# Patient Record
Sex: MALE | Race: WHITE | NOT HISPANIC OR LATINO | Employment: FULL TIME | URBAN - METROPOLITAN AREA
[De-identification: names, ages, dates, MRNs, and addresses within clinical notes are randomized per-mention and may not be internally consistent; named-entity substitution may affect disease eponyms.]

---

## 2018-09-12 ENCOUNTER — APPOINTMENT (EMERGENCY)
Dept: RADIOLOGY | Facility: HOSPITAL | Age: 63
DRG: 210 | End: 2018-09-12
Payer: OTHER MISCELLANEOUS

## 2018-09-12 ENCOUNTER — HOSPITAL ENCOUNTER (INPATIENT)
Facility: HOSPITAL | Age: 63
LOS: 5 days | DRG: 210 | End: 2018-09-17
Attending: EMERGENCY MEDICINE | Admitting: INTERNAL MEDICINE
Payer: OTHER MISCELLANEOUS

## 2018-09-12 DIAGNOSIS — E55.9 VITAMIN D DEFICIENCY: ICD-10-CM

## 2018-09-12 DIAGNOSIS — E11.65 TYPE 2 DIABETES MELLITUS WITH HYPERGLYCEMIA, WITHOUT LONG-TERM CURRENT USE OF INSULIN (HCC): ICD-10-CM

## 2018-09-12 DIAGNOSIS — M81.0 OSTEOPOROSIS WITHOUT CURRENT PATHOLOGICAL FRACTURE, UNSPECIFIED OSTEOPOROSIS TYPE: ICD-10-CM

## 2018-09-12 DIAGNOSIS — S72.002A HIP FRACTURE, LEFT, CLOSED, INITIAL ENCOUNTER (HCC): Primary | ICD-10-CM

## 2018-09-12 DIAGNOSIS — R64 CACHECTIC (HCC): ICD-10-CM
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
## 2018-09-12 PROBLEM — E87.1 HYPONATREMIA: Status: ACTIVE | Noted: 2018-09-12

## 2018-09-12 LAB
ABO GROUP BLD: NORMAL
ANION GAP SERPL CALCULATED.3IONS-SCNC: 11 MMOL/L (ref 4–13)
APTT PPP: 26 SECONDS (ref 24–36)
ATRIAL RATE: 82 BPM
BASOPHILS # BLD AUTO: 0.03 THOUSANDS/ΜL (ref 0–0.1)
BASOPHILS NFR BLD AUTO: 1 % (ref 0–1)
BLD GP AB SCN SERPL QL: NEGATIVE
BUN SERPL-MCNC: 19 MG/DL (ref 5–25)
CALCIUM SERPL-MCNC: 8.8 MG/DL (ref 8.3–10.1)
CHLORIDE SERPL-SCNC: 96 MMOL/L (ref 100–108)
CO2 SERPL-SCNC: 23 MMOL/L (ref 21–32)
CREAT SERPL-MCNC: 1.01 MG/DL (ref 0.6–1.3)
CREAT UR-MCNC: 84 MG/DL
EOSINOPHIL # BLD AUTO: 0.03 THOUSAND/ΜL (ref 0–0.61)
EOSINOPHIL NFR BLD AUTO: 1 % (ref 0–6)
ERYTHROCYTE [DISTWIDTH] IN BLOOD BY AUTOMATED COUNT: 11.9 % (ref 11.6–15.1)
GFR SERPL CREATININE-BSD FRML MDRD: 78 ML/MIN/1.73SQ M
GLUCOSE SERPL-MCNC: 280 MG/DL (ref 65–140)
GLUCOSE SERPL-MCNC: 455 MG/DL (ref 65–140)
GLUCOSE SERPL-MCNC: 479 MG/DL (ref 65–140)
GLUCOSE SERPL-MCNC: >500 MG/DL (ref 65–140)
HCT VFR BLD AUTO: 39.6 % (ref 36.5–49.3)
HGB BLD-MCNC: 14.1 G/DL (ref 12–17)
IMM GRANULOCYTES # BLD AUTO: 0.03 THOUSAND/UL (ref 0–0.2)
IMM GRANULOCYTES NFR BLD AUTO: 1 % (ref 0–2)
INR PPP: 1.03 (ref 0.86–1.17)
LYMPHOCYTES # BLD AUTO: 1.2 THOUSANDS/ΜL (ref 0.6–4.47)
LYMPHOCYTES NFR BLD AUTO: 29 % (ref 14–44)
MCH RBC QN AUTO: 30.2 PG (ref 26.8–34.3)
MCHC RBC AUTO-ENTMCNC: 35.6 G/DL (ref 31.4–37.4)
MCV RBC AUTO: 85 FL (ref 82–98)
MICROALBUMIN UR-MCNC: 7.6 MG/L (ref 0–20)
MICROALBUMIN/CREAT 24H UR: 9 MG/G CREATININE (ref 0–30)
MONOCYTES # BLD AUTO: 0.24 THOUSAND/ΜL (ref 0.17–1.22)
MONOCYTES NFR BLD AUTO: 6 % (ref 4–12)
NEUTROPHILS # BLD AUTO: 2.64 THOUSANDS/ΜL (ref 1.85–7.62)
NEUTS SEG NFR BLD AUTO: 62 % (ref 43–75)
NRBC BLD AUTO-RTO: 0 /100 WBCS
P AXIS: 66 DEGREES
PLATELET # BLD AUTO: 159 THOUSANDS/UL (ref 149–390)
PMV BLD AUTO: 9.8 FL (ref 8.9–12.7)
POTASSIUM SERPL-SCNC: 4.6 MMOL/L (ref 3.5–5.3)
PR INTERVAL: 174 MS
PROTHROMBIN TIME: 13.6 SECONDS (ref 11.8–14.2)
QRS AXIS: 69 DEGREES
QRSD INTERVAL: 74 MS
QT INTERVAL: 372 MS
QTC INTERVAL: 434 MS
RBC # BLD AUTO: 4.67 MILLION/UL (ref 3.88–5.62)
RH BLD: POSITIVE
SODIUM SERPL-SCNC: 130 MMOL/L (ref 136–145)
SPECIMEN EXPIRATION DATE: NORMAL
T WAVE AXIS: 76 DEGREES
VENTRICULAR RATE: 82 BPM
WBC # BLD AUTO: 4.17 THOUSAND/UL (ref 4.31–10.16)

## 2018-09-12 PROCEDURE — 82948 REAGENT STRIP/BLOOD GLUCOSE: CPT

## 2018-09-12 PROCEDURE — 85025 COMPLETE CBC W/AUTO DIFF WBC: CPT | Performed by: EMERGENCY MEDICINE

## 2018-09-12 PROCEDURE — 85730 THROMBOPLASTIN TIME PARTIAL: CPT | Performed by: ORTHOPAEDIC SURGERY

## 2018-09-12 PROCEDURE — 73080 X-RAY EXAM OF ELBOW: CPT

## 2018-09-12 PROCEDURE — 86901 BLOOD TYPING SEROLOGIC RH(D): CPT | Performed by: ORTHOPAEDIC SURGERY

## 2018-09-12 PROCEDURE — 71045 X-RAY EXAM CHEST 1 VIEW: CPT

## 2018-09-12 PROCEDURE — 36415 COLL VENOUS BLD VENIPUNCTURE: CPT | Performed by: ORTHOPAEDIC SURGERY

## 2018-09-12 PROCEDURE — 82570 ASSAY OF URINE CREATININE: CPT | Performed by: INTERNAL MEDICINE

## 2018-09-12 PROCEDURE — 80048 BASIC METABOLIC PNL TOTAL CA: CPT | Performed by: EMERGENCY MEDICINE

## 2018-09-12 PROCEDURE — 93010 ELECTROCARDIOGRAM REPORT: CPT | Performed by: INTERNAL MEDICINE

## 2018-09-12 PROCEDURE — 86900 BLOOD TYPING SEROLOGIC ABO: CPT | Performed by: ORTHOPAEDIC SURGERY

## 2018-09-12 PROCEDURE — 96361 HYDRATE IV INFUSION ADD-ON: CPT

## 2018-09-12 PROCEDURE — 85610 PROTHROMBIN TIME: CPT | Performed by: ORTHOPAEDIC SURGERY

## 2018-09-12 PROCEDURE — 90471 IMMUNIZATION ADMIN: CPT

## 2018-09-12 PROCEDURE — 82043 UR ALBUMIN QUANTITATIVE: CPT | Performed by: INTERNAL MEDICINE

## 2018-09-12 PROCEDURE — 96374 THER/PROPH/DIAG INJ IV PUSH: CPT

## 2018-09-12 PROCEDURE — 99285 EMERGENCY DEPT VISIT HI MDM: CPT

## 2018-09-12 PROCEDURE — 90715 TDAP VACCINE 7 YRS/> IM: CPT | Performed by: EMERGENCY MEDICINE

## 2018-09-12 PROCEDURE — 73502 X-RAY EXAM HIP UNI 2-3 VIEWS: CPT

## 2018-09-12 PROCEDURE — 86850 RBC ANTIBODY SCREEN: CPT | Performed by: ORTHOPAEDIC SURGERY

## 2018-09-12 PROCEDURE — 93005 ELECTROCARDIOGRAM TRACING: CPT

## 2018-09-12 PROCEDURE — 73552 X-RAY EXAM OF FEMUR 2/>: CPT

## 2018-09-12 PROCEDURE — 36415 COLL VENOUS BLD VENIPUNCTURE: CPT | Performed by: EMERGENCY MEDICINE

## 2018-09-12 RX ORDER — HEPARIN SODIUM 5000 [USP'U]/ML
5000 INJECTION, SOLUTION INTRAVENOUS; SUBCUTANEOUS EVERY 8 HOURS SCHEDULED
Status: DISCONTINUED | OUTPATIENT
Start: 2018-09-12 | End: 2018-09-13

## 2018-09-12 RX ORDER — INSULIN GLARGINE 100 [IU]/ML
10 INJECTION, SOLUTION SUBCUTANEOUS ONCE
Status: COMPLETED | OUTPATIENT
Start: 2018-09-12 | End: 2018-09-12

## 2018-09-12 RX ORDER — METHOCARBAMOL 500 MG/1
500 TABLET, FILM COATED ORAL EVERY 6 HOURS PRN
Status: DISCONTINUED | OUTPATIENT
Start: 2018-09-12 | End: 2018-09-17 | Stop reason: HOSPADM

## 2018-09-12 RX ORDER — DOCUSATE SODIUM 100 MG/1
100 CAPSULE, LIQUID FILLED ORAL 2 TIMES DAILY
Status: DISCONTINUED | OUTPATIENT
Start: 2018-09-12 | End: 2018-09-16

## 2018-09-12 RX ORDER — MORPHINE SULFATE 10 MG/ML
6 INJECTION, SOLUTION INTRAMUSCULAR; INTRAVENOUS ONCE
Status: COMPLETED | OUTPATIENT
Start: 2018-09-12 | End: 2018-09-12

## 2018-09-12 RX ORDER — ACETAMINOPHEN 325 MG/1
650 TABLET ORAL EVERY 6 HOURS PRN
Status: DISCONTINUED | OUTPATIENT
Start: 2018-09-12 | End: 2018-09-17 | Stop reason: HOSPADM

## 2018-09-12 RX ORDER — OXYCODONE HYDROCHLORIDE 5 MG/1
5 TABLET ORAL EVERY 4 HOURS PRN
Status: DISCONTINUED | OUTPATIENT
Start: 2018-09-12 | End: 2018-09-17 | Stop reason: HOSPADM

## 2018-09-12 RX ORDER — SODIUM CHLORIDE 9 MG/ML
125 INJECTION, SOLUTION INTRAVENOUS CONTINUOUS
Status: DISCONTINUED | OUTPATIENT
Start: 2018-09-13 | End: 2018-09-15

## 2018-09-12 RX ORDER — ACETAMINOPHEN 325 MG/1
650 TABLET ORAL EVERY 6 HOURS PRN
Status: DISCONTINUED | OUTPATIENT
Start: 2018-09-12 | End: 2018-09-12

## 2018-09-12 RX ORDER — POLYETHYLENE GLYCOL 3350 17 G/17G
17 POWDER, FOR SOLUTION ORAL DAILY PRN
Status: DISCONTINUED | OUTPATIENT
Start: 2018-09-12 | End: 2018-09-17 | Stop reason: HOSPADM

## 2018-09-12 RX ORDER — GABAPENTIN 100 MG/1
100 CAPSULE ORAL 3 TIMES DAILY
Status: DISCONTINUED | OUTPATIENT
Start: 2018-09-12 | End: 2018-09-17 | Stop reason: HOSPADM

## 2018-09-12 RX ORDER — OXYCODONE HYDROCHLORIDE 10 MG/1
10 TABLET ORAL EVERY 4 HOURS PRN
Status: DISCONTINUED | OUTPATIENT
Start: 2018-09-12 | End: 2018-09-17 | Stop reason: HOSPADM

## 2018-09-12 RX ADMIN — MORPHINE SULFATE 6 MG: 10 INJECTION INTRAVENOUS at 09:40

## 2018-09-12 RX ADMIN — INSULIN LISPRO 6 UNITS: 100 INJECTION, SOLUTION INTRAVENOUS; SUBCUTANEOUS at 18:29

## 2018-09-12 RX ADMIN — HEPARIN SODIUM 5000 UNITS: 5000 INJECTION, SOLUTION INTRAVENOUS; SUBCUTANEOUS at 21:04

## 2018-09-12 RX ADMIN — DOCUSATE SODIUM 100 MG: 100 CAPSULE, LIQUID FILLED ORAL at 17:58

## 2018-09-12 RX ADMIN — OXYCODONE HYDROCHLORIDE 10 MG: 10 TABLET ORAL at 21:04

## 2018-09-12 RX ADMIN — SODIUM CHLORIDE 125 ML/HR: 0.9 INJECTION, SOLUTION INTRAVENOUS at 10:59

## 2018-09-12 RX ADMIN — INSULIN GLARGINE 10 UNITS: 100 INJECTION, SOLUTION SUBCUTANEOUS at 18:30

## 2018-09-12 RX ADMIN — INSULIN LISPRO 2 UNITS: 100 INJECTION, SOLUTION INTRAVENOUS; SUBCUTANEOUS at 21:05

## 2018-09-12 RX ADMIN — HEPARIN SODIUM 5000 UNITS: 5000 INJECTION, SOLUTION INTRAVENOUS; SUBCUTANEOUS at 15:00

## 2018-09-12 RX ADMIN — GABAPENTIN 100 MG: 100 CAPSULE ORAL at 13:15

## 2018-09-12 RX ADMIN — TETANUS TOXOID, REDUCED DIPHTHERIA TOXOID AND ACELLULAR PERTUSSIS VACCINE, ADSORBED 0.5 ML: 5; 2.5; 8; 8; 2.5 SUSPENSION INTRAMUSCULAR at 09:40

## 2018-09-12 RX ADMIN — METHOCARBAMOL 500 MG: 500 TABLET ORAL at 17:58

## 2018-09-12 RX ADMIN — GABAPENTIN 100 MG: 100 CAPSULE ORAL at 21:04

## 2018-09-12 RX ADMIN — GABAPENTIN 100 MG: 100 CAPSULE ORAL at 17:58

## 2018-09-12 NOTE — H&P
INTERNAL MEDICINE HISTORY AND PHYSICAL  [facility]     NAME: Rajiv Piedra  AGE: 70 y o  SEX: male  : 1955   MRN: [id number]  ENCOUNTER: [id number]    DATE: 2018  TIME: 2:20 PM    Primary Care Physician: No primary care provider on file  Admitting Provider: Kyara Rushing MD    Chief complaint:  Fall    History of Present Illness     Rajiv Piedra is a 70 y o  male with reported history of possible pre diabetes, otherwise patient's past medical history is unknown as he has not seen a physician in many years  Patient thinks he may have taken medications for blood pressure though he is not sure  Patient does not currently take any medications for other chronic co morbidities  Patient presented to the emergency department in the setting of a mechanical fall resulting in a left hip fracture  Patient states he was at work he is a  for [email or web address] and was running and slipped and landed on his left hip  He denies any loss of consciousness or seizure-like activity  Patient noticed immediate pain, externally rotated left leg and inability to weight bear on left lower extremity  Patient did not have any head trauma  On presentation patient's glucose was elevated at 480  He is not on any insulin or oral antihyperglycemics  Patient states he was told he might have pre diabetes many years ago  Patient is planned for OR tomorrow with Ortho  Denies shortness of breath, fevers, chills, nausea, vomiting  Denies any chest pain  Denies any history of chest pain  Patient does report being a former smoker from the age of 14 to approximately age 26 packs per day however quit in the 46s  Patient states he is very active and exercises daily  Patient states he does pushups every day and used to run on a daily basis though does not run as much    His exercise capacity is good    Patient will be admitted to Medicine service for preoperative evaluation as well as medical management    Review of Systems   Review of Systems   Constitutional: Negative for activity change, appetite change, chills, fatigue and fever  HENT: Negative  Eyes: Negative  Respiratory: Negative for cough, choking, shortness of breath and wheezing  Cardiovascular: Negative for chest pain, palpitations and leg swelling  Gastrointestinal: Negative for abdominal distention, abdominal pain, constipation, diarrhea, nausea and vomiting  Endocrine: Positive for polyuria  Negative for polydipsia and polyphagia  Genitourinary: Positive for frequency  Negative for difficulty urinating, dysuria, flank pain and hematuria  Musculoskeletal: Positive for arthralgias, gait problem and joint swelling  Skin: Positive for color change and wound  Hematological: Negative  Psychiatric/Behavioral: Negative  Past Medical History     Past Medical History:   Diagnosis Date    H/O nicotine dependence     Prediabetes        Past Surgical History   No past surgical history on file  Social History     History   Alcohol Use No     History   Drug Use No     History   Smoking Status    Former Smoker    Packs/day: 2 00    Years: 15 00    Types: Cigarettes    Quit date: 9/12/1987   Smokeless Tobacco    Not on file       Family History     Family History   Problem Relation Age of Onset    No Known Problems Mother     No Known Problems Father     No Known Problems Sister     Lung cancer Brother        Medications Prior to Admission     Prior to Admission medications    Not on File       Allergies   No Known Allergies    Objective     Vitals:    09/12/18 1103 09/12/18 1106 09/12/18 1358   BP: 108/58 108/58 125/65   BP Location: Left arm Left arm    Pulse: 78 82 76   Resp: 16 16 16   SpO2: 98% 98% 97%     There is no height or weight on file to calculate BMI    No intake or output data in the 24 hours ending 09/12/18 1420  Invasive Devices     Peripheral Intravenous Line            Peripheral IV 09/12/18 Right Antecubital less than 1 day                Physical Exam  Physical Exam   Constitutional: He is oriented to person, place, and time  No distress  Thin appearing, cachectic   HENT:   Head: Normocephalic and atraumatic  Eyes: Pupils are equal, round, and reactive to light  Neck: Normal range of motion  No JVD present  Cardiovascular: Normal rate and regular rhythm  Exam reveals no gallop and no friction rub  No murmur heard  Pulmonary/Chest: Effort normal and breath sounds normal  No respiratory distress  He has no wheezes  He has no rales  Abdominal: Soft  He exhibits no distension  There is no tenderness  Hyperactive bowel sounds   Musculoskeletal: He exhibits tenderness and deformity  Left lower extremity externally rotated  Limited range of motion  Mild bruising noted on lateral aspect of left lower extremity by knee   Neurological: He is alert and oriented to person, place, and time  No cranial nerve deficit  Skin: Skin is warm  He is not diaphoretic  Superficial skin laceration on left upper extremity on elbow   Vitals reviewed  Lab Results: I have personally reviewed pertinent reports      CBC:   Results from last 7 days  Lab Units 09/12/18  0930   WBC Thousand/uL 4 17*   RBC Million/uL 4 67   HEMOGLOBIN g/dL 14 1   HEMATOCRIT % 39 6   MCV fL 85   MCH pg 30 2   MCHC g/dL 35 6   RDW % 11 9   MPV fL 9 8   PLATELETS Thousands/uL 159   NRBC AUTO /100 WBCs 0   NEUTROS PCT % 62   LYMPHS PCT % 29   MONOS PCT % 6   EOS PCT % 1   BASOS PCT % 1   NEUTROS ABS Thousands/µL 2 64   LYMPHS ABS Thousands/µL 1 20   MONOS ABS Thousand/µL 0 24   EOS ABS Thousand/µL 0 03   , Chemistry Profile:   Results from last 7 days  Lab Units 09/12/18  0930   SODIUM mmol/L 130*   POTASSIUM mmol/L 4 6   CHLORIDE mmol/L 96*   CO2 mmol/L 23   BUN mg/dL 19   CREATININE mg/dL 1 01   CALCIUM mg/dL 8 8   EGFR ml/min/1 73sq m 78   , Coagulation Studies:   Results from last 7 days  Lab Units 09/12/18  1049   PROTIME seconds 13 6   INR 1 03   PTT seconds 26   , Cardiac Studies:       Imaging: I have personally reviewed pertinent films in PACS  Xr Elbow 3+ Views Left    Result Date: 9/12/2018  Narrative: LEFT ELBOW INDICATION:   elbow pain  Injury COMPARISON:  None VIEWS:  XR ELBOW 3+ VW LEFT FINDINGS: There is no acute fracture or dislocation  There is no joint effusion  Posterior olecranon spurring  No lytic or blastic lesions are seen  Soft tissues are unremarkable  Impression: No acute osseous abnormality  Workstation performed: [id number]     Xr Hip/pelv 2-3 Vws Left    Result Date: 9/12/2018  Narrative: LEFT HIP INDICATION:   hip pain  COMPARISON:  None VIEWS:  XR HIP/PELV 2-3 VWS LEFT  W PELVIS IF PERFORMED FINDINGS: There is a mildly displaced, likely comminuted left femoral intertrochanteric fracture  Mild hip degenerative changes  No lytic or blastic osseous lesions  Soft tissues are unremarkable  Degenerative changes visualized lower lumbar spine  Impression: There is a mildly displaced, likely comminuted left femoral intertrochanteric fracture  Workstation performed: [id number]     Xr Femur 2 Vw Left    Result Date: 9/12/2018  Narrative: LEFT FEMUR INDICATION:   hip pain  Injury COMPARISON:  None VIEWS:  XR FEMUR 2 VW LEFT FINDINGS: There is a mildly displaced, likely comminuted left femoral intertrochanteric fracture  Left hip and left knee degenerative changes  No lytic or blastic lesions are seen  Soft tissues are unremarkable  Impression: There is a mildly displaced, likely comminuted left femoral intertrochanteric fracture  Workstation performed: [id number]     Xr Chest 1 View    Result Date: 9/12/2018  Narrative: CHEST INDICATION:   hip pain  Injury COMPARISON:  None EXAM PERFORMED/VIEWS:  XR CHEST 1 VIEW FINDINGS: Cardiomediastinal silhouette appears unremarkable  The lungs are clear  No pneumothorax or pleural effusion  Osseous structures appear within normal limits for patient age       Impression: No acute cardiopulmonary disease  Workstation performed: [id number]           EKG, Pathology, and Other Studies: I have personally reviewed pertinent reports  Medications given in Emergency Department     Medication Administration - last 24 hours from 09/11/2018 1420 to 09/12/2018 1420       Date/Time Order Dose Route Action Action by     09/12/2018 0940 tetanus-diphtheria-acellular pertussis (BOOSTRIX) IM injection 0 5 mL 0 5 mL Intramuscular Given Jacinto Good RN     09/12/2018 0940 morphine (PF) 10 mg/mL injection 6 mg 6 mg Intravenous Given Jacinto Good RN     09/12/2018 1059 sodium chloride 0 9 % infusion 125 mL/hr Intravenous New Bag Hazeline Raw     09/12/2018 1315 gabapentin (NEURONTIN) capsule 100 mg 100 mg Oral Given David Mcgregor RN          Assessment and Plan     1  Left femoral intertrochanteric fracture  · NPO after midnight  To OR with Ortho for fixation of left femoral intertrochanteric fracture  · Pain control  · Monitor for postop anemia  · IV fluids  · Preoperative evaluation - patient has no history of coronary artery disease or congestive heart failure  He is not on intensive insulin regimen  Patient reports good exercise tolerance with at least greater than 4 mets of physical activity  Denies any current or history of chest pain  No lower extremity edema  Patient's revised repeat cardiac risk index is 0 4% for estimated rate of MI, PE, VF, cardiac arrest or complete heart block perioperatively  Therefore patient is very low risk of adverse outcome with noncardiac surgery and does not require further cardiac risk stratification to proceed with low risk orthopedic procedure    2  Presumed Type 2 diabetes mellitus with history of pre diabetes and significant hyperglycemia on admission  · No prior A1c  Patient states he was told he may have pre diabetes many years ago  Patient does not follow with primary care physician and has not followed diabetic diet    · On admission blood glucose was elevated to almost 500  He had no evidence of anion gap or acidosis to suggest DKA  · For now will place on correction scale algorithm 2 t i d  with meals and algorithm 1 q h s  · Will check A1c to assess severity of disease  Diabetic diet  · Check microalbumin, creatinine ratio and lipid panel  Consider statin and ACE-inhibitor based on results    3  Reported history of possible hypertension  · Currently controlled  Continue to monitor off medication    4  History of nicotine dependence  · Two pack per day from age 14 to approximately 25  Patient states he quit in the 1980s  Patient does report difficulty gaining weight over the last year and reports he used to be maybe 20 lb heavier a few years ago  He does appear cachectic though reports his weight over the last year has been stable  · Consider lung cancer screening with low-dose CT once acute issues addressed    5  Cachexia - nutrition consult  Diabetic diet    6  Hyponatremia - continue to monitor  Sodium 130 on admission  Asymptomatic  IV fluids recheck BMP in    Code Status: Level 1 - Full Code  VTE Pharmacologic Prophylaxis: Heparin   VTE Mechanical Prophylaxis: foot pump applied  Admission Status: INPATIENT     Admission Time  I spent 45 minutes admitting the patient  This involved direct patient contact where I performed a full history and physical, reviewing previous records, and reviewing laboratory and other diagnostic studies      Bassem Ochoa MD  Internal Medicine  PGY-3

## 2018-09-12 NOTE — CONSULTS
Orthopedics   Jennie Nurse 70 y o  male MRN: [id number]  Unit/Bed#: X ray      Chief Complaint:   left hip pain    HPI:   70 y  o male community ambulator status post fall at work complaining of left hip pain and inability to bear weight  Patient works as a  for a [employer] and fell at work, complaining of immediate pain  He has no history of injury or pain in this hip  He only complains of elbow pain otherwise  No associated numbness or tingling  He has not seen a doctor in many years, either  Review Of Systems:   · Skin: Superficial abrasion over posterior olecranon  · Neuro: See HPI  · Musculoskeletal: See HPI  · 14 point review of systems negative except as stated above     Past Medical History:   No past medical history on file  Past Surgical History:   No past surgical history on file  Family History:  Family history reviewed and non-contributory  No family history on file  Social History:  Social History     Social History    Marital status: Single     Spouse name: N/A    Number of children: N/A    Years of education: N/A     Social History Main Topics    Smoking status: Never Smoker    Smokeless tobacco: Not on file    Alcohol use No    Drug use: No    Sexual activity: Not on file     Other Topics Concern    Not on file     Social History Narrative    No narrative on file       Allergies:   No Known Allergies        Labs:    0  Lab Value Date/Time   HCT 39 6 09/12/2018 0930   HGB 14 1 09/12/2018 0930   INR 1 03 09/12/2018 1049   WBC 4 17 (L) 09/12/2018 0930       Meds:    Current Facility-Administered Medications:     [START ON 9/13/2018] sodium chloride 0 9 % infusion, 125 mL/hr, Intravenous, Continuous, Víctor Cardona MD, Last Rate: 125 mL/hr at 09/12/18 1059, 125 mL/hr at 09/12/18 1059  No current outpatient prescriptions on file      Blood Culture:   No results found for: BLOODCX    Wound Culture:   No results found for: WOUNDCULT    Ins and Outs:  No intake/output data recorded  Physical Exam:   /58 (BP Location: Left arm)   Pulse 82   Resp 16   SpO2 98%   Gen: Alert and oriented to person, place, time  HEENT: EOMI, eyes clear, moist mucus membranes, hearing intact  Respiratory: Bilateral chest rise  No audible wheezing found  Cardiovascular: Regular Rate and Rhythm  Abdomen: soft nontender/nondistended  Musculoskeletal: left lower extremity  · Skin intact, limb shortened and externally rotated  · Tender to palpation over hip  · Positive log roll  · Sensation intact L2-S1  · Positive ankle dorsi/plantar flexion, EHL/FHL  · 2+ DP pulse    Radiology:   I personally reviewed the films  X-rays left hip shows Intertrochanteric femur fracture    Assessment:  70 y  o male status post fall with leftIntertrochanteric femur fracture    Plan:   · Non weight bearing left lower extremity  · Analgesics for pain  · NPO at midnight  · Ortega Catheter insertion  · Medicine to manage elevated glucose levels in 400s and sodium in 130s  · To OR for IM nail femur fracture of Intertrochanteric femur fracture   · Pre-op labs and imaging    Frances James MD

## 2018-09-12 NOTE — ED PROVIDER NOTES
History  Chief Complaint   Patient presents with    Fall     Pt tripped while running an dinjured his L hip  -LOC -thinners -head strike    Hip Injury     Patient is a 76-year-old male with no known medical history who presents with fall  Patient says that he suffered a mechanical fall at approximately 8:00 a m  this morning while running at work  He works for [employer] and is active  He says the surface was slippery and he slipped and fell on his left hip  He denies any preceding chest pain, dyspnea, palpitations, syncope  It was mechanical fall  He says that he primarily struck his left hip and that is where all of his pain is at this point  He denies any head trauma, neck trauma, chest pain, dyspnea  Patient denies any neurologic symptoms including headache, altered mental status, focal neurological deficits, nausea, vomiting  Patient denies any neck pain or decreased range of motion of the neck  He has not been ambulatory since the accident  He denies pain anywhere else on his body other than his left hip at this time  Patient was stood up by his colleagues at work and was unable to bear weight on his left leg  Patient denies medical history, though he has not seen a doctor for many years he tells me  He denies any recent history of broken bones  He does not take any daily medications  None       Past Medical History:   Diagnosis Date    H/O nicotine dependence     Prediabetes        No past surgical history on file  Family History   Problem Relation Age of Onset    No Known Problems Mother     No Known Problems Father     No Known Problems Sister     Lung cancer Brother      I have reviewed and agree with the history as documented      Social History   Substance Use Topics    Smoking status: Former Smoker     Packs/day: 2 00     Years: 15 00     Types: Cigarettes     Quit date: 9/12/1987    Smokeless tobacco: Not on file    Alcohol use No        Review of Systems Constitutional: Negative for chills, diaphoresis, fatigue and fever  HENT: Negative for drooling, facial swelling, sore throat and trouble swallowing  Eyes: Negative for photophobia  Respiratory: Negative for cough, choking, chest tightness, shortness of breath, wheezing and stridor  Cardiovascular: Negative for chest pain, palpitations and leg swelling  Gastrointestinal: Negative for abdominal distention, abdominal pain, diarrhea, nausea and vomiting  Genitourinary: Negative for dysuria  Musculoskeletal: Negative for back pain, neck pain and neck stiffness  Left hip pain   Skin: Negative for color change, pallor and rash  Neurological: Negative for dizziness, speech difficulty, weakness, light-headedness, numbness and headaches  Hematological: Negative for adenopathy  Psychiatric/Behavioral: Negative for agitation  All other systems reviewed and are negative  Physical Exam  ED Triage Vitals   Temperature Pulse Respirations Blood Pressure SpO2   09/12/18 1445 09/12/18 1103 09/12/18 1103 09/12/18 1103 09/12/18 1103   98 3 °F (36 8 °C) 78 16 108/58 98 %      Temp Source Heart Rate Source Patient Position - Orthostatic VS BP Location FiO2 (%)   09/12/18 1445 09/12/18 1103 09/12/18 1106 09/12/18 1103 --   Oral Monitor Lying Left arm       Pain Score       09/12/18 0940       8           Orthostatic Vital Signs  Vitals:    09/12/18 1103 09/12/18 1106 09/12/18 1358 09/12/18 1445   BP: 108/58 108/58 125/65 136/64   Pulse: 78 82 76 95   Patient Position - Orthostatic VS:  Lying  Lying       Physical Exam   Constitutional: He is oriented to person, place, and time  He appears well-developed and well-nourished  No distress  HENT:   Head: Normocephalic  Greer sign negative  Raccoon eyes negative  Has no hemotympanum bilaterally  Eyes: EOM are normal  Pupils are equal, round, and reactive to light  Neck: Normal range of motion  Neck supple  Neck is nontender    Full range of motion of the neck  Cardiovascular: Normal rate, regular rhythm, normal heart sounds and intact distal pulses  Exam reveals no gallop and no friction rub  No murmur heard  Pulmonary/Chest: Effort normal and breath sounds normal    Lungs are clear bilaterally   Abdominal: Soft  Bowel sounds are normal  He exhibits no distension  There is no tenderness  There is no guarding  Abdomen is soft, nondistended, nontender  No rebound tenderness or guarding  Musculoskeletal: Normal range of motion  No tenderness of the T-spine, L-spine, sacrum  No tenderness of the remainder of the back, chest wall, left elbow, left knee  No tenderness of the trochanter of the left hip  Patient is keeping his left leg flex and internally rotated  He will not allow me to move the leg  Distally patient is neurovascularly intact on the left leg  Abrasion noted to the left elbow and left knee  Neurological: He is alert and oriented to person, place, and time  No cranial nerve deficit or sensory deficit  He exhibits normal muscle tone  Skin: Capillary refill takes less than 2 seconds  Psychiatric: He has a normal mood and affect   His behavior is normal  Judgment and thought content normal        ED Medications  Medications   sodium chloride 0 9 % infusion (125 mL/hr Intravenous New Bag 9/12/18 1059)   gabapentin (NEURONTIN) capsule 100 mg (100 mg Oral Given 9/12/18 1758)   HYDROmorphone (DILAUDID) injection 0 5 mg (not administered)   methocarbamol (ROBAXIN) tablet 500 mg (500 mg Oral Given 9/12/18 1758)   oxyCODONE (ROXICODONE) immediate release tablet 10 mg (not administered)   oxyCODONE (ROXICODONE) IR tablet 5 mg (not administered)   acetaminophen (TYLENOL) tablet 650 mg (not administered)   docusate sodium (COLACE) capsule 100 mg (100 mg Oral Given 9/12/18 1758)   polyethylene glycol (MIRALAX) packet 17 g (not administered)   heparin (porcine) subcutaneous injection 5,000 Units (5,000 Units Subcutaneous Given 9/12/18 1500)   insulin lispro (HumaLOG) 100 units/mL subcutaneous injection 1-6 Units (6 Units Subcutaneous Given 9/12/18 1829)   insulin lispro (HumaLOG) 100 units/mL subcutaneous injection 1-5 Units (not administered)   tetanus-diphtheria-acellular pertussis (BOOSTRIX) IM injection 0 5 mL (0 5 mL Intramuscular Given 9/12/18 0940)   morphine (PF) 10 mg/mL injection 6 mg (6 mg Intravenous Given 9/12/18 0940)   insulin glargine (LANTUS) subcutaneous injection 10 Units 0 1 mL (10 Units Subcutaneous Given 9/12/18 1830)       Diagnostic Studies  Results Reviewed     Procedure Component Value Units Date/Time    APTT [[id number]]  (Normal) Collected:  09/12/18 1049    Lab Status:  Final result Specimen:  Blood from Arm, Right Updated:  09/12/18 1117     PTT 26 seconds     Protime-INR [[id number]]  (Normal) Collected:  09/12/18 1049    Lab Status:  Final result Specimen:  Blood from Arm, Right Updated:  09/12/18 1117     Protime 13 6 seconds      INR 4 58    Basic metabolic panel [[id number]]  (Abnormal) Collected:  09/12/18 0930    Lab Status:  Final result Specimen:  Blood from Arm, Right Updated:  09/12/18 1002     Sodium 130 (L) mmol/L      Potassium 4 6 mmol/L      Chloride 96 (L) mmol/L      CO2 23 mmol/L      ANION GAP 11 mmol/L      BUN 19 mg/dL      Creatinine 1 01 mg/dL      Glucose 479 (H) mg/dL      Calcium 8 8 mg/dL      eGFR 78 ml/min/1 73sq m     Narrative:         National Kidney Disease Education Program recommendations are as follows:  GFR calculation is accurate only with a steady state creatinine  Chronic Kidney disease less than 60 ml/min/1 73 sq  meters  Kidney failure less than 15 ml/min/1 73 sq  meters      CBC and differential [[id number]]  (Abnormal) Collected:  09/12/18 0930    Lab Status:  Final result Specimen:  Blood from Arm, Right Updated:  09/12/18 0940     WBC 4 17 (L) Thousand/uL      RBC 4 67 Million/uL      Hemoglobin 14 1 g/dL      Hematocrit 39 6 %      MCV 85 fL      MCH 30 2 pg      MCHC 35 6 g/dL      RDW 11 9 %      MPV 9 8 fL      Platelets 035 Thousands/uL      nRBC 0 /100 WBCs      Neutrophils Relative 62 %      Immat GRANS % 1 %      Lymphocytes Relative 29 %      Monocytes Relative 6 %      Eosinophils Relative 1 %      Basophils Relative 1 %      Neutrophils Absolute 2 64 Thousands/µL      Immature Grans Absolute 0 03 Thousand/uL      Lymphocytes Absolute 1 20 Thousands/µL      Monocytes Absolute 0 24 Thousand/µL      Eosinophils Absolute 0 03 Thousand/µL      Basophils Absolute 0 03 Thousands/µL                  XR elbow 3+ views LEFT   Final Result by Bandar Rojo MD (09/12 1126)      No acute osseous abnormality  Workstation performed: [id number]         XR chest 1 view   Final Result by Bandar Rojo MD (09/12 1124)      No acute cardiopulmonary disease  Workstation performed: [id number]         XR femur 2 vw left   Final Result by Bandar Rojo MD (09/12 1124)      There is a mildly displaced, likely comminuted left femoral intertrochanteric fracture  Workstation performed: [id number]         XR hip/pelv 2-3 vws left   Final Result by Bandar Rojo MD (09/12 1123)      There is a mildly displaced, likely comminuted left femoral intertrochanteric fracture  Workstation performed: [id number]               Procedures  Procedures      Phone Consults  ED Phone Contact    ED Course                               MDM  Number of Diagnoses or Management Options  [facility]): Hip fracture, left, closed, initial encounter [facility]): new and requires workup  Diagnosis management comments: Initial evaluation:  Patient is a 58-year-old male presents after mechanical fall  Patient does not have medical history, though he has not seen a physician in years  At this time, the major concern is left hip pathology (fracture versus dislocation)  Patient also has abrasions to the left elbow and left knee    We will image left elbow, left knee, pelvis, left hip   Patient does not require CT head or C-spine imaging at this time as patient has no neurologic complaints, neck pain, decreased range of motion of the neck  Patient will be given analgesia with morphine at this time in reassessed  Final evaluation:  Patient is a 68-year-old male who presents after mechanical fall found to have a intertrochanteric left hip fracture  I have discussed the patient case with Orthopedics  Due to fact that the patient has significant hyperglycemia and likely undiagnosed diabetes, he will be admitted to Medicine with orthopedic consult  He will need orthopedic surgery over the coming days  Patient was hemodynamically stable throughout his time in the ED  Amount and/or Complexity of Data Reviewed  Clinical lab tests: ordered and reviewed  Tests in the radiology section of CPT®: ordered and reviewed    Risk of Complications, Morbidity, and/or Mortality  Presenting problems: high  Diagnostic procedures: low  Management options: moderate    Patient Progress  Patient progress: stable    CritCare Time    Disposition  Final diagnoses:   Hip fracture, left, closed, initial encounter ([facility] [address] )   [facility] ([facility] [address] )     Time reflects when diagnosis was documented in both MDM as applicable and the Disposition within this note     Time User Action Codes Description Comment    9/12/2018 10:24 AM PhongLeticia kennedy [place] Add [S72 002A] Hip fracture, left, closed, initial encounter ([facility] [address] )     9/12/2018  1:40 PM CardioSofia Add [R64] [facility])       ED Disposition     ED Disposition Condition Comment    Admit  Admitting Physician: Javy Gudino [[id number]]   Level of Care: Med Surg [16]        Follow-up Information    None         There are no discharge medications for this patient  No discharge procedures on file  ED Provider  Attending physically available and evaluated Atilano Bence  I managed the patient along with the ED Attending      Electronically Signed by         Heather Vasques MD  09/12/18 1946

## 2018-09-12 NOTE — ED ATTENDING ATTESTATION
Princess Halina MD, saw and evaluated the patient  I have discussed the patient with the resident/non-physician practitioner and agree with the resident's/non-physician practitioner's findings, Plan of Care, and MDM as documented in the resident's/non-physician practitioner's note, except where noted  All available labs and Radiology studies were reviewed  At this point I agree with the current assessment done in the Emergency Department  I have conducted an independent evaluation of this patient a history and physical is as follows:  68-year-old male with no regular primary care, mechanical slip and fall on wet concrete  Patient was unable to ambulate after the event  Patient complains of severe left hip pain  Patient denies other injury  On exam patient with deformed rotated hip  Patient also with abrasion on his left elbow  Remainder of exam is unremarkable  Impression:  Hip fracture  X-ray with intratrochanteric hip fracture  Patient's labs with hyperglycemia  Will plan to admit to the hospital for surgical repair, management of hyperglycemia      Critical Care Time  CritCare Time    Procedures

## 2018-09-13 ENCOUNTER — ANESTHESIA EVENT (INPATIENT)
Dept: PERIOP | Facility: HOSPITAL | Age: 63
DRG: 210 | End: 2018-09-13
Payer: OTHER MISCELLANEOUS

## 2018-09-13 ENCOUNTER — APPOINTMENT (INPATIENT)
Dept: RADIOLOGY | Facility: HOSPITAL | Age: 63
DRG: 210 | End: 2018-09-13
Payer: OTHER MISCELLANEOUS

## 2018-09-13 ENCOUNTER — ANESTHESIA (INPATIENT)
Dept: PERIOP | Facility: HOSPITAL | Age: 63
DRG: 210 | End: 2018-09-13
Payer: OTHER MISCELLANEOUS

## 2018-09-13 LAB
ALBUMIN SERPL BCP-MCNC: 3.1 G/DL (ref 3.5–5)
ALP SERPL-CCNC: 34 U/L (ref 46–116)
ALT SERPL W P-5'-P-CCNC: 23 U/L (ref 12–78)
ANION GAP SERPL CALCULATED.3IONS-SCNC: 8 MMOL/L (ref 4–13)
APTT PPP: 28 SECONDS (ref 24–36)
AST SERPL W P-5'-P-CCNC: 12 U/L (ref 5–45)
BASOPHILS # BLD AUTO: 0.03 THOUSANDS/ΜL (ref 0–0.1)
BASOPHILS NFR BLD AUTO: 0 % (ref 0–1)
BILIRUB SERPL-MCNC: 0.87 MG/DL (ref 0.2–1)
BUN SERPL-MCNC: 19 MG/DL (ref 5–25)
CALCIUM SERPL-MCNC: 8.1 MG/DL (ref 8.3–10.1)
CHLORIDE SERPL-SCNC: 100 MMOL/L (ref 100–108)
CHOLEST SERPL-MCNC: 156 MG/DL (ref 50–200)
CO2 SERPL-SCNC: 25 MMOL/L (ref 21–32)
CREAT SERPL-MCNC: 0.78 MG/DL (ref 0.6–1.3)
EOSINOPHIL # BLD AUTO: 0.04 THOUSAND/ΜL (ref 0–0.61)
EOSINOPHIL NFR BLD AUTO: 1 % (ref 0–6)
ERYTHROCYTE [DISTWIDTH] IN BLOOD BY AUTOMATED COUNT: 12.2 % (ref 11.6–15.1)
EST. AVERAGE GLUCOSE BLD GHB EST-MCNC: 309 MG/DL
GFR SERPL CREATININE-BSD FRML MDRD: 95 ML/MIN/1.73SQ M
GLUCOSE SERPL-MCNC: 226 MG/DL (ref 65–140)
GLUCOSE SERPL-MCNC: 239 MG/DL (ref 65–140)
GLUCOSE SERPL-MCNC: 241 MG/DL (ref 65–140)
GLUCOSE SERPL-MCNC: 242 MG/DL (ref 65–140)
GLUCOSE SERPL-MCNC: 346 MG/DL (ref 65–140)
GLUCOSE SERPL-MCNC: 405 MG/DL (ref 65–140)
HBA1C MFR BLD: 12.4 % (ref 4.2–6.3)
HCT VFR BLD AUTO: 35.1 % (ref 36.5–49.3)
HDLC SERPL-MCNC: 52 MG/DL (ref 40–60)
HGB BLD-MCNC: 12.3 G/DL (ref 12–17)
IMM GRANULOCYTES # BLD AUTO: 0.03 THOUSAND/UL (ref 0–0.2)
IMM GRANULOCYTES NFR BLD AUTO: 0 % (ref 0–2)
INR PPP: 1.12 (ref 0.86–1.17)
LDLC SERPL CALC-MCNC: 81 MG/DL (ref 0–100)
LYMPHOCYTES # BLD AUTO: 1.57 THOUSANDS/ΜL (ref 0.6–4.47)
LYMPHOCYTES NFR BLD AUTO: 22 % (ref 14–44)
MCH RBC QN AUTO: 30 PG (ref 26.8–34.3)
MCHC RBC AUTO-ENTMCNC: 35 G/DL (ref 31.4–37.4)
MCV RBC AUTO: 86 FL (ref 82–98)
MONOCYTES # BLD AUTO: 0.55 THOUSAND/ΜL (ref 0.17–1.22)
MONOCYTES NFR BLD AUTO: 8 % (ref 4–12)
NEUTROPHILS # BLD AUTO: 4.88 THOUSANDS/ΜL (ref 1.85–7.62)
NEUTS SEG NFR BLD AUTO: 69 % (ref 43–75)
NRBC BLD AUTO-RTO: 0 /100 WBCS
PLATELET # BLD AUTO: 147 THOUSANDS/UL (ref 149–390)
PMV BLD AUTO: 10.3 FL (ref 8.9–12.7)
POTASSIUM SERPL-SCNC: 3.9 MMOL/L (ref 3.5–5.3)
PROT SERPL-MCNC: 6.1 G/DL (ref 6.4–8.2)
PROTHROMBIN TIME: 14.5 SECONDS (ref 11.8–14.2)
RBC # BLD AUTO: 4.1 MILLION/UL (ref 3.88–5.62)
SODIUM SERPL-SCNC: 133 MMOL/L (ref 136–145)
TRIGL SERPL-MCNC: 115 MG/DL
TSH SERPL DL<=0.05 MIU/L-ACNC: 2.88 UIU/ML (ref 0.36–3.74)
WBC # BLD AUTO: 7.1 THOUSAND/UL (ref 4.31–10.16)

## 2018-09-13 PROCEDURE — C1769 GUIDE WIRE: HCPCS | Performed by: ORTHOPAEDIC SURGERY

## 2018-09-13 PROCEDURE — 82948 REAGENT STRIP/BLOOD GLUCOSE: CPT

## 2018-09-13 PROCEDURE — C1713 ANCHOR/SCREW BN/BN,TIS/BN: HCPCS | Performed by: ORTHOPAEDIC SURGERY

## 2018-09-13 PROCEDURE — 80061 LIPID PANEL: CPT | Performed by: INTERNAL MEDICINE

## 2018-09-13 PROCEDURE — 0QS706Z REPOSITION LEFT UPPER FEMUR WITH INTRAMEDULLARY INTERNAL FIXATION DEVICE, OPEN APPROACH: ICD-10-PCS | Performed by: ORTHOPAEDIC SURGERY

## 2018-09-13 PROCEDURE — 85730 THROMBOPLASTIN TIME PARTIAL: CPT | Performed by: INTERNAL MEDICINE

## 2018-09-13 PROCEDURE — 83036 HEMOGLOBIN GLYCOSYLATED A1C: CPT | Performed by: INTERNAL MEDICINE

## 2018-09-13 PROCEDURE — 84443 ASSAY THYROID STIM HORMONE: CPT | Performed by: INTERNAL MEDICINE

## 2018-09-13 PROCEDURE — 80053 COMPREHEN METABOLIC PANEL: CPT | Performed by: INTERNAL MEDICINE

## 2018-09-13 PROCEDURE — 85025 COMPLETE CBC W/AUTO DIFF WBC: CPT | Performed by: INTERNAL MEDICINE

## 2018-09-13 PROCEDURE — 99255 IP/OBS CONSLTJ NEW/EST HI 80: CPT | Performed by: INTERNAL MEDICINE

## 2018-09-13 PROCEDURE — 99222 1ST HOSP IP/OBS MODERATE 55: CPT | Performed by: ORTHOPAEDIC SURGERY

## 2018-09-13 PROCEDURE — 85610 PROTHROMBIN TIME: CPT | Performed by: INTERNAL MEDICINE

## 2018-09-13 PROCEDURE — 27245 TREAT THIGH FRACTURE: CPT | Performed by: ORTHOPAEDIC SURGERY

## 2018-09-13 PROCEDURE — 73502 X-RAY EXAM HIP UNI 2-3 VIEWS: CPT

## 2018-09-13 DEVICE — 10MM/130 DEG TI CANN TROCH FIXATION NAIL 170MM-STERILE: Type: IMPLANTABLE DEVICE | Site: FEMUR | Status: FUNCTIONAL

## 2018-09-13 DEVICE — 5.0MM TI LOCKING SCREW W/T25 STARDRIVE 42MM F/IM NAIL-STER: Type: IMPLANTABLE DEVICE | Site: FEMUR | Status: FUNCTIONAL

## 2018-09-13 DEVICE — 11.0MM TI TROCH FIXATION NAIL SCREW/110MM - STERILE: Type: IMPLANTABLE DEVICE | Site: FEMUR | Status: FUNCTIONAL

## 2018-09-13 RX ORDER — CALCIUM CARBONATE 500(1250)
1 TABLET ORAL
Status: DISCONTINUED | OUTPATIENT
Start: 2018-09-13 | End: 2018-09-17 | Stop reason: HOSPADM

## 2018-09-13 RX ORDER — MAGNESIUM HYDROXIDE 1200 MG/15ML
LIQUID ORAL AS NEEDED
Status: DISCONTINUED | OUTPATIENT
Start: 2018-09-13 | End: 2018-09-13 | Stop reason: HOSPADM

## 2018-09-13 RX ORDER — MIDAZOLAM HYDROCHLORIDE 1 MG/ML
INJECTION INTRAMUSCULAR; INTRAVENOUS AS NEEDED
Status: DISCONTINUED | OUTPATIENT
Start: 2018-09-13 | End: 2018-09-13 | Stop reason: SURG

## 2018-09-13 RX ORDER — SODIUM CHLORIDE 9 MG/ML
75 INJECTION, SOLUTION INTRAVENOUS CONTINUOUS
Status: DISCONTINUED | OUTPATIENT
Start: 2018-09-13 | End: 2018-09-15

## 2018-09-13 RX ORDER — INSULIN GLARGINE 100 [IU]/ML
30 INJECTION, SOLUTION SUBCUTANEOUS DAILY
Status: DISCONTINUED | OUTPATIENT
Start: 2018-09-14 | End: 2018-09-15

## 2018-09-13 RX ORDER — INSULIN GLARGINE 100 [IU]/ML
12 INJECTION, SOLUTION SUBCUTANEOUS DAILY
Status: DISCONTINUED | OUTPATIENT
Start: 2018-09-13 | End: 2018-09-13

## 2018-09-13 RX ORDER — ONDANSETRON 2 MG/ML
4 INJECTION INTRAMUSCULAR; INTRAVENOUS EVERY 6 HOURS PRN
Status: DISCONTINUED | OUTPATIENT
Start: 2018-09-13 | End: 2018-09-17 | Stop reason: HOSPADM

## 2018-09-13 RX ORDER — PROPOFOL 10 MG/ML
INJECTION, EMULSION INTRAVENOUS AS NEEDED
Status: DISCONTINUED | OUTPATIENT
Start: 2018-09-13 | End: 2018-09-13 | Stop reason: SURG

## 2018-09-13 RX ORDER — OXYCODONE HYDROCHLORIDE 5 MG/1
TABLET ORAL
Qty: 40 TABLET | Refills: 0 | Status: CANCELLED | OUTPATIENT
Start: 2018-09-13

## 2018-09-13 RX ORDER — FENTANYL CITRATE 50 UG/ML
INJECTION, SOLUTION INTRAMUSCULAR; INTRAVENOUS AS NEEDED
Status: DISCONTINUED | OUTPATIENT
Start: 2018-09-13 | End: 2018-09-13 | Stop reason: SURG

## 2018-09-13 RX ORDER — OXYCODONE HYDROCHLORIDE 5 MG/1
5 TABLET ORAL EVERY 4 HOURS PRN
Qty: 30 TABLET | Refills: 0 | Status: SHIPPED | OUTPATIENT
Start: 2018-09-13 | End: 2018-10-23 | Stop reason: ALTCHOICE

## 2018-09-13 RX ORDER — ONDANSETRON 2 MG/ML
INJECTION INTRAMUSCULAR; INTRAVENOUS AS NEEDED
Status: DISCONTINUED | OUTPATIENT
Start: 2018-09-13 | End: 2018-09-13 | Stop reason: SURG

## 2018-09-13 RX ORDER — FENTANYL CITRATE/PF 50 MCG/ML
25 SYRINGE (ML) INJECTION
Status: DISCONTINUED | OUTPATIENT
Start: 2018-09-13 | End: 2018-09-13 | Stop reason: HOSPADM

## 2018-09-13 RX ORDER — ONDANSETRON 2 MG/ML
4 INJECTION INTRAMUSCULAR; INTRAVENOUS ONCE AS NEEDED
Status: DISCONTINUED | OUTPATIENT
Start: 2018-09-13 | End: 2018-09-13 | Stop reason: HOSPADM

## 2018-09-13 RX ORDER — LIDOCAINE HYDROCHLORIDE 10 MG/ML
INJECTION, SOLUTION INFILTRATION; PERINEURAL AS NEEDED
Status: DISCONTINUED | OUTPATIENT
Start: 2018-09-13 | End: 2018-09-13 | Stop reason: SURG

## 2018-09-13 RX ORDER — MELATONIN
2000 DAILY
Status: DISCONTINUED | OUTPATIENT
Start: 2018-09-13 | End: 2018-09-17 | Stop reason: HOSPADM

## 2018-09-13 RX ADMIN — INSULIN LISPRO 10 UNITS: 100 INJECTION, SOLUTION INTRAVENOUS; SUBCUTANEOUS at 18:58

## 2018-09-13 RX ADMIN — GABAPENTIN 100 MG: 100 CAPSULE ORAL at 09:15

## 2018-09-13 RX ADMIN — CEFAZOLIN SODIUM 2000 MG: 2 SOLUTION INTRAVENOUS at 11:04

## 2018-09-13 RX ADMIN — INSULIN LISPRO 6 UNITS: 100 INJECTION, SOLUTION INTRAVENOUS; SUBCUTANEOUS at 18:57

## 2018-09-13 RX ADMIN — FENTANYL CITRATE 25 MCG: 50 INJECTION, SOLUTION INTRAMUSCULAR; INTRAVENOUS at 12:28

## 2018-09-13 RX ADMIN — DOCUSATE SODIUM 100 MG: 100 CAPSULE, LIQUID FILLED ORAL at 17:34

## 2018-09-13 RX ADMIN — CEFAZOLIN SODIUM 2000 MG: 2 SOLUTION INTRAVENOUS at 18:54

## 2018-09-13 RX ADMIN — SODIUM CHLORIDE 125 ML/HR: 0.9 INJECTION, SOLUTION INTRAVENOUS at 00:00

## 2018-09-13 RX ADMIN — PROPOFOL 150 MG: 10 INJECTION, EMULSION INTRAVENOUS at 11:05

## 2018-09-13 RX ADMIN — INSULIN GLARGINE 12 UNITS: 100 INJECTION, SOLUTION SUBCUTANEOUS at 08:01

## 2018-09-13 RX ADMIN — SODIUM CHLORIDE 75 ML/HR: 0.9 INJECTION, SOLUTION INTRAVENOUS at 12:30

## 2018-09-13 RX ADMIN — PHENYLEPHRINE HYDROCHLORIDE 50 MCG/MIN: 10 INJECTION INTRAVENOUS at 11:20

## 2018-09-13 RX ADMIN — ONDANSETRON 4 MG: 2 INJECTION INTRAMUSCULAR; INTRAVENOUS at 11:39

## 2018-09-13 RX ADMIN — LIDOCAINE HYDROCHLORIDE 50 MG: 10 INJECTION, SOLUTION INFILTRATION; PERINEURAL at 11:05

## 2018-09-13 RX ADMIN — GABAPENTIN 100 MG: 100 CAPSULE ORAL at 21:34

## 2018-09-13 RX ADMIN — MIDAZOLAM 1 MG: 1 INJECTION INTRAMUSCULAR; INTRAVENOUS at 10:45

## 2018-09-13 RX ADMIN — FENTANYL CITRATE 50 MCG: 50 INJECTION, SOLUTION INTRAMUSCULAR; INTRAVENOUS at 10:45

## 2018-09-13 RX ADMIN — VITAMIN D, TAB 1000IU (100/BT) 2000 UNITS: 25 TAB at 09:15

## 2018-09-13 RX ADMIN — FENTANYL CITRATE 25 MCG: 50 INJECTION, SOLUTION INTRAMUSCULAR; INTRAVENOUS at 11:29

## 2018-09-13 RX ADMIN — INSULIN LISPRO 3 UNITS: 100 INJECTION, SOLUTION INTRAVENOUS; SUBCUTANEOUS at 21:34

## 2018-09-13 RX ADMIN — GABAPENTIN 100 MG: 100 CAPSULE ORAL at 17:33

## 2018-09-13 RX ADMIN — SODIUM CHLORIDE: 0.9 INJECTION, SOLUTION INTRAVENOUS at 10:40

## 2018-09-13 RX ADMIN — Medication 1 TABLET: at 09:15

## 2018-09-13 NOTE — PROGRESS NOTES
Subjective: 71 yo male with left intertrochanteric fracture  Pain continues to be patients main complaint    Objective:  Lab Results   Component Value Date/Time    WBC 7 10 09/13/2018 05:03 AM    HGB 12 3 09/13/2018 05:03 AM       Vitals:    09/12/18 2350   BP: 106/58   Pulse: 92   Resp: 18   Temp: 98 6 °F (37 °C)   SpO2: 98%     Left lower extremity  TTP over hip  Ecchymoses seen anterolaterally  Motor & sensation grossly intact  2+ DP pulse    Assessment: 71 yo male with left intertrochanteric fracture    Plan:   To OR for IMN today  NPO  NWB LLE  Pain control  DVT ppx- Mechanical until after surgery  PT/OT after surgery  Glucose and sodium management per primary team  Dispo

## 2018-09-13 NOTE — DISCHARGE INSTRUCTIONS
Discharge Instructions - Orthopedics  Savannah Richards 70 y o  male MRN: [id number]  Unit/Bed#: PACU 06    Weight Bearing Status:                                           Weight bearing as tolerated left lower extremity    DVT prophylaxis:  Complete course of Lovenox as directed    Pain:  Continue analgesics as directed    Dressing Instructions:   Keep dressing clean, dry and intact until follow up appointment  PT/OT:  Continue PT/OT on outpatient basis as directed    Appt Instructions: If you do not have your appointment, please call the clinic at [phone] to f/u with Dr Sandoval in 2 weeks    Contact the office sooner if you experience any increased numbness/tingling in the extremities  Miscellaneous: Follow up with Endocrine in 4-6 weeks  Call for appt once discharged from hospital       Prevention of Falls and Fractures  Safety first to prevent falls: At any age, people can change their environments to reduce their risk of falling and breaking a bone  Outdoor safety tips: In nasty weather, use a walker or cane for added stability  Wear warm boots with rubber soles for added traction  Look carefully at floor surfaces in public buildings  Many floors are made of highly polished marble or tile that can be very slippery  If floors have plastic or carpet runners in place, stay on them whenever possible  Identify community services that can provide assistance, such as 24-hour pharmacies and grocery stores that take orders over the phone and deliver  It is especially important to use these services in bad weather  Use a shoulder bag, lui pack, or backpack to leave hands free  Stop at curbs and check their height before stepping up or down  Be cautious at curbs that have been cut away to allow access for bikes or wheelchairs  The incline up or down may lead to a fall  Indoor safety tips:  Keep all rooms free from clutter, especially the floors  Keep floor surfaces smooth but not slippery   When entering rooms, be aware of differences in floor levels and thresholds  Wear supportive, low-heeled shoes, even at home  Avoid walking around in socks, stockings, or floppy, backless slippers  Check that all carpets and area rugs have skid-proof backing or are tacked to the floor, including carpeting on stairs  Keep electrical and telephone cords and wires out of walkways  Be sure that all stairwells are adequately lit and that stairs have handrails on both sides  Consider placing fluorescent tape on the edges of the top and bottom steps  For optimal safety, install grab bars on bathroom walls beside tubs, showers, and toilets  If you are unstable on your feet, consider using a plastic chair with a back and nonskid leg tips in the shower  Use a rubber bath mat in the shower or tub  Keep a flashlight with fresh batteries beside your bed  Add ceiling fixtures to rooms lit by lamps only, or install lamps that can be turned on by a switch near the entry point into the room  Another option is to install voice- or sound-activated lamps  Use bright light bulbs in your home  If you must use a step-stool for hard-to-reach areas, use a sturdy one with a handrail and wide steps  A better option is to reorganize work and storage areas to minimize the need for stooping or excessive reaching  Consider purchasing a portable phone that you can take with you from room to room  It provides security because you can answer the phone without rushing for it and you can call for help should an accident occur  Dont let prescriptions run low  Always keep at least 1 weeks worth of medications on hand at home  Check prescriptions with your doctor and pharmacist to see if they may be increasing your risk of falling  If you take multiple medications, check with your doctor and pharmacist about possible interactions between the different medications  Arrange with a family member or friend for daily contact   Try to have at least one person who knows where you are  If you live alone, you may wish to contract with a monitoring company that will respond to your call 24 hours a day  Watch yourself in a mirror  Does your body lean or sway back and forth or side to side? People with decreased ability to balance often have a high degree of body sway and are more likely to fall  ([employer]  [facility] gov/Health_Info/Bone/Osteoporosis/Fracture/prevent_falls  asp#d)    This patient has been enrolled in Own the Bone program (www [employer]  org), which is a national post-fracture, systems-based, multidisciplinary fragility fracture prevention initiative program   In order to help with this initiative, please provide the appropriate counseling regarding the following prevention measures at the patient's next office visit:    Regular weight bearing and muscle strengthening  Fall Prevention  Smoking Cessation  Alcohol Consumption  Vitamin D supplementation  Calcium supplementation  Any additional pharmacotherapy recommendations    Type 2 Diabetes in Adults   AMBULATORY CARE:   Type 2 diabetes  is a disease that affects how your body uses glucose (sugar)  Normally, when the blood sugar level increases, the pancreas makes more insulin  Insulin helps move sugar out of the blood so it can be used for energy  Type 2 diabetes develops because either the body cannot make enough insulin, or it cannot use the insulin correctly  After many years, your pancreas may stop making insulin     Common symptoms include the following:   · More hunger or thirst than usual     · Frequent urination     · Weight loss without trying     · Blurred vision  Call 911 if you have any of the following:   · You have any of the following signs of a stroke:      ¨ Numbness or drooping on one side of your face     ¨ Weakness in an arm or leg    ¨ Confusion or difficulty speaking    ¨ Dizziness, a severe headache, or vision loss    · You have any of the following signs of a heart attack: ¨ Squeezing, pressure, or pain in your chest that lasts longer than 5 minutes or returns    ¨ Discomfort or pain in your back, neck, jaw, stomach, or arm     ¨ Trouble breathing    ¨ Nausea or vomiting    ¨ Lightheadedness or a sudden cold sweat, especially with chest pain or trouble breathing  Seek care immediately if:   · You have severe abdominal pain, or the pain spreads to your back  You may also be vomiting  · You have trouble staying awake or focusing  · You are shaking or sweating  · You have blurred or double vision  · Your breath has a fruity, sweet smell  · Your breathing is deep and labored, or rapid and shallow  · Your heartbeat is fast and weak  Contact your healthcare provider if:   · You are vomiting or have diarrhea  · You have an upset stomach and cannot eat the foods on your meal plan  · You feel weak or more tired than usual      · You feel dizzy, have headaches, or are easily irritated  · Your skin is red, warm, dry, or swollen  · You have a wound that does not heal      · You have numbness in your arms or legs  · You have trouble coping with your illness, or you feel anxious or depressed  · You have questions or concerns about your condition or care  Treatment for type 2 diabetes  includes keeping your blood sugar at a normal level  You must eat the right foods, and exercise regularly  You may need medicine if you cannot control your blood sugar level with nutrition and exercise  You may also need medicine to prevent heart disease, a complication of type 2 diabetes  You may  need any of the following:  · Hypoglycemic medicines or insulin  may be given to decrease the amount of sugar in your blood  · Blood pressure medicine  may be given to lower your blood pressure  Your blood pressure should be less than 140/90  · Cholesterol lowering medicine  may be given to prevent heart disease       · Antiplatelets , such as aspirin, help prevent blood clots  Take your antiplatelet medicine exactly as directed  These medicines make it more likely for you to bleed or bruise  If you are told to take aspirin, do not take acetaminophen or ibuprofen instead  · Take your medicine as directed  Contact your healthcare provider if you think your medicine is not helping or if you have side effects  Tell him or her if you are allergic to any medicine  Keep a list of the medicines, vitamins, and herbs you take  Include the amounts, and when and why you take them  Bring the list or the pill bottles to follow-up visits  Carry your medicine list with you in case of an emergency  Check your blood sugar level: You will be taught how to check a small drop of blood in a glucose monitor  You will need to check your blood sugar level at least 3 times each day if you are on insulin  Ask your healthcare provider when and how often to check during the day  If you check your blood sugar level before a meal , it should be between 80 and 130 mg/dL  If you check your blood sugar level 1 to 2 hours after a meal , it should be less than 180 mg/dL  Ask your healthcare provider if these are good goals for you  Write down your results, and show them to your healthcare provider  He may use the results to make changes to your medicine, food, and exercise schedules  If your blood sugar level is too low: Your blood sugar level is too low if it goes below 70 mg/dL  If the level is too low, eat or drink 15 grams of fast-acting carbohydrate  These are found naturally in fruits  Fast-acting carbohydrates will raise your blood sugar level quickly  Examples of 15 grams of fast-acting carbohydrate are 4 ounces (½ cup) of fruit juice or 4 ounces of regular soda  Other examples are 2 tablespoons of raisins or 3 to 4 glucose tablets  Check your blood sugar level 15 minutes later  If the level is still low (less than 100 mg/dL), eat another 15 grams of carbohydrate   When the level returns to 100 mg/dL, eat a snack or meal that contains carbohydrates  This will help prevent another drop in blood sugar  Always carefully follow your healthcare provider's instructions on how to treat low blood sugar levels  Check your feet each day for sores:  Wear shoes and socks that fit correctly  Do not trim your toenails  Ask your healthcare provider for more information about foot care  Follow your meal plan:  A dietitian will help you make a meal plan to keep your blood sugar level steady  Do not skip meals  Your blood sugar level may drop too low if you have taken diabetes medicine and do not eat  · Keep track of carbohydrates (sugar and starchy foods)  Your blood sugar level can get too high if you eat too many carbohydrates  Your dietitian will help you plan meals and snacks that have the right amount of carbohydrates  · Eat low-fat foods , such as skinless chicken and low-fat milk  · Eat less sodium (salt)  Limit high-sodium foods, such as soy sauce, potato chips, and soup  Do not add salt to food you cook  Limit your use of table salt  You should have less than 2,300 mg of sodium per day  · Eat high-fiber foods , such as vegetables, whole grain breads, and beans  · Limit alcohol  Alcohol affects your blood sugar level and can make it harder to manage your diabetes  Limit alcohol to 1 drink a day if you are a woman  Limit alcohol to 2 drinks a day if you are a man  A drink of alcohol is 12 ounces of beer, 5 ounces of wine, or 1½ ounces of liquor  Maintain a healthy weight:  Ask your healthcare provider how much you should weigh  A healthy weight can help you control your diabetes  Ask your provider to help you create a weight loss plan if you are overweight  Together you can set manageable weight loss goals  Exercise as directed:  Exercise can help keep your blood sugar level steady, decrease your risk of heart disease, and help you lose weight   Stretch before and after you exercise  Exercise for at least 150 minutes every week  Spread this amount of exercise over at least 3 days a week  Do not skip exercise more than 2 days in a row  Include muscle strengthening activities 2 to 3 days each week  Older adults should include balance training 2 to 3 times each week  Activities that help increase balance include yoga and azael chi  Work with your healthcare provider to create an exercise plan  · Check your blood sugar level before and after exercise  Healthcare providers may tell you to change the amount of insulin you take or food you eat  If your blood sugar level is high, check your blood or urine for ketones before you exercise  Do not exercise if your blood sugar level is high and you have ketones  · If your blood sugar level is less than 100 mg/dL, have a carbohydrate snack before you exercise  Examples are 4 to 6 crackers, ½ banana, 8 ounces (1 cup) of milk, or 4 ounces (½ cup) of juice  Drink water or liquids that do not contain sugar before, during, and after exercise  Ask your dietitian or healthcare provider which liquids you should drink when you exercise  · Do not sit for longer than 30 minutes  If you cannot walk around, at least stand up  This will help you stay active and keep your blood circulating  Do not smoke:  Nicotine and other chemicals in cigarettes and cigars can cause lung damage and make it more difficult to manage your diabetes  Ask your healthcare provider for information if you currently smoke and need help to quit  Do not use e-cigarettes or smokeless tobacco in place of cigarettes or to help you quit  They still contain nicotine  Check your blood pressure as directed:  Ask your healthcare provider what your blood pressure should be  Most adults with diabetes and high blood pressure should have a systolic blood pressure (first number) less than 140  Your diastolic blood pressure (second number) should be less than 90     Wear medical alert identification:  Wear medical alert jewelry or carry a card that says you have diabetes  Ask your healthcare provider where to get these items  Ask about vaccines: You have a higher risk for serious illness if you get the flu, pneumonia, or hepatitis  Ask your healthcare provider if you should get a flu, pneumonia, or hepatitis B vaccine, and when to get the vaccine  Follow up with your healthcare provider as directed: You may need to return to have your A1c checked every 3 months  You will need to return at least once each year to have your feet checked  You will need an eye exam once a year to check for retinopathy  You will also need urine tests every year to check for kidney problems  You may need tests to monitor for heart disease such as an EKG, stress test, blood pressure monitoring, and blood tests  Write down your questions so you remember to ask them during your visits  © 2017 [address] [employer] Information is for End User's use only and may not be sold, redistributed or otherwise used for commercial purposes  All illustrations and images included in CareNotes® are the copyrighted property of [employer]  or Barak Vazquez  The above information is an  only  It is not intended as medical advice for individual conditions or treatments  Talk to your doctor, nurse or pharmacist before following any medical regimen to see if it is safe and effective for you

## 2018-09-13 NOTE — PROGRESS NOTES
IM Residency Progress Note   Unit/Bed#: [facility] 929-01 Encounter: [id number]  SOD Team CECILY       Tesha Her 70 y o  male [id number]    Hospital Stay Days: 1      Assessment/Plan:    Principal Problem:    Hip fracture, left, closed, initial encounter [facility])  Active Problems:    Type 2 diabetes mellitus ([facility] )    Essential hypertension    Hyponatremia    Accident due to mechanical fall without injury    Cachectic ([facility] )    1  Left intertrochanteric fracture  · Mechanical fall  Check PTH, 25 hydroxy vitamin-D and TSH level for presumed osteoporosis  No evidence of hypercalcemia to suggest MM  No clinical manifestations of testosterone deficiency  Will defer SPEP/UPEP, free and total testosterone levels as well as 24 hour urinary calcium excretion  · Endocrinology consult   · Start vitamin-D and calcium supplementation  · PT/OT eval  · DVT prophylaxis following OR  Foot pumps  · Pain control  Bowel regimen    2  Newly diagnosed poorly controlled type 2 diabetes mellitus  · New diagnosis for patient  A1c noted to be 12 4  Patient started on basal and prandial insulin regimen -  weight based since A1c greater than 9 ~  0 4 mg u/kg/daily total dose of insulin  12 units Lantus daily and 4 units Humalog t i d  before meals  · Blood glucose goal 140-180 while in the hospital   Avoid hypoglycemia  · Carbohydrate restricted diet  Currently NPO  · Check lipid panel and microalbumin/creatinine ratio  · Consider statin and ACE-inhibitor based on above results  · Diabetes education  Endocrinology consult for newly diagnosed poorly controlled diabetes  Patient currently does not have a PCP  He will need also Endocrinology follow-up in the outpatient setting given his A1c level  Will coordinate with case management for dispo regarding diabetes supplies/follow-up    3  Reported history of essential hypertension  · Currently not on any medication  Controlled  Continue to monitor    4  Cachexia  · Nutrition consult  Adjust diet per recommendations    5  Hyponatremia  · Resolving  IVF    Disposition: To OR today for IMS of left intertrochanteric fracture with Ortho  Endocrinology consult for new diagnosis of type 2 diabetes poorly controlled with A1c greater than 12  PT/OT eval for dispo following surgery      Subjective:   Patient seen examined this morning  No acute events overnight  Pain is well controlled  Patient is planned for OR today for orthopedic fixation of left femoral neck fracture  Denies any shortness of breath or chest pain  Denies any fevers, chills, nausea or vomiting       Vitals: Temp (24hrs), Av 2 °F (36 8 °C), Min:97 6 °F (36 4 °C), Max:98 6 °F (37 °C)  Current: Temperature: 97 6 °F (36 4 °C)  Vitals:    18 1430 18 1445 18 2350 18 0700   BP:  136/64 106/58 129/72   BP Location:  Right arm Left arm Left arm   Pulse:  95 92 84   Resp:   18   Temp:  98 3 °F (36 8 °C) 98 6 °F (37 °C) 97 6 °F (36 4 °C)   TempSrc:  Oral Oral Oral   SpO2:  97% 98% 98%   Weight: 67 1 kg (148 lb) 67 1 kg (147 lb 14 9 oz)     Height: 5' 10" (1 778 m) 5' 10" (1 778 m)      Body mass index is 21 23 kg/m²  I/O last 24 hours: In: 180 [P O :180]  Out: 1650 [Urine:1650]      Physical Exam:   Physical Exam   Constitutional: He is oriented to person, place, and time  No distress  Thin appearing, cachectic   HENT:   Head: Normocephalic and atraumatic  Eyes: Pupils are equal, round, and reactive to light  Neck: Normal range of motion  No JVD present  Cardiovascular: Normal rate and regular rhythm  Exam reveals no gallop and no friction rub  No murmur heard  Pulmonary/Chest: Effort normal and breath sounds normal  No respiratory distress  He has no wheezes  He has no rales  Abdominal: Soft  He exhibits no distension  There is no tenderness  Musculoskeletal: He exhibits tenderness and deformity  Left lower extremity externally rotated  Limited range of motion    Mild bruising noted on lateral aspect of left lower extremity by knee   Neurological: He is alert and oriented to person, place, and time  No cranial nerve deficit  Skin: Skin is warm  He is not diaphoretic  Vitals reviewed      Invasive Devices     Peripheral Intravenous Line            Peripheral IV 09/12/18 Right Antecubital less than 1 day                Labs:   Recent Results (from the past 24 hour(s))   CBC and differential    Collection Time: 09/12/18  9:30 AM   Result Value Ref Range    WBC 4 17 (L) 4 31 - 10 16 Thousand/uL    RBC 4 67 3 88 - 5 62 Million/uL    Hemoglobin 14 1 12 0 - 17 0 g/dL    Hematocrit 39 6 36 5 - 49 3 %    MCV 85 82 - 98 fL    MCH 30 2 26 8 - 34 3 pg    MCHC 35 6 31 4 - 37 4 g/dL    RDW 11 9 11 6 - 15 1 %    MPV 9 8 8 9 - 12 7 fL    Platelets 823 139 - 900 Thousands/uL    nRBC 0 /100 WBCs    Neutrophils Relative 62 43 - 75 %    Immat GRANS % 1 0 - 2 %    Lymphocytes Relative 29 14 - 44 %    Monocytes Relative 6 4 - 12 %    Eosinophils Relative 1 0 - 6 %    Basophils Relative 1 0 - 1 %    Neutrophils Absolute 2 64 1 85 - 7 62 Thousands/µL    Immature Grans Absolute 0 03 0 00 - 0 20 Thousand/uL    Lymphocytes Absolute 1 20 0 60 - 4 47 Thousands/µL    Monocytes Absolute 0 24 0 17 - 1 22 Thousand/µL    Eosinophils Absolute 0 03 0 00 - 0 61 Thousand/µL    Basophils Absolute 0 03 0 00 - 0 10 Thousands/µL   Basic metabolic panel    Collection Time: 09/12/18  9:30 AM   Result Value Ref Range    Sodium 130 (L) 136 - 145 mmol/L    Potassium 4 6 3 5 - 5 3 mmol/L    Chloride 96 (L) 100 - 108 mmol/L    CO2 23 21 - 32 mmol/L    ANION GAP 11 4 - 13 mmol/L    BUN 19 5 - 25 mg/dL    Creatinine 1 01 0 60 - 1 30 mg/dL    Glucose 479 (H) 65 - 140 mg/dL    Calcium 8 8 8 3 - 10 1 mg/dL    eGFR 78 ml/min/1 73sq m   APTT    Collection Time: 09/12/18 10:49 AM   Result Value Ref Range    PTT 26 24 - 36 seconds   Protime-INR    Collection Time: 09/12/18 10:49 AM   Result Value Ref Range    Protime 13 6 11 8 - 14 2 seconds INR 1 03 0 86 - 1 17   Type and screen    Collection Time: 09/12/18 10:49 AM   Result Value Ref Range    ABO Grouping A     Rh Factor Positive     Antibody Screen Negative     Specimen Expiration Date [id number]    ECG 12 lead    Collection Time: 09/12/18 11:03 AM   Result Value Ref Range    Ventricular Rate 82 BPM    Atrial Rate 82 BPM    LA Interval 174 ms    QRSD Interval 74 ms    QT Interval 372 ms    QTC Interval 434 ms    P Axis 66 degrees    QRS Axis 69 degrees    T Wave Axis 76 degrees   Fingerstick Glucose (POCT)    Collection Time: 09/12/18  2:58 PM   Result Value Ref Range    POC Glucose 455 (H) 65 - 140 mg/dl   Fingerstick Glucose (POCT)    Collection Time: 09/12/18  5:59 PM   Result Value Ref Range    POC Glucose >500 (HH) 65 - 140 mg/dl   Fingerstick Glucose (POCT)    Collection Time: 09/12/18  8:36 PM   Result Value Ref Range    POC Glucose 280 (H) 65 - 140 mg/dl   Microalbumin / creatinine urine ratio    Collection Time: 09/12/18 10:27 PM   Result Value Ref Range    Creatinine, Ur 84 0 mg/dL    Microalbum  ,U,Random 7 6 0 0 - 20 0 mg/L    Microalb Creat Ratio 9 0 - 30 mg/g creatinine   Hemoglobin A1C    Collection Time: 09/13/18  5:03 AM   Result Value Ref Range    Hemoglobin A1C 12 4 (H) 4 2 - 6 3 %     mg/dl   TSH, 3rd generation    Collection Time: 09/13/18  5:03 AM   Result Value Ref Range    TSH 3RD GENERATON 2 880 0 358 - 3 740 uIU/mL   CBC and differential    Collection Time: 09/13/18  5:03 AM   Result Value Ref Range    WBC 7 10 4 31 - 10 16 Thousand/uL    RBC 4 10 3 88 - 5 62 Million/uL    Hemoglobin 12 3 12 0 - 17 0 g/dL    Hematocrit 35 1 (L) 36 5 - 49 3 %    MCV 86 82 - 98 fL    MCH 30 0 26 8 - 34 3 pg    MCHC 35 0 31 4 - 37 4 g/dL    RDW 12 2 11 6 - 15 1 %    MPV 10 3 8 9 - 12 7 fL    Platelets 230 (L) 776 - 390 Thousands/uL    nRBC 0 /100 WBCs    Neutrophils Relative 69 43 - 75 %    Immat GRANS % 0 0 - 2 %    Lymphocytes Relative 22 14 - 44 %    Monocytes Relative 8 4 - 12 % Eosinophils Relative 1 0 - 6 %    Basophils Relative 0 0 - 1 %    Neutrophils Absolute 4 88 1 85 - 7 62 Thousands/µL    Immature Grans Absolute 0 03 0 00 - 0 20 Thousand/uL    Lymphocytes Absolute 1 57 0 60 - 4 47 Thousands/µL    Monocytes Absolute 0 55 0 17 - 1 22 Thousand/µL    Eosinophils Absolute 0 04 0 00 - 0 61 Thousand/µL    Basophils Absolute 0 03 0 00 - 0 10 Thousands/µL   Protime-INR    Collection Time: 09/13/18  5:03 AM   Result Value Ref Range    Protime 14 5 (H) 11 8 - 14 2 seconds    INR 1 12 0 86 - 1 17   APTT    Collection Time: 09/13/18  5:03 AM   Result Value Ref Range    PTT 28 24 - 36 seconds   Comprehensive metabolic panel    Collection Time: 09/13/18  5:03 AM   Result Value Ref Range    Sodium 133 (L) 136 - 145 mmol/L    Potassium 3 9 3 5 - 5 3 mmol/L    Chloride 100 100 - 108 mmol/L    CO2 25 21 - 32 mmol/L    ANION GAP 8 4 - 13 mmol/L    BUN 19 5 - 25 mg/dL    Creatinine 0 78 0 60 - 1 30 mg/dL    Glucose 241 (H) 65 - 140 mg/dL    Calcium 8 1 (L) 8 3 - 10 1 mg/dL    AST 12 5 - 45 U/L    ALT 23 12 - 78 U/L    Alkaline Phosphatase 34 (L) 46 - 116 U/L    Total Protein 6 1 (L) 6 4 - 8 2 g/dL    Albumin 3 1 (L) 3 5 - 5 0 g/dL    Total Bilirubin 0 87 0 20 - 1 00 mg/dL    eGFR 95 ml/min/1 73sq m   Lipid Panel with Direct LDL reflex    Collection Time: 09/13/18  5:03 AM   Result Value Ref Range    Cholesterol 156 50 - 200 mg/dL    Triglycerides 115 <=150 mg/dL    HDL, Direct 52 40 - 60 mg/dL    LDL Calculated 81 0 - 100 mg/dL   Fingerstick Glucose (POCT)    Collection Time: 09/13/18  7:43 AM   Result Value Ref Range    POC Glucose 242 (H) 65 - 140 mg/dl       Radiology Results: I have personally reviewed pertinent reports  Other Diagnostic Testing:   I have personally reviewed pertinent reports          Active Meds:   Current Facility-Administered Medications   Medication Dose Route Frequency    acetaminophen (TYLENOL) tablet 650 mg  650 mg Oral Q6H PRN    calcium carbonate (OYSTER SHELL,OSCAL) 500 mg tablet 1 tablet  1 tablet Oral Daily With Breakfast    cholecalciferol (VITAMIN D3) tablet 2,000 Units  2,000 Units Oral Daily    docusate sodium (COLACE) capsule 100 mg  100 mg Oral BID    gabapentin (NEURONTIN) capsule 100 mg  100 mg Oral TID    heparin (porcine) subcutaneous injection 5,000 Units  5,000 Units Subcutaneous Q8H [address]    HYDROmorphone (DILAUDID) injection 0 5 mg  0 5 mg Intravenous Q3H PRN    insulin glargine (LANTUS) subcutaneous injection 12 Units 0 12 mL  12 Units Subcutaneous Daily    insulin lispro (HumaLOG) 100 units/mL subcutaneous injection 1-5 Units  1-5 Units Subcutaneous HS    insulin lispro (HumaLOG) 100 units/mL subcutaneous injection 1-6 Units  1-6 Units Subcutaneous TID With Meals    insulin lispro (HumaLOG) 100 units/mL subcutaneous injection 4 Units  4 Units Subcutaneous TID With Meals    methocarbamol (ROBAXIN) tablet 500 mg  500 mg Oral Q6H PRN    oxyCODONE (ROXICODONE) immediate release tablet 10 mg  10 mg Oral Q4H PRN    oxyCODONE (ROXICODONE) IR tablet 5 mg  5 mg Oral Q4H PRN    polyethylene glycol (MIRALAX) packet 17 g  17 g Oral Daily PRN    sodium chloride 0 9 % infusion  125 mL/hr Intravenous Continuous         VTE Pharmacologic Prophylaxis: Reason for no pharmacologic prophylaxis To OR today  VTE Mechanical Prophylaxis: foot pump applied    Rustam Jasso MD

## 2018-09-13 NOTE — PROGRESS NOTES
Patient is a 43-year-old male who is a community ambulator who was admitted yesterday the medical service following a fall during which she suffered a fracture of his left hip  He complains left hip pain, and inability ambulate  Examination finds a gaunt appearing male, moderate distress  His left lower extremity short and externally rotated  The skin overlying the left hip is intact  He is under perform straight leg raise  His left thighs devoid of atrophy  Left knee is not effused  The foot and toes strongly dorsiflex on the left foot  I have personally reviewed x-rays left hip that show a intertrochanteric fracture left hip that occurs through osteopenic bone  Assessment/plan:  43-year-old male with acute traumatic injury left hip which is a intertrochanteric fracture of the left hip  Occurs without compartment syndrome, occurs without neurovascular compromise  Operative repair of this lesion is indicated to alleviate pain and promote improved function  After review of the risks and benefits, consent was confirmed at the bedside  Plan:   At this patient's medically cleared, ORIF left hip can be performed today at [facility]

## 2018-09-13 NOTE — MALNUTRITION/BMI
This medical record reflects one or more clinical indicators suggestive of malnutrition  Malnutrition Findings:   Malnutrition type: Acute illness (in the context on uncontrolled BG)  Degree of Malnutrition: Malnutrition of moderate degree (as evidenced by mild fat and muscle loss as observed by slightly depressed cheeks and temples; tx with oral diet)  Malnutrition Characteristics: Fat loss, Muscle loss        See Nutrition note dated 9/13/18 for additional details  Completed nutrition assessment is viewable in the nutrition documentation

## 2018-09-13 NOTE — CONSULTS
Consultation - Eitan Low 70 y o  male MRN: [id number]    Unit/Bed#: [facility] 929-01 Encounter: [id number]      Assessment/Plan     Assessment: This is a 70y o -year-old male with severe osteoporosis, left intertrochanteric fracture, erectile dysfunction and diabetes with hyperglycemia  Plan:  1  Severe osteoporosis as evident by left intertrochanteric fracture which caused significant decrease in mobility-he will need a metabolic workup for his osteoporosis which should include CMP, TSH, free T4, SPEP, UPEP, total and free testosterone, 25 hydroxy vitamin-D level and intact PTH  He should have a DEXA scan as an outpatient  He would be a good candidate for pharmacological therapy for his osteoporosis which can be discussed that is office visit in about six weeks  2   Left intertrochanteric fracture has been repaired surgically  3   New onset type 2 diabetes-his diabetes is uncontrolled based on hemoglobin A1c as well as his blood sugars  I would increase his Lantus to 30 U per day and Humalog to 10 U with meals with correctional insulin at algorithm four  He should have a urine for microalbumin  4   Erectile dysfunction suggesting hypogonadism-check total and free testosterone  Inpatient consult to Endocrinology  Consult performed by: Bogdan Abdi  Consult ordered by: Surekha Epstein          CC:  Osteoporosis and own the bone Program    History of Present Illness     HPI: Eitan Low is a 70y o  year old male who is admitted to the hospital on September 12, 2018 after a fall which led to a left intertrochanteric fracture  He has no prior history of fractures and denies any history of osteoporosis  There is no family history of osteoporosis  He currently does not smoke or drink alcohol  He does not drink carbonated beverages  He is taking 1000 U of vitamin-D per day  He is not eating or drinking much dairy products or green leafy vegetables      On this admission, he was diagnosed with type 2 diabetes up  He does admit to polyuria, nocturia and weight loss  He has not seen a physician in over two years  There is no family history of diabetes  He denies any retinopathy, neuropathy, nephropathy, heart attack, stroke in claudication  He does admit to rectal dysfunction which has been going on for a few years  Review of Systems   Constitutional: Positive for unexpected weight change  Negative for chills and fever  Respiratory: Negative for shortness of breath  Cardiovascular: Negative for chest pain  Gastrointestinal: Negative for constipation, diarrhea, nausea and vomiting  Endocrine: Positive for polyuria  Negative for polydipsia  Nocturia   All other systems reviewed and are negative  Historical Information   Past Medical History:   Diagnosis Date    H/O nicotine dependence     Prediabetes      No past surgical history on file    Social History   History   Alcohol Use No     History   Drug Use No     History   Smoking Status    Former Smoker    Packs/day: 2 00    Years: 15 00    Types: Cigarettes    Quit date: 9/12/1987   Smokeless Tobacco    Not on file     Family History:   Family History   Problem Relation Age of Onset    No Known Problems Mother     No Known Problems Father     No Known Problems Sister     Lung cancer Brother        Meds/Allergies   Current Facility-Administered Medications   Medication Dose Route Frequency Provider Last Rate Last Dose    acetaminophen (TYLENOL) tablet 650 mg  650 mg Oral Q6H PRN Gonzalo Lin MD        calcium carbonate (OYSTER SHELL,OSCAL) 500 mg tablet 1 tablet  1 tablet Oral Daily With Breakfast Gonzalo Lin MD   1 tablet at 09/13/18 0915    ceFAZolin (ANCEF) IVPB (premix) 2,000 mg  2,000 mg Intravenous Q8H Frances James MD        cholecalciferol (VITAMIN D3) tablet 2,000 Units  2,000 Units Oral Daily Gonzalo Lin MD   2,000 Units at 09/13/18 0915    docusate sodium (COLACE) capsule 100 mg  100 mg Oral BID Juan Jose Rosa Cardio, MD   100 mg at 09/12/18 1758    [START ON 9/14/2018] enoxaparin (LOVENOX) subcutaneous injection 40 mg  40 mg Subcutaneous Daily Awais Eldridge MD        gabapentin (NEURONTIN) capsule 100 mg  100 mg Oral TID Awais Eldridge MD   100 mg at 09/13/18 0915    HYDROmorphone (DILAUDID) injection 0 5 mg  0 5 mg Intravenous Q3H PRN Awais Eldridge MD        insulin glargine (LANTUS) subcutaneous injection 12 Units 0 12 mL  12 Units Subcutaneous Daily Danie Hopkins MD   12 Units at 09/13/18 0801    insulin lispro (HumaLOG) 100 units/mL subcutaneous injection 1-5 Units  1-5 Units Subcutaneous HS Danie Hopkins MD   2 Units at 09/12/18 2105    insulin lispro (HumaLOG) 100 units/mL subcutaneous injection 1-6 Units  1-6 Units Subcutaneous TID With Meals Danie Hopkins MD   6 Units at 09/12/18 1829    insulin lispro (HumaLOG) 100 units/mL subcutaneous injection 4 Units  4 Units Subcutaneous TID With Meals Danie Hopkins MD        methocarbamol (ROBAXIN) tablet 500 mg  500 mg Oral Q6H PRN Awais Eldridge MD   500 mg at 09/12/18 1758    ondansetron (ZOFRAN) injection 4 mg  4 mg Intravenous Q6H PRN Awais Eldridge MD        oxyCODONE (ROXICODONE) immediate release tablet 10 mg  10 mg Oral Q4H PRN Awais Eldridge MD   10 mg at 09/12/18 2104    oxyCODONE (ROXICODONE) IR tablet 5 mg  5 mg Oral Q4H PRN Awais Eldridge MD        polyethylene glycol (MIRALAX) packet 17 g  17 g Oral Daily PRN Danie Hopkins MD        sodium chloride 0 9 % infusion  125 mL/hr Intravenous Continuous Awais Eldridge  mL/hr at 09/13/18 0000      sodium chloride 0 9 % infusion  75 mL/hr Intravenous Continuous Ceclia Coma, CRNA 75 mL/hr at 09/13/18 1230 75 mL/hr at 09/13/18 1230     No Known Allergies    Objective   Vitals: Blood pressure 119/70, pulse 87, temperature 97 5 °F (36 4 °C), temperature source Oral, resp  rate 16, height 5' 10" (1 778 m), weight 67 1 kg (147 lb 14 9 oz), SpO2 99 %      Intake/Output Summary (Last 24 hours) at 09/13/18 [address] [place] [address] filed at 09/13/18 1340   Gross per 24 hour   Intake              980 ml   Output             2600 ml   Net            -1620 ml     Invasive Devices     Peripheral Intravenous Line            Peripheral IV 09/12/18 Right Antecubital 1 day          Drain            Urethral Catheter Latex 16 Fr  less than 1 day                Physical Exam   Constitutional: He is oriented to person, place, and time  He appears well-developed and well-nourished  No distress  HENT:   Head: Normocephalic and atraumatic  Mouth/Throat: Oropharynx is clear and moist and mucous membranes are normal  No oropharyngeal exudate  Eyes: Conjunctivae, EOM and lids are normal  Right eye exhibits no discharge  Left eye exhibits no discharge  No scleral icterus  Neck: Neck supple  No thyromegaly present  Cardiovascular: Normal rate, regular rhythm and normal heart sounds  Exam reveals no gallop and no friction rub  No murmur heard  Pulmonary/Chest: Effort normal and breath sounds normal  No respiratory distress  He has no wheezes  Abdominal: Soft  Bowel sounds are normal  He exhibits no distension  There is no tenderness  Musculoskeletal: Normal range of motion  He exhibits no edema, tenderness or deformity  Lymphadenopathy:        Head (right side): No occipital adenopathy present  Head (left side): No occipital adenopathy present  Right: No supraclavicular adenopathy present  Left: No supraclavicular adenopathy present  Neurological: He is alert and oriented to person, place, and time  No cranial nerve deficit  Coordination normal    Skin: Skin is warm and intact  No rash noted  He is not diaphoretic  No erythema  Psychiatric: He has a normal mood and affect  His behavior is normal    Vitals reviewed  The history was obtained from the review of the chart, patient      Lab Results:     Results from last 7 days  Lab Units 09/13/18  0503   HEMOGLOBIN A1C % 12 4*     Lab Results Component Value Date    WBC 7 10 09/13/2018    HGB 12 3 09/13/2018    HCT 35 1 (L) 09/13/2018    MCV 86 09/13/2018     (L) 09/13/2018     Lab Results   Component Value Date/Time    BUN 19 09/13/2018 05:03 AM     (L) 09/13/2018 05:03 AM    K 3 9 09/13/2018 05:03 AM     09/13/2018 05:03 AM    CO2 25 09/13/2018 05:03 AM    CREATININE 0 78 09/13/2018 05:03 AM    AST 12 09/13/2018 05:03 AM    ALT 23 09/13/2018 05:03 AM    ALB 3 1 (L) 09/13/2018 05:03 AM     Recent Labs      09/13/18   0503   HDL  52   TRIG  115     No results found for: Nicolasa Cnaseco  POC Glucose (mg/dl)   Date Value   09/13/2018 226 (H)   09/13/2018 239 (H)       Imaging Studies: I have personally reviewed pertinent reports  I personally reviewed the imaging of the left hip and it shows an intratrochanteric fracture that is mildly displaced  Portions of the record may have been created with voice recognition software

## 2018-09-13 NOTE — OP NOTE
OPERATIVE REPORT  PATIENT NAME: Gulshan Butt    :  1955  MRN: [id number]  Pt Location: [facility] OR ROOM 04    SURGERY DATE: 2018    Surgeon(s) and Role:     * Jose Cain MD - Primary     * Yeison Edwards MD - Assisting     * Zane Hernandez PA-C - Assisting    Preop Diagnosis:  Hip fracture, left, closed, initial encounter ([facility] ) Laroy Jeans    Post-Op Diagnosis Codes:     * Hip fracture, left, closed, initial encounter ([facility] ) [S72 002A]    Procedure(s) (LRB):  INSERTION NAIL IM FEMUR ANTEGRADE (TROCHANTERIC) (Left)  ORIF left hip with intramedullary nail  Specimen(s):  * No specimens in log *    Estimated Blood Loss:   75 mL    Drains:  Urethral Catheter Latex 16 Fr  (Active)   Number of days: 0       Anesthesia Type:   General    Operative Indications:  Hip fracture, left, closed, initial encounter ([facility] ) [S72 002A]  Intertrochanteric fracture left hip    Operative Findings:  Short nail, locked distally    Complications:   None    Procedure and Technique: Following induction of adequate level of general anesthesia, this patient was then placed on the fracture table  The left lower extremity was then underwent successful closed reduction, this was confirmed with biplanar fluoroscopy  The left hip and lateral thigh were then prepped draped sterilely  Antibiotics were administered  The shower curtain was utilized  A small incision made proximally hip  Under fluoroscopic control, the proximal femur was opened up over a beaded tip guide nayely  The short nail was then introduced in cannulated fashion, and was sunk to appropriate depth, the beaded tip guide nayely was were drawn  A lag screw was then placed within the center of the femoral head  This was placed over a terminally threaded K-wire over which gentleman reamed  The setscrew was then deployed  Utilizing the out  device, a single distal locking bolt was placed  Final fluoroscopic films document a well-aligned fracture, good hardware position  Satisfied with the extent of surgery, the wounds then flushed with saline and closed  The subcu tissue closed 2 Vicryl suture  The skin was closed to a nylons  Mepilex where dressings were applied    He was awakened from general anesthesia, and taken recovery room stable condition with plans to include physical therapy weight-bearing to tolerance, he will require DVT prophylaxis with chemical agent and pneumatic compression devices   I was present for the entire procedure    Patient Disposition:  PACU     SIGNATURE: Becca Sethi MD  DATE: September 13, 2018  TIME: 11:47 AM

## 2018-09-13 NOTE — CASE MANAGEMENT
Initial Clinical Review    Admission: Date/Time/Statement: 9/12/18 @ 1437    Orders Placed This Encounter   Procedures    Inpatient Admission     Standing Status:   Standing     Number of Occurrences:   1     Order Specific Question:   Admitting Physician     Answer:   Kamari Ill     Order Specific Question:   Level of Care     Answer:   Med Surg [16]     Order Specific Question:   Estimated length of stay     Answer:   More than 2 Midnights     Order Specific Question:   Certification     Answer:   I certify that inpatient services are medically necessary for this patient for a duration of greater than two midnights  See H&P and MD Progress Notes for additional information about the patient's course of treatment  ED: Date/Time/Mode of Arrival:   ED Arrival Information     Expected Arrival Acuity Means of Arrival Escorted By Service Admission Type    - 9/12/2018 08:54 Urgent Ambulance [facility] General Medicine Urgent    Arrival Complaint    fall          Chief Complaint:   Chief Complaint   Patient presents with    Fall     Pt tripped while running an dinjured his L hip  -LOC -thinners -head strike    Hip Injury       History of Illness:  70 y o  male with reported history of possible pre diabetes, otherwise patient's past medical history is unknown as he has not seen a physician in many years  Patient thinks he may have taken medications for blood pressure though he is not sure  Patient does not currently take any medications for other chronic co morbidities  Patient presented to the emergency department in the setting of a mechanical fall resulting in a left hip fracture  Patient states he was at work, he is a  for [employer] and was running and slipped and landed on his left hip  He denies any loss of consciousness or seizure-like activity  Patient noticed immediate pain, externally rotated left leg and inability to weight bear on left lower extremity    Patient did not have any head trauma  On presentation patient's glucose was elevated at 480  He is not on any insulin or oral antihyperglycemics  Patient states he was told he might have pre diabetes many years ago  Patient is planned for OR tomorrow with Ortho  Patient does report being a former smoker from the age of 14 to approximately age 28 packs per day however quit in the 1980s        Patient will be admitted to Medicine service for preoperative evaluation as well as medical management    ED Vital Signs:   ED Triage Vitals   Temperature Pulse Respirations Blood Pressure SpO2   09/12/18 1445 09/12/18 1103 09/12/18 1103 09/12/18 1103 09/12/18 1103   98 3 °F (36 8 °C) 78 16 108/58 98 %      Temp Source Heart Rate Source Patient Position - Orthostatic VS BP Location FiO2 (%)   09/12/18 1445 09/12/18 1103 09/12/18 1106 09/12/18 1103 --   Oral Monitor Lying Left arm       Pain Score       09/12/18 0940       8        Wt Readings from Last 1 Encounters:   09/12/18 67 1 kg (147 lb 14 9 oz)       Vital Signs (abnormal): WNL    Abnormal Labs: Na = 130  Glucose = 479/ >500  Wbc = 4 17    Diagnostic Test Results: Xray Level Femur - There is a mildly displaced, likely comminuted left femoral intertrochanteric fracture  ED Treatment:   Medication Administration from 09/12/2018 0854 to 09/12/2018 1421       Date/Time Order Dose Route Action     09/12/2018 0940 tetanus-diphtheria-acellular pertussis (BOOSTRIX) IM injection 0 5 mL 0 5 mL Intramuscular Given     09/12/2018 0940 morphine (PF) 10 mg/mL injection 6 mg 6 mg Intravenous Given     09/12/2018 1059 sodium chloride 0 9 % infusion 125 mL/hr Intravenous New Bag     09/12/2018 1315 gabapentin (NEURONTIN) capsule 100 mg 100 mg Oral Given          Past Medical/Surgical History:    Active Ambulatory Problems     Diagnosis Date Noted    No Active Ambulatory Problems     Resolved Ambulatory Problems     Diagnosis Date Noted    No Resolved Ambulatory Problems     Past Medical History:   Diagnosis Date    H/O nicotine dependence     Prediabetes        Admitting Diagnosis: Cachectic ([facility] ) [R64]  H[facility], left, closed, [facility] [S72 002A]  Unspecified multiple injuries, initial encounter [T07  XXXA]    Age/Sex: 70 y o  male    Assessment/Plan:   68y Male to ED, S/P Fall with Left Femoral Intertrochanteric Fracture/ Presumed Type 2 Diabetes Mellitus  NPO - OR  Pain control  IVF  Pre-op clearance  Blood glucose on admission > 500   PT/OT eval and treat    9/13 OR - INSERTION NAIL IM FEMUR ANTEGRADE (TROCHANTERIC) (Left Leg Upper)      Admission Orders:  Scheduled Meds:   calcium carbonate 1 tablet Oral Daily With Breakfast   cefazolin 2,000 mg Intravenous Once   cholecalciferol 2,000 Units Oral Daily   docusate sodium 100 mg Oral BID   gabapentin 100 mg Oral TID   heparin (porcine) 5,000 Units Subcutaneous Q8H [address]    insulin glargine 12 Units Subcutaneous Daily    insulin lispro 1-5 Units Subcutaneous HS    insulin lispro 1-6 Units Subcutaneous TID With Meals   insulin lispro 4 Units Subcutaneous TID With Meals     Continuous Infusions:   sodium chloride 125 mL/hr Last Rate: 125 mL/hr (09/13/18 0000)     PRN Meds:  Robaxin po x1  Oxycodone po x1

## 2018-09-13 NOTE — ANESTHESIA PROCEDURE NOTES
Peripheral Block    Patient location during procedure: holding area  Start time: 9/13/2018 10:40 AM  Reason for block: procedure for pain, at surgeon's request and post-op pain management  Staffing  Anesthesiologist: Valentino Drape  Performed: anesthesiologist   Preanesthetic Checklist  Completed: patient identified, site marked, surgical consent, pre-op evaluation, timeout performed, IV checked, risks and benefits discussed and monitors and equipment checked  Peripheral Block  Patient position: supine  Prep: ChloraPrep  Patient monitoring: heart rate, cardiac monitor, continuous pulse ox and frequent blood pressure checks  Block type: femoral  Laterality: left  Injection technique: single-shot  Procedures: ultrasound guided  Ultrasound permanent image saved  Local infiltration: ropivacaine  Infiltration strength: 0 35 %  Dose: 40 mL  Needle  Needle type: StimupMTPV   Needle gauge: 22 G  Needle length: 15 cm  Needle localization: ultrasound guidance  Needle insertion depth: 7 cm  Test dose: negative  Assessment  Injection assessment: incremental injection, local visualized surrounding nerve on ultrasound, negative aspiration for CSF, negative aspiration for heme and no paresthesia on injection  Heart rate change: no  Slow fractionated injection: no  Post-procedure:  site cleaned  Additional Notes  Fascia Iliaca block (not femoral) with 40 cc 0 35% ropiv     Seen well on US

## 2018-09-13 NOTE — ANESTHESIA PREPROCEDURE EVALUATION
Review of Systems/Medical History  Patient summary reviewed        Cardiovascular  Hypertension ,    Pulmonary       GI/Hepatic            Endo/Other  Diabetes ,      GYN       Hematology   Musculoskeletal       Neurology   Psychology           Physical Exam    Airway    Mallampati score: II  TM Distance: >3 FB  Neck ROM: full     Dental       Cardiovascular      Pulmonary      Other Findings        Anesthesia Plan  ASA Score- 2     Anesthesia Type- general with ASA Monitors  Additional Monitors:   Airway Plan: LMA  Plan Factors-    Induction- intravenous  Postoperative Plan-     Informed Consent- Anesthetic plan and risks discussed with patient  I personally reviewed this patient with the CRNA  Discussed and agreed on the Anesthesia Plan with the CRNA  Julia Grace Fascia Iliaca block discussed with pt and he consents

## 2018-09-13 NOTE — SOCIAL WORK
Cm received phone call from Nilda Alfaro, [phone],[facility] with the [employer] for this patient  Cm left a voice message requesting a return phone call

## 2018-09-13 NOTE — ANESTHESIA POSTPROCEDURE EVALUATION
Post-Op Assessment Note      CV Status:  Stable    Mental Status:  Awake    Hydration Status:  Euvolemic    PONV Controlled:  Controlled    Airway Patency:  Patent    Post Op Vitals Reviewed: Yes          Staff: CRNA           BP   134/77   Temp      Pulse  82   Resp   22   SpO2   99 4LFM

## 2018-09-14 PROBLEM — M81.0 OSTEOPOROSIS: Status: ACTIVE | Noted: 2018-09-14

## 2018-09-14 LAB
25(OH)D3 SERPL-MCNC: 27 NG/ML (ref 30–100)
ANION GAP SERPL CALCULATED.3IONS-SCNC: 10 MMOL/L (ref 4–13)
BASOPHILS # BLD AUTO: 0.03 THOUSANDS/ΜL (ref 0–0.1)
BASOPHILS NFR BLD AUTO: 0 % (ref 0–1)
BUN SERPL-MCNC: 18 MG/DL (ref 5–25)
CALCIUM SERPL-MCNC: 8.7 MG/DL (ref 8.3–10.1)
CHLORIDE SERPL-SCNC: 103 MMOL/L (ref 100–108)
CO2 SERPL-SCNC: 24 MMOL/L (ref 21–32)
CREAT SERPL-MCNC: 0.75 MG/DL (ref 0.6–1.3)
EOSINOPHIL # BLD AUTO: 0.04 THOUSAND/ΜL (ref 0–0.61)
EOSINOPHIL NFR BLD AUTO: 1 % (ref 0–6)
ERYTHROCYTE [DISTWIDTH] IN BLOOD BY AUTOMATED COUNT: 12.3 % (ref 11.6–15.1)
GFR SERPL CREATININE-BSD FRML MDRD: 96 ML/MIN/1.73SQ M
GLUCOSE SERPL-MCNC: 161 MG/DL (ref 65–140)
GLUCOSE SERPL-MCNC: 194 MG/DL (ref 65–140)
GLUCOSE SERPL-MCNC: 201 MG/DL (ref 65–140)
GLUCOSE SERPL-MCNC: 201 MG/DL (ref 65–140)
GLUCOSE SERPL-MCNC: 278 MG/DL (ref 65–140)
HCT VFR BLD AUTO: 32.6 % (ref 36.5–49.3)
HGB BLD-MCNC: 11.6 G/DL (ref 12–17)
IMM GRANULOCYTES # BLD AUTO: 0.02 THOUSAND/UL (ref 0–0.2)
IMM GRANULOCYTES NFR BLD AUTO: 0 % (ref 0–2)
LYMPHOCYTES # BLD AUTO: 1.93 THOUSANDS/ΜL (ref 0.6–4.47)
LYMPHOCYTES NFR BLD AUTO: 24 % (ref 14–44)
MCH RBC QN AUTO: 30.5 PG (ref 26.8–34.3)
MCHC RBC AUTO-ENTMCNC: 35.6 G/DL (ref 31.4–37.4)
MCV RBC AUTO: 86 FL (ref 82–98)
MONOCYTES # BLD AUTO: 0.64 THOUSAND/ΜL (ref 0.17–1.22)
MONOCYTES NFR BLD AUTO: 8 % (ref 4–12)
NEUTROPHILS # BLD AUTO: 5.56 THOUSANDS/ΜL (ref 1.85–7.62)
NEUTS SEG NFR BLD AUTO: 67 % (ref 43–75)
NRBC BLD AUTO-RTO: 0 /100 WBCS
PLATELET # BLD AUTO: 155 THOUSANDS/UL (ref 149–390)
PMV BLD AUTO: 10.2 FL (ref 8.9–12.7)
POTASSIUM SERPL-SCNC: 4 MMOL/L (ref 3.5–5.3)
PTH-INTACT SERPL-MCNC: 27.2 PG/ML (ref 18.4–80.1)
RBC # BLD AUTO: 3.8 MILLION/UL (ref 3.88–5.62)
SODIUM SERPL-SCNC: 137 MMOL/L (ref 136–145)
T4 FREE SERPL-MCNC: 1.56 NG/DL (ref 0.76–1.46)
TSH SERPL DL<=0.05 MIU/L-ACNC: 2 UIU/ML (ref 0.36–3.74)
WBC # BLD AUTO: 8.22 THOUSAND/UL (ref 4.31–10.16)

## 2018-09-14 PROCEDURE — 97167 OT EVAL HIGH COMPLEX 60 MIN: CPT

## 2018-09-14 PROCEDURE — 84403 ASSAY OF TOTAL TESTOSTERONE: CPT | Performed by: INTERNAL MEDICINE

## 2018-09-14 PROCEDURE — 82948 REAGENT STRIP/BLOOD GLUCOSE: CPT

## 2018-09-14 PROCEDURE — G8988 SELF CARE GOAL STATUS: HCPCS

## 2018-09-14 PROCEDURE — G8979 MOBILITY GOAL STATUS: HCPCS

## 2018-09-14 PROCEDURE — 99024 POSTOP FOLLOW-UP VISIT: CPT | Performed by: PHYSICIAN ASSISTANT

## 2018-09-14 PROCEDURE — G8978 MOBILITY CURRENT STATUS: HCPCS

## 2018-09-14 PROCEDURE — 84443 ASSAY THYROID STIM HORMONE: CPT | Performed by: INTERNAL MEDICINE

## 2018-09-14 PROCEDURE — 97163 PT EVAL HIGH COMPLEX 45 MIN: CPT

## 2018-09-14 PROCEDURE — 80048 BASIC METABOLIC PNL TOTAL CA: CPT | Performed by: ORTHOPAEDIC SURGERY

## 2018-09-14 PROCEDURE — 84439 ASSAY OF FREE THYROXINE: CPT | Performed by: INTERNAL MEDICINE

## 2018-09-14 PROCEDURE — 84165 PROTEIN E-PHORESIS SERUM: CPT | Performed by: PATHOLOGY

## 2018-09-14 PROCEDURE — G8987 SELF CARE CURRENT STATUS: HCPCS

## 2018-09-14 PROCEDURE — 84165 PROTEIN E-PHORESIS SERUM: CPT | Performed by: INTERNAL MEDICINE

## 2018-09-14 PROCEDURE — 83970 ASSAY OF PARATHORMONE: CPT | Performed by: INTERNAL MEDICINE

## 2018-09-14 PROCEDURE — 85025 COMPLETE CBC W/AUTO DIFF WBC: CPT | Performed by: ORTHOPAEDIC SURGERY

## 2018-09-14 PROCEDURE — 82306 VITAMIN D 25 HYDROXY: CPT | Performed by: INTERNAL MEDICINE

## 2018-09-14 PROCEDURE — 84402 ASSAY OF FREE TESTOSTERONE: CPT | Performed by: INTERNAL MEDICINE

## 2018-09-14 RX ORDER — ATORVASTATIN CALCIUM 40 MG/1
40 TABLET, FILM COATED ORAL
Status: DISCONTINUED | OUTPATIENT
Start: 2018-09-14 | End: 2018-09-17 | Stop reason: HOSPADM

## 2018-09-14 RX ADMIN — GABAPENTIN 100 MG: 100 CAPSULE ORAL at 09:02

## 2018-09-14 RX ADMIN — SODIUM CHLORIDE 75 ML/HR: 0.9 INJECTION, SOLUTION INTRAVENOUS at 05:37

## 2018-09-14 RX ADMIN — INSULIN LISPRO 1 UNITS: 100 INJECTION, SOLUTION INTRAVENOUS; SUBCUTANEOUS at 21:52

## 2018-09-14 RX ADMIN — INSULIN LISPRO 10 UNITS: 100 INJECTION, SOLUTION INTRAVENOUS; SUBCUTANEOUS at 12:46

## 2018-09-14 RX ADMIN — GABAPENTIN 100 MG: 100 CAPSULE ORAL at 17:08

## 2018-09-14 RX ADMIN — DOCUSATE SODIUM 100 MG: 100 CAPSULE, LIQUID FILLED ORAL at 09:02

## 2018-09-14 RX ADMIN — OXYCODONE HYDROCHLORIDE 10 MG: 10 TABLET ORAL at 21:51

## 2018-09-14 RX ADMIN — Medication 1 TABLET: at 09:02

## 2018-09-14 RX ADMIN — OXYCODONE HYDROCHLORIDE 5 MG: 5 TABLET ORAL at 04:15

## 2018-09-14 RX ADMIN — GABAPENTIN 100 MG: 100 CAPSULE ORAL at 21:52

## 2018-09-14 RX ADMIN — DOCUSATE SODIUM 100 MG: 100 CAPSULE, LIQUID FILLED ORAL at 17:08

## 2018-09-14 RX ADMIN — INSULIN LISPRO 2 UNITS: 100 INJECTION, SOLUTION INTRAVENOUS; SUBCUTANEOUS at 09:03

## 2018-09-14 RX ADMIN — INSULIN LISPRO 10 UNITS: 100 INJECTION, SOLUTION INTRAVENOUS; SUBCUTANEOUS at 09:03

## 2018-09-14 RX ADMIN — VITAMIN D, TAB 1000IU (100/BT) 2000 UNITS: 25 TAB at 09:02

## 2018-09-14 RX ADMIN — INSULIN LISPRO 10 UNITS: 100 INJECTION, SOLUTION INTRAVENOUS; SUBCUTANEOUS at 17:08

## 2018-09-14 RX ADMIN — INSULIN GLARGINE 30 UNITS: 100 INJECTION, SOLUTION SUBCUTANEOUS at 09:01

## 2018-09-14 RX ADMIN — OXYCODONE HYDROCHLORIDE 10 MG: 10 TABLET ORAL at 12:45

## 2018-09-14 RX ADMIN — INSULIN LISPRO 2 UNITS: 100 INJECTION, SOLUTION INTRAVENOUS; SUBCUTANEOUS at 12:46

## 2018-09-14 RX ADMIN — CEFAZOLIN SODIUM 2000 MG: 2 SOLUTION INTRAVENOUS at 04:12

## 2018-09-14 RX ADMIN — ATORVASTATIN CALCIUM 40 MG: 40 TABLET, FILM COATED ORAL at 17:08

## 2018-09-14 RX ADMIN — INSULIN LISPRO 4 UNITS: 100 INJECTION, SOLUTION INTRAVENOUS; SUBCUTANEOUS at 17:08

## 2018-09-14 RX ADMIN — ENOXAPARIN SODIUM 40 MG: 100 INJECTION SUBCUTANEOUS at 09:02

## 2018-09-14 NOTE — OCCUPATIONAL THERAPY NOTE
OccupationalTherapy Evaluation     Patient Name: Savannah Richards  ZPQDH'P Date: 9/14/2018  Problem List  Patient Active Problem List   Diagnosis    Hip fracture, left, closed, initial encounter ([facility] )    Type 2 diabetes mellitus ([facility] )    Essential hypertension    Hyponatremia    Accident due to mechanical fall without injury    Cachectic ([facility] )    Osteoporosis     Past Medical History  Past Medical History:   Diagnosis Date    H/O nicotine dependence     Prediabetes      Past Surgical History  Past Surgical History:   Procedure Laterality Date    NV OPEN RX FEMUR FX+INTRAMED ADDISON Left 9/13/2018    Procedure: INSERTION NAIL IM FEMUR ANTEGRADE (TROCHANTERIC); Surgeon: Jaskaran Forrester MD;  Location: [facility] MAIN OR;  Service: Orthopedics         09/14/18 1034   Note Type   Note type Eval/Treat   Restrictions/Precautions   Weight Bearing Precautions Per Order Yes   LLE Weight Bearing Per Order WBAT   Other Precautions WBS; Fall Risk;Pain;Multiple lines   Pain Assessment   Pain Assessment 0-10   Pain Score 5   Pain Type Acute pain;Surgical pain   Pain Location Hip   Pain Orientation Left   Patient's Stated Pain Goal No pain   Hospital Pain Intervention(s) Repositioned; Ambulation/increased activity; Distraction; Emotional support   Response to Interventions TOLERATED    Home Living   Type of [address] [facility] [employer] [employer] Two level;1/2 bath on main level;Stairs to enter without rails  (1 YAMILETH; SHOWER ON 2ND FL)   Bathroom Shower/Tub Tub/shower unit   Bathroom Toilet Standard   Bathroom Accessibility Accessible   Additional Comments NO USE OF DME AT BASELINE    Prior Function   Level of Hext Independent with ADLs and functional mobility   Lives With Alone   Receives Help From (RENALDO )   ADL Assistance Independent   IADLs Independent   Falls in the last 6 months 1 to 4  (1-REASON FOR ADMISSION )   Vocational Full time employment   Lifestyle   Autonomy PT REPORTS BEING FULLY INDEPENDENT AT BASELINE    Reciprocal Relationships LIVES ALONE  LIMITED AVAILABLE SUPPORT PER PT    Service to Others WORKS FULL TIME AS A     Intrinsic Gratification ENJOYS WATCHING TV    Psychosocial   Psychosocial (WDL) WDL   ADL   Eating Assistance 7  Independent   Grooming Assistance 7  Independent   UB Bathing Assistance 5  Supervision/Setup   LB Bathing Assistance 3  Moderate Assistance   UB Dressing Assistance 5  Supervision/Setup   LB Dressing Assistance 3  Moderate Assistance   Toileting Assistance  3  Moderate Assistance   Functional Assistance 3  Moderate Assistance   Bed Mobility   Supine to Sit 3  Moderate assistance   Additional items Assist x 1; Increased time required;Verbal cues;LE management   Sit to Supine Unable to assess  (PT LEFT OOB WITH ALL NEEDS IN REACH )   Transfers   Sit to Stand 4  Minimal assistance   Additional items Assist x 1; Increased time required;Verbal cues   Stand to Sit 4  Minimal assistance   Additional items Assist x 1; Increased time required;Verbal cues   Functional Mobility   Functional Mobility 4  Minimal assistance   Additional items Rolling walker   Balance   Static Sitting Fair +   Static Standing Fair -   Ambulatory Poor +   Activity Tolerance   Activity Tolerance Patient limited by pain   Medical Staff Ben FROM     Nurse Made Aware APPROPRIATE TO SEE    RUE Assessment   RUE Assessment WFL   LUE Assessment   LUE Assessment WFL   Hand Function   Gross Motor Coordination Functional   Fine Motor Coordination Functional   Sensation   Light Touch No apparent deficits   Cognition   Overall Cognitive Status WFL   Arousal/Participation Alert; Cooperative   Attention Within functional limits   Orientation Level Oriented X4   Memory Within functional limits   Following Commands Follows all commands and directions without difficulty   Comments PT IS PLEASANT AND COOPERATIVE  PT REPORTS -HEADSTRIKE    Assessment   Limitation Decreased ADL status; Decreased endurance;Decreased self-care trans;Decreased high-level ADLs   Prognosis Good   Assessment 64 YO MALE SEEN FOR INITIAL OT EVAL FOLLOWING ADMISSION TO B S/P FALL RESULTING IN L-HIP FX  PT IS S/P L SHORT TFN  PT IS WBAT ON LLE  PROBLEMS LIST INCLUDES DM, HTN, AND CACHEXIA  PT IS FROM HOME ALONE WHERE HE REPORTS BEING I WITH ADLS/IADLS/DRIVING PTA  PT CURRENTLY REQUIRES OVERALL MOD A FOR ADLS AND MIN A FOR TRANSFERS/LIMITED FUNCTIONAL MOBILITY WITH USE OF RW  PT IS LIMITED 2' PAIN, FATIGUE, IMPAIRED BALANCE, FALL RISK, LIMITED FAMILY SUPPORT AND LIMITED ACTIVITY TOLERANCE  FROM AN OT PERSPECTIVE, PT WOULD BENEFIT FROM CONT OT SERVICES IN AN INPT REHAB SETTING UPON D/C  PT AGREEABLE  WILL CONT TO FOLLOW TO ADDRESS THE BELOW DESCRIBED GOALS  Goals   Patient Goals TO GET BETTER    LTG Time Frame 7-10   Long Term Goal #1 SEE BELOW    Plan   Treatment Interventions ADL retraining;Functional transfer training; Endurance training;Patient/family training;Equipment evaluation/education; Compensatory technique education; Energy conservation; Activityengagement   Goal Expiration Date 09/24/18   OT Frequency 3-5x/wk   Recommendation   OT Discharge Recommendation Short Term Rehab   OT - OK to Discharge Yes   Barthel Index   Feeding 10   Bathing 0   Grooming Score 5   Dressing Score 5   Bladder Score 10   Bowels Score 10   Toilet Use Score 5   Transfers (Bed/Chair) Score 10   Mobility (Level Surface) Score 0   Stairs Score 0   Barthel Index Score 55   Modified [place] Scale   Modified Denis Scale 4       OCCUPATIONAL THERAPY GOALS TO BE MET WITHIN 7-10 DAYS:    -Pt will increase bed mobility to MOD I to participate in functional activities with G tolerance and balance  -Pt will improve functional mobility and transfers to MOD I on/off all surfaces w/ G balance/safety including toileting   -Pt will participate in lt grooming task with MOD I after set-up standing at sink ~3-5 minutes with G safety and balance     -Pt will increase independence in all ADLS to MOD I with G balance sitting upright in chair   -Pt will improve activity tolerance to G for 30 min txment sessions w/ G carry over of learned energy conservation techniques   -Pt will improve independence in lt homemaking activities to MOD I without requiring cues for safety   -Pt will demonstrate G carryover of learned safety techniques and proper body mechanics in functional and leisure activities with use of DME        Documentation completed by Alexys Huerta, ODIN, OTR/L

## 2018-09-14 NOTE — SOCIAL WORK
Cm met with patient and Case Chase Kearney, [phone] at bedside to review recommendations  Per PT/OT, patient is being recommended for inpatient rehab  Cm had patient sign consent forms to release information to Tania Medina called insurance company to start insurance authorization process  Patient reported that he wants a referral to [address] [place] [address]  Tania Medina reported that claim number is [id number] and  is Maria G [facility], [phone]   Cm placed referral

## 2018-09-14 NOTE — PHYSICAL THERAPY NOTE
Physical Therapy Evaluation     Patient's Name: Tesha Her    Admitting Diagnosis  Cachectic [facility]) Shirin Gant  Hip fracture, left, closed, initial encounter ([facility] [address] ) [[id number]]  Unspecified multiple injuries, initial encounter [[id number]]    Problem List  Patient Active Problem List   Diagnosis    [facility], [facility]    Type 2 diabetes mellitus ([facility] )    Essential hypertension    Hyponatremia    Accident due to mechanical fall without injury    Cachectic ([facility]    Osteoporosis       Past Medical History  Past Medical History:   Diagnosis Date    H/O nicotine dependence     Prediabetes        Past Surgical History  Past Surgical History:   Procedure Laterality Date    FL OPEN RX FEMUR FX+INTRAMED ADDISON Left 9/13/2018    Procedure: INSERTION NAIL IM FEMUR ANTEGRADE (TROCHANTERIC); Surgeon: Lambert Caceres MD;  Location: [facility];  Service: Orthopedics      09/14/18 1038   Note Type   Note type Eval/Treat   Pain Assessment   Pain Assessment 0-10   Pain Score 5   Pain Type Acute pain;Surgical pain   Pain Location Hip   Pain Orientation Left   Effect of Pain on Daily Activities impaired quality and tolerance to mobility    Patient's Stated Pain Goal No pain   Hospital Pain Intervention(s) Repositioned; Ambulation/increased activity   Response to Interventions increased with activity but tolerable; offered ice for pain management but patient denied    Home Living   Type of [address] [place] [address] Two level;1/2 bath on main level;Stairs to enter with rails  (1 YAMILETH; full bathroom on 2nd floor )   Bathroom Shower/Tub Tub/shower unit   Bathroom Toilet Standard   Bathroom Accessibility Accessible   Home Equipment Cane;Crutches   Additional Comments Pt denies use of DME PTA   Reports owning SPC and crutches    Prior Function   Level of [place] Independent with ADLs and functional mobility   Lives With Alone   Receives Help From Family  (limited form of support )   ADL Assistance Independent   IADLs Independent   Falls in the last 6 months (1- reason for admission, at work )   Vocational Full time employment   Comments Pt works and drives  Restrictions/Precautions   Weight Bearing Precautions Per Order Yes   LLE Weight Bearing Per Order WBAT   Other Precautions WBS; Fall Risk;Pain;Multiple lines   General   Additional Pertinent History 09/13/18 INSERTION NAIL IM FEMUR ANTEGRADE (TROCHANTERIC)    Family/Caregiver Present No   Cognition   Overall Cognitive Status WFL   Arousal/Participation Cooperative   Attention Within functional limits   Orientation Level Oriented X4   Memory Within functional limits   Following Commands Follows all commands and directions without difficulty   Comments pt is pleasant and cooperative  Able to converse beyond basic needs  Admits to fear of mobility however this did not appear to limit tasks    RUE Assessment   RUE Assessment WFL   LUE Assessment   LUE Assessment WFL   RLE Assessment   RLE Assessment WFL   LLE Assessment   LLE Assessment X  (2-/5; limited 2* pain)   Coordination   Movements are Fluid and Coordinated 0   Coordination and Movement Description antalgic; LE instbility    Sensation WFL   Light Touch   RLE Light Touch Grossly intact   LLE Light Touch Grossly intact   Proprioception   RLE Proprioception Grossly intact   LLE Proprioception Grossly Intact   Bed Mobility   Supine to Sit 3  Moderate assistance   Additional items Assist x 1;HOB elevated; Increased time required;Verbal cues;LE management   Additional Comments Pt reporting pain EOB but denied dizziness/light headedness   Transfers   Sit to Stand 4  Minimal assistance   Additional items Assist x 1   Stand to Sit 4  Minimal assistance   Additional items Assist x 1; Increased time required;Verbal cues   Ambulation/Elevation   Gait pattern Decreased L stance;Decreased foot clearance; Short stride; Step to; Antalgic   Gait Assistance 4  Minimal assist   Additional items Assist x 1   Assistive Device Rolling walker   Distance 15'    Balance   Static Sitting Fair +   Dynamic Sitting Fair   Static Standing Fair -   Dynamic Standing Fair -   Ambulatory Poor +   Endurance Deficit   Endurance Deficit Yes   Endurance Deficit Description fatigue, pain    Activity Tolerance   Activity Tolerance Patient limited by fatigue;Patient limited by pain   Medical Staff Made Aware MAXX [place]; Alpa García CM    Nurse Made Aware appropriate to see    Assessment   Prognosis Good   Problem List Decreased strength;Decreased range of motion;Decreased endurance; Impaired balance;Decreased mobility; Decreased coordination;Decreased skin integrity;Orthopedic restrictions;Pain   Assessment Pt is a 70year old male presenting s/p fall at work in which he sustained a L hip fracture  Pt underwent INSERTION NAIL IM FEMUR ANTEGRADE (TROCHANTERIC) performed 9/13/18  PT consulted to assess functional mobility and assist with safe d c planning  PT eval performed POD #1 with WBAT LLE and up as tolerated orders  Pt with an additional problem list which includes cachectic, DM II, HTN, osteoporosis, and hyponatremia  PTA, pt living at home alone in a [address] with 1 YAMILETH and a full flight to full bathroom  He works full time, denies any additional falls and denies any use of DME  Pt with limited family support and will need to be fully (I) at d/c  On eval, pt presenting with pain, decreased strength and ROM, poor tolerance to activity and decreased overall functional mobility  Pt in supine upon arrival and agreeable to participate in PT session  Pt required moderate A for bed mobility with Efrem required for transfers and gait  Patient mostly limited by pain and is unsafe to return home alone at this time  PT to continue to follow pt during stay to progress functional mobility (I) and safety  Rehab recommended at this time      Goals   Patient Goals Patient would like to get stronger berfor going home    STG Expiration Date 09/24/18   Short Term Goal #1 Pt will be mod(I) with rolling R and L for ease with OOB transfer  Pt will be mod(I) with supine<->sit transfer  Pt will be mod(I) with sit<->stand to promote (I) with toileting  Pt will be mod(I) with ambualtion of household distances (appx 50') to reduce caregiver burden  Pt will be S with community distance ambulaiton using least restrictive AD to increase tolerance to activity  Pt will be S ascending and descending1 step to gain access into home  Pt will particiapte in HEP consisitng of balance, strength, ROM and coordinaiton tasks to increase activitiy, improve (I) and decrease pain  Treatment Day 0   Plan   Treatment/Interventions Functional transfer training;LE strengthening/ROM; Elevations; Therapeutic exercise; Endurance training;Patient/family training;Equipment eval/education; Bed mobility;Gait training; Compensatory technique education;Spoke to nursing;Spoke to case management;OT   PT Frequency Other (Comment)  (3-6x/wk)   Recommendation   Recommendation Post acute IP rehab   Equipment Recommended Walker   PT - OK to Discharge (to rehab when medically appropriate )   Additional Comments OOB in chair at the end of session with all needs withn reach    Educated on use of call bell =     Barthel Index   Feeding 10   Bathing 0   Grooming Score 5   Dressing Score 5   Bladder Score 10   Bowels Score 10   Toilet Use Score 5   Transfers (Bed/Chair) Score 10   Mobility (Level Surface) Score 0   Stairs Score 0   Barthel Index Score 55           Sanam Quinones, PT

## 2018-09-14 NOTE — PLAN OF CARE
Problem: OCCUPATIONAL THERAPY ADULT  Goal: Performs self-care activities at highest level of function for planned discharge setting  See evaluation for individualized goals  Treatment Interventions: ADL retraining, Functional transfer training, Endurance training, Patient/family training, Equipment evaluation/education, Compensatory technique education, Energy conservation, Activityengagement          See flowsheet documentation for full assessment, interventions and recommendations  Limitation: Decreased ADL status, Decreased endurance, Decreased self-care trans, Decreased high-level ADLs  Prognosis: Good  Assessment: 64 YO MALE SEEN FOR INITIAL OT EVAL FOLLOWING ADMISSION TO [facility] S/P FALL RESULTING IN L-HIP FX  PT IS S/P L SHORT TFN  PT IS WBAT ON LLE  PROBLEMS LIST INCLUDES DM, HTN, AND CACHEXIA  PT IS FROM HOME ALONE WHERE HE REPORTS BEING I WITH ADLS/IADLS/DRIVING PTA  PT CURRENTLY REQUIRES OVERALL MOD A FOR ADLS AND MIN A FOR TRANSFERS/LIMITED FUNCTIONAL MOBILITY WITH USE OF RW  PT IS LIMITED 2' PAIN, FATIGUE, IMPAIRED BALANCE, FALL RISK, LIMITED FAMILY SUPPORT AND LIMITED ACTIVITY TOLERANCE  FROM AN OT PERSPECTIVE, PT WOULD BENEFIT FROM CONT OT SERVICES IN AN INPT REHAB SETTING UPON D/C  PT AGREEABLE  WILL CONT TO FOLLOW TO ADDRESS THE BELOW DESCRIBED GOALS  OT Discharge Recommendation: Short Term Rehab  OT - OK to Discharge:  Yes

## 2018-09-14 NOTE — PLAN OF CARE
Problem: PHYSICAL THERAPY ADULT  Goal: Performs mobility at highest level of function for planned discharge setting  See evaluation for individualized goals  Treatment/Interventions: Functional transfer training, LE strengthening/ROM, Elevations, Therapeutic exercise, Endurance training, Patient/family training, Equipment eval/education, Bed mobility, Gait training, Compensatory technique education, Spoke to nursing, Spoke to case management, OT  Equipment Recommended: Javier Cui       See flowsheet documentation for full assessment, interventions and recommendations  Prognosis: Good  Problem List: Decreased strength, Decreased range of motion, Decreased endurance, Impaired balance, Decreased mobility, Decreased coordination, Decreased skin integrity, Orthopedic restrictions, Pain  Assessment: Pt is a 70year old male presenting s/p fall at work in which he sustained a L hip fracture  Pt underwent INSERTION NAIL IM FEMUR ANTEGRADE (TROCHANTERIC) performed 9/13/18  PT consulted to assess functional mobility and assist with safe d c planning  PT eval performed POD #1 with WBAT LLE and up as tolerated orders  Pt with an additional problem list which includes cachectic, DM II, HTN, osteoporosis, and hyponatremia  PTA, pt living at home alone in a [facility] with 1 YAMILETH and a full flight to full bathroom  He works full time, denies any additional falls and denies any use of DME  Pt with limited family support and will need to be fully (I) at d/c  On eval, pt presenting with pain, decreased strength and ROM, poor tolerance to activity and decreased overall functional mobility  Pt in supine upon arrival and agreeable to participate in PT session  Pt required moderate A for bed mobility with Efrem required for transfers and gait  Patient mostly limited by pain and is unsafe to return home alone at this time  PT to continue to follow pt during stay to progress functional mobility (I) and safety  Rehab recommended at this time  Recommendation: Post acute IP rehab     PT - OK to Discharge:  (to rehab when medically appropriate )    See flowsheet documentation for full assessment

## 2018-09-14 NOTE — PROGRESS NOTES
Progress Note - Orthopedics   María Licona 70 y o  male MRN: [id number]  Unit/Bed#: [facility] 929-01      Subjective:    70 y  o male POD 1 Left short TFN  No acute events, no complaints  Pt doing well  Pain controlled   Denies fevers chills, CP, SOB    Labs:    0  Lab Value Date/Time   HCT 32 6 (L) 09/14/2018 0512   HCT 35 1 (L) 09/13/2018 0503   HCT 39 6 09/12/2018 0930   HGB 11 6 (L) 09/14/2018 0512   HGB 12 3 09/13/2018 0503   HGB 14 1 09/12/2018 0930   INR 1 12 09/13/2018 0503   WBC 8 22 09/14/2018 0512   WBC 7 10 09/13/2018 0503   WBC 4 17 (L) 09/12/2018 0930       Meds:    Current Facility-Administered Medications:     acetaminophen (TYLENOL) tablet 650 mg, 650 mg, Oral, Q6H PRN, Kyle Hernandez MD    calcium carbonate (OYSTER SHELL,OSCAL) 500 mg tablet 1 tablet, 1 tablet, Oral, Daily With Breakfast, Kyle Hernandez MD, 1 tablet at 09/13/18 0915    cholecalciferol (VITAMIN D3) tablet 2,000 Units, 2,000 Units, Oral, Daily, Kyle Hernandez MD, 2,000 Units at 09/13/18 0915    docusate sodium (COLACE) capsule 100 mg, 100 mg, Oral, BID, Kyle Hernandez MD, 100 mg at 09/13/18 1734    enoxaparin (LOVENOX) subcutaneous injection 40 mg, 40 mg, Subcutaneous, Daily, Betina Pope MD    gabapentin (NEURONTIN) capsule 100 mg, 100 mg, Oral, TID, Betina Pope MD, 100 mg at 09/13/18 2134    HYDROmorphone (DILAUDID) injection 0 5 mg, 0 5 mg, Intravenous, Q3H PRN, Betina Pope MD    insulin glargine (LANTUS) subcutaneous injection 30 Units 0 3 mL, 30 Units, Subcutaneous, Daily, Kyle Hernandez MD    insulin lispro (HumaLOG) 100 units/mL subcutaneous injection 1-5 Units, 1-5 Units, Subcutaneous, HS, Kyle Hernandez MD, 3 Units at 09/13/18 2134    insulin lispro (HumaLOG) 100 units/mL subcutaneous injection 1-6 Units, 1-6 Units, Subcutaneous, TID With Meals, 6 Units at 09/13/18 1857 **AND** Fingerstick Glucose (POCT), , , TID AC, Kyle Hernandez MD    insulin lispro (HumaLOG) 100 units/mL subcutaneous injection 10 Units, 10 Units, Subcutaneous, TID With Meals, Ron Faulkner MD, 10 Units at 09/13/18 1858    methocarbamol (ROBAXIN) tablet 500 mg, 500 mg, Oral, Q6H PRN, Zechariah Hooks MD, 500 mg at 09/12/18 1758    ondansetron (ZOFRAN) injection 4 mg, 4 mg, Intravenous, Q6H PRN, Zechariah Hooks MD    oxyCODONE (ROXICODONE) immediate release tablet 10 mg, 10 mg, Oral, Q4H PRN, Zechariah Hooks MD, 10 mg at 09/12/18 2104    oxyCODONE (ROXICODONE) IR tablet 5 mg, 5 mg, Oral, Q4H PRN, Zechariah Hooks MD, 5 mg at 09/14/18 0415    polyethylene glycol (MIRALAX) packet 17 g, 17 g, Oral, Daily PRN, Ron Faulkner MD    sodium chloride 0 9 % infusion, 125 mL/hr, Intravenous, Continuous, Zechariah Hooks MD, Last Rate: 125 mL/hr at 09/13/18 0000    sodium chloride 0 9 % infusion, 75 mL/hr, Intravenous, Continuous, Jan Garvey CRNA, Last Rate: 75 mL/hr at 09/14/18 0537, 75 mL/hr at 09/14/18 0537    Blood Culture:   No results found for: BLOODCX    Wound Culture:   No results found for: WOUNDCULT    Ins and Outs:  I/O last 24 hours: In: 2505 [P O :480; I V :2025]  Out: 3450 [Urine:3375; Blood:75]          Physical:  Vitals:    09/14/18 0331   BP: 116/66   Pulse: 86   Resp: 18   Temp: 98 °F (36 7 °C)   SpO2: 98%     Musculoskeletal: left Lower Extremity  · Skin-no erythema  · Dressing c/d/i  · SILT s/s/sp/dp/t  +fhl/ehl, +ankle dorsi/plantar flexion  +PT pulse      Assessment:    70 y  o male POD L short TFN  Plan:  · WBAT  · PT/OT  · Pain control  · DVT ppx  · Dispo:      Karina Cornejo PA-C

## 2018-09-14 NOTE — PROGRESS NOTES
IM Residency Progress Note   Unit/Bed#: [facility] 929-01 Encounter: [id number]  SOD Team B       María Licona 70 y o  male [id number]    Hospital Stay Days: 2      Assessment/Plan:    Principal Problem:    Hip fracture, left, closed, initial encounter [facility])  Active Problems:    Type 2 diabetes mellitus ([facility] [address] )    Essential hypertension    Hyponatremia    Accident due to mechanical fall without injury    Cachectic ([facility] [address] )    Hip Fracture from ground-level fall  - postop day 1 status post left short TFN from left intertrochanteric fracture  Management per Ortho  - left hip pain worsens with movement but is well controlled at rest  - will consult with case management about inpatient rehabilitation options  - PT/OT  - Pain management per ortho    T2DM, new onset  - on admission blood glucose was noted to be 479, hgb a1c 12 4 without history of diabetes  - endocrine consulted for management of new onset diabetes in addition to severe osteoporosis  - on regimen of Lantus 30 units and Humalog 10 units t i d  with meals, correctional insulin algorithm 4  - workup pending per Endo for osteoporosis, diabetes, and erectile dysfunction; will follow result    Hyponatremia - resolved    Cachexia  - likely due to acute on chronic illness and poor medical follow-up  - nutrition following, no further recommendations for supplemental nutrition aside from carb controlled diet      Disposition: Continue inpatient treatment       Subjective:   Patient reports inability to sleep overnight and pain in the left hip with movement although it is well controlled at rest   Denies any acute events overnight including fever, chills, chest pain, shortness of breath, abdominal pain, nausea, vomiting, or difficulty passing urine       Vitals: Temp (24hrs), Av 5 °F (36 4 °C), Min:97 1 °F (36 2 °C), Max:98 °F (36 7 °C)  Current: Temperature: 97 5 °F (36 4 °C)  Vitals:    18 1900 18 2351 18 0331 18 0700   BP: 118/70 114/68 116/66 117/66   BP Location: Right arm Right arm Right arm Right arm   Pulse: (!) 111 92 86 90   Resp: 16 18 18 18   Temp: 97 5 °F (36 4 °C) 97 5 °F (36 4 °C) 98 °F (36 7 °C) 97 5 °F (36 4 °C)   TempSrc: Oral Oral Oral Oral   SpO2: 98% 99% 98% 99%   Weight:       Height:        Body mass index is 21 23 kg/m²  I/O last 24 hours: In: 2605 [P  O :580; I V :2025]  Out: 3250 [Urine:3175; Blood:75]  Review of Systems   Constitutional: Negative for activity change, appetite change, chills, diaphoresis and fatigue  HENT: Negative  Respiratory: Negative for cough, chest tightness and shortness of breath  Cardiovascular: Negative for chest pain, palpitations and leg swelling  Gastrointestinal: Negative for abdominal pain, diarrhea, nausea and vomiting  Genitourinary: Negative for dysuria and frequency  Musculoskeletal: Positive for arthralgias (L hip pain)  Negative for gait problem and joint swelling  Skin: Negative for rash  Neurological: Negative for dizziness, weakness, light-headedness and headaches  Physical Exam   Constitutional: He is oriented to person, place, and time  He appears well-developed and well-nourished  No distress  HENT:   Head: Normocephalic and atraumatic  Eyes: Conjunctivae and EOM are normal  Pupils are equal, round, and reactive to light  No scleral icterus  Neck: Normal range of motion  Neck supple  No tracheal deviation present  Cardiovascular: Normal rate, regular rhythm and normal heart sounds  Pulmonary/Chest: Effort normal and breath sounds normal  No respiratory distress  He has no wheezes  He has no rales  Abdominal: Soft  Bowel sounds are normal  He exhibits no distension  There is no tenderness  Musculoskeletal: He exhibits no edema or deformity  Limited exam in postoperative setting   Neurological: He is alert and oriented to person, place, and time  No cranial nerve deficit  Coordination normal    Skin: Skin is warm and dry  No rash noted   He is not diaphoretic  No erythema  Psychiatric: He has a normal mood and affect   His behavior is normal  Judgment and thought content normal           Invasive Devices     Peripheral Intravenous Line            Peripheral IV 09/13/18 Right Arm less than 1 day                          Labs:   Recent Results (from the past 24 hour(s))   Fingerstick Glucose (POCT)    Collection Time: 09/13/18 10:57 AM   Result Value Ref Range    POC Glucose 239 (H) 65 - 140 mg/dl   Fingerstick Glucose (POCT)    Collection Time: 09/13/18 12:05 PM   Result Value Ref Range    POC Glucose 226 (H) 65 - 140 mg/dl   Fingerstick Glucose (POCT)    Collection Time: 09/13/18  5:01 PM   Result Value Ref Range    POC Glucose 405 (H) 65 - 140 mg/dl   Fingerstick Glucose (POCT)    Collection Time: 09/13/18  8:49 PM   Result Value Ref Range    POC Glucose 346 (H) 65 - 140 mg/dl   Basic metabolic panel    Collection Time: 09/14/18  5:12 AM   Result Value Ref Range    Sodium 137 136 - 145 mmol/L    Potassium 4 0 3 5 - 5 3 mmol/L    Chloride 103 100 - 108 mmol/L    CO2 24 21 - 32 mmol/L    ANION GAP 10 4 - 13 mmol/L    BUN 18 5 - 25 mg/dL    Creatinine 0 75 0 60 - 1 30 mg/dL    Glucose 194 (H) 65 - 140 mg/dL    Calcium 8 7 8 3 - 10 1 mg/dL    eGFR 96 ml/min/1 73sq m   CBC and differential    Collection Time: 09/14/18  5:12 AM   Result Value Ref Range    WBC 8 22 4 31 - 10 16 Thousand/uL    RBC 3 80 (L) 3 88 - 5 62 Million/uL    Hemoglobin 11 6 (L) 12 0 - 17 0 g/dL    Hematocrit 32 6 (L) 36 5 - 49 3 %    MCV 86 82 - 98 fL    MCH 30 5 26 8 - 34 3 pg    MCHC 35 6 31 4 - 37 4 g/dL    RDW 12 3 11 6 - 15 1 %    MPV 10 2 8 9 - 12 7 fL    Platelets 042 769 - 271 Thousands/uL    nRBC 0 /100 WBCs    Neutrophils Relative 67 43 - 75 %    Immat GRANS % 0 0 - 2 %    Lymphocytes Relative 24 14 - 44 %    Monocytes Relative 8 4 - 12 %    Eosinophils Relative 1 0 - 6 %    Basophils Relative 0 0 - 1 %    Neutrophils Absolute 5 56 1 85 - 7 62 Thousands/µL    Immature Grans Absolute 0 02 0 00 - 0 20 Thousand/uL    Lymphocytes Absolute 1 93 0 60 - 4 47 Thousands/µL    Monocytes Absolute 0 64 0 17 - 1 22 Thousand/µL    Eosinophils Absolute 0 04 0 00 - 0 61 Thousand/µL    Basophils Absolute 0 03 0 00 - 0 10 Thousands/µL   PTH, intact    Collection Time: 09/14/18  5:12 AM   Result Value Ref Range    PTH 27 2 18 4 - 80 1 pg/mL   TSH, 3rd generation    Collection Time: 09/14/18  5:12 AM   Result Value Ref Range    TSH 3RD GENERATON 2 000 0 358 - 3 740 uIU/mL   T4, free    Collection Time: 09/14/18  5:12 AM   Result Value Ref Range    Free T4 1 56 (H) 0 76 - 1 46 ng/dL   Fingerstick Glucose (POCT)    Collection Time: 09/14/18  7:22 AM   Result Value Ref Range    POC Glucose 201 (H) 65 - 140 mg/dl       Radiology Results: I have personally reviewed pertinent reports  and I have personally reviewed pertinent films in PACS      Other Diagnostic Testing:   I have personally reviewed pertinent reports     and I have personally reviewed pertinent films in PACS      Active Meds:   Current Facility-Administered Medications   Medication Dose Route Frequency    acetaminophen (TYLENOL) tablet 650 mg  650 mg Oral Q6H PRN    atorvastatin (LIPITOR) tablet 40 mg  40 mg Oral Daily With Dinner    calcium carbonate (OYSTER SHELL,OSCAL) 500 mg tablet 1 tablet  1 tablet Oral Daily With Breakfast    cholecalciferol (VITAMIN D3) tablet 2,000 Units  2,000 Units Oral Daily    docusate sodium (COLACE) capsule 100 mg  100 mg Oral BID    enoxaparin (LOVENOX) subcutaneous injection 40 mg  40 mg Subcutaneous Daily    gabapentin (NEURONTIN) capsule 100 mg  100 mg Oral TID    HYDROmorphone (DILAUDID) injection 0 5 mg  0 5 mg Intravenous Q3H PRN    insulin glargine (LANTUS) subcutaneous injection 30 Units 0 3 mL  30 Units Subcutaneous Daily    insulin lispro (HumaLOG) 100 units/mL subcutaneous injection 1-5 Units  1-5 Units Subcutaneous HS    insulin lispro (HumaLOG) 100 units/mL subcutaneous injection 1-6 Units 1-6 Units Subcutaneous TID With Meals    insulin lispro (HumaLOG) 100 units/mL subcutaneous injection 10 Units  10 Units Subcutaneous TID With Meals    methocarbamol (ROBAXIN) tablet 500 mg  500 mg Oral Q6H PRN    ondansetron (ZOFRAN) injection 4 mg  4 mg Intravenous Q6H PRN    oxyCODONE (ROXICODONE) immediate release tablet 10 mg  10 mg Oral Q4H PRN    oxyCODONE (ROXICODONE) IR tablet 5 mg  5 mg Oral Q4H PRN    polyethylene glycol (MIRALAX) packet 17 g  17 g Oral Daily PRN    sodium chloride 0 9 % infusion  125 mL/hr Intravenous Continuous    sodium chloride 0 9 % infusion  75 mL/hr Intravenous Continuous         VTE Pharmacologic Prophylaxis: Enoxaparin (Lovenox)  VTE Mechanical Prophylaxis: sequential compression device    Charmayne Mayers, DO

## 2018-09-15 LAB
ANION GAP SERPL CALCULATED.3IONS-SCNC: 8 MMOL/L (ref 4–13)
BASOPHILS # BLD AUTO: 0.03 THOUSANDS/ΜL (ref 0–0.1)
BASOPHILS NFR BLD AUTO: 1 % (ref 0–1)
BUN SERPL-MCNC: 17 MG/DL (ref 5–25)
CALCIUM SERPL-MCNC: 8.7 MG/DL (ref 8.3–10.1)
CHLORIDE SERPL-SCNC: 103 MMOL/L (ref 100–108)
CO2 SERPL-SCNC: 26 MMOL/L (ref 21–32)
CREAT SERPL-MCNC: 0.74 MG/DL (ref 0.6–1.3)
EOSINOPHIL # BLD AUTO: 0.12 THOUSAND/ΜL (ref 0–0.61)
EOSINOPHIL NFR BLD AUTO: 2 % (ref 0–6)
ERYTHROCYTE [DISTWIDTH] IN BLOOD BY AUTOMATED COUNT: 12.2 % (ref 11.6–15.1)
GFR SERPL CREATININE-BSD FRML MDRD: 97 ML/MIN/1.73SQ M
GLUCOSE SERPL-MCNC: 168 MG/DL (ref 65–140)
GLUCOSE SERPL-MCNC: 208 MG/DL (ref 65–140)
GLUCOSE SERPL-MCNC: 287 MG/DL (ref 65–140)
GLUCOSE SERPL-MCNC: 300 MG/DL (ref 65–140)
GLUCOSE SERPL-MCNC: 91 MG/DL (ref 65–140)
HCT VFR BLD AUTO: 29.6 % (ref 36.5–49.3)
HGB BLD-MCNC: 10.4 G/DL (ref 12–17)
IMM GRANULOCYTES # BLD AUTO: 0.02 THOUSAND/UL (ref 0–0.2)
IMM GRANULOCYTES NFR BLD AUTO: 0 % (ref 0–2)
LYMPHOCYTES # BLD AUTO: 1.37 THOUSANDS/ΜL (ref 0.6–4.47)
LYMPHOCYTES NFR BLD AUTO: 22 % (ref 14–44)
MCH RBC QN AUTO: 30.4 PG (ref 26.8–34.3)
MCHC RBC AUTO-ENTMCNC: 35.1 G/DL (ref 31.4–37.4)
MCV RBC AUTO: 87 FL (ref 82–98)
MONOCYTES # BLD AUTO: 0.58 THOUSAND/ΜL (ref 0.17–1.22)
MONOCYTES NFR BLD AUTO: 9 % (ref 4–12)
NEUTROPHILS # BLD AUTO: 4.04 THOUSANDS/ΜL (ref 1.85–7.62)
NEUTS SEG NFR BLD AUTO: 66 % (ref 43–75)
NRBC BLD AUTO-RTO: 0 /100 WBCS
PLATELET # BLD AUTO: 126 THOUSANDS/UL (ref 149–390)
PMV BLD AUTO: 10 FL (ref 8.9–12.7)
POTASSIUM SERPL-SCNC: 3.8 MMOL/L (ref 3.5–5.3)
RBC # BLD AUTO: 3.42 MILLION/UL (ref 3.88–5.62)
SODIUM SERPL-SCNC: 137 MMOL/L (ref 136–145)
TESTOST FREE SERPL-MCNC: 1.2 PG/ML (ref 6.6–18.1)
TESTOST SERPL-MCNC: 282 NG/DL (ref 264–916)
WBC # BLD AUTO: 6.16 THOUSAND/UL (ref 4.31–10.16)

## 2018-09-15 PROCEDURE — 85025 COMPLETE CBC W/AUTO DIFF WBC: CPT | Performed by: ORTHOPAEDIC SURGERY

## 2018-09-15 PROCEDURE — 97116 GAIT TRAINING THERAPY: CPT

## 2018-09-15 PROCEDURE — 99024 POSTOP FOLLOW-UP VISIT: CPT | Performed by: ORTHOPAEDIC SURGERY

## 2018-09-15 PROCEDURE — 82948 REAGENT STRIP/BLOOD GLUCOSE: CPT

## 2018-09-15 PROCEDURE — 80048 BASIC METABOLIC PNL TOTAL CA: CPT | Performed by: ORTHOPAEDIC SURGERY

## 2018-09-15 RX ORDER — INSULIN GLARGINE 100 [IU]/ML
35 INJECTION, SOLUTION SUBCUTANEOUS DAILY
Status: DISCONTINUED | OUTPATIENT
Start: 2018-09-15 | End: 2018-09-16

## 2018-09-15 RX ADMIN — Medication 1 TABLET: at 10:31

## 2018-09-15 RX ADMIN — DOCUSATE SODIUM 100 MG: 100 CAPSULE, LIQUID FILLED ORAL at 10:30

## 2018-09-15 RX ADMIN — GABAPENTIN 100 MG: 100 CAPSULE ORAL at 16:24

## 2018-09-15 RX ADMIN — ENOXAPARIN SODIUM 40 MG: 100 INJECTION SUBCUTANEOUS at 10:41

## 2018-09-15 RX ADMIN — INSULIN LISPRO 10 UNITS: 100 INJECTION, SOLUTION INTRAVENOUS; SUBCUTANEOUS at 15:10

## 2018-09-15 RX ADMIN — VITAMIN D, TAB 1000IU (100/BT) 2000 UNITS: 25 TAB at 10:30

## 2018-09-15 RX ADMIN — INSULIN LISPRO 1 UNITS: 100 INJECTION, SOLUTION INTRAVENOUS; SUBCUTANEOUS at 19:08

## 2018-09-15 RX ADMIN — ATORVASTATIN CALCIUM 40 MG: 40 TABLET, FILM COATED ORAL at 16:24

## 2018-09-15 RX ADMIN — INSULIN GLARGINE 35 UNITS: 100 INJECTION, SOLUTION SUBCUTANEOUS at 10:50

## 2018-09-15 RX ADMIN — GABAPENTIN 100 MG: 100 CAPSULE ORAL at 21:50

## 2018-09-15 RX ADMIN — INSULIN LISPRO 10 UNITS: 100 INJECTION, SOLUTION INTRAVENOUS; SUBCUTANEOUS at 19:08

## 2018-09-15 RX ADMIN — INSULIN LISPRO 4 UNITS: 100 INJECTION, SOLUTION INTRAVENOUS; SUBCUTANEOUS at 15:10

## 2018-09-15 RX ADMIN — INSULIN LISPRO 4 UNITS: 100 INJECTION, SOLUTION INTRAVENOUS; SUBCUTANEOUS at 10:58

## 2018-09-15 RX ADMIN — DOCUSATE SODIUM 100 MG: 100 CAPSULE, LIQUID FILLED ORAL at 19:05

## 2018-09-15 RX ADMIN — INSULIN LISPRO 10 UNITS: 100 INJECTION, SOLUTION INTRAVENOUS; SUBCUTANEOUS at 10:59

## 2018-09-15 RX ADMIN — GABAPENTIN 100 MG: 100 CAPSULE ORAL at 10:30

## 2018-09-15 NOTE — PLAN OF CARE
Problem: PHYSICAL THERAPY ADULT  Goal: Performs mobility at highest level of function for planned discharge setting  See evaluation for individualized goals  Treatment/Interventions: Functional transfer training, LE strengthening/ROM, Elevations, Therapeutic exercise, Endurance training, Patient/family training, Equipment eval/education, Bed mobility, Gait training, Compensatory technique education, Spoke to nursing, Spoke to case management, OT  Equipment Recommended: Myriam Paul       See flowsheet documentation for full assessment, interventions and recommendations  Outcome: Progressing  Prognosis: Good  Problem List: Decreased strength, Decreased range of motion, Decreased endurance, Decreased mobility, Pain, Orthopedic restrictions  Assessment: Pt supine in bed upon arrival, pleasant and agreeable to OOB ambulation  pt requires min assist x1 for all bed mobility, as well ast sit to stand transfers  Pt has difficulty moving LLE without manual assistance  pt with increased ambualtion this session, ambualtes 15' x2 w/ use of RW  Pt requires encouragment  to walk further this session  this pt will continue to benefit from skilled Pt to furhter increase strength and endruance in order to maximize safe fucntional mobility  Barriers to Discharge: Decreased caregiver support, Inaccessible home environment  Barriers to Discharge Comments:  (pt lives alone)  Recommendation: Post acute IP rehab     PT - OK to Discharge: Yes (to rehab when medically ready)    See flowsheet documentation for full assessment

## 2018-09-15 NOTE — PHYSICAL THERAPY NOTE
Physical Therapy Progress Note     09/15/18 1122   Pain Assessment   Pain Assessment 0-10   Pain Score 6   Pain Type Acute pain   Pain Location Hip   Pain Orientation Left   Hospital Pain Intervention(s) Medication (See MAR); Ambulation/increased activity;Repositioned   Restrictions/Precautions   Weight Bearing Precautions Per Order Yes   LLE Weight Bearing Per Order WBAT   Other Precautions Pain; Fall Risk;WBS   General   Chart Reviewed Yes   Response to Previous Treatment Patient with no complaints from previous session  Family/Caregiver Present No   Cognition   Overall Cognitive Status WFL   Arousal/Participation Alert; Responsive; Cooperative   Attention Within functional limits   Orientation Level Oriented X4   Memory Within functional limits   Following Commands Follows all commands and directions without difficulty   Bed Mobility   Supine to Sit 4  Minimal assistance   Additional items Assist x 1;Bedrails;HOB elevated; Increased time required;LE management   Sit to Supine 4  Minimal assistance   Additional items Assist x 1;HOB elevated; Bedrails; Increased time required;Verbal cues;LE management   Transfers   Sit to Stand 4  Minimal assistance   Additional items Assist x 1; Increased time required;Verbal cues   Stand to Sit 4  Minimal assistance   Additional items Assist x 1; Increased time required;Verbal cues   Ambulation/Elevation   Gait pattern Improper Weight shift;Decreased foot clearance;Decreased L stance; Short stride; Step to;Shuffling   Gait Assistance 4  Minimal assist   Additional items Assist x 1   Assistive Device Rolling walker   Distance 15'x2   Endurance Deficit   Endurance Deficit Yes   Activity Tolerance   Activity Tolerance Patient limited by fatigue;Patient limited by pain   Nurse Made Aware CECILLE Barroso ok to see   Assessment   Prognosis Good   Problem List Decreased strength;Decreased range of motion;Decreased endurance;Decreased mobility;Pain;Orthopedic restrictions   Assessment Pt supine in bed upon arrival, pleasant and agreeable to OOB ambulation  pt requires min assist x1 for all bed mobility, as well ast sit to stand transfers  Pt has difficulty moving LLE without manual assistance  pt with increased ambualtion this session, ambualtes 15' x2 w/ use of RW  Pt requires encouragment  to walk further this session  this pt will continue to benefit from skilled Pt to furhter increase strength and endruance in order to maximize safe fucntional mobility  Barriers to Discharge Decreased caregiver support; Inaccessible home environment   Barriers to Discharge Comments (pt lives alone)   Goals   Patient Goals none stated at this time  STG Expiration Date 09/24/18   Short Term Goal #1 Pt will be mod(I) with rolling R and L for ease with OOB transfer  Pt will be mod(I) with supine<->sit transfer  Pt will be mod(I) with sit<->stand to promote (I) with toileting  Pt will be mod(I) with ambualtion of household distances (appx 50') to reduce caregiver burden  Pt will be S with community distance ambulaiton using least restrictive AD to increase tolerance to activity  Pt will be S ascending and descending1 step to gain access into home  Pt will particiapte in HEP consisitng of balance, strength, ROM and coordinaiton tasks to increase activitiy, improve (I) and decrease pain  Treatment Day 1   Plan   Treatment/Interventions Functional transfer training;LE strengthening/ROM; Elevations; Therapeutic exercise; Endurance training;Bed mobility;Gait training   Progress Progressing toward goals   PT Frequency (3-6x/week)   Recommendation   Recommendation Post acute IP rehab   Equipment Recommended Walker   PT - OK to Discharge Yes  (to rehab when medically ready)     Connie Mckenna PTA

## 2018-09-15 NOTE — PROGRESS NOTES
Subjective: No acute events overnight  No acute distress        Objective:  Lab Results   Component Value Date/Time    WBC 6 16 09/15/2018 05:10 AM    HGB 10 4 (L) 09/15/2018 05:10 AM       Vitals:    09/15/18 0343   BP: 113/61   Pulse: 102   Resp: 18   Temp: 98 1 °F (36 7 °C)   SpO2: 99%     Left lower extremity  Incision c/d/i  Motor & sensation grossly intact  Palpable DP pulse    Assessment: Post op day 2 IMN left femur     Plan:  WBAT LLE  Pain control  DVT ppx  PT  Dispo

## 2018-09-15 NOTE — PROGRESS NOTES
IM Residency Progress Note   Unit/Bed#: [facility] 929-01 Encounter: [id number]  SOD Team B       Herm[place] 70 y o  male [id number]    Hospital Stay Days: 3      Assessment/Plan:    Principal Problem:    Hip fracture, left, closed, initial encounter [facility])  Active Problems:    Type 2 diabetes mellitus ([facility] )    Essential hypertension    Hyponatremia    Accident due to mechanical fall without injury    Cachectic ([facility] )    Osteoporosis    1  Postop day 2 following ORIF of L hip  · Patient is doing well  Management per Ortho  · PT OT eval   Follow-up case management regarding rehab recommendations  · Pain control  Bowel regimen  · DVT prophylaxis  · Up and out of bed  · Appreciated on the bone consultation by Endocrinology  Follow-up studies  Continue vitamin-D and calcium supplementation    2  Type 2 diabetes mellitus  · Will increase Lantus 35 U  continue Humalog 10 U t i d  · Appreciate Endocrinology assistance  · Decrease carbohydrate restriction to 2 servings  · Patient will need diabetic teaching prior to being sent to rehab  · Blood glucose goal 140-180    Disposition:  Continue insulin titration  Continue pain control  Consider transitioning to oral pain medicine next 24-48 hr and possibly discharge if we have an accepting facility for rehab      Subjective:   Patient seen examined this morning  No acute events overnight  Patient reports some mild pain in his left lower extremity though overall pain is well controlled  He denies any chest pain, he denies any shortness of breath  He denies any fevers, chills, nausea or vomiting         Vitals: Temp (24hrs), Av 1 °F (36 7 °C), Min:97 9 °F (36 6 °C), Max:98 2 °F (36 8 °C)  Current: Temperature: 98 2 °F (36 8 °C)  Vitals:    18 2151 18 2341 09/15/18 0343 09/15/18 0847   BP:  116/64 113/61 117/56   BP Location:  Right arm Right arm Right arm   Pulse:  97 102 102   Resp:     Temp:  98 °F (36 7 °C) 98 1 °F (36 7 °C) 98 2 °F (36 8 °C) TempSrc:  Oral Oral Oral   SpO2: 96% 98% 99% 100%   Weight:       Height:        Body mass index is 21 23 kg/m²  I/O last 24 hours: In: 56 [P O :740]  Out: 2375 [Urine:2375]      Physical Exam:   Physical Exam   Constitutional: He is oriented to person, place, and time  He appears well-developed and well-nourished  HENT:   Head: Normocephalic and atraumatic  Eyes: Pupils are equal, round, and reactive to light  Neck: Normal range of motion  No JVD present  Cardiovascular: Regular rhythm  Exam reveals no gallop and no friction rub  No murmur heard  Sinus tachycardia   Pulmonary/Chest: Effort normal  No respiratory distress  He has no wheezes  He has no rales  Abdominal: Soft  Bowel sounds are normal    Musculoskeletal: Normal range of motion  He exhibits no edema  Neurological: He is alert and oriented to person, place, and time  No cranial nerve deficit  Skin: Skin is warm and dry  Vitals reviewed          Invasive Devices     Peripheral Intravenous Line            Peripheral IV 09/13/18 Right Arm 1 day              Labs:   Recent Results (from the past 24 hour(s))   Fingerstick Glucose (POCT)    Collection Time: 09/14/18  4:58 PM   Result Value Ref Range    POC Glucose 278 (H) 65 - 140 mg/dl   Fingerstick Glucose (POCT)    Collection Time: 09/14/18  9:03 PM   Result Value Ref Range    POC Glucose 161 (H) 65 - 140 mg/dl   Basic metabolic panel    Collection Time: 09/15/18  5:10 AM   Result Value Ref Range    Sodium 137 136 - 145 mmol/L    Potassium 3 8 3 5 - 5 3 mmol/L    Chloride 103 100 - 108 mmol/L    CO2 26 21 - 32 mmol/L    ANION GAP 8 4 - 13 mmol/L    BUN 17 5 - 25 mg/dL    Creatinine 0 74 0 60 - 1 30 mg/dL    Glucose 208 (H) 65 - 140 mg/dL    Calcium 8 7 8 3 - 10 1 mg/dL    eGFR 97 ml/min/1 73sq m   CBC and differential    Collection Time: 09/15/18  5:10 AM   Result Value Ref Range    WBC 6 16 4 31 - 10 16 Thousand/uL    RBC 3 42 (L) 3 88 - 5 62 Million/uL    Hemoglobin 10 4 (L) 12 0 - 17 0 g/dL    Hematocrit 29 6 (L) 36 5 - 49 3 %    MCV 87 82 - 98 fL    MCH 30 4 26 8 - 34 3 pg    MCHC 35 1 31 4 - 37 4 g/dL    RDW 12 2 11 6 - 15 1 %    MPV 10 0 8 9 - 12 7 fL    Platelets 418 (L) 202 - 390 Thousands/uL    nRBC 0 /100 WBCs    Neutrophils Relative 66 43 - 75 %    Immat GRANS % 0 0 - 2 %    Lymphocytes Relative 22 14 - 44 %    Monocytes Relative 9 4 - 12 %    Eosinophils Relative 2 0 - 6 %    Basophils Relative 1 0 - 1 %    Neutrophils Absolute 4 04 1 85 - 7 62 Thousands/µL    Immature Grans Absolute 0 02 0 00 - 0 20 Thousand/uL    Lymphocytes Absolute 1 37 0 60 - 4 47 Thousands/µL    Monocytes Absolute 0 58 0 17 - 1 22 Thousand/µL    Eosinophils Absolute 0 12 0 00 - 0 61 Thousand/µL    Basophils Absolute 0 03 0 00 - 0 10 Thousands/µL   Fingerstick Glucose (POCT)    Collection Time: 09/15/18  8:52 AM   Result Value Ref Range    POC Glucose 287 (H) 65 - 140 mg/dl       Radiology Results: I have personally reviewed pertinent reports  Other Diagnostic Testing:   I have personally reviewed pertinent reports          Active Meds:   Current Facility-Administered Medications   Medication Dose Route Frequency    acetaminophen (TYLENOL) tablet 650 mg  650 mg Oral Q6H PRN    atorvastatin (LIPITOR) tablet 40 mg  40 mg Oral Daily With Dinner    calcium carbonate (OYSTER SHELL,OSCAL) 500 mg tablet 1 tablet  1 tablet Oral Daily With Breakfast    cholecalciferol (VITAMIN D3) tablet 2,000 Units  2,000 Units Oral Daily    docusate sodium (COLACE) capsule 100 mg  100 mg Oral BID    enoxaparin (LOVENOX) subcutaneous injection 40 mg  40 mg Subcutaneous Daily    gabapentin (NEURONTIN) capsule 100 mg  100 mg Oral TID    HYDROmorphone (DILAUDID) injection 0 5 mg  0 5 mg Intravenous Q3H PRN    insulin glargine (LANTUS) subcutaneous injection 35 Units 0 35 mL  35 Units Subcutaneous Daily    insulin lispro (HumaLOG) 100 units/mL subcutaneous injection 1-5 Units  1-5 Units Subcutaneous HS    insulin lispro (HumaLOG) 100 units/mL subcutaneous injection 1-6 Units  1-6 Units Subcutaneous TID With Meals    insulin lispro (HumaLOG) 100 units/mL subcutaneous injection 10 Units  10 Units Subcutaneous TID With Meals    methocarbamol (ROBAXIN) tablet 500 mg  500 mg Oral Q6H PRN    ondansetron (ZOFRAN) injection 4 mg  4 mg Intravenous Q6H PRN    oxyCODONE (ROXICODONE) immediate release tablet 10 mg  10 mg Oral Q4H PRN    oxyCODONE (ROXICODONE) IR tablet 5 mg  5 mg Oral Q4H PRN    polyethylene glycol (MIRALAX) packet 17 g  17 g Oral Daily PRN         VTE Pharmacologic Prophylaxis: Enoxaparin (Lovenox)  VTE Mechanical Prophylaxis: foot pump applied    Jamaica Barnett MD

## 2018-09-16 PROBLEM — E55.9 VITAMIN D DEFICIENCY: Status: ACTIVE | Noted: 2018-09-16

## 2018-09-16 PROBLEM — R79.89 LOW TESTOSTERONE: Status: ACTIVE | Noted: 2018-09-16

## 2018-09-16 LAB
ALBUMIN SERPL ELPH-MCNC: 3.26 G/DL (ref 3.5–5)
ALBUMIN SERPL ELPH-MCNC: 60.4 % (ref 52–65)
ALPHA1 GLOB SERPL ELPH-MCNC: 0.29 G/DL (ref 0.1–0.4)
ALPHA1 GLOB SERPL ELPH-MCNC: 5.3 % (ref 2.5–5)
ALPHA2 GLOB SERPL ELPH-MCNC: 0.72 G/DL (ref 0.4–1.2)
ALPHA2 GLOB SERPL ELPH-MCNC: 13.4 % (ref 7–13)
ANION GAP SERPL CALCULATED.3IONS-SCNC: 6 MMOL/L (ref 4–13)
BASOPHILS # BLD AUTO: 0.03 THOUSANDS/ΜL (ref 0–0.1)
BASOPHILS NFR BLD AUTO: 1 % (ref 0–1)
BETA GLOB ABNORMAL SERPL ELPH-MCNC: 0.28 G/DL (ref 0.4–0.8)
BETA1 GLOB SERPL ELPH-MCNC: 5.2 % (ref 5–13)
BETA2 GLOB SERPL ELPH-MCNC: 4.4 % (ref 2–8)
BETA2+GAMMA GLOB SERPL ELPH-MCNC: 0.24 G/DL (ref 0.2–0.5)
BUN SERPL-MCNC: 14 MG/DL (ref 5–25)
CALCIUM SERPL-MCNC: 8.5 MG/DL (ref 8.3–10.1)
CHLORIDE SERPL-SCNC: 101 MMOL/L (ref 100–108)
CO2 SERPL-SCNC: 27 MMOL/L (ref 21–32)
CREAT SERPL-MCNC: 0.64 MG/DL (ref 0.6–1.3)
EOSINOPHIL # BLD AUTO: 0.1 THOUSAND/ΜL (ref 0–0.61)
EOSINOPHIL NFR BLD AUTO: 2 % (ref 0–6)
ERYTHROCYTE [DISTWIDTH] IN BLOOD BY AUTOMATED COUNT: 11.9 % (ref 11.6–15.1)
GAMMA GLOB ABNORMAL SERPL ELPH-MCNC: 0.61 G/DL (ref 0.5–1.6)
GAMMA GLOB SERPL ELPH-MCNC: 11.3 % (ref 12–22)
GFR SERPL CREATININE-BSD FRML MDRD: 103 ML/MIN/1.73SQ M
GLUCOSE SERPL-MCNC: 123 MG/DL (ref 65–140)
GLUCOSE SERPL-MCNC: 215 MG/DL (ref 65–140)
GLUCOSE SERPL-MCNC: 246 MG/DL (ref 65–140)
GLUCOSE SERPL-MCNC: 331 MG/DL (ref 65–140)
GLUCOSE SERPL-MCNC: 89 MG/DL (ref 65–140)
HCT VFR BLD AUTO: 28.4 % (ref 36.5–49.3)
HGB BLD-MCNC: 10.1 G/DL (ref 12–17)
IGG/ALB SER: 1.53 {RATIO} (ref 1.1–1.8)
IMM GRANULOCYTES # BLD AUTO: 0.02 THOUSAND/UL (ref 0–0.2)
IMM GRANULOCYTES NFR BLD AUTO: 0 % (ref 0–2)
LYMPHOCYTES # BLD AUTO: 1.3 THOUSANDS/ΜL (ref 0.6–4.47)
LYMPHOCYTES NFR BLD AUTO: 23 % (ref 14–44)
MCH RBC QN AUTO: 30.7 PG (ref 26.8–34.3)
MCHC RBC AUTO-ENTMCNC: 35.6 G/DL (ref 31.4–37.4)
MCV RBC AUTO: 86 FL (ref 82–98)
MONOCYTES # BLD AUTO: 0.54 THOUSAND/ΜL (ref 0.17–1.22)
MONOCYTES NFR BLD AUTO: 9 % (ref 4–12)
NEUTROPHILS # BLD AUTO: 3.73 THOUSANDS/ΜL (ref 1.85–7.62)
NEUTS SEG NFR BLD AUTO: 65 % (ref 43–75)
NRBC BLD AUTO-RTO: 0 /100 WBCS
PLATELET # BLD AUTO: 136 THOUSANDS/UL (ref 149–390)
PMV BLD AUTO: 10.1 FL (ref 8.9–12.7)
POTASSIUM SERPL-SCNC: 3.8 MMOL/L (ref 3.5–5.3)
PROT PATTERN SERPL ELPH-IMP: ABNORMAL
PROT SERPL-MCNC: 5.4 G/DL (ref 6.4–8.2)
RBC # BLD AUTO: 3.29 MILLION/UL (ref 3.88–5.62)
SODIUM SERPL-SCNC: 134 MMOL/L (ref 136–145)
WBC # BLD AUTO: 5.72 THOUSAND/UL (ref 4.31–10.16)

## 2018-09-16 PROCEDURE — 99232 SBSQ HOSP IP/OBS MODERATE 35: CPT | Performed by: INTERNAL MEDICINE

## 2018-09-16 PROCEDURE — 97535 SELF CARE MNGMENT TRAINING: CPT | Performed by: STUDENT IN AN ORGANIZED HEALTH CARE EDUCATION/TRAINING PROGRAM

## 2018-09-16 PROCEDURE — 97530 THERAPEUTIC ACTIVITIES: CPT | Performed by: STUDENT IN AN ORGANIZED HEALTH CARE EDUCATION/TRAINING PROGRAM

## 2018-09-16 PROCEDURE — 85025 COMPLETE CBC W/AUTO DIFF WBC: CPT | Performed by: ORTHOPAEDIC SURGERY

## 2018-09-16 PROCEDURE — 82948 REAGENT STRIP/BLOOD GLUCOSE: CPT

## 2018-09-16 PROCEDURE — 97116 GAIT TRAINING THERAPY: CPT

## 2018-09-16 PROCEDURE — 80048 BASIC METABOLIC PNL TOTAL CA: CPT | Performed by: ORTHOPAEDIC SURGERY

## 2018-09-16 RX ORDER — INSULIN GLARGINE 100 [IU]/ML
45 INJECTION, SOLUTION SUBCUTANEOUS DAILY
Status: DISCONTINUED | OUTPATIENT
Start: 2018-09-17 | End: 2018-09-17 | Stop reason: HOSPADM

## 2018-09-16 RX ORDER — AMOXICILLIN 250 MG
1 CAPSULE ORAL
Status: DISCONTINUED | OUTPATIENT
Start: 2018-09-16 | End: 2018-09-17

## 2018-09-16 RX ADMIN — INSULIN LISPRO 5 UNITS: 100 INJECTION, SOLUTION INTRAVENOUS; SUBCUTANEOUS at 13:57

## 2018-09-16 RX ADMIN — INSULIN LISPRO 10 UNITS: 100 INJECTION, SOLUTION INTRAVENOUS; SUBCUTANEOUS at 10:59

## 2018-09-16 RX ADMIN — GABAPENTIN 100 MG: 100 CAPSULE ORAL at 10:59

## 2018-09-16 RX ADMIN — INSULIN GLARGINE 35 UNITS: 100 INJECTION, SOLUTION SUBCUTANEOUS at 10:58

## 2018-09-16 RX ADMIN — INSULIN LISPRO 10 UNITS: 100 INJECTION, SOLUTION INTRAVENOUS; SUBCUTANEOUS at 13:58

## 2018-09-16 RX ADMIN — GABAPENTIN 100 MG: 100 CAPSULE ORAL at 22:23

## 2018-09-16 RX ADMIN — DOCUSATE SODIUM 100 MG: 100 CAPSULE, LIQUID FILLED ORAL at 11:01

## 2018-09-16 RX ADMIN — OXYCODONE HYDROCHLORIDE 5 MG: 5 TABLET ORAL at 14:03

## 2018-09-16 RX ADMIN — Medication 1 TABLET: at 10:59

## 2018-09-16 RX ADMIN — ENOXAPARIN SODIUM 40 MG: 100 INJECTION SUBCUTANEOUS at 10:57

## 2018-09-16 RX ADMIN — INSULIN LISPRO 3 UNITS: 100 INJECTION, SOLUTION INTRAVENOUS; SUBCUTANEOUS at 10:58

## 2018-09-16 RX ADMIN — GABAPENTIN 100 MG: 100 CAPSULE ORAL at 18:29

## 2018-09-16 RX ADMIN — VITAMIN D, TAB 1000IU (100/BT) 2000 UNITS: 25 TAB at 10:59

## 2018-09-16 RX ADMIN — ATORVASTATIN CALCIUM 40 MG: 40 TABLET, FILM COATED ORAL at 18:29

## 2018-09-16 RX ADMIN — Medication 1 TABLET: at 22:23

## 2018-09-16 NOTE — PROGRESS NOTES
IM Residency Progress Note   Unit/Bed#: [facility] 929-01 Encounter: [id number]  SOD Team B       Luis Hendrix 70 y o  male [id number]    Hospital Stay Days: 4      Assessment/Plan:    Principal Problem:    Hip fracture, left, closed, initial encounter [facility])  Active Problems:    Type 2 diabetes mellitus ([facility] )    Essential hypertension    Hyponatremia    Accident due to mechanical fall without injury    Cachectic ([facility] )    Osteoporosis    L hip fracture s/p ground level fall  - postop day 3 s/p IMN L femur per Ortho  - pain control  - PT OT eval and case management for rehab recommendations  - bowel regimen  - DVT prophylaxis  - out of bed  - appreciate Endocrinology workup and recs for osteoporosis as well as newly diagnosed diabetes    T2DM   - newly diagnosed, hemoglobin A1c on admission 12 0  - currently on 35 units Lantus daily plus correctional scale, continue to titrate  - endocrine following  - will need diabetes education for patient    Osteoporosis  - endocrine working up  - thus far workup has revealed low 25 hydroxy vitamin-D, low free testosterone  - TSH within normal limits, free T4 slightly elevated, PTH within normal limits  - protein electrophoresis pending      Disposition: Continue inpatient treatment       Subjective:   Patient reports feeling well and no acute complaints overnight  Is now able to move and adjust himself in bed without issue and states pain is well controlled  When asked about radicular-type pain, only complains of chronic osteoarthritis in the lower back and knee  Denies fever, chills, chest pain, shortness of breath, or difficulty urinating  Has not had a bowel movement but reports passing plentiful gas     Also had me take a look at his left eye which had increased crusting when compared to the right eye, but he denies itching and when asked if his eye hurt him he said maybe a little "       Vitals: Temp (24hrs), Av 5 °F (36 9 °C), Min:98 3 °F (36 8 °C), Max:98 6 °F (37 °C)  Current: Temperature: 98 6 °F (37 °C)  Vitals:    09/15/18 1900 09/15/18 2107 09/16/18 0002 09/16/18 0737   BP: 132/66  132/80 117/66   BP Location: Right arm  Right arm Right arm   Pulse: 102  96 89   Resp: 18 18 18   Temp: 98 6 °F (37 °C)  98 3 °F (36 8 °C) 98 6 °F (37 °C)   TempSrc: Oral  Oral Oral   SpO2: 99% 99% 98% 97%   Weight:       Height:        Body mass index is 21 23 kg/m²  I/O last 24 hours: In: 1240 [P O :1240]  Out: 2025 [Urine:2025]    Review of Systems   Constitutional: Negative for activity change, appetite change, chills, diaphoresis and fatigue  HENT: Negative  Respiratory: Negative for cough, chest tightness and shortness of breath  Cardiovascular: Negative for chest pain, palpitations and leg swelling  Gastrointestinal: Negative for abdominal pain, diarrhea, nausea and vomiting  Genitourinary: Negative for dysuria and frequency  Musculoskeletal: Positive for arthralgias (L hip pain s/p surgery) and back pain (chronic, intermittent)  Negative for gait problem and joint swelling  Skin: Negative for rash  Neurological: Negative for dizziness, weakness, light-headedness and headaches  Physical Exam   Constitutional: He is oriented to person, place, and time  He appears well-developed and well-nourished  No distress  HENT:   Head: Normocephalic and atraumatic  Eyes: Conjunctivae and EOM are normal  Pupils are equal, round, and reactive to light  No scleral icterus  Moderate yellow crusting below the left eye when compared to the right, no active discharge, no conjunctival injection   Neck: Normal range of motion  Neck supple  No tracheal deviation present  Cardiovascular: Normal rate, regular rhythm and normal heart sounds  Pulmonary/Chest: Effort normal and breath sounds normal  No respiratory distress  He has no wheezes  He has no rales  Abdominal: Soft  Bowel sounds are normal  He exhibits no distension  There is no tenderness     Musculoskeletal: He exhibits no edema, tenderness or deformity  Left hip bandage is in place, no sign of drainage, bleeding, or infection   Neurological: He is alert and oriented to person, place, and time  No cranial nerve deficit or sensory deficit  Coordination normal    Skin: Skin is warm and dry  No rash noted  He is not diaphoretic  No erythema  Psychiatric: He has a normal mood and affect  His behavior is normal  Judgment and thought content normal    Nursing note and vitals reviewed        Invasive Devices     Peripheral Intravenous Line            Peripheral IV 09/13/18 Right Arm 2 days                          Labs:   Recent Results (from the past 24 hour(s))   Fingerstick Glucose (POCT)    Collection Time: 09/15/18 12:40 PM   Result Value Ref Range    POC Glucose 300 (H) 65 - 140 mg/dl   Fingerstick Glucose (POCT)    Collection Time: 09/15/18  5:19 PM   Result Value Ref Range    POC Glucose 168 (H) 65 - 140 mg/dl   Fingerstick Glucose (POCT)    Collection Time: 09/15/18  9:18 PM   Result Value Ref Range    POC Glucose 91 65 - 140 mg/dl   Basic metabolic panel    Collection Time: 09/16/18  5:50 AM   Result Value Ref Range    Sodium 134 (L) 136 - 145 mmol/L    Potassium 3 8 3 5 - 5 3 mmol/L    Chloride 101 100 - 108 mmol/L    CO2 27 21 - 32 mmol/L    ANION GAP 6 4 - 13 mmol/L    BUN 14 5 - 25 mg/dL    Creatinine 0 64 0 60 - 1 30 mg/dL    Glucose 215 (H) 65 - 140 mg/dL    Calcium 8 5 8 3 - 10 1 mg/dL    eGFR 103 ml/min/1 73sq m   CBC and differential    Collection Time: 09/16/18  5:50 AM   Result Value Ref Range    WBC 5 72 4 31 - 10 16 Thousand/uL    RBC 3 29 (L) 3 88 - 5 62 Million/uL    Hemoglobin 10 1 (L) 12 0 - 17 0 g/dL    Hematocrit 28 4 (L) 36 5 - 49 3 %    MCV 86 82 - 98 fL    MCH 30 7 26 8 - 34 3 pg    MCHC 35 6 31 4 - 37 4 g/dL    RDW 11 9 11 6 - 15 1 %    MPV 10 1 8 9 - 12 7 fL    Platelets 852 (L) 239 - 390 Thousands/uL    nRBC 0 /100 WBCs    Neutrophils Relative 65 43 - 75 %    Immat GRANS % 0 0 - 2 % Lymphocytes Relative 23 14 - 44 %    Monocytes Relative 9 4 - 12 %    Eosinophils Relative 2 0 - 6 %    Basophils Relative 1 0 - 1 %    Neutrophils Absolute 3 73 1 85 - 7 62 Thousands/µL    Immature Grans Absolute 0 02 0 00 - 0 20 Thousand/uL    Lymphocytes Absolute 1 30 0 60 - 4 47 Thousands/µL    Monocytes Absolute 0 54 0 17 - 1 22 Thousand/µL    Eosinophils Absolute 0 10 0 00 - 0 61 Thousand/µL    Basophils Absolute 0 03 0 00 - 0 10 Thousands/µL       Radiology Results: I have personally reviewed pertinent reports  and I have personally reviewed pertinent films in PACS      Other Diagnostic Testing:   I have personally reviewed pertinent reports     and I have personally reviewed pertinent films in PACS      Active Meds:   Current Facility-Administered Medications   Medication Dose Route Frequency    acetaminophen (TYLENOL) tablet 650 mg  650 mg Oral Q6H PRN    atorvastatin (LIPITOR) tablet 40 mg  40 mg Oral Daily With Dinner    calcium carbonate (OYSTER SHELL,OSCAL) 500 mg tablet 1 tablet  1 tablet Oral Daily With Breakfast    cholecalciferol (VITAMIN D3) tablet 2,000 Units  2,000 Units Oral Daily    docusate sodium (COLACE) capsule 100 mg  100 mg Oral BID    enoxaparin (LOVENOX) subcutaneous injection 40 mg  40 mg Subcutaneous Daily    gabapentin (NEURONTIN) capsule 100 mg  100 mg Oral TID    HYDROmorphone (DILAUDID) injection 0 5 mg  0 5 mg Intravenous Q3H PRN    insulin glargine (LANTUS) subcutaneous injection 35 Units 0 35 mL  35 Units Subcutaneous Daily    insulin lispro (HumaLOG) 100 units/mL subcutaneous injection 1-5 Units  1-5 Units Subcutaneous HS    insulin lispro (HumaLOG) 100 units/mL subcutaneous injection 1-6 Units  1-6 Units Subcutaneous TID With Meals    insulin lispro (HumaLOG) 100 units/mL subcutaneous injection 10 Units  10 Units Subcutaneous TID With Meals    methocarbamol (ROBAXIN) tablet 500 mg  500 mg Oral Q6H PRN    ondansetron (ZOFRAN) injection 4 mg  4 mg Intravenous Q6H PRN    oxyCODONE (ROXICODONE) immediate release tablet 10 mg  10 mg Oral Q4H PRN    oxyCODONE (ROXICODONE) IR tablet 5 mg  5 mg Oral Q4H PRN    polyethylene glycol (MIRALAX) packet 17 g  17 g Oral Daily PRN         VTE Pharmacologic Prophylaxis: Enoxaparin (Lovenox)  VTE Mechanical Prophylaxis: sequential compression device    Preeti Brady DO

## 2018-09-16 NOTE — PLAN OF CARE
Problem: PHYSICAL THERAPY ADULT  Goal: Performs mobility at highest level of function for planned discharge setting  See evaluation for individualized goals  Treatment/Interventions: Functional transfer training, LE strengthening/ROM, Elevations, Therapeutic exercise, Endurance training, Patient/family training, Equipment eval/education, Bed mobility, Gait training, Compensatory technique education, Spoke to nursing, Spoke to case management, OT  Equipment Recommended: Carol Ann Sharpe       See flowsheet documentation for full assessment, interventions and recommendations  Outcome: Progressing  Prognosis: Good  Problem List: Decreased strength, Decreased range of motion, Decreased endurance, Impaired balance, Pain, Orthopedic restrictions, Decreased mobility  Assessment: Pt supine in bed upon arrival, pleasant and agreeable to OOB ambualtion, reports decreased pain at this time  Pt was able to ambualte greater distance this session with less c/o pain throughout tx  Pt ambualtes 90'x 2 w/ min assist for all mobility and use of RW  this pt lives alone and would beneift from continued skilled PT to further increase his strength and endurance in order to maximize safe fucntional mobility  Barriers to Discharge: Decreased caregiver support, Inaccessible home environment  Barriers to Discharge Comments:  (pt lives alone)  Recommendation: Post acute IP rehab     PT - OK to Discharge: Yes (to rehab when medically ready)    See flowsheet documentation for full assessment

## 2018-09-16 NOTE — PROGRESS NOTES
Progress Note - Georgina Epley 70 y o  male MRN: [id number]    [facility] Encounter: [id number]      CC: diabetes f/u    Subjective:   Georgina Epley is a 70y o  year old male with type 2 diabetes new onset,  Feels well  No complaints  No hypoglycemia  Started on basal bolus insulin regimen, blood sugars elevated  Patient does not have have history of thyroid problems in the past    Labs reviewed    Component      Latest Ref Rng & Units 9/14/2018   A/G RATIO      1 10 - 1 80 1 53   ALBUMIN ELP      52 0 - 65 0 % 60 4   ALBUMIN CONC ELP      3 50 - 5 00 g/dl 3 26 (L)   ALPHA 1      2 5 - 5 0 % 5 3 (H)   ALPHA 1 CONC ELP      0 10 - 0 40 g/dL 0 29   ALPHA 2      7 0 - 13 0 % 13 4 (H)   ALPHA 2 CONC ELP      0 40 - 1 20 g/dL 0 72   BETA-1      5 0 - 13 0 % 5 2   BETA 1 CONC ELP      0 40 - 0 80 g/dL 0 28 (L)   BETA-2      2 0 - 8 0 % 4 4   BETA 2 CONC ELP      0 20 - 0 50 g/dL 0 24   GAMMA GLOBULIN      12 0 - 22 0 % 11 3 (L)   GAMMA CONC ELP      0 50 - 1 60 g/dL 0 61   SPEP INTERPRETATION       The serum total protein and albumin are decreased  The serum protein electrophoresis shows a decreased beta-1 region  No monoclonal bands noted  Reviewed by: Moses Aranda MD  ([id number])  **Electronic Signature**   Total Protein      6 4 - 8 2 g/dL 5 4 (L)   TESTOSTERONE FREE      6 6 - 18 1 pg/mL 1 2 (L)   TESTOSTERONE (TOTAL)      264 - 916 ng/dL 282   PTH      18 4 - 80 1 pg/mL 27 2   TSH 3RD GENERATON      0 358 - 3 740 uIU/mL 2 000   Free T4      0 76 - 1 46 ng/dL 1 56 (H)   Vit D, 25-Hydroxy      30 0 - 100 0 ng/mL 27 0 (L)   POC Glucose      65 - 140 mg/dl        Lab Results   Component Value Date    CREATININE 0 64 09/16/2018       Lab Results   Component Value Date    HGBA1C 12 4 (H) 09/13/2018           Objective:     Vitals: Blood pressure 117/66, pulse 89, temperature 98 6 °F (37 °C), temperature source Oral, resp  rate 18, height 5' 10" (1 778 m), weight 67 1 kg (147 lb 14 9 oz), SpO2 97 %  ,Body mass index is 21 23 kg/m²  Intake/Output Summary (Last 24 hours) at 09/16/18 1451  Last data filed at 09/16/18 0700   Gross per 24 hour   Intake              700 ml   Output             1425 ml   Net             -725 ml       Physical Exam:  General Appearance: awake, appears stated age and cooperative  Head: Normocephalic, without obvious abnormality, atraumatic  Extremities: moves all extremities  Skin: Skin color and temperature normal    Pulm: no labored breathing    Lab, Imaging and other studies: I have personally reviewed pertinent reports  POC Glucose (mg/dl)   Date Value   09/16/2018 331 (H)   09/16/2018 246 (H)   09/15/2018 91   09/15/2018 168 (H)   09/15/2018 300 (H)   09/15/2018 287 (H)   09/14/2018 161 (H)   09/14/2018 278 (H)   09/14/2018 201 (H)   09/14/2018 201 (H)       Assessment:  diabetes with hyperglycemia, severe osteoporosis, left intertrochanteric fracture, vitamin-D deficiency, hypogonadism    Plan:  -type 2 diabetes uncontrolled with hyperglycemia- A1c uncontrolled, with consistently elevated blood sugars  -increase Lantus to 45 units at bedtime  -increase Humalog to 15 units with meals plus scale  -continue scale at bedtime and with mealtime  -patient is most likely going to rehab, he will need basal bolus insulin regimen for discharge as his A1c is uncontrolled    2  Osteoporosis/fracture- SPEP is negative for elevated protein abnormality  -he will need repeat SPEP on outpatient basis, as it could be affected by acute sickness  -vitamin-D is low, will increase vitamin-D dose to 4000 International Units daily  -he is also candidate for IV Reclast or Prolia injection on outpatient basis  -he will need a appointment with Endocrinology in 4-6 weeks after discharge    3  Vitamin-D deficiency-increase vitamin D3 supplementation to 4000 International Units daily    4   Hypogonadism-testosterone level is low including free testosterone level, which could be affected by acute sickness as well as uncontrolled diabetes  -he will need repeat testosterone level once his condition is stable on outpatient basis, to assess if he is candidate for testosterone replacement  5  Elevated free T4-no history of thyroid problems in the past  Will repeat free T4 tomorrow    Will follow and make further adjustment     Portions of the record may have been created with voice recognition software

## 2018-09-16 NOTE — OCCUPATIONAL THERAPY NOTE
09/16/18 1045   Restrictions/Precautions   Weight Bearing Precautions Per Order Yes   LLE Weight Bearing Per Order WBAT   Other Precautions Pain; Fall Risk;WBS   Pain Assessment   Pain Assessment 0-10   Pain Score 6   ADL   Where Assessed Edge of bed   LB Dressing Assistance 2  Maximal Assistance   LB Dressing Deficit Setup;Don/doff R sock; Don/doff L sock; Increased time to complete; Thread RLE into pants; Thread LLE into pants   LB Dressing Comments don/doff socks and pants   Bed Mobility   Supine to Sit 4  Minimal assistance   Additional items Assist x 1   Sit to Supine 4  Minimal assistance   Additional items Assist x 1   Transfers   Sit to Stand 4  Minimal assistance   Additional items Assist x 1   Stand to Sit 4  Minimal assistance   Additional items Assist x 1   Cognition   Overall Cognitive Status WFL   Arousal/Participation Alert   Attention Within functional limits   Orientation Level Oriented X4   Memory Within functional limits   Following Commands Follows one step commands without difficulty   Activity Tolerance   Activity Tolerance Patient tolerated treatment well   Assessment   Assessment Pt participates in OT session with focus on LB dressing, bed mobility, and transfers to increase I for d/c  Visitor present during session  Pt min A supine to sit EOB and requires A for LLE along with HOB elevated  Pt max Ax1 LB dressing to don/doff socks and pants while seated EOB  Pt only able to pull up pants while in stance with RW support  Pt unable to don/doff socks at this time 2* decreased ROM  Pt CGA sit to stand from EOB with RW support  Pt min A sit to supine from EOB  Pt will continue to benefit from activity tolerance, adls, and transfers  Plan   Treatment Interventions ADL retraining;Functional transfer training; Endurance training; Activityengagement   Goal Expiration Date 09/24/18   Treatment Day 1   OT Frequency 3-5x/wk   Recommendation   OT Discharge Recommendation Short Term Rehab

## 2018-09-16 NOTE — PHYSICAL THERAPY NOTE
Physical Therapy Progress Note     09/16/18 7024   Pain Assessment   Pain Assessment 0-10   Pain Score 6   Pain Type Acute pain   Pain Location Hip   Pain Orientation Left   Hospital Pain Intervention(s) Medication (See MAR); Ambulation/increased activity;Repositioned;Distraction   Diversional Activities Television   Restrictions/Precautions   Weight Bearing Precautions Per Order Yes   LLE Weight Bearing Per Order WBAT   Other Precautions Pain; Fall Risk;WBS   General   Chart Reviewed Yes   Response to Previous Treatment Patient with no complaints from previous session  Family/Caregiver Present No   Cognition   Overall Cognitive Status WFL   Arousal/Participation Alert; Responsive; Cooperative   Attention Within functional limits   Orientation Level Oriented X4   Memory Within functional limits   Following Commands Follows all commands and directions without difficulty   Bed Mobility   Supine to Sit 4  Minimal assistance   Additional items Assist x 1; Increased time required;LE management;Verbal cues;HOB elevated; Bedrails   Sit to Supine 4  Minimal assistance   Additional items Assist x 1;HOB elevated; Bedrails; Increased time required;Verbal cues;LE management   Transfers   Sit to Stand 4  Minimal assistance   Additional items Assist x 1; Increased time required;HOB elevated; Bedrails;Verbal cues   Stand to Sit 4  Minimal assistance   Additional items Assist x 1; Increased time required;Verbal cues;HOB elevated   Ambulation/Elevation   Gait pattern Decreased L stance;Decreased foot clearance; Improper Weight shift; Short stride; Excessively slow; Shuffling   Gait Assistance 4  Minimal assist   Additional items Assist x 1;Verbal cues   Assistive Device Rolling walker   Distance 90'x2   Endurance Deficit   Endurance Deficit Yes   Activity Tolerance   Activity Tolerance Patient limited by fatigue;Patient limited by pain   Nurse Made Aware CECILLE adame to see   Assessment   Prognosis Good   Problem List Decreased strength;Decreased range of motion;Decreased endurance; Impaired balance;Pain;Orthopedic restrictions;Decreased mobility   Assessment Pt supine in bed upon arrival, pleasant and agreeable to OOB ambualtion, reports decreased pain at this time  Pt was able to ambualte greater distance this session with less c/o pain throughout tx  Pt ambualtes 90'x 2 w/ min assist for all mobility and use of RW  this pt lives alone and would beneift from continued skilled PT to further increase his strength and endurance in order to maximize safe fucntional mobility  Barriers to Discharge Decreased caregiver support; Inaccessible home environment   Goals   Patient Goals none stated   STG Expiration Date 09/24/18   Short Term Goal #1 Pt will be mod(I) with rolling R and L for ease with OOB transfer  Pt will be mod(I) with supine<->sit transfer  Pt will be mod(I) with sit<->stand to promote (I) with toileting  Pt will be mod(I) with ambualtion of household distances (appx 50') to reduce caregiver burden  Pt will be S with community distance ambulaiton using least restrictive AD to increase tolerance to activity  Pt will be S ascending and descending1 step to gain access into home  Pt will particiapte in HEP consisitng of balance, strength, ROM and coordinaiton tasks to increase activitiy, improve (I) and decrease pain  Treatment Day 2   Plan   Treatment/Interventions Functional transfer training;LE strengthening/ROM; Elevations; Therapeutic exercise; Endurance training;Bed mobility;Gait training   Progress Progressing toward goals   PT Frequency (3-6x/week)   Recommendation   Recommendation Post acute IP rehab   Equipment Recommended Walker   PT - OK to Discharge Yes  (to rehab when medically ready)     Violeta Rabago, FREEDOM

## 2018-09-16 NOTE — PLAN OF CARE
Problem: OCCUPATIONAL THERAPY ADULT  Goal: Performs self-care activities at highest level of function for planned discharge setting  See evaluation for individualized goals  Treatment Interventions: ADL retraining, Functional transfer training, Endurance training, Patient/family training, Equipment evaluation/education, Compensatory technique education, Energy conservation, Activityengagement          See flowsheet documentation for full assessment, interventions and recommendations  Outcome: Progressing  Limitation: Decreased ADL status, Decreased endurance, Decreased self-care trans, Decreased high-level ADLs  Prognosis: Good  Assessment: Pt participates in OT session with focus on LB dressing, bed mobility, and transfers to increase I for d/c  Visitor present during session  Pt min A supine to sit EOB and requires A for LLE along with HOB elevated  Pt max Ax1 LB dressing to don/doff socks and pants while seated EOB  Pt only able to pull up pants while in stance with RW support  Pt unable to don/doff socks at this time 2* decreased ROM  Pt CGA sit to stand from EOB with RW support  Pt min A sit to supine from EOB  Pt will continue to benefit from activity tolerance, adls, and transfers  OT Discharge Recommendation: Short Term Rehab  OT - OK to Discharge:  Yes

## 2018-09-17 VITALS
TEMPERATURE: 98.9 F | WEIGHT: 147.93 LBS | DIASTOLIC BLOOD PRESSURE: 59 MMHG | RESPIRATION RATE: 18 BRPM | BODY MASS INDEX: 21.18 KG/M2 | OXYGEN SATURATION: 98 % | SYSTOLIC BLOOD PRESSURE: 125 MMHG | HEART RATE: 112 BPM | HEIGHT: 70 IN

## 2018-09-17 LAB
ALBUMIN SERPL BCP-MCNC: 2.4 G/DL (ref 3.5–5)
ALP SERPL-CCNC: 45 U/L (ref 46–116)
ALT SERPL W P-5'-P-CCNC: 23 U/L (ref 12–78)
ANION GAP SERPL CALCULATED.3IONS-SCNC: 6 MMOL/L (ref 4–13)
ANION GAP SERPL CALCULATED.3IONS-SCNC: 7 MMOL/L (ref 4–13)
AST SERPL W P-5'-P-CCNC: 22 U/L (ref 5–45)
BILIRUB SERPL-MCNC: 0.87 MG/DL (ref 0.2–1)
BUN SERPL-MCNC: 13 MG/DL (ref 5–25)
BUN SERPL-MCNC: 13 MG/DL (ref 5–25)
CALCIUM SERPL-MCNC: 8.6 MG/DL (ref 8.3–10.1)
CALCIUM SERPL-MCNC: 8.7 MG/DL (ref 8.3–10.1)
CHLORIDE SERPL-SCNC: 101 MMOL/L (ref 100–108)
CHLORIDE SERPL-SCNC: 102 MMOL/L (ref 100–108)
CO2 SERPL-SCNC: 28 MMOL/L (ref 21–32)
CO2 SERPL-SCNC: 28 MMOL/L (ref 21–32)
CREAT SERPL-MCNC: 0.62 MG/DL (ref 0.6–1.3)
CREAT SERPL-MCNC: 0.65 MG/DL (ref 0.6–1.3)
ERYTHROCYTE [SEDIMENTATION RATE] IN BLOOD: 85 MM/HOUR (ref 0–10)
GFR SERPL CREATININE-BSD FRML MDRD: 102 ML/MIN/1.73SQ M
GFR SERPL CREATININE-BSD FRML MDRD: 104 ML/MIN/1.73SQ M
GLUCOSE SERPL-MCNC: 119 MG/DL (ref 65–140)
GLUCOSE SERPL-MCNC: 124 MG/DL (ref 65–140)
GLUCOSE SERPL-MCNC: 166 MG/DL (ref 65–140)
GLUCOSE SERPL-MCNC: 173 MG/DL (ref 65–140)
GLUCOSE SERPL-MCNC: 267 MG/DL (ref 65–140)
POTASSIUM SERPL-SCNC: 3.8 MMOL/L (ref 3.5–5.3)
POTASSIUM SERPL-SCNC: 3.8 MMOL/L (ref 3.5–5.3)
PROT SERPL-MCNC: 5.9 G/DL (ref 6.4–8.2)
SODIUM SERPL-SCNC: 135 MMOL/L (ref 136–145)
SODIUM SERPL-SCNC: 137 MMOL/L (ref 136–145)
T4 FREE SERPL-MCNC: 1.39 NG/DL (ref 0.76–1.46)

## 2018-09-17 PROCEDURE — 99024 POSTOP FOLLOW-UP VISIT: CPT | Performed by: ORTHOPAEDIC SURGERY

## 2018-09-17 PROCEDURE — 97535 SELF CARE MNGMENT TRAINING: CPT

## 2018-09-17 PROCEDURE — 82948 REAGENT STRIP/BLOOD GLUCOSE: CPT

## 2018-09-17 PROCEDURE — 80048 BASIC METABOLIC PNL TOTAL CA: CPT | Performed by: INTERNAL MEDICINE

## 2018-09-17 PROCEDURE — 80053 COMPREHEN METABOLIC PANEL: CPT | Performed by: PHYSICIAN ASSISTANT

## 2018-09-17 PROCEDURE — 85652 RBC SED RATE AUTOMATED: CPT | Performed by: PHYSICIAN ASSISTANT

## 2018-09-17 PROCEDURE — 84439 ASSAY OF FREE THYROXINE: CPT | Performed by: INTERNAL MEDICINE

## 2018-09-17 RX ORDER — OXYCODONE HYDROCHLORIDE 10 MG/1
5 TABLET ORAL EVERY 4 HOURS PRN
Qty: 30 TABLET | Refills: 0
Start: 2018-09-17 | End: 2018-10-23 | Stop reason: ALTCHOICE

## 2018-09-17 RX ORDER — ATORVASTATIN CALCIUM 40 MG/1
40 TABLET, FILM COATED ORAL
Qty: 30 TABLET | Refills: 0
Start: 2018-09-18 | End: 2019-03-27 | Stop reason: CLARIF

## 2018-09-17 RX ORDER — MAGNESIUM CARB/ALUMINUM HYDROX 105-160MG
296 TABLET,CHEWABLE ORAL ONCE
Status: COMPLETED | OUTPATIENT
Start: 2018-09-17 | End: 2018-09-17

## 2018-09-17 RX ORDER — CALCIUM CARBONATE 500(1250)
1 TABLET ORAL
Qty: 30 TABLET | Refills: 0
Start: 2018-09-18 | End: 2019-06-27 | Stop reason: ALTCHOICE

## 2018-09-17 RX ORDER — AMOXICILLIN 250 MG
1 CAPSULE ORAL DAILY
Qty: 30 TABLET | Refills: 0
Start: 2018-09-17 | End: 2018-10-01 | Stop reason: DRUGHIGH

## 2018-09-17 RX ORDER — AMOXICILLIN 250 MG
1 CAPSULE ORAL 2 TIMES DAILY
Status: DISCONTINUED | OUTPATIENT
Start: 2018-09-17 | End: 2018-09-17 | Stop reason: HOSPADM

## 2018-09-17 RX ORDER — SODIUM PHOSPHATE, DIBASIC AND SODIUM PHOSPHATE, MONOBASIC 7; 19 G/133ML; G/133ML
1 ENEMA RECTAL DAILY PRN
Status: DISCONTINUED | OUTPATIENT
Start: 2018-09-17 | End: 2018-09-17 | Stop reason: HOSPADM

## 2018-09-17 RX ORDER — GABAPENTIN 100 MG/1
100 CAPSULE ORAL 3 TIMES DAILY
Qty: 90 CAPSULE | Refills: 0
Start: 2018-09-17 | End: 2018-10-23 | Stop reason: ALTCHOICE

## 2018-09-17 RX ORDER — POLYETHYLENE GLYCOL 3350 17 G/17G
17 POWDER, FOR SOLUTION ORAL DAILY PRN
Qty: 14 EACH | Refills: 0
Start: 2018-09-17 | End: 2018-10-23 | Stop reason: ALTCHOICE

## 2018-09-17 RX ORDER — METHOCARBAMOL 500 MG/1
500 TABLET, FILM COATED ORAL EVERY 6 HOURS PRN
Qty: 60 TABLET | Refills: 0
Start: 2018-09-17 | End: 2018-10-23 | Stop reason: ALTCHOICE

## 2018-09-17 RX ORDER — INSULIN GLARGINE 100 [IU]/ML
45 INJECTION, SOLUTION SUBCUTANEOUS DAILY
Qty: 10 ML | Refills: 0
Start: 2018-09-18 | End: 2018-10-01

## 2018-09-17 RX ADMIN — Medication 1 TABLET: at 11:23

## 2018-09-17 RX ADMIN — ATORVASTATIN CALCIUM 40 MG: 40 TABLET, FILM COATED ORAL at 16:00

## 2018-09-17 RX ADMIN — OXYCODONE HYDROCHLORIDE 5 MG: 5 TABLET ORAL at 16:00

## 2018-09-17 RX ADMIN — VITAMIN D, TAB 1000IU (100/BT) 2000 UNITS: 25 TAB at 11:23

## 2018-09-17 RX ADMIN — GABAPENTIN 100 MG: 100 CAPSULE ORAL at 11:23

## 2018-09-17 RX ADMIN — GABAPENTIN 100 MG: 100 CAPSULE ORAL at 16:00

## 2018-09-17 RX ADMIN — INSULIN LISPRO 1 UNITS: 100 INJECTION, SOLUTION INTRAVENOUS; SUBCUTANEOUS at 17:57

## 2018-09-17 RX ADMIN — MAGESIUM CITRATE 296 ML: 1.75 LIQUID ORAL at 12:45

## 2018-09-17 RX ADMIN — INSULIN GLARGINE 45 UNITS: 100 INJECTION, SOLUTION SUBCUTANEOUS at 11:23

## 2018-09-17 RX ADMIN — ENOXAPARIN SODIUM 40 MG: 100 INJECTION SUBCUTANEOUS at 11:22

## 2018-09-17 RX ADMIN — INSULIN LISPRO 3 UNITS: 100 INJECTION, SOLUTION INTRAVENOUS; SUBCUTANEOUS at 12:45

## 2018-09-17 NOTE — PROGRESS NOTES
Progress Note  Georgina Epley 70 y o  male MRN: [id number]  [facility]          ASSESSMENT: 63M pod4 L fem IM nail for L IT fx    PLAN:   -WBAT LLE  -DVTppx  -Pain Mgmt  -PT/OT  -Dispo: Ok for discharge from ortho perspective  -Will continue to assess for acute blood loss anemia      PE:   LLE:  Dressings C/D/I, SILT s/sp/dp/s, toes x5 warm and pink, +FHL/EHL, +AnkleP-Dflexion      SUBJECTIVE: No acute events, no complaints    Data:     _*_*_*_*_*_*_*_*_*_*_*_*_*_*_*_*_*_*_*_*_*_*_*_*_*_*_*_*_*_*_*_*_*_*_*_*_*_*_*_*_*    Vitals:    09/17/18 0025   BP: 102/60   Pulse: 97   Resp: 18   Temp: 98 2 °F (36 8 °C)   SpO2: 97%         0  Lab Value Date/Time   HCT 28 4 (L) 09/16/2018 0550   HGB 10 1 (L) 09/16/2018 0550   INR 1 12 09/13/2018 0503   WBC 5 72 09/16/2018 0550         Current Facility-Administered Medications:  acetaminophen 650 mg Oral Q6H PRN Jonathan Salgado MD   atorvastatin 40 mg Oral Daily With [employer], DO   calcium carbonate 1 tablet Oral Daily With Breakfast Jonathan Salgado MD   cholecalciferol 2,000 Units Oral Daily Jonathan Salgado MD   enoxaparin 40 mg Subcutaneous Daily Chaz Thompson MD   gabapentin 100 mg Oral TID Chaz Thompson MD   HYDROmorphone 0 5 mg Intravenous Q3H PRN Chaz Thompson MD   insulin glargine 45 Units Subcutaneous Daily Daina Street MD   insulin lispro 1-5 Units Subcutaneous HS Jonathan Salgado MD   insulin lispro 1-6 Units Subcutaneous TID With Meals Jonathan Salgado MD   insulin lispro 15 Units Subcutaneous TID With Meals Daina Street MD   methocarbamol 500 mg Oral Q6H PRN Chaz Thompson MD   ondansetron 4 mg Intravenous Q6H PRN Chaz Thompson MD   oxyCODONE 10 mg Oral Q4H PRN Chaz Thompson MD   oxyCODONE 5 mg Oral Q4H PRN Chaz Thompson MD   polyethylene glycol 17 g Oral Daily PRN Jonathan Salgado MD   senna-docusate sodium 1 tablet Oral HS Lavanda Landau, DO Christen Ernst, MD

## 2018-09-17 NOTE — PLAN OF CARE
Problem: OCCUPATIONAL THERAPY ADULT  Goal: Performs self-care activities at highest level of function for planned discharge setting  See evaluation for individualized goals  Treatment Interventions: ADL retraining, Functional transfer training, Endurance training, Patient/family training, Equipment evaluation/education, Compensatory technique education, Energy conservation, Activityengagement          See flowsheet documentation for full assessment, interventions and recommendations  Outcome: Progressing  Limitation: Decreased ADL status, Decreased endurance, Decreased self-care trans, Decreased high-level ADLs  Prognosis: Good  Assessment: pt  participated in PM OT session focusing on LB dressing, toileting and functional mobility/transfers  pt  can be self limiting at times  pt  requires Max Asst for LB dressing/toileting  pt  requires Min Asst for transfers and functional mobility  pt  was motivated to participate and ambulated with RW while educated on clothing transportation with AD  pt  declined offer to get dressed and stated "Can't I just stay in this gown " pt  will continue to benefit from skilled OT services to maximize independence         OT Discharge Recommendation: Short Term Rehab  OT - OK to Discharge: Yes  Khari Likes

## 2018-09-17 NOTE — DISCHARGE SUMMARY
[facility] Discharge Summary - Medical Navid Robles 70 y o  male MRN: [id number]    [address] [facility] Room / Bed: [facility] 929/[facility] 929-01 Encounter: [id number]    BRIEF OVERVIEW    Admitting Provider: Kurt Layton MD  Discharge Provider: Kurt Layton MD  Primary Care Physician at Discharge:  None listed, will provide InfoLink at discharge    Discharge To:  SNF   Facility / Family Member Name: [facility]    Admission Date: 9/12/2018     Discharge Date: 09/17/18      Primary Discharge Diagnosis  Principal Problem:    Hip fracture, left, closed, initial encounter [facility])  Active Problems:    Type 2 diabetes mellitus ([facility] )    Essential hypertension    Hyponatremia    Accident due to mechanical fall without injury    [facility]    Osteoporosis    Low testosterone    Vitamin D deficiency  Resolved Problems:    * No resolved hospital problems  *    Review of Systems   Constitutional: Negative for activity change, appetite change, chills, diaphoresis and fatigue  HENT: Negative  Respiratory: Negative for cough, chest tightness and shortness of breath  Cardiovascular: Negative for chest pain, palpitations and leg swelling  Gastrointestinal: Positive for constipation  Negative for abdominal pain, diarrhea, nausea and vomiting  Genitourinary: Negative for dysuria and frequency  Musculoskeletal: Positive for arthralgias (L hip pain with movement)  Negative for gait problem and joint swelling  Skin: Negative for rash  Neurological: Negative for dizziness, weakness, light-headedness and headaches  Physical Exam   Constitutional: He is oriented to person, place, and time  He appears well-developed and well-nourished  No distress  HENT:   Head: Normocephalic and atraumatic  Eyes: Conjunctivae and EOM are normal  Pupils are equal, round, and reactive to light  No scleral icterus  Neck: Normal range of motion  Neck supple  No tracheal deviation present  Cardiovascular: Normal rate, regular rhythm and normal heart sounds  Pulmonary/Chest: Effort normal and breath sounds normal  No respiratory distress  He has no wheezes  He has no rales  Abdominal: Soft  Bowel sounds are normal  He exhibits no distension  There is no tenderness  Musculoskeletal: He exhibits no edema, tenderness or deformity  Left hip: He exhibits decreased range of motion (2/2 pain)  Neurological: He is alert and oriented to person, place, and time  No cranial nerve deficit  Coordination normal    Skin: Skin is warm and dry  No rash noted  He is not diaphoretic  No erythema  Psychiatric: He has a normal mood and affect  His behavior is normal  Judgment and thought content normal        Other Problems Addressed: None    Consulting Providers   Dr Cristela Hooper Ask - Endocrine         Therapeutic Operative Procedures Performed  L hip ORIF    Diagnostic Procedures Performed  labs: CBC, BMP, osteoporosis panel, endocrine panel, Hgb A1c and radiology: XR hip/pelvis, elbow, chest    Discharge Disposition: Final discharge disposition not confirmed  Discharged With Lines: no    Test Results Pending at Discharge: Yes - SPEP    Outpatient Follow-Up  yes      Follow up: Dr Ackerman Mention, Orthopedics    Active Issues Requiring Follow-up   none    Code Status: Level 1 - Full Code  Advance Directive and Living Will: <no information>  Power of :    POLST:      Medications   See after visit summary for reconciled discharge medications provided to patient and family  Allergies  No Known Allergies  Discharge Diet: diabetic diet  Activity restrictions: as tolerated postoperatively/directed by PT/OT    [facility] RUST Course  75-year-old male without significant past medical history presented to the hospital following a ground level fall which resulted in hip fracture  Patient underwent correction of left intertrochanteric fracture in the OR on 9/13/18  During his workup on admission, it was discovered that his blood glucose was above 450 and hemoglobin A1c was 12  Patient was begun on insulin and endocrine was consulted for new onset diabetes and severe osteoporosis  Workup revealed low free testosterone, low vitamin-D, TSH and PTH within normal limits, serum protein electrophoresis pending  Hospital course was unremarkable and patient's pain remained well controlled, and he remarks that he really only needs pain medication when he is going to have to ambulate  Today he has no acute complaints and feels well, only comment that he has not been able to have a bowel movement since admission  He will be discharged to short-term rehab and follow up for his health issues outpatient, current insulin regimen 45 U of Lantus daily and 15 U of short-acting insulin before meals  Also added atorvastatin 40 mg, vitamin-D, calcium supplements, and gabapentin/robaxin per Ortho  Pain medication supplied by Orthopedics  Presenting Problem/History of Present Illness  Principal Problem:    Hip fracture, left, closed, initial encounter ([facility] )  Active Problems:    Type 2 diabetes mellitus ([facility] )    Essential hypertension    Hyponatremia    Accident due to mechanical fall without injury    Cachectic ([facility] )    Osteoporosis    Low testosterone    Vitamin D deficiency  Resolved Problems:    * No resolved hospital problems  *    L hip fracture s/p ground level fall  - postop day 4 s/p IMN L femur per Ortho  - pain well controlled, to be discharged to rehab with supply of oxycodone per Orthopedics  - continue MiraLax and Senokot while on opioids for constipation  - endocrine workup for severe osteoarthritis, has been started on calcium and vitamin D supplements for deficiencies    - follow-up outpatient with endocrine     T2DM   - newly diagnosed, hemoglobin A1c on admission 12 0  - 45 U of Lantus daily plus 15 U short-acting with meals  - information provided in order to establish a primary care physician and follow outpatient     Osteoporosis  - endocrine working up  - thus far workup has revealed low 25 hydroxy vitamin-D, low free testosterone  - TSH within normal limits, free T4 slightly elevated, PTH within normal limits  - protein electrophoresis pending  - calcium and vitamin-D supplement started    Other Pertinent Test Results  none     Discharge Condition: good      Discharge  Statement   I spent 30 minutes minutes discharging the patient  This time was spent on the day of discharge  I had direct contact with the patient on the day of discharge  Additional documentation is required if more than 30 minutes were spent on discharge

## 2018-09-17 NOTE — OCCUPATIONAL THERAPY NOTE
Occupational Therapy Treatment Note:       09/17/18 1705   Restrictions/Precautions   Weight Bearing Precautions Per Order Yes   LLE Weight Bearing Per Order WBAT   Other Precautions Pain; Fall Risk;WBS   Pain Assessment   Pain Assessment FLACC   Pain Rating: FLACC (Rest) - Face 0   Pain Rating: FLACC (Rest) - Legs 0   Pain Rating: FLACC (Rest) - Activity 0   Pain Rating: FLACC (Rest) - Cry 0   Pain Rating: FLACC (Rest) - Consolability 0   Score: FLACC (Rest) 0   Pain Rating: FLACC (Activity) - Face 1   Pain Rating: FLACC (Activity) - Legs 0   Pain Rating: FLACC (Activity) - Activity 0   Pain Rating: FLACC (Activity) - Cry 1   Pain Rating: FLACC (Activity) - Consolability 0   Score: FLACC (Activity) 2   ADL   LB Dressing Assistance 2  Maximal Assistance   LB Dressing Deficit Thread LLE into pants; Thread RLE into pants   Toileting Assistance  2  Maximal Assistance   Toileting Deficit Clothing management up;Clothing management down;Perineal hygiene   Functional Standing Tolerance   Time 5 mins   Activity Max Asst for kevin hygiene   Bed Mobility   Supine to Sit 4  Minimal assistance   Additional items Increased time required;LE management;HOB elevated   Transfers   Sit to Stand 4  Minimal assistance   Additional items HOB elevated; Increased time required   Stand to Sit 4  Minimal assistance   Additional items HOB elevated; Increased time required   Functional Mobility   Functional Mobility 4  Minimal assistance   Additional Comments clothing transportation techniques   Additional items Rolling walker   Cognition   Overall Cognitive Status WFL   Arousal/Participation Alert; Responsive; Cooperative   Attention Within functional limits   Memory Within functional limits   Following Commands Follows all commands and directions without difficulty   Activity Tolerance   Activity Tolerance Patient tolerated treatment well   Assessment   Assessment pt  participated in PM OT session focusing on LB dressing, toileting and functional mobility/transfers  pt  can be self limiting at times  pt  requires Max Asst for LB dressing/toileting  pt  requires Min Asst for transfers and functional mobility  pt  was motivated to participate and ambulated with RW while educated on clothing transportation with AD  pt  declined offer to get dressed and stated "Can't I just stay in this gown " pt  will continue to benefit from skilled OT services to maximize independence  Plan   Treatment Interventions ADL retraining;Functional transfer training;Patient/family training;Equipment evaluation/education; Energy conservation; Activityengagement   Goal Expiration Date 09/24/18   Treatment Day 2   OT Frequency 3-5x/wk   Recommendation   OT Discharge Recommendation Short Term Rehab   Barthel Index   Feeding 10   Bathing 0   Grooming Score 5   Dressing Score 5   Bladder Score 10   Bowels Score 10   Toilet Use Score 5   Transfers (Bed/Chair) Score 10   Mobility (Level Surface) Score 0   Stairs Score 0   Barthel Index Score 55   Modified Barnwell Scale   Modified Denis Scale 4   Antonia Harvey

## 2018-09-17 NOTE — SOCIAL WORK
Cm reviewed patient during care coordination rounds  Patient is anticipated for d/c today  Cm sent several messages to patient's first choice in rehab, [facility], but have not received any responses as of yet  Cm met with patient to discuss [facility]'s lack of response and to offer other choices in rehab  Per patient's choice, based off of distance, patient would prefer a referral to [facility]  Cm sent referral and placed another phone call to [address] [place] [address]  Cm awaiting responses  Around 2pm, Cm received [facility] return [facility] from [facility] explaining that they do not have a bed for patient  Cm met with patient to explain determination  Cm contacted  with [address]  and explained that patient is now in agreement with accepting bed offer at [facility]  Randi Cowan reported that workers' comp will assist with the [facility] transport cost  Patient also in agreement with choice for [facility]  Gary arranged a  Myrna Loupe with [facility] at 6pm  Per patient he already informed brother, but was okay with Cm calling brother with formal discharge plan  Gary spoke with both Aki Burgos and his wife in regards to patient's discharge plan  Both aware of address and servics available to patient at time of d/c Cm informed all parties

## 2018-09-17 NOTE — CASE MANAGEMENT
Continued Stay Review    Date: 9/16    Vital Signs:   09/16/18 0737    BP: 117/66   BP Location: Right arm   Pulse: 89   Resp: 18   Temp: 98 6 °F (37 °C)   TempSrc: Oral   SpO2: 97%         Medications:   Scheduled Meds:   Current Facility-Administered Medications:  atorvastatin 40 mg Oral Daily With Dinner   calcium carbonate 1 tablet Oral Daily With Breakfast   cholecalciferol 2,000 Units Oral Daily   enoxaparin 40 mg Subcutaneous Daily   gabapentin 100 mg Oral TID   insulin glargine 45 Units Subcutaneous Daily   insulin lispro 1-5 Units Subcutaneous HS   insulin lispro 1-6 Units Subcutaneous TID With Meals   insulin lispro 15 Units Subcutaneous TID With Meals   senna-docusate sodium 1 tablet Oral BID     Continuous Infusions:    PRN Meds:   acetaminophen    HYDROmorphone    methocarbamol    ondansetron    oxyCODONE    Abnormal Labs/Diagnostic Results:  H/H = 10 1/ 28 4    Age/Sex: 70 y o  male     Assessment/Plan:   L hip fracture s/p ground level fall  - postop day 3 s/p IMN L femur per Ortho  - pain control  - PT OT eval and case management for rehab recommendations  Appreciated Endocrinology workup  and recs for osteoporosis as well as newly diagnosed diabetes    Discharge Plan: TBD

## 2018-09-18 NOTE — PLAN OF CARE
DISCHARGE PLANNING     Discharge to home or other facility with appropriate resources Adequate for Discharge        INFECTION - ADULT     Absence or prevention of progression during hospitalization Adequate for Discharge        Nutrition/Hydration-ADULT     Nutrient/Hydration intake appropriate for improving, restoring or maintaining nutritional needs Adequate for Discharge        PAIN - ADULT     Verbalizes/displays adequate comfort level or baseline comfort level Adequate for Discharge        Potential for Falls     Patient will remain free of falls Adequate for Discharge        Prexisting or High Potential for Compromised Skin Integrity     Skin integrity is maintained or improved Adequate for Discharge        SAFETY ADULT     Maintain or return to baseline ADL function Adequate for Discharge     Maintain or return mobility status to optimal level Adequate for Discharge

## 2018-10-01 ENCOUNTER — OFFICE VISIT (OUTPATIENT)
Dept: ENDOCRINOLOGY | Facility: CLINIC | Age: 63
End: 2018-10-01
Payer: OTHER MISCELLANEOUS

## 2018-10-01 VITALS
HEIGHT: 70 IN | SYSTOLIC BLOOD PRESSURE: 116 MMHG | HEART RATE: 94 BPM | BODY MASS INDEX: 21.23 KG/M2 | DIASTOLIC BLOOD PRESSURE: 66 MMHG

## 2018-10-01 DIAGNOSIS — M81.0 OSTEOPOROSIS WITHOUT CURRENT PATHOLOGICAL FRACTURE, UNSPECIFIED OSTEOPOROSIS TYPE: ICD-10-CM

## 2018-10-01 DIAGNOSIS — E11.65 TYPE 2 DIABETES MELLITUS WITH HYPERGLYCEMIA, WITHOUT LONG-TERM CURRENT USE OF INSULIN (HCC): Primary | ICD-10-CM

## 2018-10-01 DIAGNOSIS — E55.9 VITAMIN D DEFICIENCY: ICD-10-CM

## 2018-10-01 DIAGNOSIS — I10 ESSENTIAL HYPERTENSION: ICD-10-CM

## 2018-10-01 PROCEDURE — 99214 OFFICE O/P EST MOD 30 MIN: CPT | Performed by: INTERNAL MEDICINE

## 2018-10-01 RX ORDER — SENNA AND DOCUSATE SODIUM 50; 8.6 MG/1; MG/1
1 TABLET, FILM COATED ORAL AS NEEDED
COMMUNITY
End: 2020-02-20 | Stop reason: ALTCHOICE

## 2018-10-01 RX ORDER — GLIPIZIDE 10 MG/1
TABLET ORAL
COMMUNITY
End: 2018-12-12

## 2018-10-01 RX ORDER — MULTIVIT-MIN/IRON/FOLIC ACID/K 18-600-40
CAPSULE ORAL
COMMUNITY
End: 2018-10-01

## 2018-10-01 NOTE — PATIENT INSTRUCTIONS
10% - bad control"> 10% - bad control,Hemoglobin A1c (HbA1c) greater than 10% indicating poor diabetic control,Haemoglobin A1c greater than 10% indicating poor diabetic control">   Diabetes Mellitus Type 2 in Adults, Ambulatory Care   GENERAL INFORMATION:   Diabetes mellitus type 2  is a disease that affects how your body uses glucose (sugar)  Insulin helps move sugar out of the blood so it can be used for energy  Normally, when the blood sugar level increases, the pancreas makes more insulin  Type 2 diabetes develops because either the body cannot make enough insulin, or it cannot use the insulin correctly  After many years, your pancreas may stop making insulin  Common symptoms include the following:   · More hunger or thirst than usual     · Frequent urination     · Weight loss without trying     · Blurred vision  Seek immediate care for the following symptoms:   · Severe abdominal pain, or pain that spreads to your back  You may also be vomiting  · Trouble staying awake or focusing    · Shaking or sweating    · Blurred or double vision    · Breath has a fruity, sweet smell    · Breathing is deep and labored, or rapid and shallow    · Heartbeat is fast and weak  Treatment for diabetes mellitus type 2  includes keeping your blood sugar at a normal level  You must eat the right foods, and exercise regularly  You may also need medicine if you cannot control your blood sugar level with nutrition and exercise  Manage diabetes mellitus type 2:   · Check your blood sugar level  You will be taught how to check a small drop of blood in a glucose monitor  Ask your healthcare provider when and how often to check during the day  Ask your healthcare provider what your blood sugar levels should be when you check them  · Keep track of carbohydrates (sugar and starchy foods)  Your blood sugar level can get too high if you eat too many carbohydrates   Your dietitian will help you plan meals and snacks that have the right amount of carbohydrates  · Eat low-fat foods  Some examples are skinless chicken and low-fat milk  · Eat less sodium (salt)  Some examples of high-sodium foods to limit are soy sauce, potato chips, and soup  Do not add salt to food you cook  Limit your use of table salt  · Eat high-fiber foods  Foods that are a good source of fiber include vegetables, whole grain bread, and beans  · Limit alcohol  Alcohol affects your blood sugar level and can make it harder to manage your diabetes  Women should limit alcohol to 1 drink a day  Men should limit alcohol to 2 drinks a day  A drink of alcohol is 12 ounces of beer, 5 ounces of wine, or 1½ ounces of liquor  · Get regular exercise  Exercise can help keep your blood sugar level steady, decrease your risk of heart disease, and help you lose weight  Exercise for at least 30 minutes, 5 days a week  Include muscle strengthening activities 2 days each week  Work with your healthcare provider to create an exercise plan  · Check your feet each day  for injuries or open sores  Ask your healthcare provider for activities you can do if you have an open sore  · Quit smoking  If you smoke, it is never too late to quit  Smoking can worsen the problems that may occur with diabetes  Ask your healthcare provider for information about how to stop smoking if you are having trouble quitting  · Ask about your weight:  Ask healthcare providers if you need to lose weight, and how much to lose  Ask them to help you with a weight loss program  Even a 10 to 15 pound weight loss can help you manage your blood sugar level  · Carry medical alert identification  Wear medical alert jewelry or carry a card that says you have diabetes  Ask your healthcare provider where to get these items  · Ask about vaccines  Diabetes puts you at risk of serious illness if you get the flu, pneumonia, or hepatitis   Ask your healthcare provider if you should get a flu, pneumonia, or hepatitis B vaccine, and when to get the vaccine  Follow up with your healthcare provider as directed:  Write down your questions so you remember to ask them during your visits  CARE AGREEMENT:   You have the right to help plan your care  Learn about your health condition and how it may be treated  Discuss treatment options with your caregivers to decide what care you want to receive  You always have the right to refuse treatment  The above information is an  only  It is not intended as medical advice for individual conditions or treatments  Talk to your doctor, nurse or pharmacist before following any medical regimen to see if it is safe and effective for you  © 2014 [address] is for End User's use only and may not be sold, redistributed or otherwise used for commercial purposes  All illustrations and images included in CareNotes® are the copyrighted property of A D A M , Inc  or Barak Vazquez

## 2018-10-01 NOTE — PROGRESS NOTES
Leta Padilla 70 y o  male MRN: [id number]    Encounter: [id number]      Assessment/Plan     Assessment: This is a 70y o -year-old male with diabetes with hyperglycemia, vitamin D deficiency and hypertension  He also has osteoporosis based on left femoral neck fracture  Plan:  1  Type 2 diabetes with hyperglycemia-based on his blood sugar log, it appears that his blood sugars are under good control on metformin and Glucotrol  Continue current regimen  2  For the osteoporosis, I would recommend doing a DEXA scan  Please send the report once the testing is completed  He would be a good candidate for Prolia injection  We will do a prior authorization for this  3  Vitamin-D deficiency-increase supplementation to 5000 units per day  4  Hypertension is well controlled  CC: Diabetes    History of Present Illness     HPI:  58-year-old male seen in the hospital for uncontrolled diabetes and osteoporosis  He was started on insulin during the hospitalization  He states that his blood sugars are fairly well controlled on metformin only at this time  He denies any hypoglycemia  For the osteoporosis, he states that he is on calcium and vitamin-D  He did have a left intertrochanteric fracture which has been surgically repaired  For the vitamin-D deficiency, he is on cholecalciferol 2000 units per day  He is currently  medications for hypertension  Review of Systems   Constitutional: Negative for chills and fever  Respiratory: Negative for shortness of breath  Cardiovascular: Negative for chest pain  Gastrointestinal: Negative for constipation, diarrhea, nausea and vomiting  Endocrine: Negative for polydipsia and polyuria  All other systems reviewed and are negative        Historical Information   Past Medical History:   Diagnosis Date    H/O nicotine dependence     Prediabetes      Past Surgical History:   Procedure Laterality Date    [place] OPEN RX FEMUR FX+INTRAMED ADDISON Left 9/13/2018    Procedure: INSERTION NAIL IM FEMUR ANTEGRADE (TROCHANTERIC);   Surgeon: Clint Almanzar MD;  [facility];  Service: Orthopedics     Social History   History   Alcohol Use No     History   Drug Use No     History   Smoking Status    Former Smoker    Packs/day: 2 00    Years: 15 00    Types: Cigarettes    Quit date: 9/12/1987   Smokeless Tobacco    Never Used     Family History:   Family History   Problem Relation Age of Onset    No Known Problems Mother     No Known Problems Father     No Known Problems Sister     Lung cancer Brother        Meds/Allergies   Current Outpatient Prescriptions   Medication Sig Dispense Refill    atorvastatin (LIPITOR) 40 mg tablet Take 1 tablet (40 mg total) by mouth daily with dinner 30 tablet 0    calcium carbonate (OYSTER SHELL,OSCAL) 500 mg Take 1 tablet by mouth daily with breakfast 30 tablet 0    cholecalciferol 2000 units TABS Take 1 tablet (2,000 Units total) by mouth daily 30 tablet 0    gabapentin (NEURONTIN) 100 mg capsule Take 1 capsule (100 mg total) by mouth 3 (three) times a day 90 capsule 0    insulin glargine (LANTUS) 100 units/mL subcutaneous injection Inject 45 Units under the skin daily 10 mL 0    insulin lispro (HumaLOG) 100 units/mL injection Inject 15 Units under the skin 3 (three) times a day with meals 10 mL 3    methocarbamol (ROBAXIN) 500 mg tablet Take 1 tablet (500 mg total) by mouth every 6 (six) hours as needed for muscle spasms 60 tablet 0    oxyCODONE (ROXICODONE) 10 MG TABS Take 0 5 tablets (5 mg total) by mouth every 4 (four) hours as needed for severe pain for up to 30 doses Max Daily Amount: 30 mg 30 tablet 0    oxyCODONE (ROXICODONE) 5 mg immediate release tablet Take 1 tablet (5 mg total) by mouth every 4 (four) hours as needed for moderate pain for up to 30 doses Max Daily Amount: 30 mg 30 tablet 0    polyethylene glycol (MIRALAX) 17 g packet Take 17 g by mouth daily as needed (constipation) 14 each 0    senna-docusate sodium (SENOKOT S) 8 6-50 mg per tablet Take 1 tablet by mouth daily 30 tablet 0     No current facility-administered medications for this visit  No Known Allergies    Objective   Vitals: Blood pressure 116/66, pulse 94, height 5' 10" (1 778 m)  Physical Exam   Constitutional: He is oriented to person, place, and time  He appears well-developed and well-nourished  No distress  HENT:   Head: Normocephalic and atraumatic  Mouth/Throat: Oropharynx is clear and moist and mucous membranes are normal  No oropharyngeal exudate  Eyes: Conjunctivae, EOM and lids are normal  Right eye exhibits no discharge  Left eye exhibits no discharge  No scleral icterus  Neck: Neck supple  No thyromegaly present  Cardiovascular: Normal rate, regular rhythm and normal heart sounds  Exam reveals no gallop and no friction rub  No murmur heard  Pulmonary/Chest: Effort normal and breath sounds normal  No respiratory distress  He has no wheezes  Abdominal: Soft  Bowel sounds are normal  He exhibits no distension  There is no tenderness  Musculoskeletal: Normal range of motion  He exhibits no edema, tenderness or deformity  Lymphadenopathy:        Head (right side): No occipital adenopathy present  Head (left side): No occipital adenopathy present  Right: No supraclavicular adenopathy present  Left: No supraclavicular adenopathy present  Neurological: He is alert and oriented to person, place, and time  No cranial nerve deficit  Coordination normal    Skin: Skin is warm and intact  No rash noted  He is not diaphoretic  No erythema  Psychiatric: He has a normal mood and affect  His behavior is normal    Vitals reviewed  The history was obtained from the review of the chart, patient      Lab Results:   Lab Results   Component Value Date/Time    Hemoglobin A1C 12 4 (H) 09/13/2018 05:03 AM    WBC 5 72 09/16/2018 05:50 AM    WBC 6 16 09/15/2018 05:10 AM    WBC 8 22 09/14/2018 05:12 AM    Hemoglobin 10 1 (L) 09/16/2018 05:50 AM    Hemoglobin 10 4 (L) 09/15/2018 05:10 AM    Hemoglobin 11 6 (L) 09/14/2018 05:12 AM    Hematocrit 28 4 (L) 09/16/2018 05:50 AM    Hematocrit 29 6 (L) 09/15/2018 05:10 AM    Hematocrit 32 6 (L) 09/14/2018 05:12 AM    MCV 86 09/16/2018 05:50 AM    MCV 87 09/15/2018 05:10 AM    MCV 86 09/14/2018 05:12 AM    Platelets 428 (L) 58/04/4302 05:50 AM    Platelets 503 (L) 34/74/0775 05:10 AM    Platelets 728 96/22/2576 05:12 AM    BUN 13 09/17/2018 05:18 AM    BUN 13 09/17/2018 05:18 AM    BUN 14 09/16/2018 05:50 AM    Sodium 135 (L) 09/17/2018 05:18 AM    Sodium 137 09/17/2018 05:18 AM    Sodium 134 (L) 09/16/2018 05:50 AM    Potassium 3 8 09/17/2018 05:18 AM    Potassium 3 8 09/17/2018 05:18 AM    Potassium 3 8 09/16/2018 05:50 AM    Chloride 101 09/17/2018 05:18 AM    Chloride 102 09/17/2018 05:18 AM    Chloride 101 09/16/2018 05:50 AM    CO2 28 09/17/2018 05:18 AM    CO2 28 09/17/2018 05:18 AM    CO2 27 09/16/2018 05:50 AM    Creatinine 0 62 09/17/2018 05:18 AM    Creatinine 0 65 09/17/2018 05:18 AM    Creatinine 0 64 09/16/2018 05:50 AM    AST 22 09/17/2018 05:18 AM    AST 12 09/13/2018 05:03 AM    ALT 23 09/17/2018 05:18 AM    ALT 23 09/13/2018 05:03 AM    Albumin 2 4 (L) 09/17/2018 05:18 AM    Albumin 3 1 (L) 09/13/2018 05:03 AM    HDL, Direct 52 09/13/2018 05:03 AM    Triglycerides 115 09/13/2018 05:03 AM     Imaging Studies: I have personally reviewed pertinent reports  Portions of the record may have been created with voice recognition software  Occasional wrong word or "sound a like" substitutions may have occurred due to the inherent limitations of voice recognition software  Read the chart carefully and recognize, using context, where substitutions have occurred

## 2018-10-04 ENCOUNTER — HOSPITAL ENCOUNTER (OUTPATIENT)
Dept: RADIOLOGY | Facility: HOSPITAL | Age: 63
Discharge: HOME | End: 2018-10-04
Attending: ORTHOPAEDIC SURGERY

## 2018-10-04 ENCOUNTER — OFFICE VISIT (OUTPATIENT)
Dept: OBGYN CLINIC | Facility: HOSPITAL | Age: 63
End: 2018-10-04

## 2018-10-04 VITALS
WEIGHT: 145 LBS | HEART RATE: 94 BPM | SYSTOLIC BLOOD PRESSURE: 113 MMHG | HEIGHT: 70 IN | BODY MASS INDEX: 20.76 KG/M2 | DIASTOLIC BLOOD PRESSURE: 68 MMHG
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
## 2018-10-04 DIAGNOSIS — Z48.89 AFTERCARE FOLLOWING SURGERY: ICD-10-CM

## 2018-10-04 DIAGNOSIS — Z48.89 AFTERCARE FOLLOWING SURGERY: Primary | ICD-10-CM

## 2018-10-04 PROCEDURE — 99024 POSTOP FOLLOW-UP VISIT: CPT | Performed by: ORTHOPAEDIC SURGERY

## 2018-10-04 PROCEDURE — 73502 X-RAY EXAM HIP UNI 2-3 VIEWS: CPT

## 2018-10-04 NOTE — PROGRESS NOTES
Subjective;    69-year-old male individual, who appears much older than his stated age  He incurred a left hip intertrochanteric hip fracture from a work related injury which was operated on after his admission in September  He is now between 3-4 weeks postop from intramedullary nail fixation of a left hip intertrochanteric hip fracture  He is a resident at [facility] receiving therapy  He is represented by a  as well as attended to by a family member who is a male  All 3 relate that he is potentially due for discharge from [facility] tomorrow  The patient voices apprehension and anxiety about administering anticoagulant  No one lives with him in his home  His home is difficult to navigate and has not been prepared for a patient who is recovering from orthopedic surgery in the use of a walker    X-rays were obtained of his left hip on arrival to the office  Past Medical History:   Diagnosis Date    H/O nicotine dependence     Prediabetes        Past Surgical History:   Procedure Laterality Date    [place] OPEN RX FEMUR FX+INTRAMED ADDISON Left 9/13/2018    Procedure: INSERTION NAIL IM FEMUR ANTEGRADE (TROCHANTERIC);   Surgeon: Shalom Trammell MD;  [facility];  Service: Orthopedics       Family History   Problem Relation Age of Onset    No Known Problems Mother     No Known Problems Father     No Known Problems Sister     Lung cancer Brother        Social History   Substance Use Topics    Smoking status: Former Smoker     Packs/day: 2 00     Years: 15 00     Types: Cigarettes     Quit date: 9/12/1987    Smokeless tobacco: Never Used    Alcohol use No     Exam;    His left hip permits left hip flexion and 10° of internal and external rotation without significant pain  He can perform left hip flexion actively, but is resistant to abduction  His incisions are clean dry and allow for removal of his sutures  He has the absence of calf pain he has a negative Homans test    X-rays; personally reviewed by Dr Bryant Espinoza and myself show an intramedullary short nail affixing a intertrochanteric fracture of the hip it is placed well and without fatigue or compromise  Impression; First post hospitalization follow-up for left hip fracture from a work related injury  Plan;    Given his home situation his living arrangements his injury and his lack of successful rehabilitation to this point,   I feel that a discharge tomorrow is too soon,   And he would benefit by additional rehab and administration of anticoagulant and acclammation to his home  I feel and the group felt that a more appropriate discharge would be next week on October 12th    Additionally continued medical assessment of this gentleman should be carried out as he appears far older than his stated age almost cachectic in his weight and slightly jaundiced in his color  His exam was supervised by and plan formulated by the attending surgeon,   It was my privilege to assist him in its delivery

## 2018-10-12 ENCOUNTER — TELEPHONE (OUTPATIENT)
Dept: OBGYN CLINIC | Facility: HOSPITAL | Age: 63
End: 2018-10-12

## 2018-10-12 DIAGNOSIS — Z47.89 AFTERCARE FOLLOWING SURGERY OF THE MUSCULOSKELETAL SYSTEM: Primary | ICD-10-CM

## 2018-10-12 NOTE — TELEPHONE ENCOUNTER
Caller: [address]  C/B #: [phone]    Patient was discharged today and needs a referral for home health for his back  [facility] Floor also wanted a clarifcation on P/T, is the patient going to have in home P/T? He is asking for a call back at  ext [phone]    Thanks

## 2018-10-12 NOTE — TELEPHONE ENCOUNTER
A prescription for outpatient therapy was submitted electronically  Please call patient and inform of above    Thank you

## 2018-10-12 NOTE — TELEPHONE ENCOUNTER
I reviewed the message     I reviewed the chart     I attempted to call Daylin Colunga at the rehab facility,     no answer     could you please elaborate on what they require,  Or what is outstanding in this patient's care     I look forward to their return phone call,  And your interpretation of the materials they desire

## 2018-10-15 ENCOUNTER — EVALUATION (OUTPATIENT)
Dept: PHYSICAL THERAPY | Facility: REHABILITATION | Age: 63
End: 2018-10-15
Payer: OTHER MISCELLANEOUS

## 2018-10-15 DIAGNOSIS — Z47.89 AFTERCARE FOLLOWING SURGERY OF THE MUSCULOSKELETAL SYSTEM: Primary | ICD-10-CM

## 2018-10-15 PROCEDURE — G8978 MOBILITY CURRENT STATUS: HCPCS | Performed by: PHYSICAL THERAPIST

## 2018-10-15 PROCEDURE — 97112 NEUROMUSCULAR REEDUCATION: CPT | Performed by: PHYSICAL THERAPIST

## 2018-10-15 PROCEDURE — G8979 MOBILITY GOAL STATUS: HCPCS | Performed by: PHYSICAL THERAPIST

## 2018-10-15 PROCEDURE — 97161 PT EVAL LOW COMPLEX 20 MIN: CPT | Performed by: PHYSICAL THERAPIST

## 2018-10-15 NOTE — PROGRESS NOTES
PT Evaluation     Today's date: 10/15/2018  Patient name: Fransico Tierney  : 1955  MRN: [id number]  Referring provider: Gerard Decker MD  Dx:   Encounter Diagnosis     ICD-10-CM    1  Aftercare following surgery of the musculoskeletal system Z47 89                   Assessment  Impairments: abnormal gait, abnormal or restricted ROM, abnormal movement, activity intolerance, impaired balance, impaired physical strength, lacks appropriate home exercise program, pain with function and weight-bearing intolerance  Other impairment: decreased flexibility in BL hamstrings   Functional limitations: unable to work, unable to stand up from a chair without use of AD, unable to stand without pain,   Symptom irritability: moderate  Assessment details: Pt is a 70y o  year old male that presents to PT who is 4 1/2 weeks S/P surgical L hip fracture  He has the above mentioned impairments which is consistent with referring diagnosis  These impairments have lead to participation restrictions in work and household chores  According to functional outcome scores for the 5xSTS and TUG for community dwelling Older Adults age 61-76 he falls in an increased risk for falls  Pt was instructed on a HEP that focused on strengthening  Pt was given a handout with instructions for completion  Pt verbalized and demonstrated understanding  Pt would benefit from skilled outpatient PT to address gait, strength, muscular endurance, ROM, and balance in order to maximize his level of function  Understanding of Dx/Px/POC: good   Prognosis: fair    Goals  ST  Independent with HEP in 2 weeks  2  Pt will have verbal report of improvement in symptoms by >/=25% in 2 weeks    3  Pt will have improvement in L hip ROM measurements by >/= 7 deg     LT  Pt will improve FOTO score by >/=6  points in 6 weeks  2  Pt will improve FOTO score to >/= 59 by visit # 16  3   Pt will be able to stand for >/= 1 hour with little to no difficulty in 6 weeks 4  Pt will be able to perform usual work and house work with little to no difficulty in 6 weeks   5  Pt will be able to ascend/descend a flight of stairs with no difficulty in 6 weeks   6  Pt will be able to perform heavy activities around the home with no difficulty in 6 weeks  7  Pt will be able to rise from a chair without the use of AD/UEs with no pain/ no difficulty in 6 weeks         Plan  Patient would benefit from: skilled physical therapy  Planned modality interventions: thermotherapy: hydrocollator packs and cryotherapy  Other planned modality interventions: other modalities prn   Planned therapy interventions: neuromuscular re-education, muscle pump exercises, joint mobilization, manual therapy, IADL retraining, patient education, strengthening, stretching, therapeutic activities, therapeutic exercise, home exercise program, gait training, functional ROM exercises, flexibility, balance and abdominal trunk stabilization  Frequency: 3x week  Duration in weeks: 6  Plan of Care beginning date: 10/15/2018  Treatment plan discussed with: patient        Subjective Evaluation    History of Present Illness  Date of onset: 2018  Date of surgery: 2018  Mechanism of injury: surgery and trauma  Mechanism of injury: Pt reports that he was at work where he is a   He started to hurry and run and ended up tripping  He was escorted to the hospital where the next day he had surgery for a L  Hip fracture  [place] [facility] [place] which ended on [place] 11[place]     Pain  Current pain ratin  At best pain ratin  At worst pain ratin  Location: L hip and L knee   Quality: dull ache  Relieving factors: rest  Aggravating factors: sitting  Progression: improved    Social Support  Steps to enter house: yes  1  Stairs in house: yes   12  Lives in: multiple-level home  Lives with: alone    Employment status: working  Treatments  Previous treatment: physical therapy  Current treatment: physical therapy  Patient Goals  Patient goals for therapy: decreased pain, return to work, improved balance and increased strength  Patient goal: walk without an assistive device         Objective     Active Range of Motion   Left Hip   Flexion: 103 degrees   Abduction: 16 degrees   External rotation (90/90): 20 degrees   Internal rotation (90/90): 10 degrees     Right Hip   Flexion: 110 degrees   Abduction: 25 degrees   External rotation (90/90): 25 degrees   Internal rotation (90/90): 18 degrees     Additional Active Range of Motion Details  Moderate hamstring tightness     Passive Range of Motion   Left Hip   Flexion: 103 degrees     Strength/Myotome Testing     Left Hip   Planes of Motion   Flexion: 3+  Abduction: 3+ (painful)  Adduction: 3+  External rotation: 3  Internal rotation: 3+    Right Hip   Planes of Motion   Flexion: 4-  Abduction: 4  Adduction: 4+  External rotation: 4  Internal rotation: 4    Left Knee   Flexion: 3+  Extension: 4    Right Knee   Flexion: 4+  Extension: 4    Left Ankle/Foot   Dorsiflexion: 4-  Inversion: 4-  Eversion: 4-    Right Ankle/Foot   Dorsiflexion: 4  Inversion: 4  Eversion: 4    Additional Strength Details  Unable to tolerate lying in prone and S/L positions for appropriate MMT grades  Isometrics break tests were completed in siting     Tests     Additional Tests Details  5x STS: 17 seconds     TUG:   With the cane : 14 seconds   Without AD: 14 seconds     SLB:   R: 15 seconds   L: unable to complete     Ambulation   Weight-Bearing Status   Weight-Bearing Status (Left): weight-bearing as tolerated   Weight-Bearing Status (Right): full weight-bearing    Assistive device used: quad cane    Ambulation: Level Surfaces   Ambulation with assistive device: independent  Ambulation without assistive device: independent    Observational Gait   Gait: antalgic and crouched   Increased right stance time and left swing time   Decreased walking speed, stride length, left stance time, right swing time, left step length and right step length     Left foot contact pattern: foot flat  Right foot contact pattern: foot flat  Left arm swing: decreased  Right arm swing: decreased  Base of support: increased      Flowsheet Rows      Most Recent Value   PT/OT G-Codes   Current Score  48   Projected Score  59   FOTO information reviewed  Yes   Assessment Type  Evaluation   G code set  Mobility: Walking & Moving Around   Mobility: Walking and Moving Around Current Status ()  CK   Mobility: Walking and Moving Around Goal Status ()  CK          Precautions: Osteoporosis, HTN, diabetes     Manuals 10/15                                                                                                           Exercise Diary                         bike x5 min                       STS high/low  x10                     Standing hip abd R/L  3x10                     Standing hip ext R/L  3x10                      standing BL HR x30                      mini squats   2x10                     Standing marching              SLB R/L                         bridges                         SLR flexion                                                                                                                                                                        Modalities                                                                                             X= performed

## 2018-10-16 ENCOUNTER — TELEPHONE (OUTPATIENT)
Dept: ENDOCRINOLOGY | Facility: CLINIC | Age: 63
End: 2018-10-16

## 2018-10-16 DIAGNOSIS — E11.65 TYPE 2 DIABETES MELLITUS WITH HYPERGLYCEMIA, WITHOUT LONG-TERM CURRENT USE OF INSULIN (HCC): Primary | ICD-10-CM

## 2018-10-16 NOTE — TELEPHONE ENCOUNTER
Patient left message stating that he is having issues with his supplies and would like a call back   [phone]

## 2018-10-17 ENCOUNTER — OFFICE VISIT (OUTPATIENT)
Dept: PHYSICAL THERAPY | Facility: REHABILITATION | Age: 63
End: 2018-10-17
Payer: OTHER MISCELLANEOUS

## 2018-10-17 DIAGNOSIS — Z47.89 AFTERCARE FOLLOWING SURGERY OF THE MUSCULOSKELETAL SYSTEM: Primary | ICD-10-CM

## 2018-10-17 PROCEDURE — 97112 NEUROMUSCULAR REEDUCATION: CPT

## 2018-10-17 PROCEDURE — 97110 THERAPEUTIC EXERCISES: CPT

## 2018-10-17 RX ORDER — LANCETS
EACH MISCELLANEOUS 2 TIMES DAILY
Qty: 200 EACH | Refills: 1 | Status: SHIPPED | OUTPATIENT
Start: 2018-10-17

## 2018-10-17 RX ORDER — BLOOD-GLUCOSE METER
EACH MISCELLANEOUS 2 TIMES DAILY
Qty: 1 KIT | Refills: 0 | Status: SHIPPED | OUTPATIENT
Start: 2018-10-17

## 2018-10-17 NOTE — PROGRESS NOTES
Daily Note     Today's date: 10/17/2018  Patient name: Sulma Esteves  : 1955  MRN: [id number]  Referring provider: Armando Caballero MD  Dx:   Encounter Diagnosis     ICD-10-CM    1  Aftercare following surgery of the musculoskeletal system Z47 89                   Subjective: Reports he could not do STS at home from kitchen chair, just too difficult  C/o L hip & knee stiffness    PRECAUTIONS: DM, osteop  Objective: See treatment diary below  Manuals 10/15 10-17                   Man ham str B  30"x3                                                                                     Exercise Diary                         bike x5 min   7'                    STS high/low  x10  2x10                   Standing hip abd R/L  3x10  x 2x15                   Standing hip ext R/L  3x10  x 2x15                    standing BL HR x30  x                    mini squats   2x10  x                   Standing marching  20x            SLB R/L                         bridges     20x                    SLR flexion     2x5                                                                                                                                                                   Modalities                                                                                             X= performed        Assessment: Tolerated treatment well  Patient would benefit from continued PT  NEYDA WB on L LE well  Difficult to SLR  Hams are tight  Plan: Continue per plan of care

## 2018-10-19 ENCOUNTER — OFFICE VISIT (OUTPATIENT)
Dept: PHYSICAL THERAPY | Facility: REHABILITATION | Age: 63
End: 2018-10-19
Payer: OTHER MISCELLANEOUS

## 2018-10-19 DIAGNOSIS — Z47.89 AFTERCARE FOLLOWING SURGERY OF THE MUSCULOSKELETAL SYSTEM: Primary | ICD-10-CM

## 2018-10-19 PROCEDURE — 97112 NEUROMUSCULAR REEDUCATION: CPT

## 2018-10-19 PROCEDURE — 97110 THERAPEUTIC EXERCISES: CPT

## 2018-10-19 NOTE — PROGRESS NOTES
Daily Note     Today's date: 10/19/2018  Patient name: Florance Nissen  : 1955  MRN: [id number]  Referring provider: Jie Sullivan MD  Dx:   Encounter Diagnosis     ICD-10-CM    1  Aftercare following surgery of the musculoskeletal system Z47 89                   Subjective:   Mild post ex soreness  PRECAUTIONS: DM, osteop  Objective: See treatment diary below  Manuals 10/15 10-17  10-19                 Man ham str B  30"x3  x                                                                                   Exercise Diary                         bike x5 min   7'  9'                  STS high/low  x10  2x10  x                 Standing hip abd R/L  3x10  x 2x15  x                 Standing hip ext R/L  3x10  x 2x15  x                  standing BL HR x30  x  x                  mini squats   2x10  x  x                 Standing marching  20x x           SLB R/L                         bridges     20x  x                  SLR flexion     2x5  x                  step up      4' 2x10                  clams      10                                                                                                                 Modalities                                                                                             X= performed        Assessment: Tolerated treatment well  Patient would benefit from continued PT  Light finger tip hold for SLS  No c/o with increases      Plan: Continue per plan of care

## 2018-10-22 ENCOUNTER — OFFICE VISIT (OUTPATIENT)
Dept: PHYSICAL THERAPY | Facility: REHABILITATION | Age: 63
End: 2018-10-22
Payer: OTHER MISCELLANEOUS

## 2018-10-22 DIAGNOSIS — Z47.89 AFTERCARE FOLLOWING SURGERY OF THE MUSCULOSKELETAL SYSTEM: Primary | ICD-10-CM

## 2018-10-22 PROCEDURE — 97112 NEUROMUSCULAR REEDUCATION: CPT | Performed by: PHYSICAL THERAPIST

## 2018-10-22 PROCEDURE — 97110 THERAPEUTIC EXERCISES: CPT | Performed by: PHYSICAL THERAPIST

## 2018-10-22 NOTE — PROGRESS NOTES
Daily Note     Today's date: 10/22/2018  Patient name: Norma Fletcher  : 1955  MRN: [id number]  Referring provider: Hillary Ring MD  Dx:   Encounter Diagnosis     ICD-10-CM    1  Aftercare following surgery of the musculoskeletal system Z47 89                   Subjective:  Pt reports that he is slightly sore after therapy sessions  He notes compliance with his HEP except for STS  PRECAUTIONS: DM, osteop  Objective: See treatment diary below  Manuals 10/15 10-17  10-19  10/22               Man ham str B  30"x3  x  KAB                                                                                 Exercise Diary                         bike x5 min   7'  9'  10'                STS high/low  x10  2x10  x  2x10               Standing hip abd R/L  3x10  x 2x15  x  2x15               Standing hip ext R/L  3x10  x 2x15  x  x30                standing BL HR x30  x  x  x30                mini squats   2x10  x  x  3x10               Standing marching  20x x x30 #1          SLB R/L         15" x3                bridges     20x  x  x20                SLR flexion     2x5  x  2x10 L                step up      4' 2x10  4' 2x10 up with L down with R                clams      10  x20                                                                                                               Modalities                                                                                             X= performed        Assessment: Pt was able to tolerate progressions in program as demonstrated by no increases in pain t/o session  He required extensive VC for SLR  He was challenged with eccentric control t/o his program and requires extensive cueing for slow controlled movements  He was additionally challenged with SLB and required UE assistance and verbal encouragement for completion   Pt would benefit from skilled outpatient PT to address pain, strength, cardiovascular endurance, and balance in order to maximize his level of function  Plan: Continue per plan of care

## 2018-10-23 ENCOUNTER — OFFICE VISIT (OUTPATIENT)
Dept: INTERNAL MEDICINE CLINIC | Facility: CLINIC | Age: 63
End: 2018-10-23
Payer: COMMERCIAL

## 2018-10-23 VITALS
HEART RATE: 89 BPM | DIASTOLIC BLOOD PRESSURE: 78 MMHG | SYSTOLIC BLOOD PRESSURE: 124 MMHG | WEIGHT: 146.6 LBS | OXYGEN SATURATION: 98 % | TEMPERATURE: 97.8 F | HEIGHT: 70 IN | BODY MASS INDEX: 20.99 KG/M2

## 2018-10-23 DIAGNOSIS — I10 ESSENTIAL HYPERTENSION: ICD-10-CM

## 2018-10-23 DIAGNOSIS — Z87.81 S/P LEFT HIP FRACTURE: ICD-10-CM

## 2018-10-23 DIAGNOSIS — E55.9 VITAMIN D DEFICIENCY: ICD-10-CM

## 2018-10-23 DIAGNOSIS — E78.5 HYPERLIPIDEMIA, UNSPECIFIED HYPERLIPIDEMIA TYPE: ICD-10-CM

## 2018-10-23 DIAGNOSIS — E11.65 TYPE 2 DIABETES MELLITUS WITH HYPERGLYCEMIA, WITHOUT LONG-TERM CURRENT USE OF INSULIN (HCC): ICD-10-CM

## 2018-10-23 DIAGNOSIS — M81.0 OSTEOPOROSIS WITHOUT CURRENT PATHOLOGICAL FRACTURE, UNSPECIFIED OSTEOPOROSIS TYPE: ICD-10-CM

## 2018-10-23 DIAGNOSIS — S72.002A HIP FRACTURE, LEFT, CLOSED, INITIAL ENCOUNTER (HCC): ICD-10-CM

## 2018-10-23 DIAGNOSIS — Z12.11 SCREENING FOR COLON CANCER: Primary | ICD-10-CM

## 2018-10-23 PROCEDURE — 99204 OFFICE O/P NEW MOD 45 MIN: CPT | Performed by: NURSE PRACTITIONER

## 2018-10-23 NOTE — ASSESSMENT & PLAN NOTE
Patient's blood pressure within normal limits at 124/78, will continue to monitor  Patient currently not on medication

## 2018-10-23 NOTE — ASSESSMENT & PLAN NOTE
Patient is to continue to take vitamin-D and calcium supplementation  Recommend weight bearing and muscle strengthening exercise, fall prevention and avoidance of tobacco and excessive alcohol intake as basic preventive measures are recommended  Patient will be getting DEXA scan done on 10/25/2018  He would be a good candidate for Prolia injection

## 2018-10-23 NOTE — PROGRESS NOTES
Assessment/Plan:    Type 2 diabetes mellitus (HCC)  Lab Results   Component Value Date    HGBA1C 12 4 (H) 09/13/2018     Patient's most recent hemoglobin A1c 12 4, patient was currently taking metformin 500 mg twice a day and glipizide 10 mg once a day  Plan will be to increase patient's metformin to a 1000 mg twice a day, continues to take glipizide 10 mg once a day, and will add on Januvia 100 mg daily after blood sugar log check in 2 weeks to ensure no hyoglycemia,  Which is unlikely with a hemoglobin A1c at 12  I did advise patient to check his sugars 3 times a day at home, and follow up with our office in 2 weeks  Patient is to continue to work on diet and exercise  Limit sugars and carbohydrate intake  Avoid soda, juice, sweets, cookies, desserts, pasta, bread    Eat more whole grains, exercised 30 min of cardio at least 3 times a week  Also recommended daily foot exams to check for sores, and recommended yearly eye exams  Patient did see Endocrine on 10/01/2018, as a follow-up on 02/05/2019  Patient states that he does not check his blood sugars at home, and blood sugars that we have from the hospital have been elevated  Essential hypertension    Patient's blood pressure within normal limits at 124/78, will continue to monitor  Patient currently not on medication  Osteoporosis    Patient is to continue to take vitamin-D and calcium supplementation  Recommend weight bearing and muscle strengthening exercise, fall prevention and avoidance of tobacco and excessive alcohol intake as basic preventive measures are recommended  Patient will be getting DEXA scan done on 10/25/2018  He would be a good candidate for Prolia injection  S/p left hip fracture    Patient is to continue with physical therapy  Vitamin D deficiency    Patient currently states that he takes vitamin-D supplementation 5000 International Units daily      Hyperlipemia    Patient will continue to take the Lipitor 40 mg tablet daily  Recommend healthy lifestyle choices for your cholesterol  Low fat/low cholesterol diet  Limit/avoid red meat  Eat more lean meat - chicken breast, ground turkey, fish  Exercise 30 mins at least 3 times a week as tolerated  Diagnoses and all orders for this visit:    Screening for colon cancer    Type 2 diabetes mellitus with hyperglycemia, without long-term current use of insulin ([place] [id number] )  -     Discontinue: metFORMIN (GLUCOPHAGE) 500 mg tablet; Take 1 tablet (500 mg total) by mouth 3 (three) times a day  -     metFORMIN (GLUCOPHAGE) 1000 MG tablet; Take 1 tablet (1,000 mg total) by mouth 2 (two) times a day with meals  -     Discontinue: sitaGLIPtin (JANUVIA) 100 mg tablet; Take 1 tablet (100 mg total) by mouth daily    Osteoporosis without current pathological fracture, unspecified osteoporosis type    Essential hypertension    Hip fracture, left, closed, initial encounter ([place] [address] )    S/p left hip fracture    Vitamin D deficiency    Hyperlipidemia, unspecified hyperlipidemia type    Other orders  -     Cancel: Ambulatory referral to Gastroenterology; Future          Subjective:      Patient ID: Jones Garrett is a 70 y o  male  Patient presents today to establish care with our practice, patient has known history of hyperlipidemia as well as diabetes type 2  Patient did follow with Endocrinology on 10/01/2018, and has follow-up with them on 02/05/2018  Patient had hip fracture  With surgery on 09/12/2018  Patient continues to have physical therapy on Monday Wednesdays and Fridays  Patient currently states he lives at home alone  Patient currently manages his medications at home, he did bring a list of accurate medications to office visit today, patient does seem forgetful      Male screenings:  Colonoscopy- refuses to get one done  Hep C-will get with next blood-work  Eye Doctor- wears glasses, has not been  Dentist-has not gone  Podiatrist-does not go    Flu vaccination- does not get flu vaccination    Tobacco abuse- Quit in 78      Diabetes   He presents for his initial diabetic visit  He has type 2 diabetes mellitus  No MedicAlert identification noted  His disease course has been fluctuating  There are no hypoglycemic associated symptoms  Pertinent negatives for hypoglycemia include no dizziness, headaches or nervousness/anxiousness  There are no diabetic associated symptoms  Pertinent negatives for diabetes include no chest pain, no polydipsia, no polyphagia, no polyuria and no weakness  There are no hypoglycemic complications  Symptoms are worsening  Risk factors for coronary artery disease include diabetes mellitus, dyslipidemia and male sex  Current diabetic treatments:  patient currently taking metformin 500 mg twice a day, and glipizide 10 mg  Compliance with diabetes treatment:  unsure of treatment compliance as patient seems forgetful however he did write his medications down prior to his office visit today  Weight trend:  patient is very thin  He rarely participates in exercise  ( Patient does not check his blood sugars at home,  He is unaware of what his sugars are, he says that in the hospital they were stable however of note sugars were in the 200-300 range) He does not see a podiatrist Eye exam is not current  The following portions of the patient's history were reviewed and updated as appropriate: allergies, current medications, past family history, past medical history, past social history, past surgical history and problem list     Review of Systems   Constitutional: Negative for activity change, appetite change, chills, diaphoresis and fever  HENT: Negative for congestion, ear discharge, ear pain, postnasal drip, rhinorrhea, sinus pain, sinus pressure and sore throat  Eyes: Negative for pain, discharge, itching and visual disturbance  Respiratory: Negative for cough, chest tightness, shortness of breath and wheezing      Cardiovascular: Negative for chest pain, palpitations and leg swelling  Gastrointestinal: Negative for abdominal pain, blood in stool, constipation, diarrhea, nausea and vomiting  Endocrine: Negative for polydipsia, polyphagia and polyuria  Genitourinary: Negative for difficulty urinating, dysuria, hematuria and urgency  Musculoskeletal: Negative for arthralgias, back pain and neck pain  Skin: Negative for rash and wound  Neurological: Negative for dizziness, weakness, numbness and headaches  Psychiatric/Behavioral: The patient is not nervous/anxious            Past Medical History:   Diagnosis Date    Diabetes mellitus ([facility] [address] )     H/O nicotine dependence     Hypertension     Prediabetes          Current Outpatient Prescriptions:     ACCU-CHEK FASTCLIX LANCETS [facility] [place], by Does not apply route 2 (two) times a day, Disp: 200 each, Rfl: 1    Acetaminophen (TYLENOL EXTRA STRENGTH PO), As needed, Disp: , Rfl:     atorvastatin (LIPITOR) 40 mg tablet, Take 1 tablet (40 mg total) by mouth daily with dinner, Disp: 30 tablet, Rfl: 0    Blood Glucose Monitoring Suppl (ACCU-CHEK CHELY PLUS) w/Device KIT, by Does not apply route 2 (two) times a day, Disp: 1 kit, Rfl: 0    calcium carbonate (OYSTER SHELL,OSCAL) 500 mg, Take 1 tablet by mouth daily with breakfast, Disp: 30 tablet, Rfl: 0    cholecalciferol 2000 units TABS, Take 1 tablet (2,000 Units total) by mouth daily, Disp: 30 tablet, Rfl: 0    glipiZIDE (GLUCOTROL) 10 mg tablet, Take 10 mg by mouth daily, Disp: , Rfl:     glucose blood (ACCU-CHEK CHELY PLUS) test strip, 1 each by Other route 2 (two) times a day Use as instructed, Disp: 200 each, Rfl: 1    magnesium hydroxide (MILK OF MAGNESIA) 400 mg/5 mL oral suspension, Take by mouth daily as needed for constipation, Disp: , Rfl:     metFORMIN (GLUCOPHAGE) 1000 MG tablet, Take 1 tablet (1,000 mg total) by mouth 2 (two) times a day with meals, Disp: 30 tablet, Rfl: 0    senna-docusate sodium (SENOKOT-S) 8 6-50 mg per tablet, Take 1 tablet by mouth as needed, Disp: , Rfl:     No Known Allergies    Social History   Past Surgical History:   Procedure Laterality Date    HIP SURGERY      NOSE SURGERY      [facility] OPEN RX FEMUR FX+INTRAMED ADDISON Left 9/13/2018    Procedure: INSERTION NAIL IM FEMUR ANTEGRADE (TROCHANTERIC);   Surgeon: Paulina Mazariegos MD;  Location: [facility];  Service: Orthopedics     Family History   Problem Relation Age of Onset    Cancer Mother     No Known Problems Father     No Known Problems Sister     Lung cancer Brother     Lymphoma Family         acute       Objective:  /78 (BP Location: Left arm, Patient Position: Sitting, Cuff Size: Adult)   Pulse 89   Temp 97 8 °F (36 6 °C) (Oral)   Ht 5' 10" (1 778 m)   Wt 66 5 kg (146 lb 9 6 oz)   SpO2 98%   BMI 21 03 kg/m²     Recent Results (from the past 1344 hour(s))   CBC and differential    Collection Time: 09/12/18  9:30 AM   Result Value Ref Range    WBC 4 17 (L) 4 31 - 10 16 Thousand/uL    RBC 4 67 3 88 - 5 62 Million/uL    Hemoglobin 14 1 12 0 - 17 0 g/dL    Hematocrit 39 6 36 5 - 49 3 %    MCV 85 82 - 98 fL    MCH 30 2 26 8 - 34 3 pg    MCHC 35 6 31 4 - 37 4 g/dL    RDW 11 9 11 6 - 15 1 %    MPV 9 8 8 9 - 12 7 fL    Platelets 348 739 - 938 Thousands/uL    nRBC 0 /100 WBCs    Neutrophils Relative 62 43 - 75 %    Immat GRANS % 1 0 - 2 %    Lymphocytes Relative 29 14 - 44 %    Monocytes Relative 6 4 - 12 %    Eosinophils Relative 1 0 - 6 %    Basophils Relative 1 0 - 1 %    Neutrophils Absolute 2 64 1 85 - 7 62 Thousands/µL    Immature Grans Absolute 0 03 0 00 - 0 20 Thousand/uL    Lymphocytes Absolute 1 20 0 60 - 4 47 Thousands/µL    Monocytes Absolute 0 24 0 17 - 1 22 Thousand/µL    Eosinophils Absolute 0 03 0 00 - 0 61 Thousand/µL    Basophils Absolute 0 03 0 00 - 0 10 Thousands/µL   Basic metabolic panel    Collection Time: 09/12/18  9:30 AM   Result Value Ref Range    Sodium 130 (L) 136 - 145 mmol/L    Potassium 4 6 3 5 - 5 3 mmol/L    Chloride 96 (L) 100 - 108 mmol/L CO2 23 21 - 32 mmol/L    ANION GAP 11 4 - 13 mmol/L    BUN 19 5 - 25 mg/dL    Creatinine 1 01 0 60 - 1 30 mg/dL    Glucose 479 (H) 65 - 140 mg/dL    Calcium 8 8 8 3 - 10 1 mg/dL    eGFR 78 ml/min/1 73sq m   APTT    Collection Time: 09/12/18 10:49 AM   Result Value Ref Range    PTT 26 24 - 36 seconds   Protime-INR    Collection Time: 09/12/18 10:49 AM   Result Value Ref Range    Protime 13 6 11 8 - 14 2 seconds    INR 1 03 0 86 - 1 17   Type and screen    Collection Time: 09/12/18 10:49 AM   Result Value Ref Range    ABO Grouping A     Rh Factor Positive     Antibody Screen Negative     Specimen Expiration Date [id number]    ECG 12 lead    Collection Time: 09/12/18 11:03 AM   Result Value Ref Range    Ventricular Rate 82 BPM    Atrial Rate 82 BPM    NJ Interval 174 ms    QRSD Interval 74 ms    QT Interval 372 ms    QTC Interval 434 ms    P Axis 66 degrees    QRS Axis 69 degrees    T Wave Axis 76 degrees   Fingerstick Glucose (POCT)    Collection Time: 09/12/18  2:58 PM   Result Value Ref Range    POC Glucose 455 (H) 65 - 140 mg/dl   Fingerstick Glucose (POCT)    Collection Time: 09/12/18  5:59 PM   Result Value Ref Range    POC Glucose >500 (HH) 65 - 140 mg/dl   Fingerstick Glucose (POCT)    Collection Time: 09/12/18  8:36 PM   Result Value Ref Range    POC Glucose 280 (H) 65 - 140 mg/dl   Microalbumin / creatinine urine ratio    Collection Time: 09/12/18 10:27 PM   Result Value Ref Range    Creatinine, Ur 84 0 mg/dL    Microalbum  ,U,Random 7 6 0 0 - 20 0 mg/L    Microalb Creat Ratio 9 0 - 30 mg/g creatinine   Hemoglobin A1C    Collection Time: 09/13/18  5:03 AM   Result Value Ref Range    Hemoglobin A1C 12 4 (H) 4 2 - 6 3 %     mg/dl   TSH, 3rd generation    Collection Time: 09/13/18  5:03 AM   Result Value Ref Range    TSH 3RD GENERATON 2 880 0 358 - 3 740 uIU/mL   CBC and differential    Collection Time: 09/13/18  5:03 AM   Result Value Ref Range    WBC 7 10 4 31 - 10 16 Thousand/uL    RBC 4 10 3 88 - 5 62 Million/uL    Hemoglobin 12 3 12 0 - 17 0 g/dL    Hematocrit 35 1 (L) 36 5 - 49 3 %    MCV 86 82 - 98 fL    MCH 30 0 26 8 - 34 3 pg    MCHC 35 0 31 4 - 37 4 g/dL    RDW 12 2 11 6 - 15 1 %    MPV 10 3 8 9 - 12 7 fL    Platelets 563 (L) 175 - 390 Thousands/uL    nRBC 0 /100 WBCs    Neutrophils Relative 69 43 - 75 %    Immat GRANS % 0 0 - 2 %    Lymphocytes Relative 22 14 - 44 %    Monocytes Relative 8 4 - 12 %    Eosinophils Relative 1 0 - 6 %    Basophils Relative 0 0 - 1 %    Neutrophils Absolute 4 88 1 85 - 7 62 Thousands/µL    Immature Grans Absolute 0 03 0 00 - 0 20 Thousand/uL    Lymphocytes Absolute 1 57 0 60 - 4 47 Thousands/µL    Monocytes Absolute 0 55 0 17 - 1 22 Thousand/µL    Eosinophils Absolute 0 04 0 00 - 0 61 Thousand/µL    Basophils Absolute 0 03 0 00 - 0 10 Thousands/µL   Protime-INR    Collection Time: 09/13/18  5:03 AM   Result Value Ref Range    Protime 14 5 (H) 11 8 - 14 2 seconds    INR 1 12 0 86 - 1 17   APTT    Collection Time: 09/13/18  5:03 AM   Result Value Ref Range    PTT 28 24 - 36 seconds   Comprehensive metabolic panel    Collection Time: 09/13/18  5:03 AM   Result Value Ref Range    Sodium 133 (L) 136 - 145 mmol/L    Potassium 3 9 3 5 - 5 3 mmol/L    Chloride 100 100 - 108 mmol/L    CO2 25 21 - 32 mmol/L    ANION GAP 8 4 - 13 mmol/L    BUN 19 5 - 25 mg/dL    Creatinine 0 78 0 60 - 1 30 mg/dL    Glucose 241 (H) 65 - 140 mg/dL    Calcium 8 1 (L) 8 3 - 10 1 mg/dL    AST 12 5 - 45 U/L    ALT 23 12 - 78 U/L    Alkaline Phosphatase 34 (L) 46 - 116 U/L    Total Protein 6 1 (L) 6 4 - 8 2 g/dL    Albumin 3 1 (L) 3 5 - 5 0 g/dL    Total Bilirubin 0 87 0 20 - 1 00 mg/dL    eGFR 95 ml/min/1 73sq m   Lipid Panel with Direct LDL reflex    Collection Time: 09/13/18  5:03 AM   Result Value Ref Range    Cholesterol 156 50 - 200 mg/dL    Triglycerides 115 <=150 mg/dL    HDL, Direct 52 40 - 60 mg/dL    LDL Calculated 81 0 - 100 mg/dL   Fingerstick Glucose (POCT)    Collection Time: 09/13/18  7:43 AM Result Value Ref Range    POC Glucose 242 (H) 65 - 140 mg/dl   Fingerstick Glucose (POCT)    Collection Time: 09/13/18 10:57 AM   Result Value Ref Range    POC Glucose 239 (H) 65 - 140 mg/dl   Fingerstick Glucose (POCT)    Collection Time: 09/13/18 12:05 PM   Result Value Ref Range    POC Glucose 226 (H) 65 - 140 mg/dl   Fingerstick Glucose (POCT)    Collection Time: 09/13/18  5:01 PM   Result Value Ref Range    POC Glucose 405 (H) 65 - 140 mg/dl   Fingerstick Glucose (POCT)    Collection Time: 09/13/18  8:49 PM   Result Value Ref Range    POC Glucose 346 (H) 65 - 140 mg/dl   Basic metabolic panel    Collection Time: 09/14/18  5:12 AM   Result Value Ref Range    Sodium 137 136 - 145 mmol/L    Potassium 4 0 3 5 - 5 3 mmol/L    Chloride 103 100 - 108 mmol/L    CO2 24 21 - 32 mmol/L    ANION GAP 10 4 - 13 mmol/L    BUN 18 5 - 25 mg/dL    Creatinine 0 75 0 60 - 1 30 mg/dL    Glucose 194 (H) 65 - 140 mg/dL    Calcium 8 7 8 3 - 10 1 mg/dL    eGFR 96 ml/min/1 73sq m   CBC and differential    Collection Time: 09/14/18  5:12 AM   Result Value Ref Range    WBC 8 22 4 31 - 10 16 Thousand/uL    RBC 3 80 (L) 3 88 - 5 62 Million/uL    Hemoglobin 11 6 (L) 12 0 - 17 0 g/dL    Hematocrit 32 6 (L) 36 5 - 49 3 %    MCV 86 82 - 98 fL    MCH 30 5 26 8 - 34 3 pg    MCHC 35 6 31 4 - 37 4 g/dL    RDW 12 3 11 6 - 15 1 %    MPV 10 2 8 9 - 12 7 fL    Platelets 737 466 - 607 Thousands/uL    nRBC 0 /100 WBCs    Neutrophils Relative 67 43 - 75 %    Immat GRANS % 0 0 - 2 %    Lymphocytes Relative 24 14 - 44 %    Monocytes Relative 8 4 - 12 %    Eosinophils Relative 1 0 - 6 %    Basophils Relative 0 0 - 1 %    Neutrophils Absolute 5 56 1 85 - 7 62 Thousands/µL    Immature Grans Absolute 0 02 0 00 - 0 20 Thousand/uL    Lymphocytes Absolute 1 93 0 60 - 4 47 Thousands/µL    Monocytes Absolute 0 64 0 17 - 1 22 Thousand/µL    Eosinophils Absolute 0 04 0 00 - 0 61 Thousand/µL    Basophils Absolute 0 03 0 00 - 0 10 Thousands/µL   PTH, intact Collection Time: 09/14/18  5:12 AM   Result Value Ref Range    PTH 27 2 18 4 - 80 1 pg/mL   Protein electrophoresis, serum    Collection Time: 09/14/18  5:12 AM   Result Value Ref Range    A/G Ratio 1 53 1 10 - 1 80    Albumin Electrophoresis 60 4 52 0 - 65 0 %    Albumin CONC 3 26 (L) 3 50 - 5 00 g/dl    Alpha 1 5 3 (H) 2 5 - 5 0 %    ALPHA 1 CONC 0 29 0 10 - 0 40 g/dL    Alpha 2 13 4 (H) 7 0 - 13 0 %    ALPHA 2 CONC 0 72 0 40 - 1 20 g/dL    Beta-1 5 2 5 0 - 13 0 %    BETA 1 CONC 0 28 (L) 0 40 - 0 80 g/dL    Beta-2 4 4 2 0 - 8 0 %    BETA 2 CONC 0 24 0 20 - 0 50 g/dL    Gamma Globulin 11 3 (L) 12 0 - 22 0 %    GAMMA CONC 0 61 0 50 - 1 60 g/dL    SPEP Interpretation       The serum total protein and albumin are decreased  The serum protein electrophoresis shows a decreased beta-1 region  No monoclonal bands noted   Reviewed by: Vikki Ibanez MD  ([id number])  **Electronic Signature**    Total Protein 5 4 (L) 6 4 - 8 2 g/dL   TSH, 3rd generation    Collection Time: 09/14/18  5:12 AM   Result Value Ref Range    TSH 3RD GENERATON 2 000 0 358 - 3 740 uIU/mL   T4, free    Collection Time: 09/14/18  5:12 AM   Result Value Ref Range    Free T4 1 56 (H) 0 76 - 1 46 ng/dL   Testosterone, free, total    Collection Time: 09/14/18  5:12 AM   Result Value Ref Range    Testosterone, Free 1 2 (L) 6 6 - 18 1 pg/mL    TESTOSTERONE TOTAL 282 264 - 916 ng/dL   Vitamin D 25 hydroxy    Collection Time: 09/14/18  5:12 AM   Result Value Ref Range    Vit D, 25-Hydroxy 27 0 (L) 30 0 - 100 0 ng/mL   Fingerstick Glucose (POCT)    Collection Time: 09/14/18  7:22 AM   Result Value Ref Range    POC Glucose 201 (H) 65 - 140 mg/dl   Fingerstick Glucose (POCT)    Collection Time: 09/14/18 11:26 AM   Result Value Ref Range    POC Glucose 201 (H) 65 - 140 mg/dl   Fingerstick Glucose (POCT)    Collection Time: 09/14/18  4:58 PM   Result Value Ref Range    POC Glucose 278 (H) 65 - 140 mg/dl   Fingerstick Glucose (POCT)    Collection Time: 09/14/18  9:03 PM Result Value Ref Range    POC Glucose 161 (H) 65 - 140 mg/dl   Basic metabolic panel    Collection Time: 09/15/18  5:10 AM   Result Value Ref Range    Sodium 137 136 - 145 mmol/L    Potassium 3 8 3 5 - 5 3 mmol/L    Chloride 103 100 - 108 mmol/L    CO2 26 21 - 32 mmol/L    ANION GAP 8 4 - 13 mmol/L    BUN 17 5 - 25 mg/dL    Creatinine 0 74 0 60 - 1 30 mg/dL    Glucose 208 (H) 65 - 140 mg/dL    Calcium 8 7 8 3 - 10 1 mg/dL    eGFR 97 ml/min/1 73sq m   CBC and differential    Collection Time: 09/15/18  5:10 AM   Result Value Ref Range    WBC 6 16 4 31 - 10 16 Thousand/uL    RBC 3 42 (L) 3 88 - 5 62 Million/uL    Hemoglobin 10 4 (L) 12 0 - 17 0 g/dL    Hematocrit 29 6 (L) 36 5 - 49 3 %    MCV 87 82 - 98 fL    MCH 30 4 26 8 - 34 3 pg    MCHC 35 1 31 4 - 37 4 g/dL    RDW 12 2 11 6 - 15 1 %    MPV 10 0 8 9 - 12 7 fL    Platelets 341 (L) 320 - 390 Thousands/uL    nRBC 0 /100 WBCs    Neutrophils Relative 66 43 - 75 %    Immat GRANS % 0 0 - 2 %    Lymphocytes Relative 22 14 - 44 %    Monocytes Relative 9 4 - 12 %    Eosinophils Relative 2 0 - 6 %    Basophils Relative 1 0 - 1 %    Neutrophils Absolute 4 04 1 85 - 7 62 Thousands/µL    Immature Grans Absolute 0 02 0 00 - 0 20 Thousand/uL    Lymphocytes Absolute 1 37 0 60 - 4 47 Thousands/µL    Monocytes Absolute 0 58 0 17 - 1 22 Thousand/µL    Eosinophils Absolute 0 12 0 00 - 0 61 Thousand/µL    Basophils Absolute 0 03 0 00 - 0 10 Thousands/µL   Fingerstick Glucose (POCT)    Collection Time: 09/15/18  8:52 AM   Result Value Ref Range    POC Glucose 287 (H) 65 - 140 mg/dl   Fingerstick Glucose (POCT)    Collection Time: 09/15/18 12:40 PM   Result Value Ref Range    POC Glucose 300 (H) 65 - 140 mg/dl   Fingerstick Glucose (POCT)    Collection Time: 09/15/18  5:19 PM   Result Value Ref Range    POC Glucose 168 (H) 65 - 140 mg/dl   Fingerstick Glucose (POCT)    Collection Time: 09/15/18  9:18 PM   Result Value Ref Range    POC Glucose 91 65 - 140 mg/dl   Basic metabolic panel Collection Time: 09/16/18  5:50 AM   Result Value Ref Range    Sodium 134 (L) 136 - 145 mmol/L    Potassium 3 8 3 5 - 5 3 mmol/L    Chloride 101 100 - 108 mmol/L    CO2 27 21 - 32 mmol/L    ANION GAP 6 4 - 13 mmol/L    BUN 14 5 - 25 mg/dL    Creatinine 0 64 0 60 - 1 30 mg/dL    Glucose 215 (H) 65 - 140 mg/dL    Calcium 8 5 8 3 - 10 1 mg/dL    eGFR 103 ml/min/1 73sq m   CBC and differential    Collection Time: 09/16/18  5:50 AM   Result Value Ref Range    WBC 5 72 4 31 - 10 16 Thousand/uL    RBC 3 29 (L) 3 88 - 5 62 Million/uL    Hemoglobin 10 1 (L) 12 0 - 17 0 g/dL    Hematocrit 28 4 (L) 36 5 - 49 3 %    MCV 86 82 - 98 fL    MCH 30 7 26 8 - 34 3 pg    MCHC 35 6 31 4 - 37 4 g/dL    RDW 11 9 11 6 - 15 1 %    MPV 10 1 8 9 - 12 7 fL    Platelets 485 (L) 645 - 390 Thousands/uL    nRBC 0 /100 WBCs    Neutrophils Relative 65 43 - 75 %    Immat GRANS % 0 0 - 2 %    Lymphocytes Relative 23 14 - 44 %    Monocytes Relative 9 4 - 12 %    Eosinophils Relative 2 0 - 6 %    Basophils Relative 1 0 - 1 %    Neutrophils Absolute 3 73 1 85 - 7 62 Thousands/µL    Immature Grans Absolute 0 02 0 00 - 0 20 Thousand/uL    Lymphocytes Absolute 1 30 0 60 - 4 47 Thousands/µL    Monocytes Absolute 0 54 0 17 - 1 22 Thousand/µL    Eosinophils Absolute 0 10 0 00 - 0 61 Thousand/µL    Basophils Absolute 0 03 0 00 - 0 10 Thousands/µL   Fingerstick Glucose (POCT)    Collection Time: 09/16/18  8:47 AM   Result Value Ref Range    POC Glucose 246 (H) 65 - 140 mg/dl   Fingerstick Glucose (POCT)    Collection Time: 09/16/18 11:34 AM   Result Value Ref Range    POC Glucose 331 (H) 65 - 140 mg/dl   Fingerstick Glucose (POCT)    Collection Time: 09/16/18  5:07 PM   Result Value Ref Range    POC Glucose 123 65 - 140 mg/dl   Fingerstick Glucose (POCT)    Collection Time: 09/16/18  8:49 PM   Result Value Ref Range    POC Glucose 89 65 - 140 mg/dl   Comprehensive metabolic panel    Collection Time: 09/17/18  5:18 AM   Result Value Ref Range    Sodium 135 (L) 136 - 145 mmol/L    Potassium 3 8 3 5 - 5 3 mmol/L    Chloride 101 100 - 108 mmol/L    CO2 28 21 - 32 mmol/L    ANION GAP 6 4 - 13 mmol/L    BUN 13 5 - 25 mg/dL    Creatinine 0 62 0 60 - 1 30 mg/dL    Glucose 119 65 - 140 mg/dL    Calcium 8 6 8 3 - 10 1 mg/dL    AST 22 5 - 45 U/L    ALT 23 12 - 78 U/L    Alkaline Phosphatase 45 (L) 46 - 116 U/L    Total Protein 5 9 (L) 6 4 - 8 2 g/dL    Albumin 2 4 (L) 3 5 - 5 0 g/dL    Total Bilirubin 0 87 0 20 - 1 00 mg/dL    eGFR 104 ml/min/1 73sq m   Sedimentation rate, automated    Collection Time: 09/17/18  5:18 AM   Result Value Ref Range    Sed Rate 85 (H) 0 - 10 mm/hour   Basic metabolic panel    Collection Time: 09/17/18  5:18 AM   Result Value Ref Range    Sodium 137 136 - 145 mmol/L    Potassium 3 8 3 5 - 5 3 mmol/L    Chloride 102 100 - 108 mmol/L    CO2 28 21 - 32 mmol/L    ANION GAP 7 4 - 13 mmol/L    BUN 13 5 - 25 mg/dL    Creatinine 0 65 0 60 - 1 30 mg/dL    Glucose 124 65 - 140 mg/dL    Calcium 8 7 8 3 - 10 1 mg/dL    eGFR 102 ml/min/1 73sq m   T4, free    Collection Time: 09/17/18  5:18 AM   Result Value Ref Range    Free T4 1 39 0 76 - 1 46 ng/dL   Fingerstick Glucose (POCT)    Collection Time: 09/17/18  7:55 AM   Result Value Ref Range    POC Glucose 166 (H) 65 - 140 mg/dl   Fingerstick Glucose (POCT)    Collection Time: 09/17/18 12:24 PM   Result Value Ref Range    POC Glucose 267 (H) 65 - 140 mg/dl   Fingerstick Glucose (POCT)    Collection Time: 09/17/18  5:30 PM   Result Value Ref Range    POC Glucose 173 (H) 65 - 140 mg/dl            Physical Exam   Constitutional: He is oriented to person, place, and time  He appears well-developed and well-nourished  No distress  Cachectic   HENT:   Head: Normocephalic and atraumatic  Right Ear: External ear normal    Left Ear: External ear normal    Nose: Nose normal    Mouth/Throat: Oropharynx is clear and moist  No oropharyngeal exudate  Eyes: Pupils are equal, round, and reactive to light   Conjunctivae and EOM are normal  Right eye exhibits no discharge  Left eye exhibits no discharge  Neck: Normal range of motion  Neck supple  No thyromegaly present  Cardiovascular: Normal rate, regular rhythm, normal heart sounds and intact distal pulses  Exam reveals no gallop and no friction rub  No murmur heard  Pulmonary/Chest: Effort normal and breath sounds normal  No stridor  No respiratory distress  He has no wheezes  He has no rales  Abdominal: Soft  Bowel sounds are normal  He exhibits no distension  There is no tenderness  Lymphadenopathy:     He has no cervical adenopathy  Neurological: He is alert and oriented to person, place, and time  forgetful at times   Skin: Skin is warm and dry  No rash noted  He is not diaphoretic  No erythema  Psychiatric: He has a normal mood and affect   His behavior is normal  Judgment and thought content normal

## 2018-10-23 NOTE — ASSESSMENT & PLAN NOTE
Lab Results   Component Value Date    HGBA1C 12 4 (H) 09/13/2018     Patient's most recent hemoglobin A1c 12 4, patient was currently taking metformin 500 mg twice a day and glipizide 10 mg once a day  Plan will be to increase patient's metformin to a 1000 mg twice a day, continues to take glipizide 10 mg once a day, and will add on Januvia 100 mg daily after blood sugar log check in 2 weeks to ensure no hyoglycemia,  Which is unlikely with a hemoglobin A1c at 12  I did advise patient to check his sugars 3 times a day at home, and follow up with our office in 2 weeks  Patient is to continue to work on diet and exercise  Limit sugars and carbohydrate intake  Avoid soda, juice, sweets, cookies, desserts, pasta, bread    Eat more whole grains, exercised 30 min of cardio at least 3 times a week  Also recommended daily foot exams to check for sores, and recommended yearly eye exams  Patient did see Endocrine on 10/01/2018, as a follow-up on 02/05/2019  Patient states that he does not check his blood sugars at home, and blood sugars that we have from the hospital have been elevated

## 2018-10-23 NOTE — ASSESSMENT & PLAN NOTE
Patient will continue to take the Lipitor 40 mg tablet daily  Recommend healthy lifestyle choices for your cholesterol  Low fat/low cholesterol diet  Limit/avoid red meat  Eat more lean meat - chicken breast, ground turkey, fish  Exercise 30 mins at least 3 times a week as tolerated

## 2018-10-24 ENCOUNTER — OFFICE VISIT (OUTPATIENT)
Dept: PHYSICAL THERAPY | Facility: REHABILITATION | Age: 63
End: 2018-10-24
Payer: OTHER MISCELLANEOUS

## 2018-10-24 DIAGNOSIS — Z48.89 AFTERCARE FOLLOWING SURGERY: ICD-10-CM

## 2018-10-24 DIAGNOSIS — Z47.89 AFTERCARE FOLLOWING SURGERY OF THE MUSCULOSKELETAL SYSTEM: Primary | ICD-10-CM
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
## 2018-10-24 PROCEDURE — 97112 NEUROMUSCULAR REEDUCATION: CPT | Performed by: PHYSICAL THERAPIST

## 2018-10-24 PROCEDURE — G8979 MOBILITY GOAL STATUS: HCPCS | Performed by: PHYSICAL THERAPIST

## 2018-10-24 PROCEDURE — 97110 THERAPEUTIC EXERCISES: CPT | Performed by: PHYSICAL THERAPIST

## 2018-10-24 PROCEDURE — G8978 MOBILITY CURRENT STATUS: HCPCS | Performed by: PHYSICAL THERAPIST

## 2018-10-25 ENCOUNTER — HOSPITAL ENCOUNTER (OUTPATIENT)
Dept: RADIOLOGY | Age: 63
Discharge: HOME | End: 2018-10-25
Payer: COMMERCIAL

## 2018-10-25 ENCOUNTER — TELEPHONE (OUTPATIENT)
Dept: ENDOCRINOLOGY | Facility: CLINIC | Age: 63
End: 2018-10-25

## 2018-10-25 DIAGNOSIS — M81.0 OSTEOPOROSIS WITHOUT CURRENT PATHOLOGICAL FRACTURE, UNSPECIFIED OSTEOPOROSIS TYPE: ICD-10-CM

## 2018-10-25 PROCEDURE — 77080 DXA BONE DENSITY AXIAL: CPT

## 2018-10-25 NOTE — TELEPHONE ENCOUNTER
----- Message from Shay Corrales MD sent at 10/25/2018 10:28 AM EDT -----  Please call the patient regarding his abnormal result  Based is femoral neck fracture, he has osteoporosis  He needs prior authorization for Prolia

## 2018-10-26 ENCOUNTER — OFFICE VISIT (OUTPATIENT)
Dept: PHYSICAL THERAPY | Facility: REHABILITATION | Age: 63
End: 2018-10-26
Payer: OTHER MISCELLANEOUS

## 2018-10-26 DIAGNOSIS — Z48.89 AFTERCARE FOLLOWING SURGERY: ICD-10-CM

## 2018-10-26 DIAGNOSIS — Z47.89 AFTERCARE FOLLOWING SURGERY OF THE MUSCULOSKELETAL SYSTEM: Primary | ICD-10-CM
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
## 2018-10-26 PROCEDURE — 97110 THERAPEUTIC EXERCISES: CPT | Performed by: PHYSICAL THERAPIST

## 2018-10-26 PROCEDURE — 97112 NEUROMUSCULAR REEDUCATION: CPT | Performed by: PHYSICAL THERAPIST

## 2018-10-26 NOTE — PROGRESS NOTES
Daily Note     Today's date: 10/26/2018  Patient name: Sulma Esteves  : 1955  MRN: [id number]  Referring provider: Armando Caballero MD  Dx:   Encounter Diagnosis     ICD-10-CM    1  Aftercare following surgery of the musculoskeletal system Z47 89    2  Accident due to mechanical fall without injury, subsequent encounter W19  XXXD    3  Aftercare following surgery Z48 89                   Subjective:  Pt states that he was in the car a lot yesterday and is sore from being in the car  PRECAUTIONS: DM, osteop  Objective: See treatment diary below    Assessment: Pt demonstrated better eccentric control compared to previous games  He was easily fatigued with program and required frequent sitting rest breaks  STS and squats pt heavily relies on weight shifting to the R  Pt requires verbal and tactile cues for form correction, but was unable to complete  Pt would benefit from skilled outpatient PT to address pain, strength, cardiovascular endurance, and balance in order to maximize his level of function  Plan: Continue per plan of care         Manuals 10/15 10-17  10-19  10/22  10/24  10/26           Man ham str B  30"x3  x  KAB                                                                                 Exercise Diary                         bike x5 min   7'  9'  10'  10'  10'            STS high/low  x10  2x10  x  2x10  22 4" 2x10  22 4" 2x10           Standing hip abd R/L  3x10  x 2x15  x  2x15  1# 3x10  1# 3x10           Standing hip ext R/L  3x10  x 2x15  x  x30  1# 3x10  1# 3x10            standing BL HR x30  x  x  x30  x  x30            mini squats   2x10  x  x  3x10  x  3x10           Standing marching  20x x x30 #1 x x30 #1        SLB R/L         15" x3  x(one hand on rail)  (one hand on rail 15"x3            bridges     20x  x  x20    x30            SLR flexion     2x5  x  2x10 L                step up      4' 2x10  4' 2x10 up with L down with R  6" 10  4" 10  6" 10  4" 10            clams      10  x20  x              side steps          7 ft 5 laps  7 ft 5 laps           hamstring curl R/L           1# 3x10                                                           Modalities                                                                                             X= performed

## 2018-10-29 ENCOUNTER — OFFICE VISIT (OUTPATIENT)
Dept: PHYSICAL THERAPY | Facility: REHABILITATION | Age: 63
End: 2018-10-29
Payer: OTHER MISCELLANEOUS

## 2018-10-29 DIAGNOSIS — Z47.89 AFTERCARE FOLLOWING SURGERY OF THE MUSCULOSKELETAL SYSTEM: Primary | ICD-10-CM

## 2018-10-29 DIAGNOSIS — Z48.89 AFTERCARE FOLLOWING SURGERY: ICD-10-CM
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
## 2018-10-29 PROCEDURE — 97112 NEUROMUSCULAR REEDUCATION: CPT | Performed by: PHYSICAL THERAPIST

## 2018-10-29 PROCEDURE — 97110 THERAPEUTIC EXERCISES: CPT | Performed by: PHYSICAL THERAPIST

## 2018-10-29 NOTE — PROGRESS NOTES
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  He reports that things are healing but are slow  PRECAUTIONS: DM, osteop  Objective: See treatment diary below    Assessment: Pt is 7 weeks post surgical   He is doing well  Still need the cane for stability and support  Walks with a limp without the cane  Plan: Continue per plan of care         Manuals 10/15 10-17  10-19  10/22  10/24  10/26  10/29         Man ham str B  30"x3  x  KAB                                                                                 Exercise Diary                         bike x5 min   7'  9'  10'  10'  10'  x          STS high/low  x10  2x10  x  2x10  22 4" 2x10  22 4" 2x10  x         Standing hip abd R/L  3x10  x 2x15  x  2x15  1# 3x10  1# 3x10  3#  2x15         Standing hip ext R/L  3x10  x 2x15  x  x30  1# 3x10  1# 3x10  3#  2x15          standing BL HR x30  x  x  x30  x  x30  3#  2x15          mini squats   2x10  x  x  3x10  x  3x10  x         Standing marching  20x x x30 #1 x x30 #1 3#  2x15       SLB R/L         15" x3  x(one hand on rail)  (one hand on rail 15"x3  x          bridges     20x  x  x20    x30  x          SLR flexion     2x5  x  2x10 L                step up      4' 2x10  4' 2x10 up with L down with R  6" 10  4" 10  6" 10  4" 10  x          clams      10  x20  x    x          side steps          7 ft 5 laps  7 ft 5 laps  x         hamstring curl R/L           1# 3x10  3#  2x15                                                         Modalities                                                                                             X= performed

## 2018-10-31 ENCOUNTER — OFFICE VISIT (OUTPATIENT)
Dept: PHYSICAL THERAPY | Facility: REHABILITATION | Age: 63
End: 2018-10-31
Payer: OTHER MISCELLANEOUS

## 2018-10-31 DIAGNOSIS — Z48.89 AFTERCARE FOLLOWING SURGERY: ICD-10-CM
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
## 2018-10-31 DIAGNOSIS — Z47.89 AFTERCARE FOLLOWING SURGERY OF THE MUSCULOSKELETAL SYSTEM: Primary | ICD-10-CM

## 2018-10-31 PROCEDURE — 97110 THERAPEUTIC EXERCISES: CPT | Performed by: PHYSICAL THERAPIST

## 2018-10-31 PROCEDURE — 97112 NEUROMUSCULAR REEDUCATION: CPT | Performed by: PHYSICAL THERAPIST

## 2018-10-31 NOTE — PROGRESS NOTES
Daily Note     Today's date: 10/31/2018  Patient name: Alecia Alvarado  : 1955  MRN: [id number]  Referring provider: Silas Ramirez MD  Dx:   Encounter Diagnosis     ICD-10-CM    1  Aftercare following surgery of the musculoskeletal system Z47 89    2  Accident due to mechanical fall without injury, subsequent encounter W19  XXXD    3  Aftercare following surgery Z48 89              Subjective:  Minor pain with exercises  Overall feeling about the same  PRECAUTIONS: DM, osteop  Objective: See treatment diary below    Assessment: Clam shells were very challenging and slightly painful  Gait remains antalgic  Plan: Continue per plan of care         Manuals 10/15 10-17  10-19  10/22  10/24  10/26  10/29  10/31       Man ham str B  30"x3  x  KAB                                                                                 Exercise Diary                         bike x5 min   7'  9'  10'  10'  10'  x  x        STS high/low  x10  2x10  x  2x10  22 4" 2x10  22 4" 2x10  x  21"       Standing hip abd R/L  3x10  x 2x15  x  2x15  1# 3x10  1# 3x10  3#  2x15  x       Standing hip ext R/L  3x10  x 2x15  x  x30  1# 3x10  1# 3x10  3#  2x15  x        standing BL HR x30  x  x  x30  x  x30  3#  2x15  x        mini squats   2x10  x  x  3x10  x  3x10  x  x       Standing marching  20x x x30 #1 x x30 #1 3#  2x15 x      SLB R/L         15" x3  x(one hand on rail)  (one hand on rail 15"x3  x  x        bridges     20x  x  x20    x30  x  x        SLR flexion     2x5  x  2x10 L        3# 2x15        step up      4' 2x10  4' 2x10 up with L down with R  6" 10  4" 10  6" 10  4" 10  x  6" 2x10        clams      10  x20  x    x  red TB 10        side steps          7 ft 5 laps  7 ft 5 laps  x  red TB 5 laps       hamstring curl R/L           1# 3x10  3#  2x15  x                                                       Modalities                                                                                             X= performed

## 2018-11-02 ENCOUNTER — OFFICE VISIT (OUTPATIENT)
Dept: PHYSICAL THERAPY | Facility: REHABILITATION | Age: 63
End: 2018-11-02
Payer: OTHER MISCELLANEOUS
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
## 2018-11-02 DIAGNOSIS — Z47.89 AFTERCARE FOLLOWING SURGERY OF THE MUSCULOSKELETAL SYSTEM: Primary | ICD-10-CM

## 2018-11-02 DIAGNOSIS — Z48.89 AFTERCARE FOLLOWING SURGERY: ICD-10-CM

## 2018-11-02 PROCEDURE — 97112 NEUROMUSCULAR REEDUCATION: CPT

## 2018-11-02 PROCEDURE — 97140 MANUAL THERAPY 1/> REGIONS: CPT

## 2018-11-02 PROCEDURE — 97110 THERAPEUTIC EXERCISES: CPT

## 2018-11-02 NOTE — PROGRESS NOTES
Daily Note     Today's date: 2018  Patient name: Radha Ramírez  : 1955  MRN: [id number]  Referring provider: Layla Olivera MD  Dx:   Encounter Diagnosis     ICD-10-CM    1  Aftercare following surgery of the musculoskeletal system Z47 89    2  Accident due to mechanical fall without injury, subsequent encounter W19  XXXD    3  Aftercare following surgery Z48 89              Subjective:  Post ex soreness which is tolerable    PRECAUTIONS: DM, osteop  Objective: See treatment diary below    Assessment:   Challenged with ex  Plan: Continue per plan of care         Manuals 10/15 10-17  10-19  10/22  10/24  10/26  10/29  10/31  11-2     Man ham str B  30"x3  x  KAB          x                                                                       Exercise Diary                         bike x5 min   7'  9'  10'  10'  10'  x  x  x      STS high/low  x10  2x10  x  2x10  22 4" 2x10  22 4" 2x10  x  21"  low mat      Standing hip abd R/L  3x10  x 2x15  x  2x15  1# 3x10  1# 3x10  3#  2x15  x  x     Standing hip ext R/L  3x10  x 2x15  x  x30  1# 3x10  1# 3x10  3#  2x15  x  x      standing BL HR x30  x  x  x30  x  x30  3#  2x15  x  x      mini squats   2x10  x  x  3x10  x  3x10  x  x  x     Standing marching  20x x x30 #1 x x30 #1 3#  2x15 x x     SLB R/L         15" x3  x(one hand on rail)  (one hand on rail 15"x3  x  x  x      bridges     20x  x  x20    x30  x  x  x      SLR flexion     2x5  x  2x10 L        3# 2x15  x      step up      4' 2x10  4' 2x10 up with L down with R  6" 10  4" 10  6" 10  4" 10  x  6" 2x10  x      clams      10  x20  x    x  red TB 10  x      side steps          7 ft 5 laps  7 ft 5 laps  x  red TB 5 laps  x     hamstring curl R/L           1# 3x10  3#  2x15  x  x      LAQ                  5# 30x                             Modalities                                                                                             X= performed

## 2018-11-05 ENCOUNTER — OFFICE VISIT (OUTPATIENT)
Dept: PHYSICAL THERAPY | Facility: REHABILITATION | Age: 63
End: 2018-11-05
Payer: OTHER MISCELLANEOUS

## 2018-11-05 DIAGNOSIS — Z47.89 AFTERCARE FOLLOWING SURGERY OF THE MUSCULOSKELETAL SYSTEM: Primary | ICD-10-CM
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
## 2018-11-05 PROCEDURE — 97110 THERAPEUTIC EXERCISES: CPT | Performed by: PHYSICAL THERAPIST

## 2018-11-05 PROCEDURE — 97112 NEUROMUSCULAR REEDUCATION: CPT | Performed by: PHYSICAL THERAPIST

## 2018-11-05 NOTE — PROGRESS NOTES
Daily Note     Today's date: 2018  Patient name: Artjarrell Byrd  : 1955  MRN: [id number]  Referring provider: Onel Elliott MD  Dx:   Encounter Diagnosis     ICD-10-CM    1  Aftercare following surgery of the musculoskeletal system Z47 89    2  Accident due to mechanical fall without injury, subsequent encounter W19  XXXD              Subjective:  Pt  Reporting that he gets muscle soreness after therapy  Been trying to walk a little without a cane at home  Objective: See treatment diary below    Assessment:   Pt  Moving with more ease since I worked with him last   Good challenge with prescribed exercises  Focused on slowing down exercises and focused on technique  Needed more rests  Plan: Continue per plan of care       PRECAUTIONS: DM, osteop    Manuals 10/15 10-17  10-19  10/22  10/24  10/26  10/29  10/31  11-   Man ham str B  30"x3  x  KAB          x                                                                       Exercise Diary                         bike x5 min   7'  9'  10'  10'  10'  x  x  x  x    STS high/low  x10  2x10  x  2x10  22 4" 2x10  22 4" 2x10  x  80"  low mat   20x   Standing hip abd R/L  3x10  x 2x15  x  2x15  1# 3x10  1# 3x10  3#  2x15  x  x  x   Standing hip ext R/L  3x10  x 2x15  x  x30  1# 3x10  1# 3x10  3#  2x15  x  x  x    standing BL HR x30  x  x  x30  x  x30  3#  2x15  x  x  x    mini squats   2x10  x  x  3x10  x  3x10  x  x  x  x   Standing marching  20x x x30 #1 x x30 #1 3#  2x15 x x x    SLB R/L         15" x3  x(one hand on rail)  (one hand on rail 15"x3  x  x  x  x    bridges     20x  x  x20    x30  x  x  x  x    SLR flexion     2x5  x  2x10 L        3# 2x15  x  x    step up      4' 2x10  4' 2x10 up with L down with R  6" 10  4" 10  6" 10  4" 10  x  6" 2x10  x  8"  2x15    clams      10  x20  x    x  red TB 10  x  x    side steps          7 ft 5 laps  7 ft 5 laps  x  red TB 5 laps  x  x   hamstring curl R/L           1# 3x10  3#  2x15  x  x  x  LAQ                  5# 30x  x                           Modalities                                                                                             X= performed

## 2018-11-06 ENCOUNTER — OFFICE VISIT (OUTPATIENT)
Dept: INTERNAL MEDICINE CLINIC | Facility: CLINIC | Age: 63
End: 2018-11-06
Payer: COMMERCIAL

## 2018-11-06 VITALS
BODY MASS INDEX: 20.79 KG/M2 | HEART RATE: 83 BPM | HEIGHT: 70 IN | OXYGEN SATURATION: 96 % | TEMPERATURE: 98.4 F | WEIGHT: 145.2 LBS | SYSTOLIC BLOOD PRESSURE: 128 MMHG | DIASTOLIC BLOOD PRESSURE: 74 MMHG

## 2018-11-06 DIAGNOSIS — Z12.11 SCREENING FOR COLON CANCER: Primary | ICD-10-CM

## 2018-11-06 DIAGNOSIS — R64 CACHECTIC (HCC): ICD-10-CM

## 2018-11-06 DIAGNOSIS — I10 ESSENTIAL HYPERTENSION: ICD-10-CM

## 2018-11-06 DIAGNOSIS — E11.65 TYPE 2 DIABETES MELLITUS WITH HYPERGLYCEMIA, WITHOUT LONG-TERM CURRENT USE OF INSULIN (HCC): ICD-10-CM

## 2018-11-06 PROCEDURE — 99214 OFFICE O/P EST MOD 30 MIN: CPT | Performed by: NURSE PRACTITIONER

## 2018-11-06 RX ORDER — CHLORAL HYDRATE 500 MG
1000 CAPSULE ORAL DAILY
COMMUNITY

## 2018-11-06 NOTE — PROGRESS NOTES
Diabetic Foot Exam    Right Foot/Ankle   Right Foot Inspection  Skin Exam: dry skin, callus and callus no warmth, no erythema, no maceration, no abnormal color, no pre-ulcer and no ulcer                          Toe Exam: no swelling, no tenderness, erythema and  no right toe deformity    Vascular  Capillary refills: < 3 seconds  The right DP pulse is 2+  The right PT pulse is 2+  Right Toe  - Comprehensive Exam  Ecchymosis: none  Arch: normal  Hammertoes: absent  Claw Toes: absent  Swelling: none   Tenderness: none         Left Foot/Ankle  Left Foot Inspection  Skin Exam: dry skin and callusno warmth, no erythema, no maceration, normal color, no pre-ulcer and no ulcer                         Toe Exam: no swelling, no tenderness, no erythema and no left toe deformity                     Vascular  Capillary refills: < 3 seconds  The left DP pulse is 1+  The left PT pulse is 1+     Left Toe  - Comprehensive Exam  Ecchymosis: none  Arch: normal  Hammertoes: absent  Claw toes: absent  Swelling: none   Tenderness: none

## 2018-11-06 NOTE — PROGRESS NOTES
MEDICAL STUDENT  Inpatient Progress Note for TRAINING ONLY  Not Part of Legal Medical Record       Progress Note - April Cargo 70 y o  male MRN: [id number]    Unit/Bed#:  Encounter: [id number]      Assessment:  ***    Plan:  ***    Subjective:   ***    Objective:     Vitals: Blood pressure 128/74, pulse 83, temperature 98 4 °F (36 9 °C), temperature source Oral, height 5' 10" (1 778 m), weight 65 9 kg (145 lb 3 2 oz), SpO2 96 %  ,Body mass index is 20 83 kg/m²      @[facility]@    Physical Exam: {Exam, Complete:12743}     Invasive Devices          No matching active lines, drains, or airways          Lab, Imaging and other studies: {Results Review Statement:27006}  VTE Pharmacologic Prophylaxis: {Pharmacologic VTE Prophylaxis:351282024}  VTE Mechanical Prophylaxis: {Mechanical VTE Prophylaxis:85625}

## 2018-11-06 NOTE — PROGRESS NOTES
Assessment/Plan:    Type 2 diabetes mellitus (HCC)  Lab Results   Component Value Date    HGBA1C 12 4 (H) 09/13/2018       No results for input(s): POCGLU in the last 72 hours  Blood Sugar Average: Last 72 hrs:    Patient was not taking his metformin as prescribed, patient was only taking metformin 500 in the morning, and metformin 750 at night, I did advise patient to start taking metformin a 1000 in the morning and a 1000 at night with meals  Patient discontinued his glipizide as he felt that he lost his appetite with this medication  Will add on Januvia 100 milligram tablet daily  Will have patient get a follow-up hemoglobin A1c in 3 months  Discussed in length with patient that he may be losing weight due to having uncontrolled diabetes  Patient is to continue to work on diet  Limit sugars and carbohydrate intake  Avoid soda, juice, sweets, cookies, desserts, pasta, bread    Eat more whole grains, exercised 30 min of cardio at least 3 times a week  Also recommended daily foot exams to check for sores, and recommended yearly eye exams  Essential hypertension    Patient's blood pressure within normal limits at 124/78, will continue to monitor  Patient currently not on medication  Cachectic [facility])   Patient very thin and may be related to his diabetes  Patient does report low appetite  May need to consider at follow-up appointment appetite stimulator, or possible chest x-ray to rule out lung cancer as patient was a previous smoker  Patients TSH within normal limits  Will get fasting blood-work prior to follow-up with patient in 3 months  Diagnoses and all orders for this visit:    Screening for colon cancer  -     Ambulatory referral to Gastroenterology; Future    Type 2 diabetes mellitus with hyperglycemia, without long-term current use of insulin (HCC)  -     metFORMIN (GLUCOPHAGE) 1000 MG tablet;  Take 1 tablet (1,000 mg total) by mouth 2 (two) times a day with meals  - Hemoglobin A1C  -     sitaGLIPtin (JANUVIA) 100 mg tablet; Take 1 tablet (100 mg total) by mouth daily    Cachectic (HCC)  -     Comprehensive metabolic panel; Future  -     TSH, 3rd generation with Free T4 reflex    Essential hypertension  -     Lipid panel    Other orders  -     Omega-3 Fatty Acids (FISH OIL) 1,000 mg; Take 1,000 mg by mouth daily          Subjective:      Patient ID: Adam Agrawal is a 70 y o  male  Patient presents today for follow-up on diabetes type 2  Patient did follow with Endocrinology on 10/01/2018, and has follow-up with them on 02/05/2018  Patient had hip fracture  With surgery on 09/12/2018  Patient continues to have physical therapy on Monday Wednesdays and Fridays  Patient currently states he lives at home alone  Patient currently manages his medications at home, he did bring a list of accurate medications to office visit today, patient does seem forgetful  Male screenings:  Colonoscopy- refuses to get one done  Hep C-will get with next blood-work  Eye Doctor- wears glasses, has not been  Dentist-has not gone  Podiatrist-does not go    Flu vaccination- does not get flu vaccination    Tobacco abuse- Quit in 84      Diabetes   He presents for his initial diabetic visit  He has type 2 diabetes mellitus  No MedicAlert identification noted  His disease course has been fluctuating  There are no hypoglycemic associated symptoms  Pertinent negatives for hypoglycemia include no dizziness, headaches or nervousness/anxiousness  Associated symptoms include weight loss  Pertinent negatives for diabetes include no chest pain, no polydipsia, no polyphagia, no polyuria and no weakness  There are no hypoglycemic complications  Symptoms are worsening  Risk factors for coronary artery disease include diabetes mellitus, dyslipidemia and male sex  Current diabetic treatments:  patient currently taking metformin 500 mg twice a day, and glipizide 10 mg   Compliance with diabetes treatment: unsure of treatment compliance as patient seems forgetful however he did write his medications down prior to his office visit today  Weight trend:  patient is very thin  He rarely participates in exercise  (  Patient has been checking his blood sugars twice a day, at breakfast time his sugars range from 145-176, and his dinner blood sugars range from 154 to 134) He does not see a podiatrist Eye exam is not current  The following portions of the patient's history were reviewed and updated as appropriate: allergies, current medications, past family history, past medical history, past social history, past surgical history and problem list     Review of Systems   Constitutional: Positive for weight loss  Negative for activity change, appetite change, chills, diaphoresis and fever  HENT: Negative for congestion, ear discharge, ear pain, postnasal drip, rhinorrhea, sinus pain, sinus pressure and sore throat  Eyes: Negative for pain, discharge, itching and visual disturbance  Respiratory: Negative for cough, chest tightness, shortness of breath and wheezing  Cardiovascular: Negative for chest pain, palpitations and leg swelling  Gastrointestinal: Negative for abdominal pain, constipation, diarrhea, nausea and vomiting  Endocrine: Negative for polydipsia, polyphagia and polyuria  Genitourinary: Negative for difficulty urinating, dysuria and urgency  Musculoskeletal: Negative for arthralgias, back pain and neck pain  Skin: Negative for rash and wound  Neurological: Negative for dizziness, weakness, numbness and headaches  Psychiatric/Behavioral: The patient is not nervous/anxious            Past Medical History:   Diagnosis Date    Diabetes mellitus ([facility] [address] )     H/O nicotine dependence     Hypertension     Prediabetes          Current Outpatient Prescriptions:     ACCU-CHEK FASTCLIX LANCETS MISC, by Does not apply route 2 (two) times a day, Disp: 200 each, Rfl: 1    Acetaminophen (TYLENOL EXTRA STRENGTH PO), As needed, Disp: , Rfl:     Blood Glucose Monitoring Suppl (ACCU-CHEK CHELY PLUS) w/Device KIT, by Does not apply route 2 (two) times a day, Disp: 1 kit, Rfl: 0    calcium carbonate (OYSTER SHELL,OSCAL) 500 mg, Take 1 tablet by mouth daily with breakfast, Disp: 30 tablet, Rfl: 0    cholecalciferol 2000 units TABS, Take 1 tablet (2,000 Units total) by mouth daily, Disp: 30 tablet, Rfl: 0    glipiZIDE (GLUCOTROL) 10 mg tablet, Take 10 mg by mouth daily, Disp: , Rfl:     glucose blood (ACCU-CHEK CHELY PLUS) test strip, 1 each by Other route 2 (two) times a day Use as instructed, Disp: 200 each, Rfl: 1    magnesium hydroxide (MILK OF MAGNESIA) 400 mg/5 mL oral suspension, Take by mouth daily as needed for constipation, Disp: , Rfl:     metFORMIN (GLUCOPHAGE) 1000 MG tablet, Take 1 tablet (1,000 mg total) by mouth 2 (two) times a day with meals, Disp: 30 tablet, Rfl: 0    Omega-3 Fatty Acids (FISH OIL) 1,000 mg, Take 1,000 mg by mouth daily, Disp: , Rfl:     senna-docusate sodium (SENOKOT-S) 8 6-50 mg per tablet, Take 1 tablet by mouth as needed, Disp: , Rfl:     atorvastatin (LIPITOR) 40 mg tablet, Take 1 tablet (40 mg total) by mouth daily with dinner (Patient not taking: Reported on 11/6/2018 ), Disp: 30 tablet, Rfl: 0    sitaGLIPtin (JANUVIA) 100 mg tablet, Take 1 tablet (100 mg total) by mouth daily, Disp: 30 tablet, Rfl: 1    No Known Allergies    Social History   Past Surgical History:   Procedure Laterality Date    HIP SURGERY      NOSE SURGERY      ME OPEN RX FEMUR FX+INTRAMED ADDISON Left 9/13/2018    Procedure: INSERTION NAIL IM FEMUR ANTEGRADE (TROCHANTERIC);   Surgeon: Tanner Branham MD;  Location: [facility];  Service: Orthopedics     Family History   Problem Relation Age of Onset    Cancer Mother     No Known Problems Father     No Known Problems Sister     Lung cancer Brother     Lymphoma Family         acute       Objective:  /74 (BP Location: Left arm, Patient Position: Sitting, Cuff Size: Adult)   Pulse 83   Temp 98 4 °F (36 9 °C) (Oral)   Ht 5' 10" (1 778 m)   Wt 65 9 kg (145 lb 3 2 oz)   SpO2 96%   BMI 20 83 kg/m²     Recent Results (from the past 1344 hour(s))   CBC and differential    Collection Time: 09/12/18  9:30 AM   Result Value Ref Range    WBC 4 17 (L) 4 31 - 10 16 Thousand/uL    RBC 4 67 3 88 - 5 62 Million/uL    Hemoglobin 14 1 12 0 - 17 0 g/dL    Hematocrit 39 6 36 5 - 49 3 %    MCV 85 82 - 98 fL    MCH 30 2 26 8 - 34 3 pg    MCHC 35 6 31 4 - 37 4 g/dL    RDW 11 9 11 6 - 15 1 %    MPV 9 8 8 9 - 12 7 fL    Platelets 391 758 - 503 Thousands/uL    nRBC 0 /100 WBCs    Neutrophils Relative 62 43 - 75 %    Immat GRANS % 1 0 - 2 %    Lymphocytes Relative 29 14 - 44 %    Monocytes Relative 6 4 - 12 %    Eosinophils Relative 1 0 - 6 %    Basophils Relative 1 0 - 1 %    Neutrophils Absolute 2 64 1 85 - 7 62 Thousands/µL    Immature Grans Absolute 0 03 0 00 - 0 20 Thousand/uL    Lymphocytes Absolute 1 20 0 60 - 4 47 Thousands/µL    Monocytes Absolute 0 24 0 17 - 1 22 Thousand/µL    Eosinophils Absolute 0 03 0 00 - 0 61 Thousand/µL    Basophils Absolute 0 03 0 00 - 0 10 Thousands/µL   Basic metabolic panel    Collection Time: 09/12/18  9:30 AM   Result Value Ref Range    Sodium 130 (L) 136 - 145 mmol/L    Potassium 4 6 3 5 - 5 3 mmol/L    Chloride 96 (L) 100 - 108 mmol/L    CO2 23 21 - 32 mmol/L    ANION GAP 11 4 - 13 mmol/L    BUN 19 5 - 25 mg/dL    Creatinine 1 01 0 60 - 1 30 mg/dL    Glucose 479 (H) 65 - 140 mg/dL    Calcium 8 8 8 3 - 10 1 mg/dL    eGFR 78 ml/min/1 73sq m   APTT    Collection Time: 09/12/18 10:49 AM   Result Value Ref Range    PTT 26 24 - 36 seconds   Protime-INR    Collection Time: 09/12/18 10:49 AM   Result Value Ref Range    Protime 13 6 11 8 - 14 2 seconds    INR 1 03 0 86 - 1 17   Type and screen    Collection Time: 09/12/18 10:49 AM   Result Value Ref Range    ABO Grouping A     Rh Factor Positive     Antibody Screen Negative Specimen Expiration Date [id number]    ECG 12 lead    Collection Time: 09/12/18 11:03 AM   Result Value Ref Range    Ventricular Rate 82 BPM    Atrial Rate 82 BPM    OH Interval 174 ms    QRSD Interval 74 ms    QT Interval 372 ms    QTC Interval 434 ms    P Axis 66 degrees    QRS Axis 69 degrees    T Wave Axis 76 degrees   Fingerstick Glucose (POCT)    Collection Time: 09/12/18  2:58 PM   Result Value Ref Range    POC Glucose 455 (H) 65 - 140 mg/dl   Fingerstick Glucose (POCT)    Collection Time: 09/12/18  5:59 PM   Result Value Ref Range    POC Glucose >500 (HH) 65 - 140 mg/dl   Fingerstick Glucose (POCT)    Collection Time: 09/12/18  8:36 PM   Result Value Ref Range    POC Glucose 280 (H) 65 - 140 mg/dl   Microalbumin / creatinine urine ratio    Collection Time: 09/12/18 10:27 PM   Result Value Ref Range    Creatinine, Ur 84 0 mg/dL    Microalbum  ,U,Random 7 6 0 0 - 20 0 mg/L    Microalb Creat Ratio 9 0 - 30 mg/g creatinine   Hemoglobin A1C    Collection Time: 09/13/18  5:03 AM   Result Value Ref Range    Hemoglobin A1C 12 4 (H) 4 2 - 6 3 %     mg/dl   TSH, 3rd generation    Collection Time: 09/13/18  5:03 AM   Result Value Ref Range    TSH 3RD GENERATON 2 880 0 358 - 3 740 uIU/mL   CBC and differential    Collection Time: 09/13/18  5:03 AM   Result Value Ref Range    WBC 7 10 4 31 - 10 16 Thousand/uL    RBC 4 10 3 88 - 5 62 Million/uL    Hemoglobin 12 3 12 0 - 17 0 g/dL    Hematocrit 35 1 (L) 36 5 - 49 3 %    MCV 86 82 - 98 fL    MCH 30 0 26 8 - 34 3 pg    MCHC 35 0 31 4 - 37 4 g/dL    RDW 12 2 11 6 - 15 1 %    MPV 10 3 8 9 - 12 7 fL    Platelets 092 (L) 005 - 390 Thousands/uL    nRBC 0 /100 WBCs    Neutrophils Relative 69 43 - 75 %    Immat GRANS % 0 0 - 2 %    Lymphocytes Relative 22 14 - 44 %    Monocytes Relative 8 4 - 12 %    Eosinophils Relative 1 0 - 6 %    Basophils Relative 0 0 - 1 %    Neutrophils Absolute 4 88 1 85 - 7 62 Thousands/µL    Immature Grans Absolute 0 03 0 00 - 0 20 Thousand/uL Lymphocytes Absolute 1 57 0 60 - 4 47 Thousands/µL    Monocytes Absolute 0 55 0 17 - 1 22 Thousand/µL    Eosinophils Absolute 0 04 0 00 - 0 61 Thousand/µL    Basophils Absolute 0 03 0 00 - 0 10 Thousands/µL   Protime-INR    Collection Time: 09/13/18  5:03 AM   Result Value Ref Range    Protime 14 5 (H) 11 8 - 14 2 seconds    INR 1 12 0 86 - 1 17   APTT    Collection Time: 09/13/18  5:03 AM   Result Value Ref Range    PTT 28 24 - 36 seconds   Comprehensive metabolic panel    Collection Time: 09/13/18  5:03 AM   Result Value Ref Range    Sodium 133 (L) 136 - 145 mmol/L    Potassium 3 9 3 5 - 5 3 mmol/L    Chloride 100 100 - 108 mmol/L    CO2 25 21 - 32 mmol/L    ANION GAP 8 4 - 13 mmol/L    BUN 19 5 - 25 mg/dL    Creatinine 0 78 0 60 - 1 30 mg/dL    Glucose 241 (H) 65 - 140 mg/dL    Calcium 8 1 (L) 8 3 - 10 1 mg/dL    AST 12 5 - 45 U/L    ALT 23 12 - 78 U/L    Alkaline Phosphatase 34 (L) 46 - 116 U/L    Total Protein 6 1 (L) 6 4 - 8 2 g/dL    Albumin 3 1 (L) 3 5 - 5 0 g/dL    Total Bilirubin 0 87 0 20 - 1 00 mg/dL    eGFR 95 ml/min/1 73sq m   Lipid Panel with Direct LDL reflex    Collection Time: 09/13/18  5:03 AM   Result Value Ref Range    Cholesterol 156 50 - 200 mg/dL    Triglycerides 115 <=150 mg/dL    HDL, Direct 52 40 - 60 mg/dL    LDL Calculated 81 0 - 100 mg/dL   Fingerstick Glucose (POCT)    Collection Time: 09/13/18  7:43 AM   Result Value Ref Range    POC Glucose 242 (H) 65 - 140 mg/dl   Fingerstick Glucose (POCT)    Collection Time: 09/13/18 10:57 AM   Result Value Ref Range    POC Glucose 239 (H) 65 - 140 mg/dl   Fingerstick Glucose (POCT)    Collection Time: 09/13/18 12:05 PM   Result Value Ref Range    POC Glucose 226 (H) 65 - 140 mg/dl   Fingerstick Glucose (POCT)    Collection Time: 09/13/18  5:01 PM   Result Value Ref Range    POC Glucose 405 (H) 65 - 140 mg/dl   Fingerstick Glucose (POCT)    Collection Time: 09/13/18  8:49 PM   Result Value Ref Range    POC Glucose 346 (H) 65 - 140 mg/dl   Basic metabolic panel    Collection Time: 09/14/18  5:12 AM   Result Value Ref Range    Sodium 137 136 - 145 mmol/L    Potassium 4 0 3 5 - 5 3 mmol/L    Chloride 103 100 - 108 mmol/L    CO2 24 21 - 32 mmol/L    ANION GAP 10 4 - 13 mmol/L    BUN 18 5 - 25 mg/dL    Creatinine 0 75 0 60 - 1 30 mg/dL    Glucose 194 (H) 65 - 140 mg/dL    Calcium 8 7 8 3 - 10 1 mg/dL    eGFR 96 ml/min/1 73sq m   CBC and differential    Collection Time: 09/14/18  5:12 AM   Result Value Ref Range    WBC 8 22 4 31 - 10 16 Thousand/uL    RBC 3 80 (L) 3 88 - 5 62 Million/uL    Hemoglobin 11 6 (L) 12 0 - 17 0 g/dL    Hematocrit 32 6 (L) 36 5 - 49 3 %    MCV 86 82 - 98 fL    MCH 30 5 26 8 - 34 3 pg    MCHC 35 6 31 4 - 37 4 g/dL    RDW 12 3 11 6 - 15 1 %    MPV 10 2 8 9 - 12 7 fL    Platelets 770 382 - 161 Thousands/uL    nRBC 0 /100 WBCs    Neutrophils Relative 67 43 - 75 %    Immat GRANS % 0 0 - 2 %    Lymphocytes Relative 24 14 - 44 %    Monocytes Relative 8 4 - 12 %    Eosinophils Relative 1 0 - 6 %    Basophils Relative 0 0 - 1 %    Neutrophils Absolute 5 56 1 85 - 7 62 Thousands/µL    Immature Grans Absolute 0 02 0 00 - 0 20 Thousand/uL    Lymphocytes Absolute 1 93 0 60 - 4 47 Thousands/µL    Monocytes Absolute 0 64 0 17 - 1 22 Thousand/µL    Eosinophils Absolute 0 04 0 00 - 0 61 Thousand/µL    Basophils Absolute 0 03 0 00 - 0 10 Thousands/µL   PTH, intact    Collection Time: 09/14/18  5:12 AM   Result Value Ref Range    PTH 27 2 18 4 - 80 1 pg/mL   Protein electrophoresis, serum    Collection Time: 09/14/18  5:12 AM   Result Value Ref Range    A/G Ratio 1 53 1 10 - 1 80    Albumin Electrophoresis 60 4 52 0 - 65 0 %    Albumin CONC 3 26 (L) 3 50 - 5 00 g/dl    Alpha 1 5 3 (H) 2 5 - 5 0 %    ALPHA 1 CONC 0 29 0 10 - 0 40 g/dL    Alpha 2 13 4 (H) 7 0 - 13 0 %    ALPHA 2 CONC 0 72 0 40 - 1 20 g/dL    Beta-1 5 2 5 0 - 13 0 %    BETA 1 CONC 0 28 (L) 0 40 - 0 80 g/dL    Beta-2 4 4 2 0 - 8 0 %    BETA 2 CONC 0 24 0 20 - 0 50 g/dL    Gamma Globulin 11 3 (L) 12 0 - 22 0 %    GAMMA CONC 0 61 0 50 - 1 60 g/dL    SPEP Interpretation       The serum total protein and albumin are decreased  The serum protein electrophoresis shows a decreased beta-1 region  No monoclonal bands noted   Reviewed by: Soha Hensley MD  ([id number])  **Electronic Signature**    Total Protein 5 4 (L) 6 4 - 8 2 g/dL   TSH, 3rd generation    Collection Time: 09/14/18  5:12 AM   Result Value Ref Range    TSH 3RD GENERATON 2 000 0 358 - 3 740 uIU/mL   T4, free    Collection Time: 09/14/18  5:12 AM   Result Value Ref Range    Free T4 1 56 (H) 0 76 - 1 46 ng/dL   Testosterone, free, total    Collection Time: 09/14/18  5:12 AM   Result Value Ref Range    Testosterone, Free 1 2 (L) 6 6 - 18 1 pg/mL    TESTOSTERONE TOTAL 282 264 - 916 ng/dL   Vitamin D 25 hydroxy    Collection Time: 09/14/18  5:12 AM   Result Value Ref Range    Vit D, 25-Hydroxy 27 0 (L) 30 0 - 100 0 ng/mL   Fingerstick Glucose (POCT)    Collection Time: 09/14/18  7:22 AM   Result Value Ref Range    POC Glucose 201 (H) 65 - 140 mg/dl   Fingerstick Glucose (POCT)    Collection Time: 09/14/18 11:26 AM   Result Value Ref Range    POC Glucose 201 (H) 65 - 140 mg/dl   Fingerstick Glucose (POCT)    Collection Time: 09/14/18  4:58 PM   Result Value Ref Range    POC Glucose 278 (H) 65 - 140 mg/dl   Fingerstick Glucose (POCT)    Collection Time: 09/14/18  9:03 PM   Result Value Ref Range    POC Glucose 161 (H) 65 - 140 mg/dl   Basic metabolic panel    Collection Time: 09/15/18  5:10 AM   Result Value Ref Range    Sodium 137 136 - 145 mmol/L    Potassium 3 8 3 5 - 5 3 mmol/L    Chloride 103 100 - 108 mmol/L    CO2 26 21 - 32 mmol/L    ANION GAP 8 4 - 13 mmol/L    BUN 17 5 - 25 mg/dL    Creatinine 0 74 0 60 - 1 30 mg/dL    Glucose 208 (H) 65 - 140 mg/dL    Calcium 8 7 8 3 - 10 1 mg/dL    eGFR 97 ml/min/1 73sq m   CBC and differential    Collection Time: 09/15/18  5:10 AM   Result Value Ref Range    WBC 6 16 4 31 - 10 16 Thousand/uL    RBC 3 42 (L) 3 88 - 5 62 Million/uL    Hemoglobin 10 4 (L) 12 0 - 17 0 g/dL    Hematocrit 29 6 (L) 36 5 - 49 3 %    MCV 87 82 - 98 fL    MCH 30 4 26 8 - 34 3 pg    MCHC 35 1 31 4 - 37 4 g/dL    RDW 12 2 11 6 - 15 1 %    MPV 10 0 8 9 - 12 7 fL    Platelets 122 (L) 423 - 390 Thousands/uL    nRBC 0 /100 WBCs    Neutrophils Relative 66 43 - 75 %    Immat GRANS % 0 0 - 2 %    Lymphocytes Relative 22 14 - 44 %    Monocytes Relative 9 4 - 12 %    Eosinophils Relative 2 0 - 6 %    Basophils Relative 1 0 - 1 %    Neutrophils Absolute 4 04 1 85 - 7 62 Thousands/µL    Immature Grans Absolute 0 02 0 00 - 0 20 Thousand/uL    Lymphocytes Absolute 1 37 0 60 - 4 47 Thousands/µL    Monocytes Absolute 0 58 0 17 - 1 22 Thousand/µL    Eosinophils Absolute 0 12 0 00 - 0 61 Thousand/µL    Basophils Absolute 0 03 0 00 - 0 10 Thousands/µL   Fingerstick Glucose (POCT)    Collection Time: 09/15/18  8:52 AM   Result Value Ref Range    POC Glucose 287 (H) 65 - 140 mg/dl   Fingerstick Glucose (POCT)    Collection Time: 09/15/18 12:40 PM   Result Value Ref Range    POC Glucose 300 (H) 65 - 140 mg/dl   Fingerstick Glucose (POCT)    Collection Time: 09/15/18  5:19 PM   Result Value Ref Range    POC Glucose 168 (H) 65 - 140 mg/dl   Fingerstick Glucose (POCT)    Collection Time: 09/15/18  9:18 PM   Result Value Ref Range    POC Glucose 91 65 - 140 mg/dl   Basic metabolic panel    Collection Time: 09/16/18  5:50 AM   Result Value Ref Range    Sodium 134 (L) 136 - 145 mmol/L    Potassium 3 8 3 5 - 5 3 mmol/L    Chloride 101 100 - 108 mmol/L    CO2 27 21 - 32 mmol/L    ANION GAP 6 4 - 13 mmol/L    BUN 14 5 - 25 mg/dL    Creatinine 0 64 0 60 - 1 30 mg/dL    Glucose 215 (H) 65 - 140 mg/dL    Calcium 8 5 8 3 - 10 1 mg/dL    eGFR 103 ml/min/1 73sq m   CBC and differential    Collection Time: 09/16/18  5:50 AM   Result Value Ref Range    WBC 5 72 4 31 - 10 16 Thousand/uL    RBC 3 29 (L) 3 88 - 5 62 Million/uL    Hemoglobin 10 1 (L) 12 0 - 17 0 g/dL    Hematocrit 28 4 (L) 36 5 - 49 3 %    MCV 86 82 - 98 fL    MCH 30 7 26 8 - 34 3 pg    MCHC 35 6 31 4 - 37 4 g/dL    RDW 11 9 11 6 - 15 1 %    MPV 10 1 8 9 - 12 7 fL    Platelets 716 (L) 897 - 390 Thousands/uL    nRBC 0 /100 WBCs    Neutrophils Relative 65 43 - 75 %    Immat GRANS % 0 0 - 2 %    Lymphocytes Relative 23 14 - 44 %    Monocytes Relative 9 4 - 12 %    Eosinophils Relative 2 0 - 6 %    Basophils Relative 1 0 - 1 %    Neutrophils Absolute 3 73 1 85 - 7 62 Thousands/µL    Immature Grans Absolute 0 02 0 00 - 0 20 Thousand/uL    Lymphocytes Absolute 1 30 0 60 - 4 47 Thousands/µL    Monocytes Absolute 0 54 0 17 - 1 22 Thousand/µL    Eosinophils Absolute 0 10 0 00 - 0 61 Thousand/µL    Basophils Absolute 0 03 0 00 - 0 10 Thousands/µL   Fingerstick Glucose (POCT)    Collection Time: 09/16/18  8:47 AM   Result Value Ref Range    POC Glucose 246 (H) 65 - 140 mg/dl   Fingerstick Glucose (POCT)    Collection Time: 09/16/18 11:34 AM   Result Value Ref Range    POC Glucose 331 (H) 65 - 140 mg/dl   Fingerstick Glucose (POCT)    Collection Time: 09/16/18  5:07 PM   Result Value Ref Range    POC Glucose 123 65 - 140 mg/dl   Fingerstick Glucose (POCT)    Collection Time: 09/16/18  8:49 PM   Result Value Ref Range    POC Glucose 89 65 - 140 mg/dl   Comprehensive metabolic panel    Collection Time: 09/17/18  5:18 AM   Result Value Ref Range    Sodium 135 (L) 136 - 145 mmol/L    Potassium 3 8 3 5 - 5 3 mmol/L    Chloride 101 100 - 108 mmol/L    CO2 28 21 - 32 mmol/L    ANION GAP 6 4 - 13 mmol/L    BUN 13 5 - 25 mg/dL    Creatinine 0 62 0 60 - 1 30 mg/dL    Glucose 119 65 - 140 mg/dL    Calcium 8 6 8 3 - 10 1 mg/dL    AST 22 5 - 45 U/L    ALT 23 12 - 78 U/L    Alkaline Phosphatase 45 (L) 46 - 116 U/L    Total Protein 5 9 (L) 6 4 - 8 2 g/dL    Albumin 2 4 (L) 3 5 - 5 0 g/dL    Total Bilirubin 0 87 0 20 - 1 00 mg/dL    eGFR 104 ml/min/1 73sq m   Sedimentation rate, automated    Collection Time: 09/17/18  5:18 AM   Result Value Ref Range    Sed Rate 85 (H) 0 - 10 mm/hour   Basic metabolic panel    Collection Time: 09/17/18  5:18 AM   Result Value Ref Range    Sodium 137 136 - 145 mmol/L    Potassium 3 8 3 5 - 5 3 mmol/L    Chloride 102 100 - 108 mmol/L    CO2 28 21 - 32 mmol/L    ANION GAP 7 4 - 13 mmol/L    BUN 13 5 - 25 mg/dL    Creatinine 0 65 0 60 - 1 30 mg/dL    Glucose 124 65 - 140 mg/dL    Calcium 8 7 8 3 - 10 1 mg/dL    eGFR 102 ml/min/1 73sq m   T4, free    Collection Time: 09/17/18  5:18 AM   Result Value Ref Range    Free T4 1 39 0 76 - 1 46 ng/dL   Fingerstick Glucose (POCT)    Collection Time: 09/17/18  7:55 AM   Result Value Ref Range    POC Glucose 166 (H) 65 - 140 mg/dl   Fingerstick Glucose (POCT)    Collection Time: 09/17/18 12:24 PM   Result Value Ref Range    POC Glucose 267 (H) 65 - 140 mg/dl   Fingerstick Glucose (POCT)    Collection Time: 09/17/18  5:30 PM   Result Value Ref Range    POC Glucose 173 (H) 65 - 140 mg/dl            Physical Exam   Constitutional: He is oriented to person, place, and time  He appears well-developed and well-nourished  No distress  Cachectic   HENT:   Head: Normocephalic and atraumatic  Right Ear: External ear normal    Left Ear: External ear normal    Nose: Nose normal    Mouth/Throat: Oropharynx is clear and moist  No oropharyngeal exudate  Eyes: Pupils are equal, round, and reactive to light  Conjunctivae and EOM are normal  Right eye exhibits no discharge  Left eye exhibits no discharge  Neck: Normal range of motion  Neck supple  No thyromegaly present  Cardiovascular: Normal rate, regular rhythm, normal heart sounds and intact distal pulses  Exam reveals no gallop and no friction rub  No murmur heard  Pulmonary/Chest: Effort normal and breath sounds normal  No stridor  No respiratory distress  He has no wheezes  He has no rales  Abdominal: Soft  Bowel sounds are normal  He exhibits no distension  There is no tenderness  Lymphadenopathy:     He has no cervical adenopathy     Neurological: He is alert and oriented to person, place, and time  forgetful at times   Skin: Skin is warm and dry  No rash noted  He is not diaphoretic  No erythema  Psychiatric: He has a normal mood and affect   His behavior is normal  Judgment and thought content normal

## 2018-11-07 ENCOUNTER — OFFICE VISIT (OUTPATIENT)
Dept: PHYSICAL THERAPY | Facility: REHABILITATION | Age: 63
End: 2018-11-07
Payer: OTHER MISCELLANEOUS

## 2018-11-07 ENCOUNTER — TELEPHONE (OUTPATIENT)
Dept: ENDOCRINOLOGY | Facility: CLINIC | Age: 63
End: 2018-11-07

## 2018-11-07 DIAGNOSIS — Z47.89 AFTERCARE FOLLOWING SURGERY OF THE MUSCULOSKELETAL SYSTEM: Primary | ICD-10-CM

## 2018-11-07 DIAGNOSIS — Z48.89 AFTERCARE FOLLOWING SURGERY: ICD-10-CM
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
## 2018-11-07 PROCEDURE — 97112 NEUROMUSCULAR REEDUCATION: CPT

## 2018-11-07 PROCEDURE — 97140 MANUAL THERAPY 1/> REGIONS: CPT

## 2018-11-07 PROCEDURE — 97110 THERAPEUTIC EXERCISES: CPT

## 2018-11-07 NOTE — TELEPHONE ENCOUNTER
Will prescribe Fosamax 70 mg per week  He is to call us in 2 to 3 weeks about tolerance to the medication

## 2018-11-07 NOTE — TELEPHONE ENCOUNTER
Called patient to inform him of medication prescribed for osteoporosis  Patient stated he does not want to take any medication for osteoporosis

## 2018-11-07 NOTE — PROGRESS NOTES
Daily Note     Today's date: 2018  Patient name: Rodriguez Martin  : 1955  MRN: [id number]  Referring provider: Dianna Geronimo MD  Dx:   Encounter Diagnosis     ICD-10-CM    1  Aftercare following surgery of the musculoskeletal system Z47 89    2  Accident due to mechanical fall without injury, subsequent encounter W19  XXXD    3  Aftercare following surgery Z48 89              Subjective:  Pt  Mild soreness    Objective: See treatment diary below    Assessment:   Some cuing for posture  Short rests  Overall better form today    Plan: Continue per plan of care       PRECAUTIONS: DM, osteop    Manuals 10/15 10-17  10-19  10/22  10/24  10/26  10/29  10/31  11-   Man ham str B  30"x3  x  KAB          x                                                                       Exercise Diary                         bike x5 min   7'  9'  10'  10'  10'  x  x  x  x    STS high/low  x10  2x10  x  2x10  22 4" 2x10  22 4" 2x10  x  47"  low mat   20x   Standing hip abd R/L  3x10  x 2x15  x  2x15  1# 3x10  1# 3x10  3#  2x15  x  x  x   Standing hip ext R/L  3x10  x 2x15  x  x30  1# 3x10  1# 3x10  3#  2x15  x  x  x    standing BL HR x30  x  x  x30  x  x30  3#  2x15  x  x  x    mini squats   2x10  x  x  3x10  x  3x10  x  x  x  x   Standing marching  20x x x30 #1 x x30 #1 3#  2x15 x x x    SLB R/L         15" x3  x(one hand on rail)  (one hand on rail 15"x3  x  x  x  x    bridges     20x  x  x20    x30  x  x  x  x    SLR flexion     2x5  x  2x10 L        3# 2x15  x  x    step up      4' 2x10  4' 2x10 up with L down with R  6" 10  4" 10  6" 10  4" 10  x  6" 2x10  x  8"  2x15    clams      10  x20  x    x  red TB 10  x  x    side steps          7 ft 5 laps  7 ft 5 laps  x  red TB 5 laps  x  x   hamstring curl R/L           1# 3x10  3#  2x15  x  x  x    LAQ                  5# 30x  x                           Modalities                                                                                           Precautions: DM, osteop    Daily Treatment Diary     Manual  11-7            Man ham str x                                                                    Exercise Diary              bike 10'            STS low table 20x            Stand: abd,ext 3# 2x15            march 3# 2x15            HR 1/2 foam 3# 2x15            minisquat 30x            Step up 8" 3# 2x15            Side step TB Red 3 laps            Ham curls 3# 2x15            SLB 15"x3                                                   LAQ 3# 2x15            bridge 30x            SLR flx 3# 30x            Clams TB Red 10x                                                       Modalities                                                           X= performed

## 2018-11-09 ENCOUNTER — OFFICE VISIT (OUTPATIENT)
Dept: PHYSICAL THERAPY | Facility: REHABILITATION | Age: 63
End: 2018-11-09
Payer: OTHER MISCELLANEOUS

## 2018-11-09 DIAGNOSIS — Z48.89 AFTERCARE FOLLOWING SURGERY: ICD-10-CM
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
## 2018-11-09 DIAGNOSIS — Z47.89 AFTERCARE FOLLOWING SURGERY OF THE MUSCULOSKELETAL SYSTEM: Primary | ICD-10-CM

## 2018-11-09 PROCEDURE — 97112 NEUROMUSCULAR REEDUCATION: CPT

## 2018-11-09 PROCEDURE — 97140 MANUAL THERAPY 1/> REGIONS: CPT

## 2018-11-09 PROCEDURE — 97110 THERAPEUTIC EXERCISES: CPT

## 2018-11-09 NOTE — PROGRESS NOTES
Daily Note     Today's date: 2018  Patient name: Kiki Fernando  : 1955  MRN: [id number]  Referring provider: Chantel Paniagua MD  Dx:   Encounter Diagnosis     ICD-10-CM    1  Aftercare following surgery of the musculoskeletal system Z47 89    2  Accident due to mechanical fall without injury, subsequent encounter W19  XXXD    3  Aftercare following surgery Z48 89              Subjective:  Reports 3/10 pain level L LE   Leg fatigues quickly w/o cane    Objective: See treatment diary below    Assessment:   Cont to be quite challenged with ex  Tends to try to rush    Plan: Continue per plan of care       PRECAUTIONS: DM, osteop    Manuals 10/15 10-17  10-19  10/22  10/24  10/26  10/29  10/31  11-2  11/5   Man ham str B  30"x3  x  KAB          x                                                                       Exercise Diary                         bike x5 min   7'  9'  10'  10'  10'  x  x  x  x    STS high/low  x10  2x10  x  2x10  22 4" 2x10  22 4" 2x10  x  48"  low mat   20x   Standing hip abd R/L  3x10  x 2x15  x  2x15  1# 3x10  1# 3x10  3#  2x15  x  x  x   Standing hip ext R/L  3x10  x 2x15  x  x30  1# 3x10  1# 3x10  3#  2x15  x  x  x    standing BL HR x30  x  x  x30  x  x30  3#  2x15  x  x  x    mini squats   2x10  x  x  3x10  x  3x10  x  x  x  x   Standing marching  20x x x30 #1 x x30 #1 3#  2x15 x x x    SLB R/L         15" x3  x(one hand on rail)  (one hand on rail 15"x3  x  x  x  x    bridges     20x  x  x20    x30  x  x  x  x    SLR flexion     2x5  x  2x10 L        3# 2x15  x  x    step up      4' 2x10  4' 2x10 up with L down with R  6" 10  4" 10  6" 10  4" 10  x  6" 2x10  x  8"  2x15    clams      10  x20  x    x  red TB 10  x  x    side steps          7 ft 5 laps  7 ft 5 laps  x  red TB 5 laps  x  x   hamstring curl R/L           1# 3x10  3#  2x15  x  x  x    LAQ                  5# 30x  x                           Modalities                                                                                           Precautions: DM, osteop    Daily Treatment Diary     Manual  11-7 11-9           Man ham str x x                                                                   Exercise Diary              bike 10' x           STS low table 20x            Stand: abd,ext 3# 2x15 x           march 3# 2x15 x           HR 1/2 foam 3# 2x15 x           minisquat 30x x           Step up 8" 3# 2x15 x           Side step TB Red 3 laps x           Ham curls 3# 2x15 x           SLB 15"x3 rocker AP 20x                                                  LAQ 3# 2x15 x           bridge 30x x           SLR flx 3# 30x x           Clams TB Red 10x x                                                      Modalities                                                           X= performed

## 2018-11-12 ENCOUNTER — OFFICE VISIT (OUTPATIENT)
Dept: PHYSICAL THERAPY | Facility: REHABILITATION | Age: 63
End: 2018-11-12
Payer: OTHER MISCELLANEOUS

## 2018-11-12 DIAGNOSIS — Z48.89 AFTERCARE FOLLOWING SURGERY: ICD-10-CM
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
## 2018-11-12 DIAGNOSIS — Z47.89 AFTERCARE FOLLOWING SURGERY OF THE MUSCULOSKELETAL SYSTEM: Primary | ICD-10-CM

## 2018-11-12 PROCEDURE — 97110 THERAPEUTIC EXERCISES: CPT

## 2018-11-12 PROCEDURE — 97112 NEUROMUSCULAR REEDUCATION: CPT

## 2018-11-12 PROCEDURE — 97140 MANUAL THERAPY 1/> REGIONS: CPT

## 2018-11-12 NOTE — PROGRESS NOTES
Daily Note     Today's date: 2018  Patient name: Alecia Alvarado  : 1955  MRN: [id number]  Referring provider: Silas Ramirez MD  Dx:   Encounter Diagnosis     ICD-10-CM    1  Aftercare following surgery of the musculoskeletal system Z47 89    2  Accident due to mechanical fall without injury, subsequent encounter W19  XXXD    3  Aftercare following surgery Z48 89              Subjective:  Reports 3/10 pain level L LE  Which he states is on & off    Objective: See treatment diary below  Assessment:  Improved shifting on rocker but he is fearful  Clams are getting easier  No c/o with progression  Add more balance activities    Plan: Continue per plan of care       PRECAUTIONS: DM, osteop    Manuals 10/15 10-17  10-19  10/22  10/24  10/26  10/29  10/31  11-2  11/5   Man ham str B  30"x3  x  KAB          x                                                                       Exercise Diary                         bike x5 min   7'  9'  10'  10'  10'  x  x  x  x    STS high/low  x10  2x10  x  2x10  22 4" 2x10  22 4" 2x10  x  00"  low mat   20x   Standing hip abd R/L  3x10  x 2x15  x  2x15  1# 3x10  1# 3x10  3#  2x15  x  x  x   Standing hip ext R/L  3x10  x 2x15  x  x30  1# 3x10  1# 3x10  3#  2x15  x  x  x    standing BL HR x30  x  x  x30  x  x30  3#  2x15  x  x  x    mini squats   2x10  x  x  3x10  x  3x10  x  x  x  x   Standing marching  20x x x30 #1 x x30 #1 3#  2x15 x x x    SLB R/L         15" x3  x(one hand on rail)  (one hand on rail 15"x3  x  x  x  x    bridges     20x  x  x20    x30  x  x  x  x    SLR flexion     2x5  x  2x10 L        3# 2x15  x  x    step up      4' 2x10  4' 2x10 up with L down with R  6" 10  4" 10  6" 10  4" 10  x  6" 2x10  x  8"  2x15    clams      10  x20  x    x  red TB 10  x  x    side steps          7 ft 5 laps  7 ft 5 laps  x  red TB 5 laps  x  x   hamstring curl R/L           1# 3x10  3#  2x15  x  x  x    LAQ                  5# 30x  x                           Modalities                                                                                           Precautions: DM, osteop    Daily Treatment Diary     Manual  11-7 11-9 11-12          Man ham str x x x                                                                  Exercise Diary              bike 10' x x          STS low table 20x  4# ball           Stand: abd,ext 3# 2x15 x x          march 3# 2x15 x x          HR 1/2 foam 3# 2x15 x x          minisquat 30x x Pick ball up from floor 4# ball 10x          Step up 8" 3# 2x15 x x          Side step TB Red 3 laps x x          Ham curls 3# 2x15 x x          SLB 15"x3 rocker AP 20x x                                                 LAQ 3# 2x15 x x          bridge 30x x w gr ball x          SLR flx 3# 30x x x          Clams TB Red 10x x 15x                                                     Modalities                                                           X= performed

## 2018-11-14 ENCOUNTER — OFFICE VISIT (OUTPATIENT)
Dept: PHYSICAL THERAPY | Facility: REHABILITATION | Age: 63
End: 2018-11-14
Payer: OTHER MISCELLANEOUS

## 2018-11-14 DIAGNOSIS — Z48.89 AFTERCARE FOLLOWING SURGERY: ICD-10-CM

## 2018-11-14 DIAGNOSIS — Z47.89 AFTERCARE FOLLOWING SURGERY OF THE MUSCULOSKELETAL SYSTEM: Primary | ICD-10-CM
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
## 2018-11-14 PROCEDURE — 97140 MANUAL THERAPY 1/> REGIONS: CPT | Performed by: PHYSICAL THERAPIST

## 2018-11-14 PROCEDURE — 97110 THERAPEUTIC EXERCISES: CPT | Performed by: PHYSICAL THERAPIST

## 2018-11-14 PROCEDURE — 97112 NEUROMUSCULAR REEDUCATION: CPT | Performed by: PHYSICAL THERAPIST

## 2018-11-14 NOTE — PROGRESS NOTES
Daily Note     Today's date: 2018  Patient name: Kelly Norton  : 1955  MRN: [id number]  Referring provider: Karyle Lemon, MD  Dx:   Encounter Diagnosis     ICD-10-CM    1  Aftercare following surgery of the musculoskeletal system Z47 89    2  Accident due to mechanical fall without injury, subsequent encounter W19  XXXD    3  Aftercare following surgery Z48 89              Subjective:  Pt reports that he feels he is starting to get better but he has trouble walking without a cane  Objective: See treatment diary below    Assessment:  Pt continues to be challenged with balance activities however was able to decrease assistance to one hand to no hands t/o session  He was able to perform exercises with progressions with no increased c/o of pain  Continued to need frequent rest breaks t/o  He was able to perform ambulation without an AD, he demonstrated an antalgic gait with decreased stance time on LLE and decreased step length on the R  Will continue to work on ambulation without an AD  Pt would benefit from skilled outpatient PT to address pain, strength, cardiovascular endurance, and balance in order to maximize his level of function  Plan: Continue per plan of care       PRECAUTIONS: DM, osteop    Manuals 10/15 10-17  10-19  10/22  10/24  10/26  10/29  10/31  11-   Man ham str B  30"x3  x  KAB          x                                                                       Exercise Diary                         bike x5 min   7'  9'  10'  10'  10'  x  x  x  x    STS high/low  x10  2x10  x  2x10  22 4" 2x10  22 4" 2x10  x  51"  low mat   20x   Standing hip abd R/L  3x10  x 2x15  x  2x15  1# 3x10  1# 3x10  3#  2x15  x  x  x   Standing hip ext R/L  3x10  x 2x15  x  x30  1# 3x10  1# 3x10  3#  2x15  x  x  x    standing BL HR x30  x  x  x30  x  x30  3#  2x15  x  x  x    mini squats   2x10  x  x  3x10  x  3x10  x  x  x  x   Standing marching  20x x x30 #1 x x30 #1 3#  2x15 x x x    SLB R/L         15" x3  x(one hand on rail)  (one hand on rail 15"x3  x  x  x  x    bridges     20x  x  x20    x30  x  x  x  x    SLR flexion     2x5  x  2x10 L        3# 2x15  x  x    step up      4' 2x10  4' 2x10 up with L down with R  6" 10  4" 10  6" 10  4" 10  x  6" 2x10  x  8"  2x15    clams      10  x20  x    x  red TB 10  x  x    side steps          7 ft 5 laps  7 ft 5 laps  x  red TB 5 laps  x  x   hamstring curl R/L           1# 3x10  3#  2x15  x  x  x    LAQ                  5# 30x  x                           Modalities                                                                                           Precautions: DM, osteop    Daily Treatment Diary     Manual  11-7 11-9 11-12 11/14         Man ham str x x x KAB                                                                 Exercise Diary              bike 10' x x 10'         STS low table 20x  4# ball  x20 #4          Stand: abd,ext 3# 2x15 x x #4 2x15         march 3# 2x15 x x #4 2x15         HR 1/2 foam 3# 2x15 x x #4 2x15         minisquat 30x x Pick ball up from floor 4# ball 10x Pick ball up from floor 4# ball 10x         Step up 8" 3# 2x15 x x 8" 4# 2x15         Side step TB Red 3 laps x x Red 3 laps         Ham curls 3# 2x15 x x #4 2x15         SLB 15"x3 rocker AP 20x x rocker AP/ML 20x                                                LAQ 3# 2x15 x x #3 2x15         bridge 30x x w gr ball x x30 gr PB         SLR flx 3# 30x x x 3# 30x         Clams TB Red 10x x 15x Red x30 L/R         Gait without cane     100ft with gait belt                                       Modalities                                                           X= performed

## 2018-11-15 ENCOUNTER — OFFICE VISIT (OUTPATIENT)
Dept: OBGYN CLINIC | Facility: HOSPITAL | Age: 63
End: 2018-11-15

## 2018-11-15 ENCOUNTER — HOSPITAL ENCOUNTER (OUTPATIENT)
Dept: RADIOLOGY | Facility: HOSPITAL | Age: 63
Discharge: HOME | End: 2018-11-15
Attending: ORTHOPAEDIC SURGERY
Payer: OTHER MISCELLANEOUS

## 2018-11-15 VITALS
HEART RATE: 105 BPM | BODY MASS INDEX: 20.76 KG/M2 | SYSTOLIC BLOOD PRESSURE: 148 MMHG | HEIGHT: 70 IN | WEIGHT: 145 LBS | DIASTOLIC BLOOD PRESSURE: 90 MMHG

## 2018-11-15 DIAGNOSIS — Z87.81 S/P ORIF (OPEN REDUCTION INTERNAL FIXATION) FRACTURE: Primary | ICD-10-CM

## 2018-11-15 DIAGNOSIS — Z48.89 AFTERCARE FOLLOWING SURGERY: ICD-10-CM

## 2018-11-15 DIAGNOSIS — Z98.890 S/P ORIF (OPEN REDUCTION INTERNAL FIXATION) FRACTURE: Primary | ICD-10-CM

## 2018-11-15 DIAGNOSIS — M54.16 LEFT LUMBAR RADICULOPATHY: ICD-10-CM

## 2018-11-15 PROCEDURE — 73502 X-RAY EXAM HIP UNI 2-3 VIEWS: CPT

## 2018-11-15 PROCEDURE — 99024 POSTOP FOLLOW-UP VISIT: CPT | Performed by: ORTHOPAEDIC SURGERY

## 2018-11-15 NOTE — PROGRESS NOTES
Assessment:  1  S/P ORIF (open reduction internal fixation) fracture  Ambulatory referral to Physical Therapy    9/12/18 ORIF left hip with IM antegrade nail   2  Left lumbar radiculopathy  Ambulatory referral to Pain Management       Plan:  Diagnostics reviewed and physical exam performed  Diagnosis, treatment options and associated risks were discussed with the patient including no treatment, nonsurgical treatment and potential for surgical intervention  The patient was given the opportunity to has questions regarding each  Patient was educated on maintaining a healthy lifestyle with proper weight management and physician recommended home exercise program/routine for regular fitness  Two months status post surgical correction left intertrochanteric fracture with persistent left L5 radiculopathy  Healed left hip fracture status post surgical fixation  No restrictions in regards to his hip  Recommend a referral to Spine and Pain for his L5 radiculopathy  To do next visit:  Return for re-check on an as needed basis (PRN)  The above stated was discussed in layman's terms and the patient expressed understanding  All questions were answered to the patient's satisfaction  Scribe Attestation    I,:   Izzy Kitchen am acting as a scribe while in the presence of the attending physician :        I,:   Mary Lobato MD personally performed the services described in this documentation    as scribed in my presence :              Subjective:   Armida Doe is a 70 y o  male who presents for re-evaluation two months status post surgical correction left intertrochanteric fracture with antegrade nail  His injury was a result of a fall at work  He continues to pain pain that radiates at his left hip to his lateral lower leg area  He uses a single point cane in his right hand  Denies any groin pain         Review of systems negative unless otherwise specified in HPI    Past Medical History:   Diagnosis Date  Diabetes mellitus ([facility] [address] )     H/O nicotine dependence     Hypertension     Prediabetes        Past Surgical History:   Procedure Laterality Date    HIP SURGERY      NOSE SURGERY      OR OPEN RX FEMUR FX+INTRAMED ADDISON Left 9/13/2018    Procedure: INSERTION NAIL IM FEMUR ANTEGRADE (TROCHANTERIC);   Surgeon: Spencer Fair MD;  Location: [facility];  Service: Orthopedics       Family History   Problem Relation Age of Onset    Cancer Mother     No Known Problems Father     No Known Problems Sister     Lung cancer Brother     Lymphoma Family         acute       Social History     Occupational History          Social History Main Topics    Smoking status: Former Smoker     Packs/day: 2 00     Years: 15 00     Types: Cigarettes     Quit date: 9/12/1987    Smokeless tobacco: Never Used    Alcohol use No    Drug use: No    Sexual activity: Not on file         Current Outpatient Prescriptions:     ACCU-CHEK FASTCLIX LANCETS MISC, by Does not apply route 2 (two) times a day, Disp: 200 each, Rfl: 1    Acetaminophen (TYLENOL EXTRA STRENGTH PO), As needed, Disp: , Rfl:     atorvastatin (LIPITOR) 40 mg tablet, Take 1 tablet (40 mg total) by mouth daily with dinner, Disp: 30 tablet, Rfl: 0    Blood Glucose Monitoring Suppl (ACCU-CHEK CHELY PLUS) w/Device KIT, by Does not apply route 2 (two) times a day, Disp: 1 kit, Rfl: 0    calcium carbonate (OYSTER SHELL,OSCAL) 500 mg, Take 1 tablet by mouth daily with breakfast, Disp: 30 tablet, Rfl: 0    cholecalciferol 2000 units TABS, Take 1 tablet (2,000 Units total) by mouth daily, Disp: 30 tablet, Rfl: 0    glucose blood (ACCU-CHEK CHELY PLUS) test strip, 1 each by Other route 2 (two) times a day Use as instructed, Disp: 200 each, Rfl: 1    magnesium hydroxide (MILK OF MAGNESIA) 400 mg/5 mL oral suspension, Take by mouth daily as needed for constipation, Disp: , Rfl:     metFORMIN (GLUCOPHAGE) 1000 MG tablet, Take 1 tablet (1,000 mg total) by mouth 2 (two) times a day with meals, Disp: 30 tablet, Rfl: 0    Omega-3 Fatty Acids (FISH OIL) 1,000 mg, Take 1,000 mg by mouth daily, Disp: , Rfl:     senna-docusate sodium (SENOKOT-S) 8 6-50 mg per tablet, Take 1 tablet by mouth as needed, Disp: , Rfl:     sitaGLIPtin (JANUVIA) 100 mg tablet, Take 1 tablet (100 mg total) by mouth daily, Disp: 30 tablet, Rfl: 1    glipiZIDE (GLUCOTROL) 10 mg tablet, Take 10 mg by mouth daily, Disp: , Rfl:     No Known Allergies         Vitals:    11/15/18 0948   BP: 148/90   Pulse: 105       Objective:          Physical Exam                    Left Hip Exam     Tenderness   The patient is experiencing no tenderness  Range of Motion   Flexion: 100   Internal Rotation: 20   External Rotation: 20     Muscle Strength   Abduction: 4/5   Adduction: 4/5   Flexion: 4/5     Other   Erythema: absent    Comments:    Healed incisions  No tenderness to palpation  Moderate thigh atrophy            Diagnostics, reviewed and taken today if performed as documented: The attending physician has personally reviewed the pertinent films in PACS and interpretation is as follows:    Left hip and pelvis x-rays taken reviewed the office today show:  Excellent reduction intertrochanteric fracture with hardware in excellent position and alignment, his fracture appears to be healed  Procedures, if performed today:    Procedures    None performed      Portions of the record may have been created with voice recognition software   Occasional wrong word or "sound a like" substitutions may have occurred due to the inherent limitations of voice recognition software   Read the chart carefully and recognize, using context, where substitutions have occurred

## 2018-11-16 ENCOUNTER — APPOINTMENT (OUTPATIENT)
Dept: PHYSICAL THERAPY | Facility: REHABILITATION | Age: 63
End: 2018-11-16
Payer: OTHER MISCELLANEOUS

## 2018-11-19 ENCOUNTER — OFFICE VISIT (OUTPATIENT)
Dept: PHYSICAL THERAPY | Facility: REHABILITATION | Age: 63
End: 2018-11-19
Payer: OTHER MISCELLANEOUS

## 2018-11-19 DIAGNOSIS — Z98.890 S/P ORIF (OPEN REDUCTION INTERNAL FIXATION) FRACTURE: Primary | ICD-10-CM

## 2018-11-19 DIAGNOSIS — Z48.89 AFTERCARE FOLLOWING SURGERY: ICD-10-CM

## 2018-11-19 DIAGNOSIS — Z87.81 S/P ORIF (OPEN REDUCTION INTERNAL FIXATION) FRACTURE: Primary | ICD-10-CM

## 2018-11-19 PROCEDURE — G8979 MOBILITY GOAL STATUS: HCPCS | Performed by: PHYSICAL THERAPIST

## 2018-11-19 PROCEDURE — G8978 MOBILITY CURRENT STATUS: HCPCS | Performed by: PHYSICAL THERAPIST

## 2018-11-19 PROCEDURE — 97112 NEUROMUSCULAR REEDUCATION: CPT | Performed by: PHYSICAL THERAPIST

## 2018-11-19 PROCEDURE — 97110 THERAPEUTIC EXERCISES: CPT | Performed by: PHYSICAL THERAPIST

## 2018-11-19 NOTE — PROGRESS NOTES
Daily Note     Today's date: 2018  Patient name: Jeanette Zabala  : 1955  MRN: [id number]  Referring provider: Dustin Rojas MD  Dx:   Encounter Diagnosis     ICD-10-CM    1  S/P ORIF (open reduction internal fixation) fracture Z96 7     Z87 81    2  Aftercare following surgery Z48 89              Subjective:  Primary c/o fatigue  Objective: See treatment diary below    Assessment:  Pt  Uncomfortable walking without AD at this time  He feels weak but is overall feeling much stronger than he had  He is challenged by prescribed exercises  Plan: Continue per plan of care       Precautions: DM, osteop    Daily Treatment Diary     Manual          Man ham str x x x KAB x                                                                Exercise Diary              bike 10' x x 10' x        STS low table 20x  4# ball  x20 #4  x        Stand: abd,ext 3# 2x15 x x #4 2x15 x        march 3# 2x15 x x #4 2x15 x        HR 1/2 foam 3# 2x15 x x #4 2x15 x        minisquat 30x x Pick ball up from floor 4# ball 10x Pick ball up from floor 4# ball 10x x        Step up 8" 3# 2x15 x x 8" 4# 2x15 x        Side step TB Red 3 laps x x Red 3 laps x        Ham curls 3# 2x15 x x #4 2x15 x        SLB 15"x3 rocker AP 20x x rocker AP/ML 20x x                                               LAQ 3# 2x15 x x #3 2x15 4#  2x15        bridge 30x x w gr ball x x30 gr PB x        SLR flx 3# 30x x x 3# 30x 4#  2x15        Clams TB Red 10x x 15x Red x30 L/R x        Gait without cane     100ft with gait belt                                       Modalities                                                           X= performed

## 2018-11-21 ENCOUNTER — OFFICE VISIT (OUTPATIENT)
Dept: PHYSICAL THERAPY | Facility: REHABILITATION | Age: 63
End: 2018-11-21
Payer: OTHER MISCELLANEOUS

## 2018-11-21 DIAGNOSIS — Z98.890 S/P ORIF (OPEN REDUCTION INTERNAL FIXATION) FRACTURE: Primary | ICD-10-CM

## 2018-11-21 DIAGNOSIS — Z48.89 AFTERCARE FOLLOWING SURGERY: ICD-10-CM

## 2018-11-21 DIAGNOSIS — Z87.81 S/P ORIF (OPEN REDUCTION INTERNAL FIXATION) FRACTURE: Primary | ICD-10-CM
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
## 2018-11-21 DIAGNOSIS — Z47.89 AFTERCARE FOLLOWING SURGERY OF THE MUSCULOSKELETAL SYSTEM: ICD-10-CM

## 2018-11-21 PROCEDURE — 97110 THERAPEUTIC EXERCISES: CPT

## 2018-11-21 PROCEDURE — 97140 MANUAL THERAPY 1/> REGIONS: CPT

## 2018-11-21 PROCEDURE — 97112 NEUROMUSCULAR REEDUCATION: CPT

## 2018-11-21 NOTE — PROGRESS NOTES
Daily Note     Today's date: 2018  Patient name: Radha Ramírez  : 1955  MRN: [id number]  Referring provider: Layla Olivera MD  Dx:   Encounter Diagnosis     ICD-10-CM    1  S/P ORIF (open reduction internal fixation) fracture Z96 7     Z87 81    2  Aftercare following surgery Z48 89    3  Aftercare following surgery of the musculoskeletal system Z47 89    4  Accident due to mechanical fall without injury, subsequent encounter W19  XXXD              Subjective:   Mild pain which varies day to day  Overall much better    Objective: See treatment diary below    Assessment:   Able to get up from lower softer chair now w/o hands    Plan: Continue per plan of care       Precautions: DM, osteop    Daily Treatment Diary     Manual         Man ham str x x x KAB x x                                                               Exercise Diary              bike 10' x x 10' x x       STS low table 20x  4# ball  x20 #4  x Soft chair x       Stand: abd,ext 3# 2x15 x x #4 2x15 x x       march 3# 2x15 x x #4 2x15 x x       HR 1/2 foam 3# 2x15 x x #4 2x15 x x       minisquat 30x x Pick ball up from floor 4# ball 10x Pick ball up from floor 4# ball 10x x x       Step up 8" 3# 2x15 x x 8" 4# 2x15 x x       Side step TB Red 3 laps x x Red 3 laps x 4 laps       Ham curls 3# 2x15 x x #4 2x15 x x       SLB 15"x3 rocker AP 20x x rocker AP/ML 20x x x                                              LAQ 3# 2x15 x x #3 2x15 4#  2x15 x       bridge 30x x w gr ball x x30 gr PB x x       SLR flx 3# 30x x x 3# 30x 4#  2x15 x       Clams TB Red 10x x 15x Red x30 L/R x x       Gait without cane     100ft with gait belt                                       Modalities                                                           X= performed

## 2018-11-26 ENCOUNTER — OFFICE VISIT (OUTPATIENT)
Dept: PHYSICAL THERAPY | Facility: REHABILITATION | Age: 63
End: 2018-11-26
Payer: OTHER MISCELLANEOUS

## 2018-11-26 DIAGNOSIS — Z48.89 AFTERCARE FOLLOWING SURGERY: ICD-10-CM

## 2018-11-26 DIAGNOSIS — Z87.81 S/P ORIF (OPEN REDUCTION INTERNAL FIXATION) FRACTURE: Primary | ICD-10-CM

## 2018-11-26 DIAGNOSIS — Z98.890 S/P ORIF (OPEN REDUCTION INTERNAL FIXATION) FRACTURE: Primary | ICD-10-CM

## 2018-11-26 DIAGNOSIS — Z47.89 AFTERCARE FOLLOWING SURGERY OF THE MUSCULOSKELETAL SYSTEM: ICD-10-CM
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
## 2018-11-26 PROCEDURE — 97140 MANUAL THERAPY 1/> REGIONS: CPT

## 2018-11-26 PROCEDURE — 97110 THERAPEUTIC EXERCISES: CPT

## 2018-11-26 PROCEDURE — 97112 NEUROMUSCULAR REEDUCATION: CPT

## 2018-11-26 NOTE — PROGRESS NOTES
Daily Note     Today's date: 2018  Patient name: Trev Arroyo  : 1955  MRN: [id number]  Referring provider: Eze Gates MD  Dx:   Encounter Diagnosis     ICD-10-CM    1  S/P ORIF (open reduction internal fixation) fracture Z96 7     Z87 81    2  Aftercare following surgery Z48 89    3  Aftercare following surgery of the musculoskeletal system Z47 89    4  Accident due to mechanical fall without injury, subsequent encounter W19  XXXD             Subjective:   Cut back on medicine, he felt is was making him OOB  Better with decreasing medication  Reports he is not sore    Objective: See treatment diary below    Assessment:   Cont to need cuing to slow down with ex  No c/o w increases    Plan: Continue per plan of care       Precautions: DM, osteop    Daily Treatment Diary     Manual        Man ham str x x x KAB x x x                                                              Exercise Diary              bike 10' x x 10' x x x      STS low table 20x  4# ball  x20 #4  x Soft chair x x      Stand: abd,ext 3# 2x15 x x #4 2x15 x x x      march 3# 2x15 x x #4 2x15 x x x      HR 1/2 foam 3# 2x15 x x #4 2x15 x x x      minisquat 30x x Pick ball up from floor 4# ball 10x Pick ball up from floor 4# ball 10x x x w [id number] 10#       Step up 8" 3# 2x15 x x 8" 4# 2x15 x x x      Side step TB Red 3 laps x x Red 3 laps x 4 laps greenx      Ham curls 3# 2x15 x x #4 2x15 x x x      SLB 15"x3 rocker AP 20x x rocker AP/ML 20x x x Side step & walk over foam 3 laps ea                                             LAQ 3# 2x15 x x #3 2x15 4#  2x15 x x      bridge 30x x w gr ball x x30 gr PB x x x      SLR flx 3# 30x x x 3# 30x 4#  2x15 x x      Clams TB Red 10x x 15x Red x30 L/R x x X green      Gait without cane     100ft with gait belt                                       Modalities                                                           X= performed

## 2018-11-28 ENCOUNTER — OFFICE VISIT (OUTPATIENT)
Dept: PHYSICAL THERAPY | Facility: REHABILITATION | Age: 63
End: 2018-11-28
Payer: OTHER MISCELLANEOUS

## 2018-11-28 DIAGNOSIS — Z47.89 AFTERCARE FOLLOWING SURGERY OF THE MUSCULOSKELETAL SYSTEM: ICD-10-CM
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
## 2018-11-28 DIAGNOSIS — Z48.89 AFTERCARE FOLLOWING SURGERY: ICD-10-CM

## 2018-11-28 DIAGNOSIS — Z98.890 S/P ORIF (OPEN REDUCTION INTERNAL FIXATION) FRACTURE: Primary | ICD-10-CM

## 2018-11-28 DIAGNOSIS — Z87.81 S/P ORIF (OPEN REDUCTION INTERNAL FIXATION) FRACTURE: Primary | ICD-10-CM

## 2018-11-28 PROCEDURE — 97140 MANUAL THERAPY 1/> REGIONS: CPT

## 2018-11-28 PROCEDURE — 97112 NEUROMUSCULAR REEDUCATION: CPT

## 2018-11-28 PROCEDURE — 97110 THERAPEUTIC EXERCISES: CPT

## 2018-11-28 NOTE — PROGRESS NOTES
Daily Note     Today's date: 2018  Patient name: Kaitlynn Blas  : 1955  MRN: [id number]  Referring provider: Feli Vazquez MD  Dx:   Encounter Diagnosis     ICD-10-CM    1  S/P ORIF (open reduction internal fixation) fracture Z96 7     Z87 81    2  Aftercare following surgery Z48 89    3  Aftercare following surgery of the musculoskeletal system Z47 89    4  Accident due to mechanical fall without injury, subsequent encounter W19  XXXD             Subjective:  A little sore today    Objective: See treatment diary below    Assessment:   Much better w sit-stand  Cont to progress into work related activities    Plan: Continue per plan of care       Precautions: DM, osteop    Daily Treatment Diary     Manual       Man ham str x x x KAB x x x x                                                             Exercise Diary              bike 10' x x 10' x x x x     STS low table 20x  4# ball  x20 #4  x Soft chair x x x     Stand: abd,ext 3# 2x15 x x #4 2x15 x x x x     march 3# 2x15 x x #4 2x15 x x x x     HR 1/2 foam 3# 2x15 x x #4 2x15 x x x x     minisquat 30x x Pick ball up from floor 4# ball 10x Pick ball up from floor 4# ball 10x x x w [id number] 10#  x     Step up 8" 3# 2x15 x x 8" 4# 2x15 x x x x     Side step TB Red 3 laps x x Red 3 laps x 4 laps greenx x     Ham curls 3# 2x15 x x #4 2x15 x x x x     SLB 15"x3 rocker AP 20x x rocker AP/ML 20x x x Side step & walk over foam 3 laps ea x                                            LAQ 3# 2x15 x x #3 2x15 4#  2x15 x x 5# 20x     bridge 30x x w gr ball x x30 gr PB x x x x     SLR flx 3# 30x x x 3# 30x 4#  2x15 x x x     Clams TB Red 10x x 15x Red x30 L/R x x X green x     Gait without cane     100ft with gait belt                                       Modalities                                                           X= performed

## 2018-11-30 ENCOUNTER — OFFICE VISIT (OUTPATIENT)
Dept: PHYSICAL THERAPY | Facility: REHABILITATION | Age: 63
End: 2018-11-30
Payer: OTHER MISCELLANEOUS

## 2018-11-30 DIAGNOSIS — Z48.89 AFTERCARE FOLLOWING SURGERY: ICD-10-CM

## 2018-11-30 DIAGNOSIS — Z87.81 S/P ORIF (OPEN REDUCTION INTERNAL FIXATION) FRACTURE: Primary | ICD-10-CM

## 2018-11-30 DIAGNOSIS — Z98.890 S/P ORIF (OPEN REDUCTION INTERNAL FIXATION) FRACTURE: Primary | ICD-10-CM
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
## 2018-11-30 DIAGNOSIS — Z47.89 AFTERCARE FOLLOWING SURGERY OF THE MUSCULOSKELETAL SYSTEM: ICD-10-CM

## 2018-11-30 PROCEDURE — 97140 MANUAL THERAPY 1/> REGIONS: CPT

## 2018-11-30 PROCEDURE — 97110 THERAPEUTIC EXERCISES: CPT

## 2018-11-30 PROCEDURE — 97112 NEUROMUSCULAR REEDUCATION: CPT

## 2018-11-30 NOTE — PROGRESS NOTES
Daily Note     Today's date: 2018  Patient name: Allie Webber  : 1955  MRN: [id number]  Referring provider: Salomon Javier MD  Dx:   Encounter Diagnosis     ICD-10-CM    1  S/P ORIF (open reduction internal fixation) fracture Z96 7     Z87 81    2  Aftercare following surgery Z48 89    3  Aftercare following surgery of the musculoskeletal system Z47 89    4  Accident due to mechanical fall without injury, subsequent encounter W19  XXXD         9:50 - 10:40     Subjective: Pt states he has no pain in his hip today  Pt states he gets tired easily  Objective: See treatment diary below    Assessment: pt progressed through exercises well with good form  Pt continues to be challenged by program as he fatigue near end of session  Pt demonstrates 10% quad lag seen with SLR d/t quad weakness  Plan: Continue per plan of care       Precautions: DM, osteop    Daily Treatment Diary     Manual  11-7  11-     Man ham str x x x KAB x x x x CF                                                             Exercise Diary              bike 10' x x 10' x x x x 10mins     STS low table 20x  4# ball  x20 #4  x Soft chair x x x 2 x10    Stand: abd,ext 3# 2x15 x x #4 2x15 x x x x 2 x15    march 3# 2x15 x x #4 2x15 x x x x 2 x15    HR 1/2 foam 3# 2x15 x x #4 2x15 x x x x 2 x15    minisquat 30x x Pick ball up from floor 4# ball 10x Pick ball up from floor 4# ball 10x x x w [id number] 10#  x 10x   5 x with 10 feet walk 15#     Step up 8" 3# 2x15 x x 8" 4# 2x15 x x x x 2 x15    Side step TB Red 3 laps x x Red 3 laps x 4 laps greenx x 4laps     Ham curls 3# 2x15 x x #4 2x15 x x x x 2 x15    SLB 15"x3 rocker AP 20x x rocker AP/ML 20x x x Side step & walk over foam 3 laps ea x 3 laps                                           LAQ 3# 2x15 x x #3 2x15 4#  2x15 x x 5# 20x 5#   20x     bridge 30x x w gr ball x x30 gr PB x x x x 30x    SLR flx 3# 30x x x 3# 30x 4#  2x15 x x x 30x    Clams TB Red 10x x 15x Red x30 L/R x x X green x 30 ea    Gait without cane     100ft with gait belt                                       Modalities                                                           X= performed

## 2018-12-03 ENCOUNTER — EVALUATION (OUTPATIENT)
Dept: PHYSICAL THERAPY | Facility: REHABILITATION | Age: 63
End: 2018-12-03
Payer: OTHER MISCELLANEOUS

## 2018-12-03 DIAGNOSIS — Z98.890 S/P ORIF (OPEN REDUCTION INTERNAL FIXATION) FRACTURE: Primary | ICD-10-CM

## 2018-12-03 DIAGNOSIS — Z48.89 AFTERCARE FOLLOWING SURGERY: ICD-10-CM
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
## 2018-12-03 DIAGNOSIS — Z87.81 S/P ORIF (OPEN REDUCTION INTERNAL FIXATION) FRACTURE: Primary | ICD-10-CM

## 2018-12-03 DIAGNOSIS — Z47.89 AFTERCARE FOLLOWING SURGERY OF THE MUSCULOSKELETAL SYSTEM: ICD-10-CM

## 2018-12-03 PROCEDURE — 97110 THERAPEUTIC EXERCISES: CPT | Performed by: PHYSICAL THERAPIST

## 2018-12-03 PROCEDURE — 97164 PT RE-EVAL EST PLAN CARE: CPT | Performed by: PHYSICAL THERAPIST

## 2018-12-03 PROCEDURE — 97112 NEUROMUSCULAR REEDUCATION: CPT | Performed by: PHYSICAL THERAPIST

## 2018-12-03 NOTE — PROGRESS NOTES
PT Re-Evaluation     Today's date: 12/3/2018  Patient name: Kaitlynn Blas  : 1955  MRN: [id number]  Referring provider: Feli Vazquez MD  Dx:   Encounter Diagnosis     ICD-10-CM    1  S/P ORIF (open reduction internal fixation) fracture Z96 7     Z87 81    2  Aftercare following surgery Z48 89    3  Aftercare following surgery of the musculoskeletal system Z47 89    4  Accident due to mechanical fall without injury, subsequent encounter W19  XXXD                   Assessment/Plan  Pt has demonstrated good functional progress with PT thus far  He has improved his MMT grades, ROM, and functional out come measures which has decreased his risk of falls significantly  However, due to his stated fear of falling he continues to be at risk  He continued to present with decreased balance with SLS and balance exercises  He relies heavily on UEs for assistance  Balance and fear of falling is his greatest limitation at this time  He would continue to benefit from skilled pt to progress functional strengthening, address return to work activities, and progressing balance exercises in order to maximize his level of function in order to return to work  Plan  Patient would benefit from: skilled physical therapy  Planned modality interventions: thermotherapy: hydrocollator packs and cryotherapy  Other planned modality interventions: other modalities prn   Planned therapy interventions: neuromuscular re-education, muscle pump exercises, joint mobilization, manual therapy, IADL retraining, patient education, strengthening, stretching, therapeutic activities, therapeutic exercise, home exercise program, gait training, functional ROM exercises, flexibility, balance and abdominal trunk stabilization  Frequency: 3x week  Duration in weeks: 3  Plan of Care beginning date: 10/15/2018  Treatment plan discussed with: patient    Subjective Pt reports that he has been feeling good   He notes that he is still using the cane because he is afraid to fall  He notes that he will be seeing his MD next week  Pain  Current pain ratin  At best pain ratin  At worst pain ratin  Location: L hip and L knee   Quality: dull ache  Relieving factors: rest  Aggravating factors: sitting  Progression: improved    Social Support  Steps to enter house: yes  1  Stairs in house: yes   12  Lives in: multiple-level home  Lives with: alone    Employment status: working  Treatments  Previous treatment: physical therapy  Current treatment: physical therapy  Patient Goals  Patient goals for therapy: decreased pain, return to work, improved balance and increased strength  Patient goal: walk without an assistive device     Objective       Goals  ST  Independent with HEP in 2 weeks - MET  2  Pt will have verbal report of improvement in symptoms by >/=25% in 2 weeks  - MET  3  Pt will have improvement in L hip ROM measurements by >/= 7 deg     LT  Pt will improve FOTO score by >/=6  points in 6 weeks - MET  2  Pt will improve FOTO score to >/= 59 by visit # 16 - MET   3  Pt will be able to stand for >/= 1 hour with little to no difficulty in 6 weeks - PARTIALLY MET   4  Pt will be able to perform usual work and house work with little to no difficulty in 6 weeks - PARTIALLY MET   PROGRESS TO: Pt will be able to perform activities related to work with no difficultly   5  Pt will be able to ascend/descend a flight of stairs with no difficulty in 6 weeks - MET  6  Pt will be able to perform heavy activities around the home with no difficulty in 6 weeks - MET   PROGRESS TO No difficulty   7   Pt will be able to rise from a chair without the use of AD/UEs with no pain/ no difficulty in 6 weeks - MET       Objective     Active Range of Motion   Left Hip   Flexion: 110 degrees   Abduction: 20 degrees   External rotation (90/90): 20 degrees   Internal rotation (90/90): 15 degrees     Additional Active Range of Motion Details  Min hamstring tightness Strength/Myotome Testing     Left Hip   Planes of Motion   Flexion: 4+  Abduction: 4+ seated  Adduction: 4+ seated  External rotation: 4  Internal rotation: 4    Right Hip   Planes of Motion   Flexion: 4+  Abduction: 4+ seated  Adduction: 4+ seated  External rotation: 4+  Internal rotation: 4+    Left Knee   Flexion: 5  Extension: 4+    Right Knee   Flexion: 5  Extension: 5    Left Ankle/Foot   Dorsiflexion: 5  Inversion: 5  Eversion: 5    Right Ankle/Foot   Dorsiflexion: 5  Inversion: 5  Eversion: 5      Tests     Additional Tests Details  5x STS: 11 seconds     TUG:   With the cane : 12 seconds   Without AD: 10 seconds     SLB:   R: 15 seconds   L: 4 seconds      Ambulation   Weight-Bearing Status   Weight-Bearing Status (Left):  full weight-bearing   Weight-Bearing Status (Right): full weight-bearing    Assistive device used: single point cane     Ambulation: Level Surfaces   Ambulation with assistive device: independent  Ambulation without assistive device: independent    Observational Gait   Gait: antalgic    Increased right stance time and left swing time  walking speed WNL, stride length WNL,   Left foot contact pattern: foot flat  Right foot contact pattern: foot flat   Left arm swing: WNL  Right arm swing:  WNL  Base of support: increased      Precautions: DM, osteop    Daily Treatment Diary     Manual  11-7 11-9 11-12 11/14 11/19 11-21 11-26 11-28 11/30  12/3   Man ham str x x x KAB x x x x CF                                                             Exercise Diary              bike 10' x x 10' x x x x 10mins  10mins   STS low table 20x  4# ball  x20 #4  x Soft chair x x x 2 x10 #4 2x15   Stand: abd,ext 3# 2x15 x x #4 2x15 x x x x 2 x15 #4 2x15   march 3# 2x15 x x #4 2x15 x x x x 2 x15 #4 2x15   HR 1/2 foam 3# 2x15 x x #4 2x15 x x x x 2 x15 #4 2x15   minisquat 30x x Pick ball up from floor 4# ball 10x Pick ball up from floor 4# ball 10x x x w [id number] 10#  x 10x   5 x with 10 feet walk 15#  10x   5 x with 10 feet walk 15#    Step up 8" 3# 2x15 x x 8" 4# 2x15 x x x x 2 x15    Side step TB Red 3 laps x x Red 3 laps x 4 laps greenx x 4laps     Ham curls 3# 2x15 x x #4 2x15 x x x x 2 x15 #4 2 x15   SLB 15"x3 rocker AP 20x x rocker AP/ML 20x x x Side step & walk over foam 3 laps ea x 3 laps Foam 4 laps each                                           LAQ 3# 2x15 x x #3 2x15 4#  2x15 x x 5# 20x 5#   20x  #5 x30   bridge 30x x w gr ball x x30 gr PB x x x x 30x    SLR flx 3# 30x x x 3# 30x 4#  2x15 x x x 30x    Clams TB Red 10x x 15x Red x30 L/R x x X green x 30 ea    Gait without cane     100ft with gait belt         Single leg, leg press with PF for clutch simulation          #25lbs  2x10   Work simulation crate lifting table to table with turns           #55   x15 laps between tables                                              Modalities                                                           X= performed

## 2018-12-05 ENCOUNTER — OFFICE VISIT (OUTPATIENT)
Dept: PHYSICAL THERAPY | Facility: REHABILITATION | Age: 63
End: 2018-12-05
Payer: OTHER MISCELLANEOUS

## 2018-12-05 DIAGNOSIS — Z98.890 S/P ORIF (OPEN REDUCTION INTERNAL FIXATION) FRACTURE: Primary | ICD-10-CM

## 2018-12-05 DIAGNOSIS — Z48.89 AFTERCARE FOLLOWING SURGERY: ICD-10-CM

## 2018-12-05 DIAGNOSIS — Z87.81 S/P ORIF (OPEN REDUCTION INTERNAL FIXATION) FRACTURE: Primary | ICD-10-CM

## 2018-12-05 PROCEDURE — 97110 THERAPEUTIC EXERCISES: CPT

## 2018-12-05 PROCEDURE — 97112 NEUROMUSCULAR REEDUCATION: CPT

## 2018-12-05 NOTE — PROGRESS NOTES
Daily Note     Today's date: 2018  Patient name: Artist Carson  : 1955  MRN: [id number]  Referring provider: Onel Elliott MD  Dx:   Encounter Diagnosis     ICD-10-CM    1  S/P ORIF (open reduction internal fixation) fracture Z96 7     Z87 81    2  Aftercare following surgery Z48 89                   Subjective:  Gets winded with ex but he has not been too sore      Objective: See treatment diary below  Precautions: DM, OSTEOP    Daily Treatment Diary     Manual  12-5                                                                                 Exercise Diary  12-5            bike 10'            Stand abd,ext 5# 2x10 ea            Ham curls/# 2x10 ea            Squat crate from floor 15# 10x            Crate table to table 55# 15x            Rocker AP-ML 20x            Step up 5# 10x            Over foam side step & heel toe 4 laps            SL press w PF 25# 20x                                      bridge Gr ball 30xl            SLR flx 4# 30x            Clams  Blue 20x                                                                                              Modalities                                                     X=same as last time      Assessment: Tolerated treatment well  Patient would benefit from continued PT    No c/o with increases  Rests for breathing      Plan: Continue per plan of care

## 2018-12-07 ENCOUNTER — OFFICE VISIT (OUTPATIENT)
Dept: PHYSICAL THERAPY | Facility: REHABILITATION | Age: 63
End: 2018-12-07
Payer: OTHER MISCELLANEOUS
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
## 2018-12-07 DIAGNOSIS — Z48.89 AFTERCARE FOLLOWING SURGERY: ICD-10-CM

## 2018-12-07 DIAGNOSIS — Z98.890 S/P ORIF (OPEN REDUCTION INTERNAL FIXATION) FRACTURE: Primary | ICD-10-CM

## 2018-12-07 DIAGNOSIS — Z87.81 S/P ORIF (OPEN REDUCTION INTERNAL FIXATION) FRACTURE: Primary | ICD-10-CM

## 2018-12-07 PROCEDURE — 97110 THERAPEUTIC EXERCISES: CPT

## 2018-12-07 PROCEDURE — 97112 NEUROMUSCULAR REEDUCATION: CPT

## 2018-12-07 NOTE — PROGRESS NOTES
Daily Note     Today's date: 2018  Patient name: Beatrice Jimenez  : 1955  MRN: [id number]  Referring provider: Diana Ortega MD  Dx:   Encounter Diagnosis     ICD-10-CM    1  S/P ORIF (open reduction internal fixation) fracture Z96 7     Z87 81    2  Aftercare following surgery Z48 89    3  Accident due to mechanical fall without injury, subsequent encounter W19  XXXD                   Subjective:  He feels weight for leg press is too heavy & is making him too sore      Objective: See treatment diary below  Precautions: DM, OSTEOP    Daily Treatment Diary     Manual                                                                                  Exercise Diary             bike 10' x           Stand abd,ext 5# 2x10 ea x           Ham cur/# 2x10 ea x           Squat crate from floor 15# 10x x           Crate table to table 55# 15x x           Rocker AP-ML 20x x           Step up 5# 10x 8" x           Over foam side step & heel toe 4 laps x           SL press w PF 25# 20x 15#  20x           Step over cones  4 laps                        bridge Gr ball 30xl x           SLR flx 4# 30x x           Clams  Blue 20x x                                                                                             Modalities                                                     X=same as last time      Assessment: Tolerated treatment well  Patient would benefit from continued PT  He cont to feel his medication makes him OOB  He has not called MD re this  Decreased weight for press which he mali better  Cont to be challenged with balance tasks      Plan: Continue per plan of care

## 2018-12-10 ENCOUNTER — OFFICE VISIT (OUTPATIENT)
Dept: PHYSICAL THERAPY | Facility: REHABILITATION | Age: 63
End: 2018-12-10
Payer: OTHER MISCELLANEOUS

## 2018-12-10 DIAGNOSIS — Z48.89 AFTERCARE FOLLOWING SURGERY: ICD-10-CM

## 2018-12-10 DIAGNOSIS — Z87.81 S/P ORIF (OPEN REDUCTION INTERNAL FIXATION) FRACTURE: Primary | ICD-10-CM

## 2018-12-10 DIAGNOSIS — Z98.890 S/P ORIF (OPEN REDUCTION INTERNAL FIXATION) FRACTURE: Primary | ICD-10-CM

## 2018-12-10 PROCEDURE — 97110 THERAPEUTIC EXERCISES: CPT | Performed by: PHYSICAL THERAPIST

## 2018-12-10 PROCEDURE — 97112 NEUROMUSCULAR REEDUCATION: CPT | Performed by: PHYSICAL THERAPIST

## 2018-12-10 NOTE — PROGRESS NOTES
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  Objective: See treatment diary below  Precautions: DM, OSTEOP    Daily Treatment Diary     Manual  12-5 12-7 12/10                                                                               Exercise Diary  12-5 12-7 12/10          bike 10' x x          Stand abd,ext 5# 2x10 ea x x          Ham cur/march 5# 2x10 ea x x          Squat crate from floor 15# 10x x x          Crate table to table 55# 15x x x          Rocker AP-ML 20x x x          Step up 5# 10x 8" x x          Over foam side step & heel toe 4 laps x x          SL press w PF 25# 20x 15#  20x x          Step over cones  4 laps                        bridge Gr ball 30xl x x          SLR flx 4# 30x x 5#  20x          Clams  Blue 20x x x                                                                                            Modalities                                                     X=same as last time      Assessment: Pt  Moving with more alacrity  Still struggles with leg press secondary stress on thigh  He is working on dynamic balance on foam - feels unsteady  Overall doing very well though  Plan: Continue per plan of care

## 2018-12-12 ENCOUNTER — OFFICE VISIT (OUTPATIENT)
Dept: PHYSICAL THERAPY | Facility: REHABILITATION | Age: 63
End: 2018-12-12
Payer: OTHER MISCELLANEOUS

## 2018-12-12 DIAGNOSIS — Z47.89 AFTERCARE FOLLOWING SURGERY OF THE MUSCULOSKELETAL SYSTEM: ICD-10-CM
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
## 2018-12-12 DIAGNOSIS — Z87.81 S/P ORIF (OPEN REDUCTION INTERNAL FIXATION) FRACTURE: Primary | ICD-10-CM

## 2018-12-12 DIAGNOSIS — Z48.89 AFTERCARE FOLLOWING SURGERY: ICD-10-CM

## 2018-12-12 DIAGNOSIS — Z98.890 S/P ORIF (OPEN REDUCTION INTERNAL FIXATION) FRACTURE: Primary | ICD-10-CM

## 2018-12-12 PROCEDURE — 97112 NEUROMUSCULAR REEDUCATION: CPT

## 2018-12-12 PROCEDURE — 97110 THERAPEUTIC EXERCISES: CPT

## 2018-12-12 RX ORDER — GLIPIZIDE 10 MG/1
10 TABLET, FILM COATED, EXTENDED RELEASE ORAL DAILY
Refills: 0 | COMMUNITY
Start: 2018-10-12 | End: 2018-12-19 | Stop reason: SDUPTHER

## 2018-12-12 NOTE — PROGRESS NOTES
Daily Note     Today's date: 2018  Patient name: Ernestine Farooq  : 1955  MRN: [id number]  Referring provider: Virgil Dewey MD  Dx:   Encounter Diagnosis     ICD-10-CM    1  S/P ORIF (open reduction internal fixation) fracture Z96 7     Z87 81    2  Aftercare following surgery Z48 89    3  Accident due to mechanical fall without injury, subsequent encounter W19  XXXD    4  Aftercare following surgery of the musculoskeletal system Z47 89                   Subjective:  Feeling better since decreasing weight on leg press       Objective: See treatment diary below  Precautions: DM, OSTEOP    Daily Treatment Diary     Manual  12-5 12-7 12/10 12-12                                                                              Exercise Diary  12-5 12-7 12/10 12-12         bike 10' x x x         Stand abd,ext 5# 2x10 ea x x x         Ham cur/# 2x10 ea x x x         Squat crate from floor 15# 10x x x X 15x         Crate table to table 55# 15x x x x         Rocker AP-ML 20x x x x D/C        Step up 5# 10x 8" x x X  15x         Over foam side step & heel toe 4 laps x x x         SL press w PF 25# 20x 15#  20x x x         Step over cones  4 laps  x                      bridge Gr ball 30xl x x SL 20x         SLR flx 4# 30x x 5#  20x x         Clams  Blue 20x x x x                                                                                           Modalities                                                     X=same as last time      Assessment: Cont to need some cuing for body mechanics with box table to table  Much better with rocker board, does not need CG anymore  Better with over cones, able to put 1 foot in betw instead of 2  CG for over foam & cones    Plan: Continue per plan of care

## 2018-12-13 ENCOUNTER — OFFICE VISIT (OUTPATIENT)
Dept: OBGYN CLINIC | Facility: HOSPITAL | Age: 63
End: 2018-12-13

## 2018-12-13 VITALS
HEIGHT: 70 IN | DIASTOLIC BLOOD PRESSURE: 83 MMHG | HEART RATE: 102 BPM | BODY MASS INDEX: 20.76 KG/M2 | WEIGHT: 145 LBS | SYSTOLIC BLOOD PRESSURE: 135 MMHG

## 2018-12-13 DIAGNOSIS — Z98.890 S/P ORIF (OPEN REDUCTION INTERNAL FIXATION) FRACTURE: Primary | ICD-10-CM

## 2018-12-13 DIAGNOSIS — Z87.81 S/P ORIF (OPEN REDUCTION INTERNAL FIXATION) FRACTURE: Primary | ICD-10-CM

## 2018-12-13 PROCEDURE — 99024 POSTOP FOLLOW-UP VISIT: CPT | Performed by: ORTHOPAEDIC SURGERY

## 2018-12-13 NOTE — LETTER
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

## 2018-12-13 NOTE — PROGRESS NOTES
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  To do next visit:  Return in about 3 months (around 3/13/2019) for re-check  The above stated was discussed in layman's terms and the patient expressed understanding  All questions were answered to the patient's satisfaction  Scribe Attestation    I,:   Sapna Ortiz am acting as a scribe while in the presence of the attending physician :        I,:   Terri Diggs MD personally performed the services described in this documentation    as scribed in my presence :              Subjective:   Jeanette Zabala is a 70 y o  male who presents for repeat evaluation 3 months s/p left intertrochanteric fracture with antegrade nail  His injury was a result of a fall at work  He has been attending formal therapy and progressing well  Today he returns with intermittent discomfort laterally over his left hip and only when he leans on that side  He uses a single-point cane and right hand for ambulatory assistance  Denies any groin or thigh pain  Denies any back pain  He returns today for a note returning to work        Review of systems negative unless otherwise specified in HPI    Past Medical History:   Diagnosis Date    Diabetes mellitus ([address] )     H/O nicotine dependence     Hypertension     Prediabetes        Past Surgical History:   Procedure Laterality Date    HIP SURGERY      NOSE SURGERY      PA OPEN RX FEMUR FX+INTRAMED ADDISON Left 9/13/2018 Procedure: INSERTION NAIL IM FEMUR ANTEGRADE (TROCHANTERIC);   Surgeon: Spencer Fair MD;  Location: [facility];  Service: Orthopedics       Family History   Problem Relation Age of Onset    Cancer Mother     No Known Problems Father     No Known Problems Sister     Lung cancer Brother     Lymphoma Family         acute       Social History     Occupational History          Social History Main Topics    Smoking status: Former Smoker     Packs/day: 2 00     Years: 15 00     Types: Cigarettes     Quit date: 9/12/1987    Smokeless tobacco: Never Used    Alcohol use No    Drug use: No    Sexual activity: Not on file         Current Outpatient Prescriptions:     ACCU-CHEK FASTCLIX LANCETS MISC, by Does not apply route 2 (two) times a day, Disp: 200 each, Rfl: 1    Acetaminophen (TYLENOL EXTRA STRENGTH PO), As needed, Disp: , Rfl:     atorvastatin (LIPITOR) 40 mg tablet, Take 1 tablet (40 mg total) by mouth daily with dinner, Disp: 30 tablet, Rfl: 0    Blood Glucose Monitoring Suppl (ACCU-CHEK CHELY PLUS) w/Device KIT, by Does not apply route 2 (two) times a day, Disp: 1 kit, Rfl: 0    calcium carbonate (OYSTER SHELL,OSCAL) 500 mg, Take 1 tablet by mouth daily with breakfast, Disp: 30 tablet, Rfl: 0    cholecalciferol 2000 units TABS, Take 1 tablet (2,000 Units total) by mouth daily, Disp: 30 tablet, Rfl: 0    glipiZIDE (GLUCOTROL XL) 10 mg 24 hr tablet, Take 10 mg by mouth daily, Disp: , Rfl: 0    glucose blood (ACCU-CHEK CHELY PLUS) test strip, 1 each by Other route 2 (two) times a day Use as instructed, Disp: 200 each, Rfl: 1    magnesium hydroxide (MILK OF MAGNESIA) 400 mg/5 mL oral suspension, Take by mouth daily as needed for constipation, Disp: , Rfl:     metFORMIN (GLUCOPHAGE) 1000 MG tablet, Take 1 tablet (1,000 mg total) by mouth 2 (two) times a day with meals, Disp: 30 tablet, Rfl: 0    Omega-3 Fatty Acids (FISH OIL) 1,000 mg, Take 1,000 mg by mouth daily, Disp: , Rfl:    senna-docusate sodium (SENOKOT-S) 8 6-50 mg per tablet, Take 1 tablet by mouth as needed, Disp: , Rfl:     sitaGLIPtin (JANUVIA) 100 mg tablet, Take 1 tablet (100 mg total) by mouth daily, Disp: 30 tablet, Rfl: 1    No Known Allergies         Vitals:    12/13/18 1448   BP: 135/83   Pulse: 102       Objective:          Physical Exam                    Left Hip Exam     Tenderness   Left hip tenderness location: Very minimal tenderness trochanteric bursal area  Muscle Strength   The patient has normal left hip strength  Other   Erythema: absent  Sensation: normal    Comments:    Painless passive range of motion of his hip in all planes  Able to flex his hip and extend his knee without lag  Calf and thigh are soft nontender no signs DVT  He has a normal/steady gait with use of a single-point cane in his right hand for ambulatory assistance  Diagnostics, reviewed and taken today if performed as documented:    None performed        Procedures, if performed today:    Procedures    None performed      Portions of the record may have been created with voice recognition software   Occasional wrong word or "sound a like" substitutions may have occurred due to the inherent limitations of voice recognition software   Read the chart carefully and recognize, using context, where substitutions have occurred

## 2018-12-14 ENCOUNTER — APPOINTMENT (OUTPATIENT)
Dept: PHYSICAL THERAPY | Facility: REHABILITATION | Age: 63
End: 2018-12-14
Payer: OTHER MISCELLANEOUS

## 2018-12-19 ENCOUNTER — OFFICE VISIT (OUTPATIENT)
Dept: INTERNAL MEDICINE CLINIC | Facility: CLINIC | Age: 63
End: 2018-12-19
Payer: COMMERCIAL

## 2018-12-19 VITALS
OXYGEN SATURATION: 96 % | BODY MASS INDEX: 19.3 KG/M2 | SYSTOLIC BLOOD PRESSURE: 148 MMHG | TEMPERATURE: 97.4 F | HEART RATE: 68 BPM | HEIGHT: 70 IN | DIASTOLIC BLOOD PRESSURE: 90 MMHG | WEIGHT: 134.8 LBS

## 2018-12-19 DIAGNOSIS — Z23 NEEDS FLU SHOT: ICD-10-CM

## 2018-12-19 DIAGNOSIS — E78.5 HYPERLIPIDEMIA, UNSPECIFIED HYPERLIPIDEMIA TYPE: ICD-10-CM

## 2018-12-19 DIAGNOSIS — I10 ESSENTIAL HYPERTENSION: Primary | ICD-10-CM

## 2018-12-19 DIAGNOSIS — E55.9 VITAMIN D DEFICIENCY: ICD-10-CM

## 2018-12-19 DIAGNOSIS — E11.65 TYPE 2 DIABETES MELLITUS WITH HYPERGLYCEMIA, WITHOUT LONG-TERM CURRENT USE OF INSULIN (HCC): ICD-10-CM

## 2018-12-19 DIAGNOSIS — M81.0 OSTEOPOROSIS WITHOUT CURRENT PATHOLOGICAL FRACTURE, UNSPECIFIED OSTEOPOROSIS TYPE: ICD-10-CM

## 2018-12-19 DIAGNOSIS — R64 CACHECTIC (HCC): ICD-10-CM

## 2018-12-19 PROBLEM — E87.1 HYPONATREMIA: Status: RESOLVED | Noted: 2018-09-12 | Resolved: 2018-12-19

## 2018-12-19 PROCEDURE — 90662 IIV NO PRSV INCREASED AG IM: CPT

## 2018-12-19 PROCEDURE — G0008 ADMIN INFLUENZA VIRUS VAC: HCPCS

## 2018-12-19 PROCEDURE — 99214 OFFICE O/P EST MOD 30 MIN: CPT | Performed by: INTERNAL MEDICINE

## 2018-12-19 RX ORDER — GLIPIZIDE 10 MG/1
10 TABLET, FILM COATED, EXTENDED RELEASE ORAL DAILY
Qty: 30 TABLET | Refills: 3 | Status: SHIPPED | OUTPATIENT
Start: 2018-12-19 | End: 2019-01-21 | Stop reason: SDUPTHER

## 2018-12-19 NOTE — PROGRESS NOTES
Assessment/Plan:    Vitamin D deficiency  Continue with cholecalciferol 2000 units daily  Hyperlipemia  Continue with Lipitor 40 mg daily  Type 2 diabetes mellitus ([facility] [address] )  For now, continue with current regimen of metformin 500 mg twice a day and glipizide 10 mg daily  Discontinue Januvia due to side effects  Metformin decreased dose due to side effects as well at 1000 mg twice a day  In reviewing his blood glucose logs, average mid day preprandial blood sugar is 115 with a postprandial average of 150  Will follow up A1c  Essential hypertension  Will defer on starting any antihypertensive medication at this time as this is an isolated reading  Will continue to monitor  Osteoporosis  DEXA scan from 10/2018 shows a T-score of -2 1  Continue with calcium 1200 mg and vitamin-D 2000 units daily supplementation  Cachectic ([facility] [address] )  Recommend he take 2 Glucernas a day in in addition to meals  He reports a good appetite and eating a well-balanced diet, 3 meals a day  Diagnoses and all orders for this visit:    Essential hypertension  -     CBC; Future  -     Comprehensive metabolic panel; Future  -     Hemoglobin A1C; Future  -     Lipid panel; Future  -     TSH, 3rd generation with Free T4 reflex; Future    Type 2 diabetes mellitus with hyperglycemia, without long-term current use of insulin (HCC)  -     metFORMIN (GLUCOPHAGE) 1000 MG tablet; Take 0 5 tablets (500 mg total) by mouth 2 (two) times a day with meals  -     Microalbumin / creatinine urine ratio; Future  -     Nutritional Supplements (GLUCERNA ADVANCE SHAKE) LIQD; Take 1 Bottle by mouth 2 (two) times daily after meals  -     glipiZIDE (GLUCOTROL XL) 10 mg 24 hr tablet; Take 1 tablet (10 mg total) by mouth daily  -     CBC; Future  -     Comprehensive metabolic panel; Future  -     Hemoglobin A1C; Future  -     Lipid panel; Future  -     TSH, 3rd generation with Free T4 reflex;  Future    Cachectic ([facility] [address] )  -     Nutritional Supplements (GLUCERNA ADVANCE SHAKE) LIQD; Take 1 Bottle by mouth 2 (two) times daily after meals  -     CBC; Future  -     Comprehensive metabolic panel; Future  -     CK (with reflex to MB); Future    Hyperlipidemia, unspecified hyperlipidemia type  -     Comprehensive metabolic panel; Future  -     Lipid panel; Future  -     CK (with reflex to MB); Future    Vitamin D deficiency  -     Vitamin D 25 hydroxy; Future    Osteoporosis without current pathological fracture, unspecified osteoporosis type          Subjective:      Patient ID: Amira Ruiz is a 70 y o  male  20-year-old male is seen today for follow-up  No laboratory studies to review today  Regarding his diabetes regimen, he currently takes metformin 500 mg twice a day  He was prescribed 1000 mg twice a day however he noticed while on 1000 mg twice a day he was unsteady on his feet and lightheaded  He was also prescribed Januvia 100 mg daily however stopped taking that as well because he notices blood pressure was elevated in feeling jittery after taking Januvia  His current regimen for diabetes consist of metformin 500 mg twice a day and glipizide 10 mg daily  He tests his blood sugars daily, preprandial (nonfasting) average of 115 and postprandial average of 150  Blood pressure slightly elevated today  He is currently not on any antihypertensive regimen at this time  In reviewing previous office visits, his blood pressure was within normal range and controlled  He completed physical therapy after he had a left hip fracture repair  Since, he continues to do daily therapy exercises  He eats 3 full meals a day and does not have any caloric-protein supplementation  Diabetes   He presents for his follow-up diabetic visit  He has type 2 diabetes mellitus  His disease course has been fluctuating  There are no hypoglycemic associated symptoms  Pertinent negatives for hypoglycemia include no dizziness or headaches   Associated symptoms include foot paresthesias  Pertinent negatives for diabetes include no chest pain, no fatigue, no foot ulcerations, no polydipsia, no polyphagia, no polyuria, no visual change, no weakness and no weight loss  There are no hypoglycemic complications  Diabetic complications include peripheral neuropathy  Pertinent negatives for diabetic complications include no autonomic neuropathy, heart disease, impotence or nephropathy  Current diabetic treatment includes oral agent (dual therapy)  He is compliant with treatment most of the time  His lunch blood glucose is taken between 12-1 pm  His lunch blood glucose range is generally 110-130 mg/dl  An ACE inhibitor/angiotensin II receptor blocker is not being taken  He does not see a podiatrist Eye exam is not current  Hyperlipidemia   This is a chronic problem  The problem is controlled  Exacerbating diseases include diabetes  He has no history of chronic renal disease, hypothyroidism, liver disease, obesity or nephrotic syndrome  Pertinent negatives include no chest pain or shortness of breath  Current antihyperlipidemic treatment includes statins  The current treatment provides moderate improvement of lipids  There are no compliance problems  The following portions of the patient's history were reviewed and updated as appropriate: allergies, current medications, past family history, past medical history, past social history, past surgical history and problem list     Review of Systems   Constitutional: Negative for activity change, appetite change, chills, diaphoresis, fatigue, fever and weight loss  HENT: Negative for congestion, postnasal drip, rhinorrhea, sinus pain, sinus pressure, sneezing and sore throat  Eyes: Negative for visual disturbance  Respiratory: Negative for apnea, cough, choking, chest tightness, shortness of breath and wheezing  Cardiovascular: Negative for chest pain, palpitations and leg swelling     Gastrointestinal: Negative for abdominal distention, abdominal pain, anal bleeding, blood in stool, constipation, diarrhea, nausea and vomiting  Endocrine: Negative for cold intolerance, heat intolerance, polydipsia, polyphagia and polyuria  Genitourinary: Negative for difficulty urinating, dysuria, hematuria and impotence  Musculoskeletal: Negative  Skin: Negative  Neurological: Negative for dizziness, weakness, light-headedness, numbness and headaches  Hematological: Negative for adenopathy  Psychiatric/Behavioral: Negative for agitation, sleep disturbance and suicidal ideas  All other systems reviewed and are negative          Past Medical History:   Diagnosis Date    Diabetes mellitus ([facility] [address] )     H/O nicotine dependence     Hypertension     Prediabetes          Current Outpatient Prescriptions:     ACCU-CHEK FASTCLIX LANCETS MISC, by Does not apply route 2 (two) times a day, Disp: 200 each, Rfl: 1    Acetaminophen (TYLENOL EXTRA STRENGTH PO), As needed, Disp: , Rfl:     atorvastatin (LIPITOR) 40 mg tablet, Take 1 tablet (40 mg total) by mouth daily with dinner, Disp: 30 tablet, Rfl: 0    Blood Glucose Monitoring Suppl (ACCU-CHEK CHELY PLUS) w/Device KIT, by Does not apply route 2 (two) times a day, Disp: 1 kit, Rfl: 0    calcium carbonate (OYSTER SHELL,OSCAL) 500 mg, Take 1 tablet by mouth daily with breakfast, Disp: 30 tablet, Rfl: 0    cholecalciferol 2000 units TABS, Take 1 tablet (2,000 Units total) by mouth daily, Disp: 30 tablet, Rfl: 0    glipiZIDE (GLUCOTROL XL) 10 mg 24 hr tablet, Take 1 tablet (10 mg total) by mouth daily, Disp: 30 tablet, Rfl: 3    glucose blood (ACCU-CHEK CHELY PLUS) test strip, 1 each by Other route 2 (two) times a day Use as instructed, Disp: 200 each, Rfl: 1    magnesium hydroxide (MILK OF MAGNESIA) 400 mg/5 mL oral suspension, Take by mouth daily as needed for constipation, Disp: , Rfl:     metFORMIN (GLUCOPHAGE) 1000 MG tablet, Take 0 5 tablets (500 mg total) by mouth 2 (two) times a day with meals, Disp: 30 tablet, Rfl: 3    Omega-3 Fatty Acids (FISH OIL) 1,000 mg, Take 1,000 mg by mouth daily, Disp: , Rfl:     senna-docusate sodium (SENOKOT-S) 8 6-50 mg per tablet, Take 1 tablet by mouth as needed, Disp: , Rfl:     Nutritional Supplements (GLUCERNA ADVANCE SHAKE) LIQD, Take 1 Bottle by mouth 2 (two) times daily after meals, Disp: 24 Bottle, Rfl: 6    No Known Allergies    Social History   Past Surgical History:   Procedure Laterality Date    HIP SURGERY      NOSE SURGERY      [place] OPEN RX FEMUR FX+INTRAMED ADDISON Left 9/13/2018    Procedure: INSERTION NAIL IM FEMUR ANTEGRADE (TROCHANTERIC); Surgeon: Terri Diggs MD;  [facility]: [facility];  Service: Orthopedics     Family History   Problem Relation Age of Onset    Cancer Mother     No Known Problems Father     No Known Problems Sister     Lung cancer Brother     Lymphoma Family         acute       Objective:  /90 (BP Location: Left arm, Patient Position: Sitting, Cuff Size: Adult)   Pulse 68   Temp (!) 97 4 °F (36 3 °C) (Oral)   Ht 5' 10" (1 778 m)   Wt 61 1 kg (134 lb 12 8 oz)   SpO2 96%   BMI 19 34 kg/m²     No results found for this or any previous visit (from the past 1344 hour(s))  Physical Exam   Constitutional: He is oriented to person, place, and time  He appears well-developed and well-nourished  No distress  Thin, elderly gentleman  No acute distress  HENT:   Head: Normocephalic and atraumatic  Eyes: Pupils are equal, round, and reactive to light  Conjunctivae and EOM are normal  Right eye exhibits no discharge  Left eye exhibits no discharge  No scleral icterus  Neck: Normal range of motion  Neck supple  No JVD present  No thyromegaly present  Cardiovascular: Normal rate, regular rhythm, normal heart sounds and intact distal pulses  Exam reveals no gallop and no friction rub  No murmur heard  Pulmonary/Chest: Effort normal and breath sounds normal  No respiratory distress  He has no wheezes  He has no rales  He exhibits no tenderness  Abdominal: Soft  Bowel sounds are normal  He exhibits no distension and no mass  There is no tenderness  There is no rebound and no guarding  Musculoskeletal: Normal range of motion  He exhibits no edema, tenderness or deformity  Lymphadenopathy:     He has no cervical adenopathy  Neurological: He is alert and oriented to person, place, and time  He has normal reflexes  No cranial nerve deficit  Coordination normal    Skin: Skin is warm and dry  No rash noted  He is not diaphoretic  No erythema  No pallor  Psychiatric: He has a normal mood and affect  His behavior is normal  Judgment and thought content normal    Nursing note and vitals reviewed

## 2018-12-19 NOTE — ASSESSMENT & PLAN NOTE
Will defer on starting any antihypertensive medication at this time as this is an isolated reading  Will continue to monitor

## 2018-12-19 NOTE — PROGRESS NOTES
Diabetic Foot Exam    Patient's shoes and socks removed  Right Foot/Ankle   Right Foot Inspection  Skin Exam: skin normal, skin intact, dry skin, callus and callus no warmth, no erythema, no maceration, no abnormal color, no pre-ulcer and no ulcer                          Toe Exam: ROM and strength within normal limitsno swelling, no tenderness, erythema and  no right toe deformity  Sensory       Monofilament testing: diminished  Vascular  Capillary refills: < 3 seconds  The right DP pulse is 2+  The right PT pulse is 2+  Left Foot/Ankle  Left Foot Inspection  Skin Exam: skin normal, skin intact, dry skin and callusno warmth, no erythema, no maceration, normal color, no pre-ulcer and no ulcer                         Toe Exam: ROM and strength within normal limitsno swelling, no tenderness, no erythema and no left toe deformity                   Sensory       Monofilament: diminished  Vascular  Capillary refills: < 3 seconds  The left DP pulse is 2+  The left PT pulse is 2+  Assign Risk Category:  No deformity present; Loss of protective sensation;  No weak pulses       Risk: 1

## 2018-12-19 NOTE — ASSESSMENT & PLAN NOTE
Recommend he take 2 Glucernas a day in in addition to meals  He reports a good appetite and eating a well-balanced diet, 3 meals a day

## 2018-12-19 NOTE — ASSESSMENT & PLAN NOTE
For now, continue with current regimen of metformin 500 mg twice a day and glipizide 10 mg daily  Discontinue Januvia due to side effects  Metformin decreased dose due to side effects as well at 1000 mg twice a day  In reviewing his blood glucose logs, average mid day preprandial blood sugar is 115 with a postprandial average of 150  Will follow up A1c

## 2018-12-19 NOTE — ASSESSMENT & PLAN NOTE
DEXA scan from 10/2018 shows a T-score of -2 1  Continue with calcium 1200 mg and vitamin-D 2000 units daily supplementation

## 2018-12-21 ENCOUNTER — TELEPHONE (OUTPATIENT)
Dept: INTERNAL MEDICINE CLINIC | Age: 63
End: 2018-12-21

## 2018-12-21 NOTE — TELEPHONE ENCOUNTER
Received a call from patient's pharmacy saying that there was a denial for patient on medication Glucena

## 2018-12-24 NOTE — PROGRESS NOTES
PT Discharge    Today's date: 2018  Patient name: Niharika Workman  : 1955  MRN: [id number]  Referring provider: Rufina Vu MD  Dx:   Encounter Diagnosis     ICD-10-CM    1  S/P ORIF (open reduction internal fixation) fracture Z96 7     Z87 81    2  Aftercare following surgery Z48 89    3  Accident due to mechanical fall without injury, subsequent encounter W19  XXXD    4  Aftercare following surgery of the musculoskeletal system Z47 89        Start Time: 945  Stop Time:   Total time in clinic (min): 50 minutes    Assessment/Plan Patient has met their goals for PT, improved FOTO scores, and has a verbal report of improvement  he will be d/c to a home program  Pt was instructed that if any questions come up that they can contact our office       Subjective    Objective    Flowsheet Rows      Most Recent Value   PT/OT G-Codes   Current Score  88   Projected Score  59

## 2019-01-10 ENCOUNTER — TELEPHONE (OUTPATIENT)
Dept: OBGYN CLINIC | Facility: HOSPITAL | Age: 64
End: 2019-01-10

## 2019-01-10 NOTE — TELEPHONE ENCOUNTER
Patient would to ask Dr Lawson Watson, if he can write a letter for his work that does not include any restrictions  Patient does state that Dr Lawson Watson can call him with any questions or concerns  Patients work fax # [phone]       Best contact for patient is [phone]    Thank you

## 2019-01-21 DIAGNOSIS — E11.65 TYPE 2 DIABETES MELLITUS WITH HYPERGLYCEMIA, WITHOUT LONG-TERM CURRENT USE OF INSULIN (HCC): ICD-10-CM

## 2019-01-21 RX ORDER — GLIPIZIDE 10 MG/1
10 TABLET, FILM COATED, EXTENDED RELEASE ORAL DAILY
Qty: 90 TABLET | Refills: 1 | Status: SHIPPED | OUTPATIENT
Start: 2019-01-21 | End: 2019-03-20

## 2019-03-12 ENCOUNTER — OFFICE VISIT (OUTPATIENT)
Dept: OBGYN CLINIC | Facility: HOSPITAL | Age: 64
End: 2019-03-12
Payer: OTHER MISCELLANEOUS

## 2019-03-12 ENCOUNTER — HOSPITAL ENCOUNTER (OUTPATIENT)
Dept: RADIOLOGY | Facility: HOSPITAL | Age: 64
Discharge: HOME | End: 2019-03-12
Attending: ORTHOPAEDIC SURGERY
Payer: COMMERCIAL

## 2019-03-12 VITALS
BODY MASS INDEX: 19.18 KG/M2 | HEIGHT: 70 IN | DIASTOLIC BLOOD PRESSURE: 92 MMHG | SYSTOLIC BLOOD PRESSURE: 157 MMHG | WEIGHT: 134 LBS | HEART RATE: 86 BPM

## 2019-03-12 DIAGNOSIS — Z48.89 AFTERCARE FOLLOWING SURGERY: Primary | ICD-10-CM

## 2019-03-12 DIAGNOSIS — Z48.89 AFTERCARE FOLLOWING SURGERY: ICD-10-CM

## 2019-03-12 DIAGNOSIS — M25.552 LEFT HIP PAIN: ICD-10-CM

## 2019-03-12 PROCEDURE — 73502 X-RAY EXAM HIP UNI 2-3 VIEWS: CPT

## 2019-03-12 PROCEDURE — 99213 OFFICE O/P EST LOW 20 MIN: CPT | Performed by: ORTHOPAEDIC SURGERY

## 2019-03-12 NOTE — PROGRESS NOTES
75 y o male presents for 6 months postoperative visit status post left  IT femur fracture fixation  Patient's postoperative course continues to progressed uneventfully  He is able to return to work in January and has had no difficulties with doing so  He has not complain of any pain and is thus far very happy with recovery  Review of Systems  All systems reviewed and are otherwise negative    Past Medical History  Past Medical History:   Diagnosis Date    Diabetes mellitus ([address] )     H/O nicotine dependence     Hypertension     Prediabetes        Past Surgical History  Past Surgical History:   Procedure Laterality Date    HIP SURGERY      NOSE SURGERY      [place] OPEN RX FEMUR FX+INTRAMED ADDISON Left 9/13/2018    Procedure: INSERTION NAIL IM FEMUR ANTEGRADE (TROCHANTERIC);   Surgeon: Cristiana Gusman MD;  [facility];  Service: Orthopedics       Current Medications  Current Outpatient Medications on File Prior to Visit   Medication Sig Dispense Refill    ACCU-CHEK FASTCLIX LANCETS MISC by Does not apply route 2 (two) times a day 200 each 1    Acetaminophen (TYLENOL EXTRA STRENGTH PO) As needed      atorvastatin (LIPITOR) 40 mg tablet Take 1 tablet (40 mg total) by mouth daily with dinner 30 tablet 0    Blood Glucose Monitoring Suppl (ACCU-CHEK CHELY PLUS) w/Device KIT by Does not apply route 2 (two) times a day 1 kit 0    calcium carbonate (OYSTER SHELL,OSCAL) 500 mg Take 1 tablet by mouth daily with breakfast 30 tablet 0    cholecalciferol 2000 units TABS Take 1 tablet (2,000 Units total) by mouth daily 30 tablet 0    glipiZIDE (GLUCOTROL XL) 10 mg 24 hr tablet Take 1 tablet (10 mg total) by mouth daily 90 tablet 1    glucose blood (ACCU-CHEK CHELY PLUS) test strip 1 each by Other route 2 (two) times a day Use as instructed 200 each 1    magnesium hydroxide (MILK OF MAGNESIA) 400 mg/5 mL oral suspension Take by mouth daily as needed for constipation      metFORMIN (GLUCOPHAGE) 1000 MG tablet Take 0 5 tablets (500 mg total) by mouth 2 (two) times a day 90 tablet 1    Nutritional Supplements (GLUCERNA ADVANCE SHAKE) LIQD Take 1 Bottle by mouth 2 (two) times daily after meals 24 Bottle 6    Omega-3 Fatty Acids (FISH OIL) 1,000 mg Take 1,000 mg by mouth daily      senna-docusate sodium (SENOKOT-S) 8 6-50 mg per tablet Take 1 tablet by mouth as needed       No current facility-administered medications on file prior to visit  Recent Labs [facility])  0   Lab Value Date/Time    HCT 28 4 (L) 09/16/2018 0550    HGB 10 1 (L) 09/16/2018 0550    WBC 5 72 09/16/2018 0550    INR 1 12 09/13/2018 0503    ESR 85 (H) 09/17/2018 0518    HGBA1C 12 4 (H) 09/13/2018 0503         Physical exam  · General: Awake, Alert, Oriented  · Eyes: Pupils equal, round and reactive to light  · Heart: regular rate and rhythm  · Lungs: No audible wheezing  · Abdomen: soft  left Lower extremity  · Surgical incision well healed  · The patient walks with a slightly antalgic gait  · Full strength with hip flexion  · No pain with internal or external rotation of the hip  · Leg lengths equal  · Motor and sensation otherwise intact  · Left lower extremity warm and well perfused    Imaging  Radiographs of the left hip were obtained today and personally reviewed by myself and Dr Lorie Haywood  They reveal a left hip status post trochanteric femoral nail which is well healed with stable hardware    Procedure  No procedures performed during this visit    49-year-old male 6 months status left IM nail for intertrochanteric femur fracture  · Weightbearing as tolerated left lower extremity  · Patient may refer return to work in full  · Anti-inflammatories as needed for any pain  · He has made a complete recovery from the his left hip fracture and may follow up in the clinic on an as-needed basis  It was a pleasure to help Mr Adolfo Perla in his recovery    · Patient understands and agrees with the plan above

## 2019-03-20 ENCOUNTER — OFFICE VISIT (OUTPATIENT)
Dept: INTERNAL MEDICINE CLINIC | Facility: CLINIC | Age: 64
End: 2019-03-20
Payer: COMMERCIAL

## 2019-03-20 VITALS
BODY MASS INDEX: 21.93 KG/M2 | HEART RATE: 78 BPM | TEMPERATURE: 95.4 F | HEIGHT: 70 IN | OXYGEN SATURATION: 98 % | WEIGHT: 153.2 LBS | SYSTOLIC BLOOD PRESSURE: 160 MMHG | DIASTOLIC BLOOD PRESSURE: 100 MMHG

## 2019-03-20 DIAGNOSIS — E11.00 TYPE 2 DIABETES MELLITUS WITH HYPEROSMOLARITY WITHOUT COMA, WITH LONG-TERM CURRENT USE OF INSULIN (HCC): Primary | ICD-10-CM

## 2019-03-20 DIAGNOSIS — E78.5 HYPERLIPIDEMIA, UNSPECIFIED HYPERLIPIDEMIA TYPE: ICD-10-CM

## 2019-03-20 DIAGNOSIS — E11.65 TYPE 2 DIABETES MELLITUS WITH HYPERGLYCEMIA, WITHOUT LONG-TERM CURRENT USE OF INSULIN (HCC): ICD-10-CM

## 2019-03-20 DIAGNOSIS — Z79.4 TYPE 2 DIABETES MELLITUS WITH HYPEROSMOLARITY WITHOUT COMA, WITH LONG-TERM CURRENT USE OF INSULIN (HCC): Primary | ICD-10-CM

## 2019-03-20 DIAGNOSIS — R64 CACHECTIC (HCC): ICD-10-CM

## 2019-03-20 DIAGNOSIS — I10 ESSENTIAL HYPERTENSION: ICD-10-CM

## 2019-03-20 DIAGNOSIS — E55.9 VITAMIN D DEFICIENCY: ICD-10-CM

## 2019-03-20 DIAGNOSIS — M81.0 OSTEOPOROSIS WITHOUT CURRENT PATHOLOGICAL FRACTURE, UNSPECIFIED OSTEOPOROSIS TYPE: ICD-10-CM

## 2019-03-20 LAB
25(OH)D3 SERPL-MCNC: 48.4 NG/ML (ref 30–100)
ALBUMIN SERPL BCP-MCNC: 4.1 G/DL (ref 3.5–5)
ALP SERPL-CCNC: 46 U/L (ref 46–116)
ALT SERPL W P-5'-P-CCNC: 17 U/L (ref 12–78)
ANION GAP SERPL CALCULATED.3IONS-SCNC: 7 MMOL/L (ref 4–13)
AST SERPL W P-5'-P-CCNC: 14 U/L (ref 5–45)
BILIRUB SERPL-MCNC: 0.79 MG/DL (ref 0.2–1)
BUN SERPL-MCNC: 13 MG/DL (ref 5–25)
CALCIUM SERPL-MCNC: 9.1 MG/DL (ref 8.3–10.1)
CHLORIDE SERPL-SCNC: 102 MMOL/L (ref 100–108)
CHOLEST SERPL-MCNC: 200 MG/DL (ref 50–200)
CK SERPL-CCNC: 98 U/L (ref 39–308)
CO2 SERPL-SCNC: 25 MMOL/L (ref 21–32)
CREAT SERPL-MCNC: 0.68 MG/DL (ref 0.6–1.3)
CREAT UR-MCNC: 31.1 MG/DL
ERYTHROCYTE [DISTWIDTH] IN BLOOD BY AUTOMATED COUNT: 12.5 % (ref 11.6–15.1)
EST. AVERAGE GLUCOSE BLD GHB EST-MCNC: 131 MG/DL
GFR SERPL CREATININE-BSD FRML MDRD: 100 ML/MIN/1.73SQ M
GLUCOSE P FAST SERPL-MCNC: 135 MG/DL (ref 65–99)
HBA1C MFR BLD: 6.2 % (ref 4.2–6.3)
HCT VFR BLD AUTO: 39.6 % (ref 36.5–49.3)
HDLC SERPL-MCNC: 60 MG/DL (ref 40–60)
HGB BLD-MCNC: 13.8 G/DL (ref 12–17)
LDLC SERPL CALC-MCNC: 124 MG/DL (ref 0–100)
MCH RBC QN AUTO: 30.7 PG (ref 26.8–34.3)
MCHC RBC AUTO-ENTMCNC: 34.8 G/DL (ref 31.4–37.4)
MCV RBC AUTO: 88 FL (ref 82–98)
MICROALBUMIN UR-MCNC: 7.6 MG/L (ref 0–20)
MICROALBUMIN/CREAT 24H UR: 24 MG/G CREATININE (ref 0–30)
NONHDLC SERPL-MCNC: 140 MG/DL
PLATELET # BLD AUTO: 173 THOUSANDS/UL (ref 149–390)
PMV BLD AUTO: 9.5 FL (ref 8.9–12.7)
POTASSIUM SERPL-SCNC: 4.3 MMOL/L (ref 3.5–5.3)
PROT SERPL-MCNC: 7 G/DL (ref 6.4–8.2)
RBC # BLD AUTO: 4.5 MILLION/UL (ref 3.88–5.62)
SODIUM SERPL-SCNC: 134 MMOL/L (ref 136–145)
TRIGL SERPL-MCNC: 80 MG/DL
TSH SERPL DL<=0.05 MIU/L-ACNC: 1.4 UIU/ML (ref 0.36–3.74)
WBC # BLD AUTO: 4.82 THOUSAND/UL (ref 4.31–10.16)

## 2019-03-20 PROCEDURE — 3044F HG A1C LEVEL LT 7.0%: CPT | Performed by: NURSE PRACTITIONER

## 2019-03-20 PROCEDURE — 82550 ASSAY OF CK (CPK): CPT | Performed by: NURSE PRACTITIONER

## 2019-03-20 PROCEDURE — 80053 COMPREHEN METABOLIC PANEL: CPT | Performed by: NURSE PRACTITIONER

## 2019-03-20 PROCEDURE — 82570 ASSAY OF URINE CREATININE: CPT | Performed by: NURSE PRACTITIONER

## 2019-03-20 PROCEDURE — 80061 LIPID PANEL: CPT | Performed by: NURSE PRACTITIONER

## 2019-03-20 PROCEDURE — 3061F NEG MICROALBUMINURIA REV: CPT | Performed by: NURSE PRACTITIONER

## 2019-03-20 PROCEDURE — 99214 OFFICE O/P EST MOD 30 MIN: CPT | Performed by: NURSE PRACTITIONER

## 2019-03-20 PROCEDURE — 84443 ASSAY THYROID STIM HORMONE: CPT | Performed by: NURSE PRACTITIONER

## 2019-03-20 PROCEDURE — 82043 UR ALBUMIN QUANTITATIVE: CPT | Performed by: NURSE PRACTITIONER

## 2019-03-20 PROCEDURE — 85027 COMPLETE CBC AUTOMATED: CPT | Performed by: NURSE PRACTITIONER

## 2019-03-20 PROCEDURE — 36415 COLL VENOUS BLD VENIPUNCTURE: CPT

## 2019-03-20 PROCEDURE — 83036 HEMOGLOBIN GLYCOSYLATED A1C: CPT | Performed by: NURSE PRACTITIONER

## 2019-03-20 PROCEDURE — 82306 VITAMIN D 25 HYDROXY: CPT | Performed by: NURSE PRACTITIONER

## 2019-03-20 PROCEDURE — 4010F ACE/ARB THERAPY RXD/TAKEN: CPT | Performed by: NURSE PRACTITIONER

## 2019-03-20 RX ORDER — GLIPIZIDE 10 MG/1
10 TABLET, FILM COATED, EXTENDED RELEASE ORAL DAILY
Qty: 90 TABLET | Refills: 1 | Status: SHIPPED | OUTPATIENT
Start: 2019-03-20 | End: 2019-06-27 | Stop reason: SDUPTHER

## 2019-03-20 RX ORDER — LISINOPRIL 5 MG/1
5 TABLET ORAL DAILY
Qty: 30 TABLET | Refills: 0 | Status: SHIPPED | OUTPATIENT
Start: 2019-03-20 | End: 2019-03-27

## 2019-03-20 NOTE — ASSESSMENT & PLAN NOTE
Patient very thin and may be related to his diabetes  Patient does report low appetite  May need to consider at follow-up appointment appetite stimulator, or possible chest x-ray to rule out lung cancer as patient was a previous smoker  Patients TSH within normal limits  Will get fasting blood-work prior to follow-up with patient in 3 months

## 2019-03-20 NOTE — ASSESSMENT & PLAN NOTE
Lab Results   Component Value Date    HGBA1C 12 4 (H) 09/13/2018    Patient will complete his blood work today, at this time patient will continue with metformin 500 mg twice a day, and glipizide 10 mg tablet daily, patient states that he has only been taking glipizide 5 mg tablet  No adjustments to be made at this time as patient states his blood pressures have been within stable range, will correlate this with a hemoglobin A1c  Patient is to continue to work on diet and exercise  Limit sugars and carbohydrate intake  Avoid soda, juice, sweets, cookies, desserts, pasta, bread    Eat more whole grains, exercised 30 min of cardio at least 3 times a week  Also recommended daily foot exams to check for sores, and recommended yearly eye exams

## 2019-03-20 NOTE — ASSESSMENT & PLAN NOTE
Patient continues to have elevated blood pressure readings, will start him on low-dose lisinopril, and plan to follow up with patient in 1 week to review blood work and to re-evaluate his blood pressure  Continue current regimen -   Continue to monitor blood pressure at home  Goal BP is < 130/80  Contact our office for consistent elevations  Recommend low sodium diet  Exercise 30 minutes three times a week as tolerated    Recommend yearly eye exam

## 2019-03-20 NOTE — PROGRESS NOTES
Assessment/Plan:    Type 2 diabetes mellitus ([facility] )  Lab Results   Component Value Date    HGBA1C 12 4 (H) 09/13/2018    Patient will complete his blood work today, at this time patient will continue with metformin 500 mg twice a day, and glipizide 10 mg tablet daily, patient states that he has only been taking glipizide 5 mg tablet  No adjustments to be made at this time as patient states his blood pressures have been within stable range, will correlate this with a hemoglobin A1c  Patient is to continue to work on diet and exercise  Limit sugars and carbohydrate intake  Avoid soda, juice, sweets, cookies, desserts, pasta, bread    Eat more whole grains, exercised 30 min of cardio at least 3 times a week  Also recommended daily foot exams to check for sores, and recommended yearly eye exams  Essential hypertension  Patient continues to have elevated blood pressure readings, will start him on low-dose lisinopril, and plan to follow up with patient in 1 week to review blood work and to re-evaluate his blood pressure  Continue current regimen -   Continue to monitor blood pressure at home  Goal BP is < 130/80  Contact our office for consistent elevations  Recommend low sodium diet  Exercise 30 minutes three times a week as tolerated  Recommend yearly eye exam       Osteoporosis  DEXA scan from 10/2018 shows a T-score of -2 1  Continue with calcium 1200 mg and vitamin-D 2000 units daily supplementation  [facility])   Patient very thin and may be related to his diabetes  Patient does report low appetite  May need to consider at follow-up appointment appetite stimulator, or possible chest x-ray to rule out lung cancer as patient was a previous smoker  Patients TSH within normal limits  Will get fasting blood-work prior to follow-up with patient in 3 months  Hyperlipemia  Patient is not sure if he has been taking his Lipitor not, will get a repeat lipid panel   Recommend healthy lifestyle choices for your cholesterol  Low fat/low cholesterol diet  Limit/avoid red meat  Eat more lean meat - chicken breast, ground turkey, fish  Exercise 30 mins at least 3 times a week as tolerated  Vitamin D deficiency  Patient will continue with vitamin-D supplementation  Diagnoses and all orders for this visit:    Type 2 diabetes mellitus with hyperosmolarity without coma, with long-term current use of insulin ([address] )  -     Ambulatory referral to Ophthalmology; Future    Type 2 diabetes mellitus with hyperglycemia, without long-term current use of insulin ([facility])  -     metFORMIN (GLUCOPHAGE) 1000 MG tablet; Take 0 5 tablets (500 mg total) by mouth 2 (two) times a day  -     glipiZIDE (GLUCOTROL XL) 10 mg 24 hr tablet; Take 1 tablet (10 mg total) by mouth daily  -     TSH, 3rd generation with Free T4 reflex  -     CK  -     Vitamin D 25 hydroxy  -     Lipid panel  -     Hemoglobin A1C  -     Comprehensive metabolic panel  -     CBC  -     Microalbumin / creatinine urine ratio    Essential hypertension  -     lisinopril (ZESTRIL) 5 mg tablet;  Take 1 tablet (5 mg total) by mouth daily  -     TSH, 3rd generation with Free T4 reflex  -     CK  -     Vitamin D 25 hydroxy  -     Lipid panel  -     Hemoglobin A1C  -     Comprehensive metabolic panel  -     CBC  -     Microalbumin / creatinine urine ratio    Osteoporosis without current pathological fracture, unspecified osteoporosis type    Hyperlipidemia, unspecified hyperlipidemia type  -     TSH, 3rd generation with Free T4 reflex  -     CK  -     Vitamin D 25 hydroxy  -     Lipid panel  -     Hemoglobin A1C  -     Comprehensive metabolic panel  -     CBC  -     Microalbumin / creatinine urine ratio    Vitamin D deficiency  -     TSH, 3rd generation with Free T4 reflex  -     CK  -     Vitamin D 25 hydroxy  -     Lipid panel  -     Hemoglobin A1C  -     Comprehensive metabolic panel  -     CBC  -     Microalbumin / creatinine urine ratio    Cachectic ([facility] )  -     TSH, 3rd generation with Free T4 reflex  -     CK  -     Vitamin D 25 hydroxy  -     Lipid panel  -     Hemoglobin A1C  -     Comprehensive metabolic panel  -     CBC  -     Microalbumin / creatinine urine ratio          Subjective:      Patient ID: Noel White is a 75 y o  male  Patient presents today for follow-up on diabetes type 2  Patient did not follow up with Endocrinology on 02/05/2018  [place] Patient currently states he lives at home alone  Patient currently manages his medications at home, he did bring a list of accurate medications to office visit today, patient does seem forgetful  Patient has been noncompliant with his glyburide, he states that he only takes 5 mg instead of the prescribed 10  Patient also reports that he has not been taking his Lipitor  Male screenings:  Colonoscopy- refuses to get one done  Hep C-will get with next blood-work  Eye Doctor- wears glasses, has not been  Dentist-has not gone  Podiatrist-does not go    Flu vaccination- does not get flu vaccination    Tobacco abuse- Quit in 84    Diabetes   He presents for his initial diabetic visit  He has type 2 diabetes mellitus  No MedicAlert identification noted  His disease course has been fluctuating  There are no hypoglycemic associated symptoms  Pertinent negatives for hypoglycemia include no dizziness, headaches or nervousness/anxiousness  Associated symptoms include weight loss  Pertinent negatives for diabetes include no chest pain, no fatigue, no polydipsia, no polyphagia, no polyuria and no weakness  There are no hypoglycemic complications  Symptoms are worsening  Risk factors for coronary artery disease include diabetes mellitus, dyslipidemia and male sex  Current diabetic treatments:  patient currently taking metformin 500 mg twice a day, and glipizide 10 mg   Compliance with diabetes treatment:  unsure of treatment compliance as patient seems forgetful however he did write his medications down prior to his office visit today  Weight trend:  patient is very thin  He rarely participates in exercise  ( Patient states that his sugars have been ranging from ) He does not see a podiatrist Eye exam is not current  The following portions of the patient's history were reviewed and updated as appropriate: allergies, current medications, past family history, past medical history, past social history, past surgical history and problem list     Review of Systems   Constitutional: Positive for weight loss  Negative for activity change, appetite change, chills, diaphoresis, fatigue and fever  HENT: Negative for congestion, postnasal drip, rhinorrhea, sinus pressure, sinus pain, sneezing and sore throat  Eyes: Negative for visual disturbance  Respiratory: Negative for apnea, cough, choking, chest tightness, shortness of breath and wheezing  Cardiovascular: Negative for chest pain, palpitations and leg swelling  Gastrointestinal: Negative for abdominal distention, abdominal pain, anal bleeding, blood in stool, constipation, diarrhea, nausea and vomiting  Endocrine: Negative for cold intolerance, heat intolerance, polydipsia, polyphagia and polyuria  Genitourinary: Negative for difficulty urinating, dysuria and hematuria  Musculoskeletal: Negative  Skin: Negative  Neurological: Negative for dizziness, weakness, light-headedness, numbness and headaches  Hematological: Negative for adenopathy  Psychiatric/Behavioral: Negative for agitation, sleep disturbance and suicidal ideas  The patient is not nervous/anxious  All other systems reviewed and are negative          Past Medical History:   Diagnosis Date    Diabetes mellitus ([facility] )     H/O nicotine dependence     Hypertension     Prediabetes          Current Outpatient Medications:     ACCU-CHEK FASTCLIX LANCETS MISC, by Does not apply route 2 (two) times a day, Disp: 200 each, Rfl: 1    Acetaminophen (TYLENOL EXTRA STRENGTH PO), As needed, Disp: , Rfl:     Blood Glucose Monitoring Suppl (ACCU-CHEK CHELY PLUS) w/Device KIT, by Does not apply route 2 (two) times a day, Disp: 1 kit, Rfl: 0    glipiZIDE (GLUCOTROL XL) 10 mg 24 hr tablet, Take 1 tablet (10 mg total) by mouth daily, Disp: 90 tablet, Rfl: 1    glucose blood (ACCU-CHEK CHELY PLUS) test strip, 1 each by Other route 2 (two) times a day Use as instructed, Disp: 200 each, Rfl: 1    metFORMIN (GLUCOPHAGE) 1000 MG tablet, Take 0 5 tablets (500 mg total) by mouth 2 (two) times a day, Disp: 90 tablet, Rfl: 1    atorvastatin (LIPITOR) 40 mg tablet, Take 1 tablet (40 mg total) by mouth daily with dinner (Patient not taking: Reported on 3/20/2019), Disp: 30 tablet, Rfl: 0    calcium carbonate (OYSTER SHELL,OSCAL) 500 mg, Take 1 tablet by mouth daily with breakfast (Patient not taking: Reported on 3/20/2019), Disp: 30 tablet, Rfl: 0    cholecalciferol 2000 units TABS, Take 1 tablet (2,000 Units total) by mouth daily (Patient not taking: Reported on 3/20/2019), Disp: 30 tablet, Rfl: 0    lisinopril (ZESTRIL) 5 mg tablet, Take 1 tablet (5 mg total) by mouth daily, Disp: 30 tablet, Rfl: 0    magnesium hydroxide (MILK OF MAGNESIA) 400 mg/5 mL oral suspension, Take by mouth daily as needed for constipation, Disp: , Rfl:     Nutritional Supplements (GLUCERNA ADVANCE SHAKE) LIQD, Take 1 Bottle by mouth 2 (two) times daily after meals (Patient not taking: Reported on 3/20/2019), Disp: 24 Bottle, Rfl: 6    Omega-3 Fatty Acids (FISH OIL) 1,000 mg, Take 1,000 mg by mouth daily, Disp: , Rfl:     senna-docusate sodium (SENOKOT-S) 8 6-50 mg per tablet, Take 1 tablet by mouth as needed, Disp: , Rfl:     No Known Allergies    Social History   Past Surgical History:   Procedure Laterality Date    HIP SURGERY      NOSE SURGERY      [place] OPEN RX FEMUR FX+INTRAMED ADDISON Left 9/13/2018    Procedure: INSERTION NAIL IM FEMUR ANTEGRADE (TROCHANTERIC);   Surgeon: Cristiana Gusman MD;  [facility]; Service: Orthopedics     Family History   Problem Relation Age of Onset    Cancer Mother     No Known Problems Father     No Known Problems Sister     Lung cancer Brother     Lymphoma Family         acute       Objective:  /100 (BP Location: Left arm, Patient Position: Sitting, Cuff Size: Standard)   Pulse 78   Temp (!) 95 4 °F (35 2 °C) (Tympanic)   Ht 5' 10" (1 778 m)   Wt 69 5 kg (153 lb 3 2 oz)   SpO2 98%   BMI 21 98 kg/m²     No results found for this or any previous visit (from the past 1344 hour(s))  Physical Exam   Constitutional: He is oriented to person, place, and time  He appears well-developed and well-nourished  No distress  Thin, elderly gentleman  No acute distress  HENT:   Head: Normocephalic and atraumatic  Eyes: Pupils are equal, round, and reactive to light  Conjunctivae and EOM are normal  Right eye exhibits no discharge  Left eye exhibits no discharge  No scleral icterus  Neck: Normal range of motion  Neck supple  No JVD present  No thyromegaly present  Cardiovascular: Normal rate, regular rhythm, normal heart sounds and intact distal pulses  Exam reveals no gallop and no friction rub  No murmur heard  Pulmonary/Chest: Effort normal and breath sounds normal  No respiratory distress  He has no wheezes  He has no rales  He exhibits no tenderness  Abdominal: Soft  Bowel sounds are normal  He exhibits no distension and no mass  There is no tenderness  There is no rebound and no guarding  Musculoskeletal: Normal range of motion  He exhibits no edema, tenderness or deformity  Lymphadenopathy:     He has no cervical adenopathy  Neurological: He is alert and oriented to person, place, and time  He has normal reflexes  No cranial nerve deficit  Coordination normal    Skin: Skin is warm and dry  No rash noted  He is not diaphoretic  No erythema  No pallor  Psychiatric: He has a normal mood and affect   His behavior is normal  Judgment and thought content normal    Nursing note and vitals reviewed

## 2019-03-20 NOTE — ASSESSMENT & PLAN NOTE
Patient is not sure if he has been taking his Lipitor not, will get a repeat lipid panel  Recommend healthy lifestyle choices for your cholesterol  Low fat/low cholesterol diet  Limit/avoid red meat  Eat more lean meat - chicken breast, ground turkey, fish  Exercise 30 mins at least 3 times a week as tolerated

## 2019-03-26 NOTE — PROGRESS NOTES
Assessment/Plan:    Type 2 diabetes mellitus ([place] [address] )  Lab Results   Component Value Date    HGBA1C 6 2 03/20/2019     Patient's hemoglobin A1c much improved from 12 4-6 2, patient will continue with metformin 500 b i d  And glipizide 10 mg tablet daily  Patient is to continue to work on diet and exercise  Limit sugars and carbohydrate intake  Avoid soda, juice, sweets, cookies, desserts, pasta, bread    Eat more whole grains, exercised 30 min of cardio at least 3 times a week  Also recommended daily foot exams to check for sores, and recommended yearly eye exams  Essential hypertension  Patient's blood pressure improving 152/80, will increase patient's lisinopril from 5 mg to 10 mg  Continue to monitor blood pressure at home  Goal BP is < 130/80  Contact our office for consistent elevations  Recommend low sodium diet  Exercise 30 minutes three times a week as tolerated  Recommend yearly eye exam       Vitamin D deficiency  Patient's most recent vitamin-D 40 4, will continue vitamin-D supplementation  Hyperlipemia  Patient has not been taking his Lipitor however he has been taking fish oil, currently his cholesterol looks good except his LDL is elevated at 124  Recommend healthy lifestyle choices for your cholesterol  Low fat/low cholesterol diet  Limit/avoid red meat  Eat more lean meat - chicken breast, ground turkey, fish  Exercise 30 mins at least 3 times a week as tolerated  Cachectic ([place] [address] )     Body mass index is 22 44 kg/m²  Patient has been gaining weight steadily as he has been eating more as foods have been tasting better  Hyponatremia  Patient's sodium noted to be 134, recommended fluid restriction and increased salt in his diet as he has been restricting sodium  Will continue to monitor  Diagnoses and all orders for this visit:    Need for hepatitis C screening test  -     Hepatitis C antibody;  Future    Essential hypertension  -     lisinopril (ZESTRIL) 10 mg tablet; Take 1 tablet (10 mg total) by mouth daily  -     Comprehensive metabolic panel; Future    Type 2 diabetes mellitus with hyperglycemia, without long-term current use of insulin (H[facility])  -     Hemoglobin A1C    Screening for colon cancer  -     Cologuard; Future    Vitamin D deficiency    Hyperlipidemia, unspecified hyperlipidemia type    Cachectic (HCC)    Hyponatremia          Subjective:      Patient ID: Dariana Olivares is a 75 y o  male  Patient presents today for follow-up on diabetes type 2, hypertension, hyperlipidemia, and to review blood work  Patient also reports that he has not been taking his Lipitor  Male screenings:  Colonoscopy- refuses to get one done, ordered cologuard while in office today  Hep C-will get with next blood-work  Eye Doctor- wears glasses, has not been  Dentist-has not gone  Podiatrist-does not go    Flu vaccination- does not get flu vaccination    Tobacco abuse- Quit in 84    Diabetes   He presents for his initial diabetic visit  He has type 2 diabetes mellitus  No MedicAlert identification noted  His disease course has been fluctuating  There are no hypoglycemic associated symptoms  Pertinent negatives for hypoglycemia include no dizziness, headaches or nervousness/anxiousness  Associated symptoms include weight loss  Pertinent negatives for diabetes include no chest pain, no fatigue, no polydipsia, no polyphagia, no polyuria and no weakness  There are no hypoglycemic complications  Symptoms are worsening  Risk factors for coronary artery disease include diabetes mellitus, dyslipidemia and male sex  Current diabetic treatments:  patient currently taking metformin 500 mg twice a day, and glipizide 10 mg  Compliance with diabetes treatment:  unsure of treatment compliance as patient seems forgetful however he did write his medications down prior to his office visit today  Weight trend:  patient is very thin  He rarely participates in exercise   ( Patient states that his sugars have been ranging from ) He does not see a podiatrist Eye exam is not current  Hypertension   Pertinent negatives include no chest pain, headaches, palpitations or shortness of breath  The following portions of the patient's history were reviewed and updated as appropriate: allergies, current medications, past family history, past medical history, past social history, past surgical history and problem list     Review of Systems   Constitutional: Positive for weight loss  Negative for activity change, appetite change, chills, diaphoresis, fatigue and fever  Patient has been gaining weight   HENT: Negative for congestion, postnasal drip, rhinorrhea, sinus pressure, sinus pain, sneezing and sore throat  Eyes: Negative for visual disturbance  Respiratory: Negative for apnea, cough, choking, chest tightness, shortness of breath and wheezing  Cardiovascular: Negative for chest pain, palpitations and leg swelling  Gastrointestinal: Negative for abdominal distention, abdominal pain, anal bleeding, blood in stool, constipation, diarrhea, nausea and vomiting  Endocrine: Negative for cold intolerance, heat intolerance, polydipsia, polyphagia and polyuria  Genitourinary: Negative for difficulty urinating, dysuria and hematuria  Musculoskeletal: Negative  Skin: Negative  Neurological: Negative for dizziness, weakness, light-headedness, numbness and headaches  Hematological: Negative for adenopathy  Psychiatric/Behavioral: Negative for agitation, sleep disturbance and suicidal ideas  The patient is not nervous/anxious  All other systems reviewed and are negative          Past Medical History:   Diagnosis Date    Diabetes mellitus ([facility] )     H/O nicotine dependence     Hypertension     Prediabetes          Current Outpatient Medications:     ACCU-CHEK FASTCLIX LANCETS MISC, by Does not apply route 2 (two) times a day, Disp: 200 each, Rfl: 1    Acetaminophen (TYLENOL EXTRA STRENGTH PO), As needed, Disp: , Rfl:     Blood Glucose Monitoring Suppl (ACCU-CHEK CHELY PLUS) w/Device KIT, by Does not apply route 2 (two) times a day, Disp: 1 kit, Rfl: 0    cholecalciferol 2000 units TABS, Take 1 tablet (2,000 Units total) by mouth daily, Disp: 30 tablet, Rfl: 0    glipiZIDE (GLUCOTROL XL) 10 mg 24 hr tablet, Take 1 tablet (10 mg total) by mouth daily, Disp: 90 tablet, Rfl: 1    glucose blood (ACCU-CHEK CHELY PLUS) test strip, 1 each by Other route 2 (two) times a day Use as instructed, Disp: 200 each, Rfl: 1    lisinopril (ZESTRIL) 10 mg tablet, Take 1 tablet (10 mg total) by mouth daily, Disp: 30 tablet, Rfl: 1    metFORMIN (GLUCOPHAGE) 1000 MG tablet, Take 0 5 tablets (500 mg total) by mouth 2 (two) times a day, Disp: 90 tablet, Rfl: 1    Omega-3 Fatty Acids (FISH OIL) 1,000 mg, Take 1,000 mg by mouth daily, Disp: , Rfl:     senna-docusate sodium (SENOKOT-S) 8 6-50 mg per tablet, Take 1 tablet by mouth as needed, Disp: , Rfl:     calcium carbonate (OYSTER SHELL,OSCAL) 500 mg, Take 1 tablet by mouth daily with breakfast (Patient not taking: Reported on 3/20/2019), Disp: 30 tablet, Rfl: 0    magnesium hydroxide (MILK OF MAGNESIA) 400 mg/5 mL oral suspension, Take by mouth daily as needed for constipation, Disp: , Rfl:     Nutritional Supplements (GLUCERNA ADVANCE SHAKE) LIQD, Take 1 Bottle by mouth 2 (two) times daily after meals (Patient not taking: Reported on 3/20/2019), Disp: 24 Bottle, Rfl: 6    No Known Allergies    Social History   Past Surgical History:   Procedure Laterality Date    HIP SURGERY      NOSE SURGERY      [place] OPEN RX FEMUR FX+INTRAMED ADDISON Left 9/13/2018    Procedure: INSERTION NAIL IM FEMUR ANTEGRADE (TROCHANTERIC);   Surgeon: Tanner Branham MD;  Location[facility];  Service: Orthopedics     Family History   Problem Relation Age of Onset    Cancer Mother     No Known Problems Father     No Known Problems Sister     Lung cancer Brother     Lymphoma Family         acute       Objective:  /80 (BP Location: Left arm, Patient Position: Sitting, Cuff Size: Adult)   Pulse 72   Temp 97 9 °F (36 6 °C) (Oral)   Ht 5' 10" (1 778 m)   Wt 70 9 kg (156 lb 6 4 oz)   SpO2 98%   BMI 22 44 kg/m²     Recent Results (from the past 1344 hour(s))   TSH, 3rd generation with Free T4 reflex    Collection Time: 03/20/19 11:30 AM   Result Value Ref Range    TSH 3RD GENERATON 1 400 0 358 - 3 740 uIU/mL   CK    Collection Time: 03/20/19 11:30 AM   Result Value Ref Range    Total CK 98 39 - 308 U/L   Vitamin D 25 hydroxy    Collection Time: 03/20/19 11:30 AM   Result Value Ref Range    Vit D, 25-Hydroxy 48 4 30 0 - 100 0 ng/mL   Lipid panel    Collection Time: 03/20/19 11:30 AM   Result Value Ref Range    Cholesterol 200 50 - 200 mg/dL    Triglycerides 80 <=150 mg/dL    HDL, Direct 60 40 - 60 mg/dL    LDL Calculated 124 (H) 0 - 100 mg/dL    Non-HDL-Chol (CHOL-HDL) 140 mg/dl   Hemoglobin A1C    Collection Time: 03/20/19 11:30 AM   Result Value Ref Range    Hemoglobin A1C 6 2 4 2 - 6 3 %     mg/dl   Comprehensive metabolic panel    Collection Time: 03/20/19 11:30 AM   Result Value Ref Range    Sodium 134 (L) 136 - 145 mmol/L    Potassium 4 3 3 5 - 5 3 mmol/L    Chloride 102 100 - 108 mmol/L    CO2 25 21 - 32 mmol/L    ANION GAP 7 4 - 13 mmol/L    BUN 13 5 - 25 mg/dL    Creatinine 0 68 0 60 - 1 30 mg/dL    Glucose, Fasting 135 (H) 65 - 99 mg/dL    Calcium 9 1 8 3 - 10 1 mg/dL    AST 14 5 - 45 U/L    ALT 17 12 - 78 U/L    Alkaline Phosphatase 46 46 - 116 U/L    Total Protein 7 0 6 4 - 8 2 g/dL    Albumin 4 1 3 5 - 5 0 g/dL    Total Bilirubin 0 79 0 20 - 1 00 mg/dL    eGFR 100 ml/min/1 73sq m   CBC    Collection Time: 03/20/19 11:30 AM   Result Value Ref Range    WBC 4 82 4 31 - 10 16 Thousand/uL    RBC 4 50 3 88 - 5 62 Million/uL    Hemoglobin 13 8 12 0 - 17 0 g/dL    Hematocrit 39 6 36 5 - 49 3 %    MCV 88 82 - 98 fL    MCH 30 7 26 8 - 34 3 pg    MCHC 34 8 31 4 - 37 4 g/dL RDW 12 5 11 6 - 15 1 %    Platelets 644 345 - 570 Thousands/uL    MPV 9 5 8 9 - 12 7 fL   Microalbumin / creatinine urine ratio    Collection Time: 03/20/19 11:30 AM   Result Value Ref Range    Creatinine, Ur 31 1 mg/dL    Microalbum  ,U,Random 7 6 0 0 - 20 0 mg/L    Microalb Creat Ratio 24 0 - 30 mg/g creatinine            Physical Exam   Constitutional: He is oriented to person, place, and time  He appears well-developed and well-nourished  No distress  Thin, elderly gentleman  No acute distress  HENT:   Head: Normocephalic and atraumatic  Eyes: Pupils are equal, round, and reactive to light  Conjunctivae and EOM are normal  Right eye exhibits no discharge  Left eye exhibits no discharge  No scleral icterus  Neck: Normal range of motion  Neck supple  No JVD present  No thyromegaly present  Cardiovascular: Normal rate, regular rhythm, normal heart sounds and intact distal pulses  Exam reveals no gallop and no friction rub  No murmur heard  Pulmonary/Chest: Effort normal and breath sounds normal  No respiratory distress  He has no wheezes  He has no rales  He exhibits no tenderness  Abdominal: Soft  Bowel sounds are normal  He exhibits no distension and no mass  There is no tenderness  There is no rebound and no guarding  Musculoskeletal: Normal range of motion  He exhibits no edema, tenderness or deformity  Lymphadenopathy:     He has no cervical adenopathy  Neurological: He is alert and oriented to person, place, and time  He has normal reflexes  No cranial nerve deficit  Coordination normal    Skin: Skin is warm and dry  No rash noted  He is not diaphoretic  No erythema  No pallor  Psychiatric: He has a normal mood and affect  His behavior is normal  Judgment and thought content normal    Nursing note and vitals reviewed

## 2019-03-27 ENCOUNTER — OFFICE VISIT (OUTPATIENT)
Dept: INTERNAL MEDICINE CLINIC | Facility: CLINIC | Age: 64
End: 2019-03-27
Payer: COMMERCIAL

## 2019-03-27 VITALS
OXYGEN SATURATION: 98 % | BODY MASS INDEX: 22.39 KG/M2 | TEMPERATURE: 97.9 F | DIASTOLIC BLOOD PRESSURE: 80 MMHG | WEIGHT: 156.4 LBS | SYSTOLIC BLOOD PRESSURE: 152 MMHG | HEIGHT: 70 IN | HEART RATE: 72 BPM

## 2019-03-27 DIAGNOSIS — Z11.59 NEED FOR HEPATITIS C SCREENING TEST: Primary | ICD-10-CM

## 2019-03-27 DIAGNOSIS — I10 ESSENTIAL HYPERTENSION: ICD-10-CM

## 2019-03-27 DIAGNOSIS — E11.65 TYPE 2 DIABETES MELLITUS WITH HYPERGLYCEMIA, WITHOUT LONG-TERM CURRENT USE OF INSULIN (HCC): ICD-10-CM

## 2019-03-27 DIAGNOSIS — R64 CACHECTIC (HCC): ICD-10-CM

## 2019-03-27 DIAGNOSIS — E87.1 HYPONATREMIA: ICD-10-CM

## 2019-03-27 DIAGNOSIS — E55.9 VITAMIN D DEFICIENCY: ICD-10-CM

## 2019-03-27 DIAGNOSIS — Z12.11 SCREENING FOR COLON CANCER: ICD-10-CM

## 2019-03-27 DIAGNOSIS — E78.5 HYPERLIPIDEMIA, UNSPECIFIED HYPERLIPIDEMIA TYPE: ICD-10-CM

## 2019-03-27 PROCEDURE — 99213 OFFICE O/P EST LOW 20 MIN: CPT | Performed by: NURSE PRACTITIONER

## 2019-03-27 PROCEDURE — 1036F TOBACCO NON-USER: CPT | Performed by: NURSE PRACTITIONER

## 2019-03-27 PROCEDURE — 1160F RVW MEDS BY RX/DR IN RCRD: CPT | Performed by: NURSE PRACTITIONER

## 2019-03-27 PROCEDURE — 3008F BODY MASS INDEX DOCD: CPT | Performed by: NURSE PRACTITIONER

## 2019-03-27 RX ORDER — LISINOPRIL 10 MG/1
10 TABLET ORAL DAILY
Qty: 30 TABLET | Refills: 1 | Status: SHIPPED | OUTPATIENT
Start: 2019-03-27 | End: 2019-05-24 | Stop reason: SDUPTHER

## 2019-03-27 NOTE — ASSESSMENT & PLAN NOTE
Patient's sodium noted to be 134, recommended fluid restriction and increased salt in his diet as he has been restricting sodium  Will continue to monitor

## 2019-03-27 NOTE — ASSESSMENT & PLAN NOTE
Lab Results   Component Value Date    HGBA1C 6 2 03/20/2019     Patient's hemoglobin A1c much improved from 12 4-6 2, patient will continue with metformin 500 b i d  And glipizide 10 mg tablet daily  Patient is to continue to work on diet and exercise  Limit sugars and carbohydrate intake  Avoid soda, juice, sweets, cookies, desserts, pasta, bread    Eat more whole grains, exercised 30 min of cardio at least 3 times a week  Also recommended daily foot exams to check for sores, and recommended yearly eye exams

## 2019-03-27 NOTE — ASSESSMENT & PLAN NOTE
Patient has not been taking his Lipitor however he has been taking fish oil, currently his cholesterol looks good except his LDL is elevated at 124  Recommend healthy lifestyle choices for your cholesterol  Low fat/low cholesterol diet  Limit/avoid red meat  Eat more lean meat - chicken breast, ground turkey, fish  Exercise 30 mins at least 3 times a week as tolerated

## 2019-03-27 NOTE — ASSESSMENT & PLAN NOTE
Body mass index is 22 44 kg/m²  Patient has been gaining weight steadily as he has been eating more as foods have been tasting better

## 2019-03-27 NOTE — ASSESSMENT & PLAN NOTE
Patient's blood pressure improving 152/80, will increase patient's lisinopril from 5 mg to 10 mg  Continue to monitor blood pressure at home  Goal BP is < 130/80  Contact our office for consistent elevations  Recommend low sodium diet  Exercise 30 minutes three times a week as tolerated    Recommend yearly eye exam

## 2019-05-24 DIAGNOSIS — I10 ESSENTIAL HYPERTENSION: ICD-10-CM

## 2019-05-24 RX ORDER — LISINOPRIL 10 MG/1
10 TABLET ORAL DAILY
Qty: 90 TABLET | Refills: 1 | Status: SHIPPED | OUTPATIENT
Start: 2019-05-24 | End: 2019-06-27 | Stop reason: SDUPTHER

## 2019-06-27 ENCOUNTER — OFFICE VISIT (OUTPATIENT)
Dept: INTERNAL MEDICINE CLINIC | Facility: CLINIC | Age: 64
End: 2019-06-27
Payer: COMMERCIAL

## 2019-06-27 VITALS
DIASTOLIC BLOOD PRESSURE: 82 MMHG | HEIGHT: 70 IN | SYSTOLIC BLOOD PRESSURE: 144 MMHG | OXYGEN SATURATION: 98 % | BODY MASS INDEX: 22.54 KG/M2 | TEMPERATURE: 97.6 F | HEART RATE: 68 BPM | WEIGHT: 157.4 LBS

## 2019-06-27 DIAGNOSIS — Z23 NEED FOR PNEUMOCOCCAL VACCINATION: Primary | ICD-10-CM

## 2019-06-27 DIAGNOSIS — E11.65 TYPE 2 DIABETES MELLITUS WITH HYPERGLYCEMIA, WITHOUT LONG-TERM CURRENT USE OF INSULIN (HCC): ICD-10-CM

## 2019-06-27 DIAGNOSIS — E55.9 VITAMIN D DEFICIENCY: ICD-10-CM

## 2019-06-27 DIAGNOSIS — E78.5 HYPERLIPIDEMIA, UNSPECIFIED HYPERLIPIDEMIA TYPE: ICD-10-CM

## 2019-06-27 DIAGNOSIS — M81.0 OSTEOPOROSIS WITHOUT CURRENT PATHOLOGICAL FRACTURE, UNSPECIFIED OSTEOPOROSIS TYPE: ICD-10-CM

## 2019-06-27 DIAGNOSIS — I10 ESSENTIAL HYPERTENSION: ICD-10-CM

## 2019-06-27 DIAGNOSIS — R64 CACHECTIC (HCC): ICD-10-CM

## 2019-06-27 PROBLEM — Z48.89 AFTERCARE FOLLOWING SURGERY: Status: RESOLVED | Noted: 2018-10-04 | Resolved: 2019-06-27

## 2019-06-27 PROCEDURE — 4010F ACE/ARB THERAPY RXD/TAKEN: CPT | Performed by: NURSE PRACTITIONER

## 2019-06-27 PROCEDURE — 4040F PNEUMOC VAC/ADMIN/RCVD: CPT | Performed by: NURSE PRACTITIONER

## 2019-06-27 PROCEDURE — 1160F RVW MEDS BY RX/DR IN RCRD: CPT | Performed by: NURSE PRACTITIONER

## 2019-06-27 PROCEDURE — 90670 PCV13 VACCINE IM: CPT | Performed by: NURSE PRACTITIONER

## 2019-06-27 PROCEDURE — 1036F TOBACCO NON-USER: CPT | Performed by: NURSE PRACTITIONER

## 2019-06-27 PROCEDURE — 3008F BODY MASS INDEX DOCD: CPT | Performed by: NURSE PRACTITIONER

## 2019-06-27 PROCEDURE — 99214 OFFICE O/P EST MOD 30 MIN: CPT | Performed by: NURSE PRACTITIONER

## 2019-06-27 PROCEDURE — G0009 ADMIN PNEUMOCOCCAL VACCINE: HCPCS | Performed by: NURSE PRACTITIONER

## 2019-06-27 RX ORDER — LISINOPRIL 10 MG/1
10 TABLET ORAL DAILY
Qty: 90 TABLET | Refills: 1 | Status: SHIPPED | OUTPATIENT
Start: 2019-06-27 | End: 2020-02-13 | Stop reason: SDUPTHER

## 2019-06-27 RX ORDER — GLIPIZIDE 10 MG/1
10 TABLET, FILM COATED, EXTENDED RELEASE ORAL DAILY
Qty: 90 TABLET | Refills: 1 | Status: SHIPPED | OUTPATIENT
Start: 2019-06-27 | End: 2020-03-24 | Stop reason: SDUPTHER

## 2019-10-01 NOTE — PROGRESS NOTES
Assessment/Plan:    Type 2 diabetes mellitus ([facility] )  Lab Results   Component Value Date    HGBA1C 6 2 03/20/2019     Patient's hemoglobin A1c much improved from 12 4-6 2, patient will continue with metformin 500 b i d  And glipizide 10 mg tablet daily  Patient is to continue to work on diet and exercise  Limit sugars and carbohydrate intake  Avoid soda, juice, sweets, cookies, desserts, pasta, bread    Eat more whole grains, exercised 30 min of cardio at least 3 times a week  Also recommended daily foot exams to check for sores, and recommended yearly eye exams  Patient needs updated blood work and eye exam      Essential hypertension  Patient's blood pressure currently well controlled at 130/82, patient will continue with lisinopril 10 milligram tablet daily  Continue current regimen -   Continue to monitor blood pressure at home  Goal BP is < 130/80  Contact our office for consistent elevations  Recommend low sodium diet  Exercise 30 minutes three times a week as tolerated  Recommend yearly eye exam       Osteoporosis  DEXA scan from 10/2018 shows a T-score of -2 1  Continue with calcium 1200 mg and vitamin-D 2000 units daily supplementation  Vitamin D deficiency  Patient's most recent vitamin-D 40 4, will continue vitamin-D supplementation  Hyperlipemia  Patient is not sure if he has been taking his Lipitor not, will get a repeat lipid panel  Recommend healthy lifestyle choices for your cholesterol  Low fat/low cholesterol diet  Limit/avoid red meat  Eat more lean meat - chicken breast, ground turkey, fish  Exercise 30 mins at least 3 times a week as tolerated  Will get updated lipid panel  BMI Counseling: Body mass index is 25 73 kg/m²  The BMI is above normal  No BMI follow-up plan is appropriate  Patient is 70 years old and weight reduction/weight gain would further complicate their underlying nutritional deficiency (vitamin or mineral deficiency)     Diagnoses and all orders for this visit:    Type 2 diabetes mellitus with hyperglycemia, without long-term current use of insulin ([facility] )  -     Ambulatory referral to Ophthalmology; Future  -     Hemoglobin A1C    Type 2 diabetes mellitus with hyperosmolarity without coma, with long-term current use of insulin ([facility])    Hyperlipidemia, unspecified hyperlipidemia type  -     Lipid panel    Essential hypertension  -     CBC and differential  -     Comprehensive metabolic panel; Future    Need for hepatitis C screening test  -     Hepatitis C antibody    Osteoporosis without current pathological fracture, unspecified osteoporosis type    Vitamin D deficiency    Medicare annual wellness visit, subsequent    Flu vaccine need  -     influenza vaccine, [id number], high-dose, PF 0 5 mL (FLUZONE HIGH-DOSE)          Subjective:      Patient ID: Daniel Shin is a 75 y o  male  Patient presents today for follow-up on diabetes type 2, hypertension, hyperlipidemia, patient did not get blood work done prior to office visit today  Patient has gained weight which is wonderful  Patient's blood sugars have been ranging from   Patient has no new concerns  Male screenings:  Colonoscopy- refuses to get one done, ordered cologuard while in office today  Hep C-will get with next blood-work  Eye Doctor- wears glasses, has not been  Dentist-has not gone  Podiatrist-does not go    Flu vaccination- patient will get flu vaccination while in office today    Tobacco abuse- Quit in 84    Diabetes   He presents for his initial diabetic visit  He has type 2 diabetes mellitus  No MedicAlert identification noted  His disease course has been fluctuating  There are no hypoglycemic associated symptoms  Pertinent negatives for hypoglycemia include no dizziness, headaches or nervousness/anxiousness  Pertinent negatives for diabetes include no chest pain, no fatigue, no polydipsia, no polyphagia, no polyuria, no weakness and no weight loss   There are no hypoglycemic complications  Symptoms are worsening  Risk factors for coronary artery disease include diabetes mellitus, dyslipidemia and male sex  Current diabetic treatments:  patient currently taking metformin 500 mg twice a day, and glipizide 10 mg  Compliance with diabetes treatment:  unsure of treatment compliance as patient seems forgetful however he did write his medications down prior to his office visit today  Weight trend:  patient is very thin  He rarely participates in exercise  He does not see a podiatrist Eye exam is not current  Hypertension   This is a chronic problem  The current episode started more than 1 year ago  The problem has been gradually worsening since onset  The problem is controlled  Pertinent negatives include no anxiety, chest pain, headaches, neck pain, palpitations or shortness of breath  The following portions of the patient's history were reviewed and updated as appropriate: allergies, current medications, past family history, past medical history, past social history, past surgical history and problem list     Review of Systems   Constitutional: Negative for activity change, appetite change, chills, diaphoresis, fatigue, fever and weight loss  HENT: Negative for congestion, ear discharge, ear pain, postnasal drip, rhinorrhea, sinus pressure, sinus pain and sore throat  Eyes: Negative for pain, discharge, itching and visual disturbance  Respiratory: Negative for cough, chest tightness, shortness of breath and wheezing  Cardiovascular: Negative for chest pain, palpitations and leg swelling  Gastrointestinal: Negative for abdominal pain, constipation, diarrhea, nausea and vomiting  Endocrine: Negative for polydipsia, polyphagia and polyuria  Genitourinary: Negative for difficulty urinating, dysuria and urgency  Musculoskeletal: Negative for arthralgias, back pain and neck pain  Skin: Negative for rash and wound     Neurological: Negative for dizziness, weakness, numbness and headaches  Psychiatric/Behavioral: The patient is not nervous/anxious  Past Medical History:   Diagnosis Date    Diabetes mellitus ([facility] )     H/O nicotine dependence     Hypertension     Prediabetes          Current Outpatient Medications:     ACCU-CHEK FASTCLIX LANCETS MISC, by Does not apply route 2 (two) times a day, Disp: 200 each, Rfl: 1    Blood Glucose Monitoring Suppl (ACCU-CHEK CHELY PLUS) w/Device KIT, by Does not apply route 2 (two) times a day, Disp: 1 kit, Rfl: 0    cholecalciferol 2000 units TABS, Take 1 tablet (2,000 Units total) by mouth daily, Disp: 30 tablet, Rfl: 0    glipiZIDE (GLUCOTROL XL) 10 mg 24 hr tablet, Take 1 tablet (10 mg total) by mouth daily, Disp: 90 tablet, Rfl: 1    glucose blood (ACCU-CHEK CHELY PLUS) test strip, 1 each by Other route 2 (two) times a day Use as instructed, Disp: 200 each, Rfl: 1    lisinopril (ZESTRIL) 10 mg tablet, Take 1 tablet (10 mg total) by mouth daily, Disp: 90 tablet, Rfl: 1    metFORMIN (GLUCOPHAGE) 1000 MG tablet, Take 0 5 tablets (500 mg total) by mouth 2 (two) times a day, Disp: 180 tablet, Rfl: 0    Omega-3 Fatty Acids (FISH OIL) 1,000 mg, Take 1,000 mg by mouth daily, Disp: , Rfl:     Acetaminophen (TYLENOL EXTRA STRENGTH PO), As needed, Disp: , Rfl:     magnesium hydroxide (MILK OF MAGNESIA) 400 mg/5 mL oral suspension, Take by mouth daily as needed for constipation, Disp: , Rfl:     senna-docusate sodium (SENOKOT-S) 8 6-50 mg per tablet, Take 1 tablet by mouth as needed, Disp: , Rfl:     No Known Allergies    Social History   Past Surgical History:   Procedure Laterality Date    HIP SURGERY      NOSE SURGERY      [place] OPEN RX FEMUR FX+INTRAMED ADDISON Left 9/13/2018    Procedure: INSERTION NAIL IM FEMUR ANTEGRADE (TROCHANTERIC);   Surgeon: Riley Mendoza MD;  Location: [facility];  Service: Orthopedics     Family History   Problem Relation Age of Onset    Cancer Mother     No Known Problems Father     No Known Problems Sister     Lung cancer Brother     Lymphoma Family         acute       Objective:  /82 (BP Location: Left arm, Patient Position: Sitting, Cuff Size: Standard)   Pulse 81   Temp 97 8 °F (36 6 °C) (Oral)   Ht 5' 8 11" (1 73 m)   Wt 77 kg (169 lb 12 8 oz)   SpO2 98%   BMI 25 73 kg/m²     No results found for this or any previous visit (from the past 1344 hour(s))  Physical Exam   Constitutional: He is oriented to person, place, and time  He appears well-developed and well-nourished  No distress  Thin, elderly gentleman  No acute distress  HENT:   Head: Normocephalic and atraumatic  Eyes: Pupils are equal, round, and reactive to light  Conjunctivae and EOM are normal  Right eye exhibits no discharge  Left eye exhibits no discharge  No scleral icterus  Neck: Normal range of motion  Neck supple  No JVD present  No thyromegaly present  Cardiovascular: Normal rate, regular rhythm, normal heart sounds and intact distal pulses  Exam reveals no gallop and no friction rub  No murmur heard  Pulmonary/Chest: Effort normal and breath sounds normal  No respiratory distress  He has no wheezes  He has no rales  He exhibits no tenderness  Abdominal: Soft  Bowel sounds are normal  He exhibits no distension and no mass  There is no tenderness  There is no rebound and no guarding  Musculoskeletal: Normal range of motion  He exhibits no edema, tenderness or deformity  Lymphadenopathy:     He has no cervical adenopathy  Neurological: He is alert and oriented to person, place, and time  He has normal reflexes  No cranial nerve deficit  Coordination normal    Skin: Skin is warm and dry  No rash noted  He is not diaphoretic  No erythema  No pallor  Psychiatric: He has a normal mood and affect  His behavior is normal  Judgment and thought content normal    Nursing note and vitals reviewed

## 2019-10-02 ENCOUNTER — OFFICE VISIT (OUTPATIENT)
Dept: INTERNAL MEDICINE CLINIC | Facility: CLINIC | Age: 64
End: 2019-10-02
Payer: COMMERCIAL

## 2019-10-02 VITALS
HEART RATE: 81 BPM | TEMPERATURE: 97.8 F | OXYGEN SATURATION: 98 % | HEIGHT: 68 IN | SYSTOLIC BLOOD PRESSURE: 130 MMHG | DIASTOLIC BLOOD PRESSURE: 82 MMHG | BODY MASS INDEX: 25.73 KG/M2 | WEIGHT: 169.8 LBS

## 2019-10-02 DIAGNOSIS — E11.65 TYPE 2 DIABETES MELLITUS WITH HYPERGLYCEMIA, WITHOUT LONG-TERM CURRENT USE OF INSULIN (HCC): Primary | ICD-10-CM

## 2019-10-02 DIAGNOSIS — I10 ESSENTIAL HYPERTENSION: ICD-10-CM

## 2019-10-02 DIAGNOSIS — Z00.00 MEDICARE ANNUAL WELLNESS VISIT, SUBSEQUENT: ICD-10-CM

## 2019-10-02 DIAGNOSIS — M81.0 OSTEOPOROSIS WITHOUT CURRENT PATHOLOGICAL FRACTURE, UNSPECIFIED OSTEOPOROSIS TYPE: ICD-10-CM

## 2019-10-02 DIAGNOSIS — E55.9 VITAMIN D DEFICIENCY: ICD-10-CM

## 2019-10-02 DIAGNOSIS — E78.5 HYPERLIPIDEMIA, UNSPECIFIED HYPERLIPIDEMIA TYPE: ICD-10-CM

## 2019-10-02 DIAGNOSIS — Z23 FLU VACCINE NEED: ICD-10-CM

## 2019-10-02 DIAGNOSIS — Z11.59 NEED FOR HEPATITIS C SCREENING TEST: ICD-10-CM

## 2019-10-02 PROCEDURE — 3079F DIAST BP 80-89 MM HG: CPT | Performed by: NURSE PRACTITIONER

## 2019-10-02 PROCEDURE — 99213 OFFICE O/P EST LOW 20 MIN: CPT | Performed by: NURSE PRACTITIONER

## 2019-10-02 PROCEDURE — 1125F AMNT PAIN NOTED PAIN PRSNT: CPT | Performed by: NURSE PRACTITIONER

## 2019-10-02 PROCEDURE — G0008 ADMIN INFLUENZA VIRUS VAC: HCPCS

## 2019-10-02 PROCEDURE — 3075F SYST BP GE 130 - 139MM HG: CPT | Performed by: NURSE PRACTITIONER

## 2019-10-02 PROCEDURE — G0439 PPPS, SUBSEQ VISIT: HCPCS | Performed by: NURSE PRACTITIONER

## 2019-10-02 PROCEDURE — 1170F FXNL STATUS ASSESSED: CPT | Performed by: NURSE PRACTITIONER

## 2019-10-02 PROCEDURE — 3008F BODY MASS INDEX DOCD: CPT | Performed by: NURSE PRACTITIONER

## 2019-10-02 PROCEDURE — 90662 IIV NO PRSV INCREASED AG IM: CPT

## 2019-10-02 NOTE — ASSESSMENT & PLAN NOTE
Patient is not sure if he has been taking his Lipitor not, will get a repeat lipid panel  Recommend healthy lifestyle choices for your cholesterol  Low fat/low cholesterol diet  Limit/avoid red meat  Eat more lean meat - chicken breast, ground turkey, fish  Exercise 30 mins at least 3 times a week as tolerated  Will get updated lipid panel

## 2019-10-02 NOTE — PROGRESS NOTES
Assessment and Plan:     Problem List Items Addressed This Visit        Endocrine    Type 2 diabetes mellitus ([facility] [address] ) - Primary     Lab Results   Component Value Date    HGBA1C 6 2 03/20/2019     Patient's hemoglobin A1c much improved from 12 4-6 2, patient will continue with metformin 500 b i d  And glipizide 10 mg tablet daily  Patient is to continue to work on diet and exercise  Limit sugars and carbohydrate intake  Avoid soda, juice, sweets, cookies, desserts, pasta, bread    Eat more whole grains, exercised 30 min of cardio at least 3 times a week  Also recommended daily foot exams to check for sores, and recommended yearly eye exams  Patient needs updated blood work and eye exam           Relevant Orders    Ambulatory referral to Ophthalmology    Hemoglobin A1C       Cardiovascular and Mediastinum    Essential hypertension     Patient's blood pressure currently well controlled at 130/82, patient will continue with lisinopril 10 milligram tablet daily  Continue current regimen -   Continue to monitor blood pressure at home  Goal BP is < 130/80  Contact our office for consistent elevations  Recommend low sodium diet  Exercise 30 minutes three times a week as tolerated  Recommend yearly eye exam            Relevant Orders    CBC and differential    Comprehensive metabolic panel       Musculoskeletal and Integument    Osteoporosis     DEXA scan from 10/2018 shows a T-score of -2 1  Continue with calcium 1200 mg and vitamin-D 2000 units daily supplementation  Other    Vitamin D deficiency     Patient's most recent vitamin-D 40 4, will continue vitamin-D supplementation  Hyperlipemia     Patient is not sure if he has been taking his Lipitor not, will get a repeat lipid panel  Recommend healthy lifestyle choices for your cholesterol  Low fat/low cholesterol diet  Limit/avoid red meat  Eat more lean meat - chicken breast, ground turkey, fish    Exercise 30 mins at least 3 times a week as tolerated  Will get updated lipid panel  Relevant Orders    Lipid panel      Other Visit Diagnoses     Need for hepatitis C screening test        Relevant Orders    Hepatitis C antibody    Medicare annual wellness visit, subsequent        Flu vaccine need        Relevant Orders    influenza vaccine, [id number], high-dose, PF 0 5 mL (FLUZONE HIGH-DOSE) (Completed)           Preventive health issues were discussed with patient, and age appropriate screening tests were ordered as noted in patient's After Visit Summary  Personalized health advice and appropriate referrals for health education or preventive services given if needed, as noted in patient's After Visit Summary  History of Present Illness:     Patient presents for Medicare Annual Wellness visit    Patient Care Team:  Sara Myers MD as PCP - General (Internal Medicine)  Vinicio Blank MD as PCP - Endocrinology (Endocrinology)     Problem List:     Patient Active Problem List   Diagnosis    Type 2 diabetes mellitus ([facility] )    Essential hypertension    Hyponatremia    Accident due to mechanical fall without injury    Cachectic ([facility] )    Osteoporosis    Low testosterone    Vitamin D deficiency    S/P ORIF (open reduction internal fixation) fracture    Hyperlipemia    Left lumbar radiculopathy    Left hip pain      Past Medical and Surgical History:     Past Medical History:   Diagnosis Date    Diabetes mellitus ([address] )     H/O nicotine dependence     Hypertension     Prediabetes      Past Surgical History:   Procedure Laterality Date    HIP SURGERY      NOSE SURGERY      [place] OPEN RX FEMUR FX+INTRAMED ADDISON Left 9/13/2018    Procedure: INSERTION NAIL IM FEMUR ANTEGRADE (TROCHANTERIC);   Surgeon: Fabienne Luna MD;  Location:  MAIN OR;  Service: Orthopedics      Family History:     Family History   Problem Relation Age of Onset    Cancer Mother     No Known Problems Father     No Known Problems Sister     Lung cancer Brother  Lymphoma Family         acute      Social History:     Social History     Socioeconomic History    Marital status: Single     Spouse name: None    Number of children: None    Years of education: None    Highest education level: None   Occupational History    Occupation:    Social Needs    Financial resource strain: None    Food insecurity:     Worry: None     Inability: None    Transportation needs:     Medical: None     Non-medical: None   Tobacco Use    Smoking status: Former Smoker     Packs/day: 2 00     Years: 15 00     Pack years: 30 00     Types: Cigarettes     Last attempt to quit: 1987     Years since quittin 0    Smokeless tobacco: Never Used   Substance and Sexual Activity    Alcohol use: No    Drug use: No    Sexual activity: None   Lifestyle    Physical activity:     Days per week: None     Minutes per session: None    Stress: None   Relationships    Social connections:     Talks on phone: None     Gets together: None     Attends [employer] service: None     Active member of club or organization: None     Attends meetings of clubs or organizations: None     Relationship status: None    Intimate partner violence:     Fear of current or ex partner: None     Emotionally abused: None     Physically abused: None     Forced sexual activity: None   Other Topics Concern    None   Social History Narrative    Denied history of exercise habits    Denied history of travel        Medications and Allergies:     Current Outpatient Medications   Medication Sig Dispense Refill    ACCU-CHEK FASTCLIX LANCETS MISC by Does not apply route 2 (two) times a day 200 each 1    Blood Glucose Monitoring Suppl (ACCU-CHEK CHELY PLUS) w/Device KIT by Does not apply route 2 (two) times a day 1 kit 0    cholecalciferol 2000 units TABS Take 1 tablet (2,000 Units total) by mouth daily 30 tablet 0    glipiZIDE (GLUCOTROL XL) 10 mg 24 hr tablet Take 1 tablet (10 mg total) by mouth daily 90 tablet 1    glucose blood (ACCU-CHEK CHELY PLUS) test strip 1 each by Other route 2 (two) times a day Use as instructed 200 each 1    lisinopril (ZESTRIL) 10 mg tablet Take 1 tablet (10 mg total) by mouth daily 90 tablet 1    metFORMIN (GLUCOPHAGE) 1000 MG tablet Take 0 5 tablets (500 mg total) by mouth 2 (two) times a day 180 tablet 0    Omega-3 Fatty Acids (FISH OIL) 1,000 mg Take 1,000 mg by mouth daily      Acetaminophen (TYLENOL EXTRA STRENGTH PO) As needed      magnesium hydroxide (MILK OF MAGNESIA) 400 mg/5 mL oral suspension Take by mouth daily as needed for constipation      senna-docusate sodium (SENOKOT-S) 8 6-50 mg per tablet Take 1 tablet by mouth as needed       No current facility-administered medications for this visit  No Known Allergies   Immunizations:     Immunization History   Administered Date(s) Administered    Influenza, high dose seasonal 0 5 mL 12/19/2018, 10/02/2019    Pneumococcal Conjugate 13-Valent 06/27/2019    Tdap 09/12/2018      Health Maintenance:         Topic Date Due    Hepatitis C Screening  1955    CRC Screening: Colonoscopy  1955         Topic Date Due    HEPATITIS B VACCINES (1 of 3 - Risk 3-dose series) 01/19/1972      Medicare Health Risk Assessment:     /82 (BP Location: Left arm, Patient Position: Sitting, Cuff Size: Standard)   Pulse 81   Temp 97 8 °F (36 6 °C) (Oral)   Ht 5' 8 11" (1 73 m)   Wt 77 kg (169 lb 12 8 oz)   SpO2 98%   BMI 25 73 kg/m²      Madelin Paige is here for his Initial Wellness visit  Health Risk Assessment:   Patient rates overall health as good  Patient feels that their physical health rating is slightly better  Eyesight was rated as slightly worse  Hearing was rated as same  Patient feels that their emotional and mental health rating is same  Pain experienced in the last 7 days has been none  Fall Risk Screening:    In the past year, patient has experienced: no history of falling in past year      Home Safety:  Patient has trouble with stairs inside or outside of their home  Patient has working smoke alarms and has working carbon monoxide detector  Home safety hazards include: none  Nutrition:   Current diet is Regular  Medications:   Patient is currently taking over-the-counter supplements  OTC medications include: see medication list  Patient is able to manage medications  Activities of Daily Living (ADLs)/Instrumental Activities of Daily Living (IADLs):   Walk and transfer into and out of bed and chair?: Yes  Dress and groom yourself?: Yes    Bathe or shower yourself?: Yes    Feed yourself?  Yes  Do your laundry/housekeeping?: Yes  Manage your money, pay your bills and track your expenses?: Yes  Make your own meals?: Yes    Do your own shopping?: Yes    Previous Hospitalizations:   Any hospitalizations or ED visits within the last 12 months?: No      Advance Care Planning:   Living will: No    Durable POA for healthcare: No    Advanced directive: No    Advanced directive counseling given: Yes    Five wishes given: Yes      Comments: POD    Cognitive Screening:   Provider or family/friend/caregiver concerned regarding cognition?: No    PREVENTIVE SCREENINGS      Cardiovascular Screening:    General: Screening Not Indicated and History Lipid Disorder      Diabetes Screening:     General: Screening Not Indicated and History Diabetes      Colorectal Cancer Screening:     General: Patient Declines and Risks and Benefits Discussed    Due for: Cologuard      Osteoporosis Screening:    General: Screening Not Indicated and History Osteoporosis      Abdominal Aortic Aneurysm (AAA) Screening:    Risk factors include: age between 73-67 yo and tobacco use        Lung Cancer Screening:     General: Screening Not Indicated      Hepatitis C Screening:    General: Risks and Benefits Discussed    Hep C Screening Accepted: Yes      Other Counseling Topics:   Car/seat belt/driving safety, skin self-exam, sunscreen and calcium and vitamin D intake and regular weightbearing exercise         Duey Neighbor

## 2019-10-02 NOTE — ASSESSMENT & PLAN NOTE
Patient's blood pressure currently well controlled at 130/82, patient will continue with lisinopril 10 milligram tablet daily  Continue current regimen -   Continue to monitor blood pressure at home  Goal BP is < 130/80  Contact our office for consistent elevations  Recommend low sodium diet  Exercise 30 minutes three times a week as tolerated    Recommend yearly eye exam

## 2019-10-02 NOTE — PATIENT INSTRUCTIONS

## 2019-10-02 NOTE — ASSESSMENT & PLAN NOTE
Lab Results   Component Value Date    HGBA1C 6 2 03/20/2019     Patient's hemoglobin A1c much improved from 12 4-6 2, patient will continue with metformin 500 b i d  And glipizide 10 mg tablet daily  Patient is to continue to work on diet and exercise  Limit sugars and carbohydrate intake  Avoid soda, juice, sweets, cookies, desserts, pasta, bread    Eat more whole grains, exercised 30 min of cardio at least 3 times a week  Also recommended daily foot exams to check for sores, and recommended yearly eye exams      Patient needs updated blood work and eye exam

## 2019-10-10 ENCOUNTER — APPOINTMENT (OUTPATIENT)
Dept: LAB | Facility: OTHER | Age: 64
End: 2019-10-10
Payer: COMMERCIAL

## 2019-10-10 ENCOUNTER — TRANSCRIBE ORDERS (OUTPATIENT)
Dept: LAB | Facility: OTHER | Age: 64
End: 2019-10-10

## 2019-10-10 DIAGNOSIS — I10 ESSENTIAL HYPERTENSION: ICD-10-CM

## 2019-10-10 LAB
ALBUMIN SERPL BCP-MCNC: 4.2 G/DL (ref 3.5–5)
ALP SERPL-CCNC: 38 U/L (ref 46–116)
ALT SERPL W P-5'-P-CCNC: 23 U/L (ref 12–78)
ANION GAP SERPL CALCULATED.3IONS-SCNC: 5 MMOL/L (ref 4–13)
AST SERPL W P-5'-P-CCNC: 13 U/L (ref 5–45)
BASOPHILS # BLD AUTO: 0.04 THOUSANDS/ΜL (ref 0–0.1)
BASOPHILS NFR BLD AUTO: 1 % (ref 0–1)
BILIRUB SERPL-MCNC: 0.41 MG/DL (ref 0.2–1)
BUN SERPL-MCNC: 23 MG/DL (ref 5–25)
CALCIUM SERPL-MCNC: 9.5 MG/DL (ref 8.3–10.1)
CHLORIDE SERPL-SCNC: 107 MMOL/L (ref 100–108)
CHOLEST SERPL-MCNC: 156 MG/DL (ref 50–200)
CO2 SERPL-SCNC: 27 MMOL/L (ref 21–32)
CREAT SERPL-MCNC: 0.85 MG/DL (ref 0.6–1.3)
EOSINOPHIL # BLD AUTO: 0.19 THOUSAND/ΜL (ref 0–0.61)
EOSINOPHIL NFR BLD AUTO: 4 % (ref 0–6)
ERYTHROCYTE [DISTWIDTH] IN BLOOD BY AUTOMATED COUNT: 12.8 % (ref 11.6–15.1)
EST. AVERAGE GLUCOSE BLD GHB EST-MCNC: 143 MG/DL
GFR SERPL CREATININE-BSD FRML MDRD: 91 ML/MIN/1.73SQ M
GLUCOSE P FAST SERPL-MCNC: 159 MG/DL (ref 65–99)
HBA1C MFR BLD: 6.6 % (ref 4.2–6.3)
HCT VFR BLD AUTO: 36.5 % (ref 36.5–49.3)
HCV AB SER QL: NORMAL
HDLC SERPL-MCNC: 50 MG/DL (ref 40–60)
HGB BLD-MCNC: 12.2 G/DL (ref 12–17)
IMM GRANULOCYTES # BLD AUTO: 0.01 THOUSAND/UL (ref 0–0.2)
IMM GRANULOCYTES NFR BLD AUTO: 0 % (ref 0–2)
LDLC SERPL CALC-MCNC: 97 MG/DL (ref 0–100)
LYMPHOCYTES # BLD AUTO: 1.58 THOUSANDS/ΜL (ref 0.6–4.47)
LYMPHOCYTES NFR BLD AUTO: 30 % (ref 14–44)
MCH RBC QN AUTO: 30.1 PG (ref 26.8–34.3)
MCHC RBC AUTO-ENTMCNC: 33.4 G/DL (ref 31.4–37.4)
MCV RBC AUTO: 90 FL (ref 82–98)
MONOCYTES # BLD AUTO: 0.44 THOUSAND/ΜL (ref 0.17–1.22)
MONOCYTES NFR BLD AUTO: 8 % (ref 4–12)
NEUTROPHILS # BLD AUTO: 3.01 THOUSANDS/ΜL (ref 1.85–7.62)
NEUTS SEG NFR BLD AUTO: 57 % (ref 43–75)
NONHDLC SERPL-MCNC: 106 MG/DL
NRBC BLD AUTO-RTO: 0 /100 WBCS
PLATELET # BLD AUTO: 145 THOUSANDS/UL (ref 149–390)
PMV BLD AUTO: 10.6 FL (ref 8.9–12.7)
POTASSIUM SERPL-SCNC: 4.7 MMOL/L (ref 3.5–5.3)
PROT SERPL-MCNC: 7 G/DL (ref 6.4–8.2)
RBC # BLD AUTO: 4.05 MILLION/UL (ref 3.88–5.62)
SODIUM SERPL-SCNC: 139 MMOL/L (ref 136–145)
TRIGL SERPL-MCNC: 47 MG/DL
WBC # BLD AUTO: 5.27 THOUSAND/UL (ref 4.31–10.16)

## 2019-10-10 PROCEDURE — 80061 LIPID PANEL: CPT | Performed by: NURSE PRACTITIONER

## 2019-10-10 PROCEDURE — 86803 HEPATITIS C AB TEST: CPT | Performed by: NURSE PRACTITIONER

## 2019-10-10 PROCEDURE — 36415 COLL VENOUS BLD VENIPUNCTURE: CPT | Performed by: NURSE PRACTITIONER

## 2019-10-10 PROCEDURE — 83036 HEMOGLOBIN GLYCOSYLATED A1C: CPT | Performed by: NURSE PRACTITIONER

## 2019-10-10 PROCEDURE — 80053 COMPREHEN METABOLIC PANEL: CPT

## 2019-10-10 PROCEDURE — 85025 COMPLETE CBC W/AUTO DIFF WBC: CPT | Performed by: NURSE PRACTITIONER

## 2019-11-11 DIAGNOSIS — E78.5 HYPERLIPIDEMIA, UNSPECIFIED HYPERLIPIDEMIA TYPE: Primary | ICD-10-CM

## 2019-11-11 NOTE — TELEPHONE ENCOUNTER
[address] [facility] quality calling to ask that we please discuss bw with patient at next office visit     Also request we refill Lipitor, lipid panel was abnormal on blood work        last filled 10/18/2018

## 2019-11-12 RX ORDER — ATORVASTATIN CALCIUM 40 MG/1
40 TABLET, FILM COATED ORAL DAILY
Qty: 90 TABLET | Refills: 0 | Status: SHIPPED | OUTPATIENT
Start: 2019-11-12 | End: 2020-06-25

## 2020-02-05 LAB
LEFT EYE DIABETIC RETINOPATHY: NORMAL
RIGHT EYE DIABETIC RETINOPATHY: NORMAL

## 2020-02-13 ENCOUNTER — OFFICE VISIT (OUTPATIENT)
Dept: INTERNAL MEDICINE CLINIC | Facility: CLINIC | Age: 65
End: 2020-02-13
Payer: COMMERCIAL

## 2020-02-13 ENCOUNTER — HOSPITAL ENCOUNTER (OUTPATIENT)
Dept: RADIOLOGY | Facility: IMAGING CENTER | Age: 65
Discharge: HOME | End: 2020-02-13
Payer: COMMERCIAL

## 2020-02-13 VITALS
DIASTOLIC BLOOD PRESSURE: 88 MMHG | OXYGEN SATURATION: 99 % | SYSTOLIC BLOOD PRESSURE: 160 MMHG | HEART RATE: 77 BPM | BODY MASS INDEX: 27.74 KG/M2 | WEIGHT: 183 LBS | TEMPERATURE: 97.9 F | HEIGHT: 68 IN

## 2020-02-13 DIAGNOSIS — E78.5 HYPERLIPIDEMIA, UNSPECIFIED HYPERLIPIDEMIA TYPE: ICD-10-CM

## 2020-02-13 DIAGNOSIS — R64 CACHECTIC (HCC): ICD-10-CM

## 2020-02-13 DIAGNOSIS — R22.41 LOCALIZED SWELLING OF RIGHT LOWER EXTREMITY: ICD-10-CM

## 2020-02-13 DIAGNOSIS — I10 ESSENTIAL HYPERTENSION: ICD-10-CM

## 2020-02-13 DIAGNOSIS — E11.65 TYPE 2 DIABETES MELLITUS WITH HYPERGLYCEMIA, WITHOUT LONG-TERM CURRENT USE OF INSULIN (HCC): Primary | ICD-10-CM

## 2020-02-13 PROCEDURE — 99214 OFFICE O/P EST MOD 30 MIN: CPT | Performed by: NURSE PRACTITIONER

## 2020-02-13 PROCEDURE — 3079F DIAST BP 80-89 MM HG: CPT | Performed by: NURSE PRACTITIONER

## 2020-02-13 PROCEDURE — 4010F ACE/ARB THERAPY RXD/TAKEN: CPT | Performed by: NURSE PRACTITIONER

## 2020-02-13 PROCEDURE — 3077F SYST BP >= 140 MM HG: CPT | Performed by: NURSE PRACTITIONER

## 2020-02-13 PROCEDURE — 2022F DILAT RTA XM EVC RTNOPTHY: CPT | Performed by: NURSE PRACTITIONER

## 2020-02-13 PROCEDURE — 4040F PNEUMOC VAC/ADMIN/RCVD: CPT | Performed by: NURSE PRACTITIONER

## 2020-02-13 PROCEDURE — 93000 ELECTROCARDIOGRAM COMPLETE: CPT | Performed by: NURSE PRACTITIONER

## 2020-02-13 PROCEDURE — 1160F RVW MEDS BY RX/DR IN RCRD: CPT | Performed by: NURSE PRACTITIONER

## 2020-02-13 PROCEDURE — 1036F TOBACCO NON-USER: CPT | Performed by: NURSE PRACTITIONER

## 2020-02-13 PROCEDURE — 71046 X-RAY EXAM CHEST 2 VIEWS: CPT

## 2020-02-13 RX ORDER — FUROSEMIDE 20 MG/1
20 TABLET ORAL DAILY
Qty: 15 TABLET | Refills: 0 | Status: SHIPPED | OUTPATIENT
Start: 2020-02-13 | End: 2020-06-25

## 2020-02-13 RX ORDER — LISINOPRIL 10 MG/1
15 TABLET ORAL DAILY
Qty: 135 TABLET | Refills: 1 | Status: SHIPPED | OUTPATIENT
Start: 2020-02-13 | End: 2020-08-31 | Stop reason: SDUPTHER

## 2020-02-13 NOTE — PROGRESS NOTES
Assessment/Plan:    Type 2 diabetes mellitus ([facility] )  Lab Results   Component Value Date    HGBA1C 6 6 (H) 10/10/2019     Patient's hemoglobin A1c much improved from 12 4-6 2, patient will continue with metformin 500 b i d  And glipizide 10 mg tablet daily  Patient is to continue to work on diet and exercise  Limit sugars and carbohydrate intake  Avoid soda, juice, sweets, cookies, desserts, pasta, bread    Eat more whole grains, exercised 30 min of cardio at least 3 times a week  Also recommended daily foot exams to check for sores, and recommended yearly eye exams  Patient needs updated blood work and eye exam      Essential hypertension  Patient blood pressure uncontrolled, will increase lisinopril to 15mg tablet daily and add on lasix  Continue current regimen -   Continue to monitor blood pressure at home  Goal BP is < 130/80  Contact our office for consistent elevations  Recommend low sodium diet  Exercise 30 minutes 5 times a week as tolerated  Recommend yearly eye exam       Hyperlipemia  Patient has been taking his Lipitor, will get a repeat lipid panel  Recommend healthy lifestyle choices for your cholesterol  Low fat/low cholesterol diet  Limit/avoid red meat  Eat more lean meat - chicken breast, ground turkey, fish  Exercise 30 mins at least 3 times a week as tolerated  Will get updated lipid panel  Localized swelling of right lower extremity  EKG done while in office, will get Echo and BNP, will start patient on lasix  Diagnoses and all orders for this visit:    Type 2 diabetes mellitus with hyperglycemia, without long-term current use of insulin (HCC)  -     Hemoglobin A1C    Hyperlipidemia, unspecified hyperlipidemia type  -     Lipid panel    Essential hypertension  -     CBC and differential  -     Comprehensive metabolic panel; Future  -     lisinopril (ZESTRIL) 10 mg tablet;  Take 1 5 tablets (15 mg total) by mouth daily    Localized swelling of right lower extremity  -     Echo complete with contrast if indicated; Future  -     TSH, 3rd generation with Free T4 reflex  -     POCT ECG  -     furosemide (LASIX) 20 mg tablet; Take 1 tablet (20 mg total) by mouth daily  -     XR chest pa & lateral; Future  -     NT-BNP PRO; Future    Cachectic (HCC)          Subjective:      Patient ID: Lyubov Banerjee is a 77 y o  male  Patient presents today for follow-up on diabetes type 2, hypertension, hyperlipidemia, patient did not get blood work done prior to office visit today  Patient has gained weight and has been having swelling to his right ankle for about a month, patient denies injury to this ankle,denies SOB, denies chest pain and denies pain to his calf  Male screenings:  Colonoscopy- refuses to get one done, ordered cologuard while in office today  Hep C-will get with next blood-work  Eye Doctor- wears glasses, has not been  Dentist-has not gone  Podiatrist-does not go    Flu vaccination- patient will get flu vaccination while in office today    Tobacco abuse- Quit in 84        Diabetes   He presents for his initial diabetic visit  He has type 2 diabetes mellitus  No MedicAlert identification noted  His disease course has been fluctuating  There are no hypoglycemic associated symptoms  Pertinent negatives for hypoglycemia include no dizziness, headaches or nervousness/anxiousness  Pertinent negatives for diabetes include no chest pain, no fatigue, no polydipsia, no polyphagia, no polyuria, no weakness and no weight loss  There are no hypoglycemic complications  Symptoms are worsening  Risk factors for coronary artery disease include diabetes mellitus, dyslipidemia and male sex  Current diabetic treatments:  patient currently taking metformin 500 mg twice a day, and glipizide 10 mg  Compliance with diabetes treatment:  unsure of treatment compliance as patient seems forgetful however he did write his medications down prior to his office visit today   Weight trend: patient is very thin  He rarely participates in exercise  (Patient reports BS 100s ) He does not see a podiatrist Eye exam is not current  Hypertension   This is a chronic problem  The current episode started more than 1 year ago  The problem has been gradually worsening since onset  The problem is controlled  Pertinent negatives include no anxiety, chest pain, headaches, neck pain, palpitations or shortness of breath  The following portions of the patient's history were reviewed and updated as appropriate: allergies, current medications, past family history, past medical history, past social history, past surgical history and problem list     Review of Systems   Constitutional: Negative for activity change, appetite change, chills, diaphoresis, fatigue, fever and weight loss  Weight chauncey   HENT: Negative for congestion, ear discharge, ear pain, postnasal drip, rhinorrhea, sinus pressure, sinus pain and sore throat  Eyes: Negative for pain, discharge, itching and visual disturbance  Respiratory: Negative for cough, chest tightness, shortness of breath and wheezing  Cardiovascular: Negative for chest pain, palpitations and leg swelling  Gastrointestinal: Negative for abdominal pain, constipation, diarrhea, nausea and vomiting  Endocrine: Negative for polydipsia, polyphagia and polyuria  Genitourinary: Negative for difficulty urinating, dysuria and urgency  Musculoskeletal: Negative for arthralgias, back pain and neck pain  Swelling to his right ankle   Skin: Negative for rash and wound  Neurological: Negative for dizziness, weakness, numbness and headaches  Psychiatric/Behavioral: The patient is not nervous/anxious            Past Medical History:   Diagnosis Date    [facility] mellitus ([facility] )     H/O nicotine dependence     Hypertension     Prediabetes          Current Outpatient Medications:     ACCU-CHEK FASTCLIX LANCETS MISC, by Does not apply route 2 (two) times a day, Disp: 200 each, Rfl: 1    Acetaminophen (TYLENOL EXTRA STRENGTH PO), As needed, Disp: , Rfl:     atorvastatin (LIPITOR) 40 mg tablet, Take 1 tablet (40 mg total) by mouth daily, Disp: 90 tablet, Rfl: 0    Blood Glucose Monitoring Suppl (ACCU-CHEK CHELY PLUS) w/Device KIT, by Does not apply route 2 (two) times a day, Disp: 1 kit, Rfl: 0    cholecalciferol 2000 units TABS, Take 1 tablet (2,000 Units total) by mouth daily, Disp: 30 tablet, Rfl: 0    glipiZIDE (GLUCOTROL XL) 10 mg 24 hr tablet, Take 1 tablet (10 mg total) by mouth daily, Disp: 90 tablet, Rfl: 1    glucose blood (ACCU-CHEK CHELY PLUS) test strip, 1 each by Other route 2 (two) times a day Use as instructed, Disp: 200 each, Rfl: 1    lisinopril (ZESTRIL) 10 mg tablet, Take 1 5 tablets (15 mg total) by mouth daily, Disp: 135 tablet, Rfl: 1    magnesium hydroxide (MILK OF MAGNESIA) 400 mg/5 mL oral suspension, Take by mouth daily as needed for constipation, Disp: , Rfl:     metFORMIN (GLUCOPHAGE) 1000 MG tablet, Take 0 5 tablets (500 mg total) by mouth 2 (two) times a day, Disp: 180 tablet, Rfl: 0    Omega-3 Fatty Acids (FISH OIL) 1,000 mg, Take 1,000 mg by mouth daily, Disp: , Rfl:     senna-docusate sodium (SENOKOT-S) 8 6-50 mg per tablet, Take 1 tablet by mouth as needed, Disp: , Rfl:     furosemide (LASIX) 20 mg tablet, Take 1 tablet (20 mg total) by mouth daily, Disp: 15 tablet, Rfl: 0    No Known Allergies    Social History   Past Surgical History:   Procedure Laterality Date    HIP SURGERY      NOSE SURGERY      [place] OPEN RX FEMUR FX+INTRAMED ADDISON Left 9/13/2018    Procedure: INSERTION NAIL IM FEMUR ANTEGRADE (TROCHANTERIC);   Surgeon: Cris Carrasco MD;  [facility];  Service: Orthopedics     Family History   Problem Relation Age of Onset    Cancer Mother     No Known Problems Father     No Known Problems Sister     Lung cancer Brother     Lymphoma Family         acute       Objective:  /88 (BP Location: Left arm, Patient Position: Sitting, Cuff Size: Adult)   Pulse 77   Temp 97 9 °F (36 6 °C) (Oral)   Ht 5' 8" (1 727 m)   Wt 83 kg (183 lb)   SpO2 99%   BMI 27 83 kg/m²     Recent Results (from the past 1344 hour(s))    Diabetes Eye Exam    Collection Time: 02/05/20 10:07 AM   Result Value Ref Range    Right Eye Diabetic Retinopathy None     Left Eye Diabetic Retinopathy None             Physical Exam   Constitutional: He is oriented to person, place, and time  He appears well-developed and well-nourished  No distress  Thin, elderly gentleman  No acute distress  HENT:   Head: Normocephalic and atraumatic  Eyes: Pupils are equal, round, and reactive to light  Conjunctivae and EOM are normal  Right eye exhibits no discharge  Left eye exhibits no discharge  No scleral icterus  Neck: Normal range of motion  Neck supple  No JVD present  No thyromegaly present  Cardiovascular: Normal rate, regular rhythm, normal heart sounds and intact distal pulses  Exam reveals no gallop and no friction rub  No murmur heard  +1 pitting edema to right LLE, no erythema    Pulmonary/Chest: Effort normal and breath sounds normal  No respiratory distress  He has no wheezes  He has no rales  He exhibits no tenderness  Abdominal: Soft  Bowel sounds are normal  He exhibits no distension and no mass  There is no tenderness  There is no rebound and no guarding  Musculoskeletal: Normal range of motion  He exhibits no edema, tenderness or deformity  Lymphadenopathy:     He has no cervical adenopathy  Neurological: He is alert and oriented to person, place, and time  He has normal reflexes  No cranial nerve deficit  Coordination normal    Skin: Skin is warm and dry  No rash noted  He is not diaphoretic  No erythema  No pallor  Psychiatric: He has a normal mood and affect  His behavior is normal  Judgment and thought content normal    Nursing note and vitals reviewed

## 2020-02-13 NOTE — PROGRESS NOTES
Diabetic Foot Exam    Patient's shoes and socks removed  Right Foot/Ankle   Right Foot Inspection  Skin Exam: dry skin no warmth, no callus, no erythema, no maceration, no abnormal color, no pre-ulcer, no ulcer and no callus                          Toe Exam: swellingno tenderness, erythema and  no right toe deformity  Sensory       Monofilament testing: intact  Vascular  Capillary refills: < 3 seconds  The right DP pulse is 2+  The right PT pulse is 2+  Left Foot/Ankle  Left Foot Inspection  Skin Exam: dry skinno warmth, no erythema, no maceration, normal color, no pre-ulcer, no ulcer and no callus                         Toe Exam: no swelling, no tenderness, no erythema and no left toe deformity                   Sensory       Monofilament: intact  Vascular  Capillary refills: < 3 seconds  The left DP pulse is 2+  The left PT pulse is 2+  Assign Risk Category:  No deformity present;  No loss of protective sensation;        Risk: 0

## 2020-02-13 NOTE — ASSESSMENT & PLAN NOTE
Patient blood pressure uncontrolled, will increase lisinopril to 15mg tablet daily and add on lasix  Continue current regimen -   Continue to monitor blood pressure at home  Goal BP is < 130/80  Contact our office for consistent elevations  Recommend low sodium diet  Exercise 30 minutes 5 times a week as tolerated    Recommend yearly eye exam

## 2020-02-13 NOTE — ASSESSMENT & PLAN NOTE
Lab Results   Component Value Date    HGBA1C 6 6 (H) 10/10/2019     Patient's hemoglobin A1c much improved from 12 4-6 2, patient will continue with metformin 500 b i d  And glipizide 10 mg tablet daily  Patient is to continue to work on diet and exercise  Limit sugars and carbohydrate intake  Avoid soda, juice, sweets, cookies, desserts, pasta, bread    Eat more whole grains, exercised 30 min of cardio at least 3 times a week  Also recommended daily foot exams to check for sores, and recommended yearly eye exams      Patient needs updated blood work and eye exam

## 2020-02-13 NOTE — ASSESSMENT & PLAN NOTE
Patient has been taking his Lipitor, will get a repeat lipid panel  Recommend healthy lifestyle choices for your cholesterol  Low fat/low cholesterol diet  Limit/avoid red meat  Eat more lean meat - chicken breast, ground turkey, fish  Exercise 30 mins at least 3 times a week as tolerated  Will get updated lipid panel

## 2020-02-15 LAB — HBA1C MFR BLD HPLC: 6.7 %

## 2020-02-15 PROCEDURE — 3044F HG A1C LEVEL LT 7.0%: CPT | Performed by: INTERNAL MEDICINE

## 2020-02-20 ENCOUNTER — TELEPHONE (OUTPATIENT)
Dept: INTERNAL MEDICINE CLINIC | Facility: CLINIC | Age: 65
End: 2020-02-20

## 2020-02-20 ENCOUNTER — CONSULT (OUTPATIENT)
Dept: INTERNAL MEDICINE CLINIC | Facility: CLINIC | Age: 65
End: 2020-02-20
Payer: COMMERCIAL

## 2020-02-20 VITALS
HEART RATE: 80 BPM | HEIGHT: 68 IN | TEMPERATURE: 97.8 F | WEIGHT: 180.8 LBS | SYSTOLIC BLOOD PRESSURE: 128 MMHG | BODY MASS INDEX: 27.4 KG/M2 | OXYGEN SATURATION: 99 % | DIASTOLIC BLOOD PRESSURE: 80 MMHG

## 2020-02-20 DIAGNOSIS — R22.41 LOCALIZED SWELLING OF RIGHT LOWER EXTREMITY: ICD-10-CM

## 2020-02-20 DIAGNOSIS — E11.65 TYPE 2 DIABETES MELLITUS WITH HYPERGLYCEMIA, WITHOUT LONG-TERM CURRENT USE OF INSULIN (HCC): ICD-10-CM

## 2020-02-20 DIAGNOSIS — E87.1 HYPONATREMIA: ICD-10-CM

## 2020-02-20 DIAGNOSIS — M81.0 OSTEOPOROSIS WITHOUT CURRENT PATHOLOGICAL FRACTURE, UNSPECIFIED OSTEOPOROSIS TYPE: ICD-10-CM

## 2020-02-20 DIAGNOSIS — E78.5 HYPERLIPIDEMIA, UNSPECIFIED HYPERLIPIDEMIA TYPE: ICD-10-CM

## 2020-02-20 DIAGNOSIS — Z12.11 ENCOUNTER FOR SCREENING FOR MALIGNANT NEOPLASM OF COLON: Primary | ICD-10-CM

## 2020-02-20 DIAGNOSIS — Z01.818 PRE-OP EVALUATION: ICD-10-CM

## 2020-02-20 DIAGNOSIS — H25.9 SENILE CATARACT OF LEFT EYE, UNSPECIFIED AGE-RELATED CATARACT TYPE: ICD-10-CM

## 2020-02-20 DIAGNOSIS — I10 ESSENTIAL HYPERTENSION: ICD-10-CM

## 2020-02-20 PROCEDURE — 3074F SYST BP LT 130 MM HG: CPT | Performed by: NURSE PRACTITIONER

## 2020-02-20 PROCEDURE — 3079F DIAST BP 80-89 MM HG: CPT | Performed by: NURSE PRACTITIONER

## 2020-02-20 PROCEDURE — 99214 OFFICE O/P EST MOD 30 MIN: CPT | Performed by: NURSE PRACTITIONER

## 2020-02-20 PROCEDURE — 2022F DILAT RTA XM EVC RTNOPTHY: CPT | Performed by: NURSE PRACTITIONER

## 2020-02-20 PROCEDURE — 3008F BODY MASS INDEX DOCD: CPT | Performed by: NURSE PRACTITIONER

## 2020-02-20 PROCEDURE — 1160F RVW MEDS BY RX/DR IN RCRD: CPT | Performed by: NURSE PRACTITIONER

## 2020-02-20 PROCEDURE — 1036F TOBACCO NON-USER: CPT | Performed by: NURSE PRACTITIONER

## 2020-02-20 PROCEDURE — 4040F PNEUMOC VAC/ADMIN/RCVD: CPT | Performed by: NURSE PRACTITIONER

## 2020-02-20 PROCEDURE — 3044F HG A1C LEVEL LT 7.0%: CPT | Performed by: NURSE PRACTITIONER

## 2020-02-20 NOTE — ASSESSMENT & PLAN NOTE
Patient blood pressure uncontrolled, will continue with lisinopril to 15mg tablet daily  Continue current regimen -   Continue to monitor blood pressure at home  Goal BP is < 130/80  Contact our office for consistent elevations  Recommend low sodium diet  Exercise 30 minutes 5 times a week as tolerated  Recommend yearly eye exam     Patients blood pressures at home -130 DBP 70-80, will advise patient to bring in home BP cuff to next appointment  Did discuss with patient the importance of good blood pressure control  Did also discuss with patient that an elevation in the blood pressure may defer patient's cataract surgery  Patient reports that he has white coat syndrome

## 2020-02-20 NOTE — ASSESSMENT & PLAN NOTE
Currently well controlled patient currently taking Lipitor  Recommend healthy lifestyle choices for your cholesterol  Low fat/low cholesterol diet  Limit/avoid red meat  Eat more lean meat - chicken breast, ground turkey, fish  Exercise 30 mins at least 5 times a week as tolerated

## 2020-02-20 NOTE — ASSESSMENT & PLAN NOTE
EKG done while in office, will get Echo and BNP, will start patient on lasix for 15 days  Patient scheduled for Echo    BNP 14

## 2020-02-20 NOTE — PROGRESS NOTES
Assessment/Plan:    No problem-specific Assessment & Plan notes found for this encounter  [id number]}      Subjective:      Patient ID: Sabi Pittman is a 77 y o  male  Patient presents today for follow-up on diabetes type 2, hypertension, hyperlipidemia, patient did not get blood work done prior to office visit today  Patient has gained weight and has been having swelling to his right ankle for about a month, patient denies injury to this ankle,denies SOB, denies chest pain and denies pain to his calf  -130   DBP 60-70    Male screenings:  Colonoscopy- refuses to get one done, ordered cologuard while in office today  Hep C-will get with next blood-work  Eye Doctor- wears glasses, has not been  Dentist-has not gone  Podiatrist-does not go    Flu vaccination- patient will get flu vaccination while in office today    Tobacco abuse- Quit in 84        Diabetes   He presents for his initial diabetic visit  He has type 2 diabetes mellitus  No MedicAlert identification noted  His disease course has been fluctuating  There are no hypoglycemic associated symptoms  Pertinent negatives for hypoglycemia include no dizziness, headaches or nervousness/anxiousness  Pertinent negatives for diabetes include no chest pain, no fatigue, no polydipsia, no polyphagia, no polyuria, no weakness and no weight loss  There are no hypoglycemic complications  Symptoms are worsening  Risk factors for coronary artery disease include diabetes mellitus, dyslipidemia and male sex  Current diabetic treatments:  patient currently taking metformin 500 mg twice a day, and glipizide 10 mg  Compliance with diabetes treatment:  unsure of treatment compliance as patient seems forgetful however he did write his medications down prior to his office visit today  Weight trend:  patient is very thin  He rarely participates in exercise   (Patient reports BS 100s ) He does not see a podiatrist Eye exam is not current  Hypertension   This is a chronic problem  The current episode started more than 1 year ago  The problem has been gradually worsening since onset  The problem is controlled  Pertinent negatives include no anxiety, chest pain, headaches, neck pain, palpitations or shortness of breath  {[facility][1] SmartLinks:[id number]}    Review of Systems   Constitutional: Negative for fatigue and weight loss  Respiratory: Negative for shortness of breath  Cardiovascular: Negative for chest pain and palpitations  Endocrine: Negative for polydipsia, polyphagia and polyuria  Musculoskeletal: Negative for neck pain  Neurological: Negative for dizziness, weakness and headaches  Psychiatric/Behavioral: The patient is not nervous/anxious            Past Medical History:   Diagnosis Date    [facility] ([facility] )     H/O nicotine dependence     Hypertension     Prediabetes          Current Outpatient Medications:     ACCU-CHEK FASTCLIX LANCETS MISC, by Does not apply route 2 (two) times a day, Disp: 200 each, Rfl: 1    Acetaminophen (TYLENOL EXTRA STRENGTH PO), As needed, Disp: , Rfl:     atorvastatin (LIPITOR) 40 mg tablet, Take 1 tablet (40 mg total) by mouth daily, Disp: 90 tablet, Rfl: 0    Blood Glucose Monitoring Suppl (ACCU-CHEK CHELY PLUS) w/Device KIT, by Does not apply route 2 (two) times a day, Disp: 1 kit, Rfl: 0    cholecalciferol 2000 units TABS, Take 1 tablet (2,000 Units total) by mouth daily, Disp: 30 tablet, Rfl: 0    furosemide (LASIX) 20 mg tablet, Take 1 tablet (20 mg total) by mouth daily, Disp: 15 tablet, Rfl: 0    glipiZIDE (GLUCOTROL XL) 10 mg 24 hr tablet, Take 1 tablet (10 mg total) by mouth daily, Disp: 90 tablet, Rfl: 1    glucose blood (ACCU-CHEK CHELY PLUS) test strip, 1 each by Other route 2 (two) times a day Use as instructed, Disp: 200 each, Rfl: 1    lisinopril (ZESTRIL) 10 mg tablet, Take 1 5 tablets (15 mg total) by mouth daily, Disp: 135 tablet, Rfl: 1   metFORMIN (GLUCOPHAGE) 1000 MG tablet, Take 0 5 tablets (500 mg total) by mouth 2 (two) times a day, Disp: 180 tablet, Rfl: 0    Omega-3 Fatty Acids (FISH OIL) 1,000 mg, Take 1,000 mg by mouth daily, Disp: , Rfl:     No Known Allergies    Social History   Past Surgical History:   Procedure Laterality Date    HIP SURGERY      NOSE SURGERY      [place] OPEN RX FEMUR FX+INTRAMED ADDISON Left 9/13/2018    Procedure: INSERTION NAIL IM FEMUR ANTEGRADE (TROCHANTERIC);   Surgeon: Yosef Manley MD;  Location[facility];  Service: Orthopedics     Family History   Problem Relation Age of Onset    Lymphoma Mother     No Known Problems Father     No Known Problems Sister     Lung cancer Brother     Lymphoma Family         acute       Objective:  /86 (BP Location: Left arm, Patient Position: Sitting, Cuff Size: Adult)   Pulse 80   Temp 97 8 °F (36 6 °C) (Oral)   Ht 5' 8" (1 727 m)   Wt 82 kg (180 lb 12 8 oz) Comment: w shoes  SpO2 99% Comment: ra  BMI 27 49 kg/m²     Recent Results (from the past 1344 hour(s))   Hm Diabetes Eye Exam    Collection Time: 02/05/20 10:07 AM   Result Value Ref Range    Right Eye Diabetic Retinopathy None     Left Eye Diabetic Retinopathy None    Hemoglobin A1C    Collection Time: 02/15/20 12:00 AM   Result Value Ref Range    Hemoglobin A1C 6 7             Physical Exam

## 2020-02-20 NOTE — PROGRESS NOTES
Assessment/Plan:    Type 2 diabetes mellitus ([facility] )  Lab Results   Component Value Date    HGBA1C 6 7 02/15/2020     Patient's hemoglobin A1c much improved from 12 4-6 2, patient will continue with metformin 500 b i d  And glipizide 10 mg tablet daily  Patient is to continue to work on diet and exercise  Limit sugars and carbohydrate intake  Avoid soda, juice, sweets, cookies, desserts, pasta, bread    Eat more whole grains, exercised 30 min of cardio at least 3 times a week  Also recommended daily foot exams to check for sores, and recommended yearly eye exams  Essential hypertension  Patient blood pressure uncontrolled, will continue with lisinopril to 15mg tablet daily  Continue current regimen -   Continue to monitor blood pressure at home  Goal BP is < 130/80  Contact our office for consistent elevations  Recommend low sodium diet  Exercise 30 minutes 5 times a week as tolerated  Recommend yearly eye exam     Patients blood pressures at home -130 DBP 70-80, will advise patient to bring in home BP cuff to next appointment  Did discuss with patient the importance of good blood pressure control  Did also discuss with patient that an elevation in the blood pressure may defer patient's cataract surgery  Patient reports that he has white coat syndrome  Osteoporosis  DEXA scan from 10/2018 shows a T-score of -2 1  Continue with calcium 1200 mg and vitamin-D 2000 units daily supplementation  Localized swelling of right lower extremity  EKG done while in office, will get Echo and BNP, will start patient on lasix for 15 days  Patient scheduled for Echo  BNP 14    Hyponatremia  Patient's most recent sodium 136, recommended fluid restriction  Hyperlipemia  Currently well controlled patient currently taking Lipitor  Recommend healthy lifestyle choices for your cholesterol  Low fat/low cholesterol diet  Limit/avoid red meat  Eat more lean meat - chicken breast, ground turkey, fish  Exercise 30 mins at least 5 times a week as tolerated  Pre-op evaluation  Pre op note faxed by Jesenia Kaiser  Diagnoses and all orders for this visit:    Encounter for screening for malignant neoplasm of colon  -     Ambulatory referral to Gastroenterology; Future    Type 2 diabetes mellitus with hyperglycemia, without long-term current use of insulin (HCC)    Essential hypertension    Osteoporosis without current pathological fracture, unspecified osteoporosis type    Localized swelling of right lower extremity    Hyponatremia    Hyperlipidemia, unspecified hyperlipidemia type    Pre-op evaluation    Senile cataract of left eye, unspecified age-related cataract type          Subjective:    Ale Young is a 77y o  year old male who presents to the office today for a preoperative consultation at the request of surgeon Dr Lilia Olivier who plans on performing left cataract surgery on March 17  This consultation is requested for the specific conditions prompting preoperative evaluation (i e  because of potential affect on operative risk)  Planned anesthesia: general  The patient has the following known anesthesia issues: denies  Patients bleeding risk: no recent abnormal bleeding  Patient does not have objections to receiving blood products if needed  Patient is able to walk 4 blocks without symptoms  Patient is able to walk up 2 flights of stairs without symptoms  Significant past medical history includes:  Type 2 DM, hypertension, osteoporosis, vitamin-D deficiency, hyponatremia and hyperlipidemia     Tobacco use: former  Alcohol use: denies  Illicit drug use: denies    Symptoms:   Easy bleeding: no  Easy bruising: no  Frequent nose bleeds: no  Chest pain: no  Cough: no  Dyspnea on exertion:no  Edema: yes  Palpitations: no  Wheezing: no    Living situation: Patient lives at home alone in a two story home  He will be caring for himself after surgery  Hedoes not have post-op concerns       The following portions of the patient's history were reviewed and updated as appropriate: allergies, current medications, past family history, past medical history, past social history, past surgical history and problem list     Review of Systems  Review of Systems   Constitutional: Negative for activity change, appetite change, chills, diaphoresis and fever  HENT: Negative for congestion, ear discharge, ear pain, postnasal drip, rhinorrhea, sinus pressure, sinus pain and sore throat  Eyes: Negative for pain, discharge, itching and visual disturbance  Respiratory: Negative for cough, chest tightness, shortness of breath and wheezing  Cardiovascular: Negative for chest pain, palpitations and leg swelling  Gastrointestinal: Negative for abdominal pain, blood in stool, constipation, diarrhea, nausea and vomiting  Endocrine: Negative for polydipsia, polyphagia and polyuria  Genitourinary: Negative for difficulty urinating, dysuria, hematuria and urgency  Musculoskeletal: Negative for arthralgias, back pain and neck pain  Skin: Negative for rash and wound  Neurological: Negative for dizziness, weakness, numbness and headaches  Past Medical History:   Diagnosis Date    Diabetes mellitus ([facility] )     H/O nicotine dependence     Hypertension     Prediabetes      Past Surgical History:   Procedure Laterality Date    HIP SURGERY      NOSE SURGERY      ID OPEN RX FEMUR FX+INTRAMED ADDISON Left 2018    Procedure: INSERTION NAIL IM FEMUR ANTEGRADE (TROCHANTERIC);   Surgeon: Carly Manuel MD;  Location: [facility];  Service: Orthopedics     Social History     Tobacco Use    Smoking status: Former Smoker     Packs/day: 2 00     Years: 15 00     Pack years: 30 00     Types: Cigarettes     Last attempt to quit: 1987     Years since quittin 4    Smokeless tobacco: Never Used   Substance Use Topics    Alcohol use: No    Drug use: No     Family History   Problem Relation Age of Onset    Lymphoma Mother     No Known Problems Father     No Known Problems Sister     Lung cancer Brother     Lymphoma Family         acute     Patient has no known allergies  Current Outpatient Medications   Medication Sig Dispense Refill    ACCU-CHEK FASTCLIX LANCETS MISC by Does not apply route 2 (two) times a day 200 each 1    Acetaminophen (TYLENOL EXTRA STRENGTH PO) As needed      atorvastatin (LIPITOR) 40 mg tablet Take 1 tablet (40 mg total) by mouth daily 90 tablet 0    Blood Glucose Monitoring Suppl (ACCU-CHEK CHELY PLUS) w/Device KIT by Does not apply route 2 (two) times a day 1 kit 0    cholecalciferol 2000 units TABS Take 1 tablet (2,000 Units total) by mouth daily 30 tablet 0    furosemide (LASIX) 20 mg tablet Take 1 tablet (20 mg total) by mouth daily 15 tablet 0    glipiZIDE (GLUCOTROL XL) 10 mg 24 hr tablet Take 1 tablet (10 mg total) by mouth daily 90 tablet 1    glucose blood (ACCU-CHEK CHELY PLUS) test strip 1 each by Other route 2 (two) times a day Use as instructed 200 each 1    lisinopril (ZESTRIL) 10 mg tablet Take 1 5 tablets (15 mg total) by mouth daily 135 tablet 1    metFORMIN (GLUCOPHAGE) 1000 MG tablet Take 0 5 tablets (500 mg total) by mouth 2 (two) times a day 180 tablet 0    Omega-3 Fatty Acids (FISH OIL) 1,000 mg Take 1,000 mg by mouth daily       No current facility-administered medications for this visit  Objective:       /80   Pulse 80   Temp 97 8 °F (36 6 °C) (Oral)   Ht 5' 8" (1 727 m)   Wt 82 kg (180 lb 12 8 oz) Comment: w shoes  SpO2 99% Comment: ra  BMI 27 49 kg/m²   Physical Exam   Constitutional: He is oriented to person, place, and time  He appears well-developed and well-nourished  No distress  Thin, elderly gentleman  No acute distress  HENT:   Head: Normocephalic and atraumatic  Eyes: Pupils are equal, round, and reactive to light  Conjunctivae and EOM are normal  Right eye exhibits no discharge  Left eye exhibits no discharge   No scleral icterus  Neck: Normal range of motion  Neck supple  No JVD present  No thyromegaly present  Cardiovascular: Normal rate, regular rhythm, normal heart sounds and intact distal pulses  Exam reveals no gallop and no friction rub  No murmur heard  +1 pitting edema to right LLE, no erythema    Pulmonary/Chest: Effort normal and breath sounds normal  No respiratory distress  He has no wheezes  He has no rales  He exhibits no tenderness  Abdominal: Soft  Bowel sounds are normal  He exhibits no distension and no mass  There is no tenderness  There is no rebound and no guarding  Musculoskeletal: Normal range of motion  He exhibits no edema, tenderness or deformity  Lymphadenopathy:     He has no cervical adenopathy  Neurological: He is alert and oriented to person, place, and time  He has normal reflexes  No cranial nerve deficit  Coordination normal    Skin: Skin is warm and dry  No rash noted  He is not diaphoretic  No erythema  No pallor  Psychiatric: He has a normal mood and affect  His behavior is normal  Judgment and thought content normal    Nursing note and vitals reviewed  Cardiographics  ECG: normal sinus rhythm, no blocks or conduction defects, no ischemic changes    Imaging  Chest x-ray: normal     Lab Review   Reviewed while in the office today    Assessment:     77 y o  male with planned surgery as above  Known risk factors for perioperative complications: None      Cardiac Risk Estimation: mild    Alverna Maker is cleared from a cardiovascular standpoint to proceed with surgery  he is at a mild risk from a cardiovascular standpoint at this time without any additional cardiac testing  Reevaluation needed if he should present with symptoms prior to surgery  Plan:  Preoperative workup as follows none  Change in medication regimen before surgery: As per surgeon  Rodriguez Mcghee

## 2020-02-20 NOTE — ASSESSMENT & PLAN NOTE
Lab Results   Component Value Date    HGBA1C 6 7 02/15/2020     Patient's hemoglobin A1c much improved from 12 4-6 2, patient will continue with metformin 500 b i d  And glipizide 10 mg tablet daily  Patient is to continue to work on diet and exercise  Limit sugars and carbohydrate intake  Avoid soda, juice, sweets, cookies, desserts, pasta, bread    Eat more whole grains, exercised 30 min of cardio at least 3 times a week  Also recommended daily foot exams to check for sores, and recommended yearly eye exams

## 2020-03-24 DIAGNOSIS — E11.65 TYPE 2 DIABETES MELLITUS WITH HYPERGLYCEMIA, WITHOUT LONG-TERM CURRENT USE OF INSULIN (HCC): ICD-10-CM

## 2020-03-24 RX ORDER — GLIPIZIDE 10 MG/1
10 TABLET, FILM COATED, EXTENDED RELEASE ORAL DAILY
Qty: 90 TABLET | Refills: 1 | Status: SHIPPED | OUTPATIENT
Start: 2020-03-24 | End: 2020-09-29 | Stop reason: SDUPTHER

## 2020-05-29 ENCOUNTER — HOSPITAL ENCOUNTER (OUTPATIENT)
Dept: NON INVASIVE DIAGNOSTICS | Facility: CLINIC | Age: 65
Discharge: HOME | End: 2020-05-29
Payer: COMMERCIAL

## 2020-05-29 DIAGNOSIS — R22.41 LOCALIZED SWELLING OF RIGHT LOWER EXTREMITY: ICD-10-CM

## 2020-05-29 PROCEDURE — 93306 TTE W/DOPPLER COMPLETE: CPT | Performed by: INTERNAL MEDICINE

## 2020-05-29 PROCEDURE — 93306 TTE W/DOPPLER COMPLETE: CPT

## 2020-06-04 ENCOUNTER — TELEPHONE (OUTPATIENT)
Dept: INTERNAL MEDICINE CLINIC | Facility: CLINIC | Age: 65
End: 2020-06-04

## 2020-06-16 ENCOUNTER — TELEPHONE (OUTPATIENT)
Dept: INTERNAL MEDICINE CLINIC | Facility: CLINIC | Age: 65
End: 2020-06-16

## 2020-06-25 ENCOUNTER — OFFICE VISIT (OUTPATIENT)
Dept: INTERNAL MEDICINE CLINIC | Facility: CLINIC | Age: 65
End: 2020-06-25
Payer: COMMERCIAL

## 2020-06-25 VITALS
BODY MASS INDEX: 28.01 KG/M2 | HEART RATE: 70 BPM | HEIGHT: 68 IN | SYSTOLIC BLOOD PRESSURE: 151 MMHG | OXYGEN SATURATION: 96 % | WEIGHT: 184.8 LBS | DIASTOLIC BLOOD PRESSURE: 86 MMHG | TEMPERATURE: 98.1 F

## 2020-06-25 DIAGNOSIS — I10 ESSENTIAL HYPERTENSION: ICD-10-CM

## 2020-06-25 DIAGNOSIS — E11.65 TYPE 2 DIABETES MELLITUS WITH HYPERGLYCEMIA, WITHOUT LONG-TERM CURRENT USE OF INSULIN (HCC): Primary | ICD-10-CM

## 2020-06-25 DIAGNOSIS — E78.5 HYPERLIPIDEMIA, UNSPECIFIED HYPERLIPIDEMIA TYPE: ICD-10-CM

## 2020-06-25 PROBLEM — R22.41 LOCALIZED SWELLING OF RIGHT LOWER EXTREMITY: Status: RESOLVED | Noted: 2020-02-13 | Resolved: 2020-06-25

## 2020-06-25 PROBLEM — Z01.818 PRE-OP EVALUATION: Status: RESOLVED | Noted: 2020-02-20 | Resolved: 2020-06-25

## 2020-06-25 PROBLEM — M25.552 LEFT HIP PAIN: Status: RESOLVED | Noted: 2019-03-12 | Resolved: 2020-06-25

## 2020-06-25 PROBLEM — E87.1 HYPONATREMIA: Status: RESOLVED | Noted: 2018-09-12 | Resolved: 2020-06-25
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
## 2020-06-25 PROCEDURE — 4040F PNEUMOC VAC/ADMIN/RCVD: CPT | Performed by: INTERNAL MEDICINE

## 2020-06-25 PROCEDURE — 1036F TOBACCO NON-USER: CPT | Performed by: INTERNAL MEDICINE

## 2020-06-25 PROCEDURE — 3079F DIAST BP 80-89 MM HG: CPT | Performed by: INTERNAL MEDICINE

## 2020-06-25 PROCEDURE — 1160F RVW MEDS BY RX/DR IN RCRD: CPT | Performed by: INTERNAL MEDICINE

## 2020-06-25 PROCEDURE — 3044F HG A1C LEVEL LT 7.0%: CPT | Performed by: INTERNAL MEDICINE

## 2020-06-25 PROCEDURE — 2022F DILAT RTA XM EVC RTNOPTHY: CPT | Performed by: INTERNAL MEDICINE

## 2020-06-25 PROCEDURE — 3008F BODY MASS INDEX DOCD: CPT | Performed by: INTERNAL MEDICINE

## 2020-06-25 PROCEDURE — 99214 OFFICE O/P EST MOD 30 MIN: CPT | Performed by: INTERNAL MEDICINE

## 2020-06-25 PROCEDURE — 3077F SYST BP >= 140 MM HG: CPT | Performed by: INTERNAL MEDICINE

## 2020-08-31 DIAGNOSIS — E11.65 TYPE 2 DIABETES MELLITUS WITH HYPERGLYCEMIA, WITHOUT LONG-TERM CURRENT USE OF INSULIN (HCC): ICD-10-CM

## 2020-08-31 DIAGNOSIS — I10 ESSENTIAL HYPERTENSION: ICD-10-CM

## 2020-08-31 PROCEDURE — 4010F ACE/ARB THERAPY RXD/TAKEN: CPT | Performed by: INTERNAL MEDICINE

## 2020-09-01 RX ORDER — LISINOPRIL 10 MG/1
15 TABLET ORAL DAILY
Qty: 135 TABLET | Refills: 0 | Status: SHIPPED | OUTPATIENT
Start: 2020-09-01 | End: 2020-11-11 | Stop reason: SDUPTHER

## 2020-09-16 DIAGNOSIS — E11.65 TYPE 2 DIABETES MELLITUS WITH HYPERGLYCEMIA, WITHOUT LONG-TERM CURRENT USE OF INSULIN (HCC): ICD-10-CM

## 2020-09-16 RX ORDER — GLIPIZIDE 10 MG/1
TABLET, FILM COATED, EXTENDED RELEASE ORAL
Qty: 90 TABLET | Refills: 1 | OUTPATIENT
Start: 2020-09-16

## 2020-09-24 LAB
CREAT ?TM UR-SCNC: 118 UMOL/L
EXT MICROALBUMIN URINE RANDOM: 4.3
HBA1C MFR BLD HPLC: 6.8 %
MICROALBUMIN/CREAT UR: 36.4 MG/G{CREAT}

## 2020-09-29 DIAGNOSIS — E11.65 TYPE 2 DIABETES MELLITUS WITH HYPERGLYCEMIA, WITHOUT LONG-TERM CURRENT USE OF INSULIN (HCC): ICD-10-CM

## 2020-09-29 RX ORDER — GLIPIZIDE 10 MG/1
10 TABLET, FILM COATED, EXTENDED RELEASE ORAL DAILY
Qty: 90 TABLET | Refills: 1 | Status: SHIPPED | OUTPATIENT
Start: 2020-09-29 | End: 2020-09-29 | Stop reason: SDUPTHER

## 2020-09-29 RX ORDER — GLIPIZIDE 10 MG/1
10 TABLET, FILM COATED, EXTENDED RELEASE ORAL DAILY
Qty: 90 TABLET | Refills: 1 | Status: SHIPPED | OUTPATIENT
Start: 2020-09-29 | End: 2020-11-11 | Stop reason: SDUPTHER

## 2020-11-11 ENCOUNTER — OFFICE VISIT (OUTPATIENT)
Dept: INTERNAL MEDICINE CLINIC | Facility: CLINIC | Age: 65
End: 2020-11-11
Payer: COMMERCIAL

## 2020-11-11 VITALS
SYSTOLIC BLOOD PRESSURE: 156 MMHG | TEMPERATURE: 98.8 F | DIASTOLIC BLOOD PRESSURE: 82 MMHG | OXYGEN SATURATION: 98 % | BODY MASS INDEX: 28.79 KG/M2 | HEART RATE: 78 BPM | WEIGHT: 190 LBS | HEIGHT: 68 IN

## 2020-11-11 DIAGNOSIS — E78.5 HYPERLIPIDEMIA, UNSPECIFIED HYPERLIPIDEMIA TYPE: ICD-10-CM

## 2020-11-11 DIAGNOSIS — E11.65 TYPE 2 DIABETES MELLITUS WITH HYPERGLYCEMIA, WITHOUT LONG-TERM CURRENT USE OF INSULIN (HCC): ICD-10-CM

## 2020-11-11 DIAGNOSIS — Z23 NEED FOR INFLUENZA VACCINATION: Primary | ICD-10-CM

## 2020-11-11 DIAGNOSIS — I10 ESSENTIAL HYPERTENSION: ICD-10-CM

## 2020-11-11 DIAGNOSIS — R64 CACHECTIC (HCC): ICD-10-CM

## 2020-11-11 DIAGNOSIS — Z00.00 MEDICARE ANNUAL WELLNESS VISIT, SUBSEQUENT: ICD-10-CM

## 2020-11-11 DIAGNOSIS — E55.9 VITAMIN D DEFICIENCY: ICD-10-CM

## 2020-11-11 PROCEDURE — 3077F SYST BP >= 140 MM HG: CPT | Performed by: NURSE PRACTITIONER

## 2020-11-11 PROCEDURE — 3725F SCREEN DEPRESSION PERFORMED: CPT | Performed by: NURSE PRACTITIONER

## 2020-11-11 PROCEDURE — 4010F ACE/ARB THERAPY RXD/TAKEN: CPT | Performed by: NURSE PRACTITIONER

## 2020-11-11 PROCEDURE — 90662 IIV NO PRSV INCREASED AG IM: CPT

## 2020-11-11 PROCEDURE — 3008F BODY MASS INDEX DOCD: CPT | Performed by: NURSE PRACTITIONER

## 2020-11-11 PROCEDURE — 99214 OFFICE O/P EST MOD 30 MIN: CPT | Performed by: NURSE PRACTITIONER

## 2020-11-11 PROCEDURE — 1036F TOBACCO NON-USER: CPT | Performed by: NURSE PRACTITIONER

## 2020-11-11 PROCEDURE — 1160F RVW MEDS BY RX/DR IN RCRD: CPT | Performed by: NURSE PRACTITIONER

## 2020-11-11 PROCEDURE — G0439 PPPS, SUBSEQ VISIT: HCPCS | Performed by: NURSE PRACTITIONER

## 2020-11-11 PROCEDURE — 1170F FXNL STATUS ASSESSED: CPT | Performed by: NURSE PRACTITIONER

## 2020-11-11 PROCEDURE — 1125F AMNT PAIN NOTED PAIN PRSNT: CPT | Performed by: NURSE PRACTITIONER

## 2020-11-11 PROCEDURE — G0008 ADMIN INFLUENZA VIRUS VAC: HCPCS

## 2020-11-11 PROCEDURE — 3079F DIAST BP 80-89 MM HG: CPT | Performed by: NURSE PRACTITIONER

## 2020-11-11 RX ORDER — GLIPIZIDE 10 MG/1
10 TABLET, FILM COATED, EXTENDED RELEASE ORAL DAILY
Qty: 90 TABLET | Refills: 1 | Status: SHIPPED | OUTPATIENT
Start: 2020-11-11 | End: 2021-10-18 | Stop reason: SDUPTHER

## 2020-11-11 RX ORDER — ATORVASTATIN CALCIUM 10 MG/1
10 TABLET, FILM COATED ORAL DAILY
Qty: 90 TABLET | Refills: 1 | Status: SHIPPED | OUTPATIENT
Start: 2020-11-11 | End: 2021-08-18 | Stop reason: SDUPTHER

## 2020-11-11 RX ORDER — LISINOPRIL 20 MG/1
20 TABLET ORAL DAILY
Qty: 90 TABLET | Refills: 1 | Status: SHIPPED | OUTPATIENT
Start: 2020-11-11 | End: 2021-06-13 | Stop reason: SDUPTHER

## 2020-12-09 ENCOUNTER — OFFICE VISIT (OUTPATIENT)
Dept: INTERNAL MEDICINE CLINIC | Facility: CLINIC | Age: 65
End: 2020-12-09
Payer: COMMERCIAL

## 2020-12-09 VITALS
WEIGHT: 188 LBS | BODY MASS INDEX: 28.49 KG/M2 | TEMPERATURE: 97 F | HEIGHT: 68 IN | DIASTOLIC BLOOD PRESSURE: 70 MMHG | HEART RATE: 88 BPM | OXYGEN SATURATION: 98 % | SYSTOLIC BLOOD PRESSURE: 150 MMHG

## 2020-12-09 DIAGNOSIS — L08.9 DIABETIC FOOT INFECTION (HCC): Primary | ICD-10-CM

## 2020-12-09 DIAGNOSIS — E11.628 DIABETIC FOOT INFECTION (HCC): Primary | ICD-10-CM

## 2020-12-09 PROCEDURE — 99213 OFFICE O/P EST LOW 20 MIN: CPT | Performed by: INTERNAL MEDICINE

## 2020-12-09 PROCEDURE — 3077F SYST BP >= 140 MM HG: CPT | Performed by: INTERNAL MEDICINE

## 2020-12-09 PROCEDURE — 1160F RVW MEDS BY RX/DR IN RCRD: CPT | Performed by: INTERNAL MEDICINE

## 2020-12-09 PROCEDURE — 3008F BODY MASS INDEX DOCD: CPT | Performed by: INTERNAL MEDICINE

## 2020-12-09 PROCEDURE — 1036F TOBACCO NON-USER: CPT | Performed by: INTERNAL MEDICINE

## 2020-12-09 PROCEDURE — 3078F DIAST BP <80 MM HG: CPT | Performed by: INTERNAL MEDICINE

## 2020-12-29 ENCOUNTER — TRANSITIONAL CARE MANAGEMENT (OUTPATIENT)
Dept: INTERNAL MEDICINE CLINIC | Age: 65
End: 2020-12-29

## 2021-01-05 ENCOUNTER — TELEPHONE (OUTPATIENT)
Dept: INTERNAL MEDICINE CLINIC | Age: 66
End: 2021-01-05

## 2021-01-05 NOTE — TELEPHONE ENCOUNTER
----- Message from Yifan Guaman sent at 1/4/2021  3:37 PM EST -----  Please retry to reschedule to discuss blood work, try a virtual

## 2021-01-06 ENCOUNTER — OFFICE VISIT (OUTPATIENT)
Dept: INTERNAL MEDICINE CLINIC | Facility: CLINIC | Age: 66
End: 2021-01-06
Payer: COMMERCIAL

## 2021-01-06 VITALS
HEART RATE: 84 BPM | TEMPERATURE: 97.8 F | HEIGHT: 68 IN | DIASTOLIC BLOOD PRESSURE: 76 MMHG | OXYGEN SATURATION: 98 % | WEIGHT: 182.2 LBS | SYSTOLIC BLOOD PRESSURE: 140 MMHG | BODY MASS INDEX: 27.61 KG/M2

## 2021-01-06 DIAGNOSIS — I10 ESSENTIAL HYPERTENSION: Primary | ICD-10-CM

## 2021-01-06 DIAGNOSIS — E11.65 TYPE 2 DIABETES MELLITUS WITH HYPERGLYCEMIA, WITHOUT LONG-TERM CURRENT USE OF INSULIN (HCC): ICD-10-CM

## 2021-01-06 DIAGNOSIS — D69.6 PLATELETS DECREASED (HCC): ICD-10-CM

## 2021-01-06 DIAGNOSIS — R64 CACHECTIC (HCC): ICD-10-CM

## 2021-01-06 DIAGNOSIS — E78.5 HYPERLIPIDEMIA, UNSPECIFIED HYPERLIPIDEMIA TYPE: ICD-10-CM

## 2021-01-06 DIAGNOSIS — E11.628 DIABETIC FOOT INFECTION (HCC): ICD-10-CM

## 2021-01-06 DIAGNOSIS — L97.519 ULCER OF RIGHT FOOT, UNSPECIFIED ULCER STAGE (HCC): ICD-10-CM

## 2021-01-06 DIAGNOSIS — L08.9 DIABETIC FOOT INFECTION (HCC): ICD-10-CM

## 2021-01-06 PROCEDURE — 3077F SYST BP >= 140 MM HG: CPT | Performed by: NURSE PRACTITIONER

## 2021-01-06 PROCEDURE — 3725F SCREEN DEPRESSION PERFORMED: CPT | Performed by: NURSE PRACTITIONER

## 2021-01-06 PROCEDURE — 1036F TOBACCO NON-USER: CPT | Performed by: NURSE PRACTITIONER

## 2021-01-06 PROCEDURE — 3078F DIAST BP <80 MM HG: CPT | Performed by: NURSE PRACTITIONER

## 2021-01-06 PROCEDURE — 1160F RVW MEDS BY RX/DR IN RCRD: CPT | Performed by: NURSE PRACTITIONER

## 2021-01-06 PROCEDURE — 3008F BODY MASS INDEX DOCD: CPT | Performed by: NURSE PRACTITIONER

## 2021-01-06 PROCEDURE — 99214 OFFICE O/P EST MOD 30 MIN: CPT | Performed by: NURSE PRACTITIONER

## 2021-01-06 NOTE — ASSESSMENT & PLAN NOTE
Patient Education        Lumbar Laminectomy: What to Expect at [address] [place] [address] can expect your back to feel stiff or sore after surgery. This should improve in the weeks after surgery. You may have trouble sitting or standing in one position for very long and may need pain medicine in the weeks after your surgery. Your doctor may advise you to work with a physical therapist to strengthen the muscles around your spine and trunk. You will need to learn how to lift, twist, and bend so that you do not put too much strain on your back. This care sheet gives you a general idea about how long it will take for you to recover. But each person recovers at a different pace. Follow the steps below to get better as quickly as possible. How can you care for yourself at home? Activity    · Rest when you feel tired. Getting enough sleep will help you recover.     · Try to walk each day. Start by walking a little more than you did the day before. Bit by bit, increase the amount you walk. Walking boosts blood flow and helps prevent pneumonia and constipation. Walking may also decrease your muscle soreness after surgery.     · If advised by your doctor, you may need to avoid lifting anything that would cause excessive strain on your back. This may include a child, heavy grocery bags and milk containers, a heavy briefcase or backpack, cat litter or dog food bags, or a vacuum .     · Avoid strenuous activities, such as bicycle riding, jogging, weight lifting, or aerobic exercise, until your doctor says it is okay.     · Do not drive for 2 to 4 weeks after your surgery or until your doctor says it is okay.     · Avoid riding in a car for more than 30 minutes at a time for 2 to 4 weeks after surgery. If you must ride in a car for a longer distance, stop often to walk and stretch your legs.     · Try to change your position about every 30 minutes while sitting or standing.  This will help decrease your back pain while Patient will continue on lisinopril to 20 mg tablet daily  Continue current regimen -   Continue to monitor blood pressure at home  Goal BP is < 130/80  Contact our office for consistent elevations  Recommend low sodium diet  Exercise 30 minutes 5 times a week as tolerated    Recommend yearly eye exam  you are healing.     · You will probably need to take 4 to 6 weeks off from work. It depends on the type of work you do and how you feel.     · You may have sex as soon as you feel able, but avoid positions that put stress on your back or cause pain. Diet    · You can eat your normal diet. If your stomach is upset, try bland, low-fat foods like plain rice, broiled chicken, toast, and yogurt.     · Drink plenty of fluids (unless your doctor tells you not to).     · You may notice that your bowel movements are not regular right after your surgery. This is common. Try to avoid constipation and straining with bowel movements. You may want to take a fiber supplement every day. If you have not had a bowel movement after a couple of days, ask your doctor about taking a mild laxative. Medicines    · Your doctor will tell you if and when you can restart your medicines. He or she will also give you instructions about taking any new medicines.     · If you take blood thinners, such as warfarin (Coumadin), clopidogrel (Plavix), or aspirin, be sure to talk to your doctor. He or she will tell you if and when to start taking those medicines again. Make sure that you understand exactly what your doctor wants you to do.     · Take pain medicines exactly as directed. ? If the doctor gave you a prescription medicine for pain, take it as prescribed. ? If you are not taking a prescription pain medicine, ask your doctor if you can take an over-the-counter medicine.     · If your doctor prescribed antibiotics, take them as directed. Do not stop taking them just because you feel better. You need to take the full course of antibiotics.     · If you think your pain medicine is making you sick to your stomach:  ? Take your medicine after meals (unless your doctor has told you not to). ? Ask your doctor for a different pain medicine.    Incision care    · If you have strips of tape on the cut (incision) the doctor made, leave the tape on for a week or until it falls off.     · Wash the area daily with warm, soapy water and pat it dry.     · Keep the area clean and dry. You may cover it with a gauze bandage if it weeps or rubs against clothing. Change the bandage every day. Exercise    · Do back exercises as instructed by your doctor.     · Your doctor may advise you to work with a physical therapist to improve the strength and flexibility of your back. Other instructions    · To reduce stiffness and help sore muscles, use a warm water bottle, a heating pad set on low, or a warm cloth on your back. Do not put heat right over the incision. Do not go to sleep with a heating pad on your skin. Follow-up care is a key part of your treatment and safety. Be sure to make and go to all appointments, and call your doctor if you are having problems. It's also a good idea to know your test results and keep a list of the medicines you take. When should you call for help? Call 911 anytime you think you may need emergency care. For example, call if:    · You passed out (lost consciousness).     · You have sudden chest pain and shortness of breath, or you cough up blood.     · You are unable to move a leg at all.   [facility] your doctor now or seek immediate medical care if:    · You have new or worse symptoms in your legs or buttocks. Symptoms may include:  ? Numbness or tingling. ? Weakness. ? Pain.     · You lose bladder or bowel control.     · You have loose stitches, or your incision comes open.     · You have blood or fluid draining from the incision.     · You have signs of infection, such as:  ? Increased pain, swelling, warmth, or redness. ? Pus draining from the incision. ? A fever. ? Red streaks leading from the incision.    Watch closely for changes in your health, and be sure to contact your doctor if:    · You do not have a bowel movement after taking a laxative.     · You are not getting better as expected. Where can you learn more?   Go to http://irlanda-gustavo.info/. Enter A854 in the search box to learn more about \"Lumbar Laminectomy: What to Expect at Home. \"  Current as of: September 20, 2018  Content Version: 11.9  © 0055-3855 [employer]. Care instructions adapted under license by [employer] (which disclaims liability or warranty for this information). If you have questions about a medical condition or this instruction, always ask your healthcare professional. [address] any warranty or liability for your use of this information. Patient Education        Lumbar Spinal Fusion: What to Expect at Home  Your Recovery    After surgery, you can expect your back to feel stiff and sore. You may have trouble sitting or standing in one position for very long and may need pain medicine in the weeks after your surgery. It may take 4 to 6 weeks to get back to doing simple activities, such as light housework. It may take 6 months to a year for your back to get better completely. You may need to wear a back brace while your back heals. And your doctor may have you go to physical therapy. If your job doesn't require physical labor, you will probably be able to go back to work after 1 or 2 months. If your job involves light physical labor, it may take 3 to 6 months. Most people whose jobs involved heavy labor can never return to those jobs. This care sheet gives you a general idea about how long it will take for you to recover. But each person recovers at a different pace. Follow the steps below to get better as quickly as possible. How can you care for yourself at home? Activity    · Rest when you feel tired. Getting enough sleep will help you recover.     · Try to walk each day. Start by walking a little more than you did the day before. Bit by bit, increase the amount you walk. Walking boosts blood flow and helps prevent pneumonia and constipation.  Walking may also decrease your muscle soreness after surgery.     · If advised by your doctor, you may need to avoid lifting anything that would cause excessive strain on your back. This may include a child, heavy grocery bags and milk containers, a heavy briefcase or backpack, cat litter or dog food bags, or a vacuum .     · Avoid strenuous activities, such as bicycle riding, jogging, weight lifting, or aerobic exercise, until your doctor says it is okay.     · Do not drive for 2 to 4 weeks after your surgery or until your doctor says it is okay.     · Avoid riding in a car for more than 30 minutes at a time for 2 to 4 weeks after surgery. If you must ride in a car for a longer distance, stop often to walk and stretch your legs.     · Try to change your position about every 30 minutes while sitting or standing. This will help decrease your back pain while you are healing.     · You will probably need to take at least 4 to 6 weeks off from work. It depends on the type of work you do and how you feel.     · You may have sex as soon as you feel able, but avoid positions that put stress on your back or cause pain. Diet    · You can eat your normal diet. If your stomach is upset, try bland, low-fat foods like plain rice, broiled chicken, toast, and yogurt.     · Drink plenty of fluids (unless your doctor tells you not to).     · You may notice that your bowel movements are not regular right after your surgery. This is common. Try to avoid constipation and straining with bowel movements. You may want to take a fiber supplement every day. If you have not had a bowel movement after a couple of days, ask your doctor about taking a mild laxative. Medicines    · Be safe with medicines. Take pain medicines exactly as directed. ? If the doctor gave you a prescription medicine for pain, take it as prescribed.   ? If you are not taking a prescription pain medicine, ask your doctor if you can take an over-the-counter medicine.     · If your doctor prescribed antibiotics, take them as directed. Do not stop taking them just because you feel better. You need to take the full course of antibiotics.     · If you think your pain medicine is making you sick to your stomach:  ? Take your medicine after meals (unless your doctor has told you not to). ? Ask your doctor for a different pain medicine. Incision care    · You will be given specific instructions about how to care for the cuts (incisions) the doctor made. The instructions will depend on the type of materials used to close the cut. Exercise    · Do back exercises as instructed by your doctor.     · Your doctor may advise you to work with a physical therapist to improve the strength and flexibility of your back. Other instructions    · To reduce stiffness and help sore muscles, use a warm water bottle, a heating pad set on low, or a warm cloth on your back. Do not put heat right over the incision. Do not go to sleep with a heating pad on your skin. Follow-up care is a key part of your treatment and safety. Be sure to make and go to all appointments, and call your doctor if you are having problems. It's also a good idea to know your test results and keep a list of the medicines you take. When should you call for help? Call 911 anytime you think you may need emergency care. For example, call if:    · You passed out (lost consciousness).     · You have sudden chest pain and shortness of breath, or you cough up blood.     · You are unable to move a leg at all.   [facility] your doctor now or seek immediate medical care if:    · You have pain that does not get better after you take pain pills.     · You have new or worse symptoms in your legs or buttocks. Symptoms may include:  ? Numbness or tingling. ? Weakness.   ? Pain.     · You lose bladder or bowel control.     · You have loose stitches, or your incision comes open.     · You have blood or fluid draining from the incision.     · You have signs of infection, such as:  ? Increased pain, swelling, warmth, or redness. ? Pus draining from the incision. ? A fever.    Watch closely for any changes in your health, and be sure to contact your doctor if:    · You do not have a bowel movement after taking a laxative.     · You are not getting better as expected. Where can you learn more? Go to http://ir[place]a-gustavo.info/. Enter C549 in the search box to learn more about \"Lumbar Spinal Fusion: What to Expect at Home. \"  Current as of: September 20, 2018  Content Version: 11.9  © 0708-5806 [employer]. Care instructions adapted under license by [employer] (which disclaims liability or warranty for this information). If you have questions about a medical condition or this instruction, always ask your healthcare professional. [address] any warranty or liability for your use of this information. DISCHARGE SUMMARY from Nurse    PATIENT INSTRUCTIONS:    After general anesthesia or intravenous sedation, for 24 hours or while taking prescription Narcotics:  · Limit your activities  · Do not drive and operate hazardous machinery  · Do not make important personal or business decisions  · Do  not drink alcoholic beverages  · If you have not urinated within 8 hours after discharge, please contact your surgeon on call.     Report the following to your surgeon:  · Excessive pain, swelling, redness or odor of or around the surgical area  · Temperature over 100.5  · Nausea and vomiting lasting longer than 4 hours or if unable to take medications  · Any signs of decreased circulation or nerve impairment to extremity: change in color, persistent  numbness, tingling, coldness or increase pain  · Any questions    What to do at Home:  Recommended activity: Activity as tolerated,     If you experience any of the following symptoms shortness of breath, severe pain that in unrelieved by pain medication,nausea with vomiting that last for 4 or more hours, please follow up with ER. *  Please give a list of your current medications to your Primary Care Provider. *  Please update this list whenever your medications are discontinued, doses are      changed, or new medications (including over-the-counter products) are added. *  Please carry medication information at all times in case of emergency situations. These are general instructions for a healthy lifestyle:    No smoking/ No tobacco products/ Avoid exposure to second hand smoke  Surgeon General's Warning:  Quitting smoking now greatly reduces serious risk to your health. Obesity, smoking, and sedentary lifestyle greatly increases your risk for illness    A healthy diet, regular physical exercise & weight monitoring are important for maintaining a healthy lifestyle    You may be retaining fluid if you have a history of heart failure or if you experience any of the following symptoms:  Weight gain of 3 pounds or more overnight or 5 pounds in a week, increased swelling in our hands or feet or shortness of breath while lying flat in bed. Please call your doctor as soon as you notice any of these symptoms; do not wait until your next office visit. Recognize signs and symptoms of STROKE:    F-face looks uneven    A-arms unable to move or move unevenly    S-speech slurred or non-existent    T-time-call 911 as soon as signs and symptoms begin-DO NOT go       Back to bed or wait to see if you get better-TIME IS BRAIN. Warning Signs of HEART ATTACK     Call 911 if you have these symptoms:   Chest discomfort. Most heart attacks involve discomfort in the center of the chest that lasts more than a few minutes, or that goes away and comes back. It can feel like uncomfortable pressure, squeezing, fullness, or pain.  Discomfort in other areas of the upper body. Symptoms can include pain or discomfort in one or both arms, the back, neck, jaw, or stomach.    Shortness of breath with or without chest discomfort.  Other signs may include breaking out in a cold sweat, nausea, or lightheadedness. Don't wait more than five minutes to call 911 - MINUTES MATTER! Fast action can save your life. Calling 911 is almost always the fastest way to get lifesaving treatment. Emergency Medical Services staff can begin treatment when they arrive -- up to an hour sooner than if someone gets to the hospital by car. The discharge information has been reviewed with the patient and spouse. The patient verbalized understanding. Discharge medications reviewed with the patient and appropriate educational materials and side effects teaching were provided.     Patient armband removed and shredded    ___________________________________________________________________________________________________________________________________

## 2021-01-06 NOTE — ASSESSMENT & PLAN NOTE
HBA1C 6 8  Not well controlled  Increase Meformin to 1000mg in the morning and 500mg at dinner, continue glipizide 10 mg daily  Patient is to continue to work on diet and exercise  Limit sugars and carbohydrate intake  Avoid soda, juice, sweets, cookies, desserts, pasta, bread    Eat more whole grains, exercised 30 min of cardio at least 3 times a week  Also recommended daily foot exams to check for sores, and recommended yearly eye exams

## 2021-01-06 NOTE — ASSESSMENT & PLAN NOTE
Will start patient on Lipitor 10 mg tablet  The 10-year ASCVD risk score (Renetta Daugherty et al , 2013) is: 30 3%    Values used to calculate the score:      Age: 77 years      Sex: Male      Is Non- : No      Diabetic: Yes      Tobacco smoker: No      Systolic Blood Pressure: 977 mmHg      Is BP treated: Yes      HDL Cholesterol: 50 mg/dL      Total Cholesterol: 156 mg/dL    Recommend healthy lifestyle choices for your cholesterol  Low fat/low cholesterol diet  Limit/avoid red meat  Eat more lean meat - chicken breast, ground turkey, fish  Exercise 30 mins at least 5 times a week as tolerated

## 2021-01-06 NOTE — PROGRESS NOTES
Assessment/Plan:    Type 2 diabetes mellitus (HCC)  HBA1C 6 8  Not well controlled  Increase Meformin to 1000mg in the morning and 500mg at dinner, continue glipizide 10 mg daily  Patient is to continue to work on diet and exercise  Limit sugars and carbohydrate intake  Avoid soda, juice, sweets, cookies, desserts, pasta, bread    Eat more whole grains, exercised 30 min of cardio at least 3 times a week  Also recommended daily foot exams to check for sores, and recommended yearly eye exams  Diabetic foot infection ([facility] [address] )    Patient is to continue to follow up with podiatry  Essential hypertension  Patient will continue on lisinopril to 20 mg tablet daily  Continue current regimen -   Continue to monitor blood pressure at home  Goal BP is < 130/80  Contact our office for consistent elevations  Recommend low sodium diet  Exercise 30 minutes 5 times a week as tolerated  Recommend yearly eye exam       Hyperlipemia  Will start patient on Lipitor 10 mg tablet  The 10-year ASCVD risk score (Michael Livingston et al , 2013) is: 30 3%    Values used to calculate the score:      Age: 77 years      Sex: Male      Is Non- : No      Diabetic: Yes      Tobacco smoker: No      Systolic Blood Pressure: 925 mmHg      Is BP treated: Yes      HDL Cholesterol: 50 mg/dL      Total Cholesterol: 156 mg/dL    Recommend healthy lifestyle choices for your cholesterol  Low fat/low cholesterol diet  Limit/avoid red meat  Eat more lean meat - chicken breast, ground turkey, fish  Exercise 30 mins at least 5 times a week as tolerated  Ulcer of right foot [facility])  Patient is status post amputation of right great toe on 12/15/2020 with I&D  Diagnoses and all orders for this visit:    Essential hypertension  -     Comprehensive metabolic panel;  Future  -     CBC and differential    Type 2 diabetes mellitus with hyperglycemia, without long-term current use of insulin ([facility] [address] )  - Hemoglobin A1C  -     metFORMIN (GLUCOPHAGE) 1000 MG tablet; Talk one tablet (1,000mg total) by mouth with morning and 1/2 tablet (500mg) at HS  Hyperlipidemia, unspecified hyperlipidemia type  -     Lipid panel    Ulcer of right foot, unspecified ulcer stage ([facility] [address] )    Cachectic ([facility] [address] )    Platelets decreased ([facility] [address] )    Diabetic foot infection ([facility] )          Subjective:      Patient ID: Shari Sacks is a 77 y o  male  Patient presents today status post amputation of right great toe on 12/15/2020  Patient reports today to review blood work  Patient's glucose was noted to be 243 however he reports this was nonfasting  Patient does not check his sugars regularly however he reports they have been "higher" lately, he denies hypoglycemic events  Most recent hemoglobin A1c 7 0  Patient is currently receiving at home nursing  Patient also noted to have some postop anemia, hemoglobin currently 11 6, plan will be to continue to monitor  Patient denies any pain to amputation site, currently follows podiatry Dr Reji Martin  The following portions of the patient's history were reviewed and updated as appropriate: allergies, current medications, past family history, past medical history, past social history, past surgical history and problem list     Review of Systems   Constitutional: Negative for activity change, appetite change, chills, diaphoresis and fever  HENT: Negative for congestion, ear discharge, ear pain, postnasal drip, rhinorrhea, sinus pressure, sinus pain and sore throat  Eyes: Negative for pain, discharge, itching and visual disturbance  Respiratory: Negative for cough, chest tightness, shortness of breath and wheezing  Cardiovascular: Negative for chest pain, palpitations and leg swelling  Gastrointestinal: Negative for abdominal pain, constipation, diarrhea, nausea and vomiting  Endocrine: Negative for polydipsia, polyphagia and polyuria     Genitourinary: Negative for difficulty urinating, dysuria and urgency  Musculoskeletal: Negative for arthralgias, back pain and neck pain  Skin: Negative for rash and wound  Neurological: Negative for dizziness, weakness, numbness and headaches  Past Medical History:   Diagnosis Date    Accident due to mechanical fall without injury 9/12/2018    Diabetes mellitus ([facility] [address] )     H/O nicotine dependence     Hypertension     Prediabetes          Current Outpatient Medications:     ACCU-CHEK FASTCLIX LANCETS MISC, by Does not apply route 2 (two) times a day, Disp: 200 each, Rfl: 1    Acetaminophen (TYLENOL EXTRA STRENGTH PO), As needed, Disp: , Rfl:     atorvastatin (LIPITOR) 10 mg tablet, Take 1 tablet (10 mg total) by mouth daily, Disp: 90 tablet, Rfl: 1    Blood Glucose Monitoring Suppl (ACCU-CHEK CHELY PLUS) w/Device KIT, by Does not apply route 2 (two) times a day, Disp: 1 kit, Rfl: 0    cholecalciferol 2000 units TABS, Take 1 tablet (2,000 Units total) by mouth daily, Disp: 30 tablet, Rfl: 0    glipiZIDE (GLUCOTROL XL) 10 mg 24 hr tablet, Take 1 tablet (10 mg total) by mouth daily, Disp: 90 tablet, Rfl: 1    glucose blood (ACCU-CHEK CHELY PLUS) test strip, 1 each by Other route 2 (two) times a day Use as instructed, Disp: 200 each, Rfl: 1    lisinopril (ZESTRIL) 20 mg tablet, Take 1 tablet (20 mg total) by mouth daily, Disp: 90 tablet, Rfl: 1    metFORMIN (GLUCOPHAGE) 1000 MG tablet, Talk one tablet (1,000mg total) by mouth with morning and 1/2 tablet (500mg) at HS , Disp: 180 tablet, Rfl: 1    Omega-3 Fatty Acids (FISH OIL) 1,000 mg, Take 1,000 mg by mouth daily, Disp: , Rfl:     No Known Allergies    Social History   Past Surgical History:   Procedure Laterality Date    HIP SURGERY      NOSE SURGERY      ME OPEN RX FEMUR FX+INTRAMED ADDISON Left 9/13/2018    Procedure: INSERTION NAIL IM FEMUR ANTEGRADE (TROCHANTERIC);   Surgeon: Camilla Denson MD;  [facility];  Service: Orthopedics    TOE SURGERY Right 12/2020    right [facility] toe removed due to cellulitis     Family History   Problem Relation Age of Onset    Lymphoma Mother     No Known Problems Father     No Known Problems Sister     Lung cancer Brother     Lymphoma Family         acute       Objective:  /76 (BP Location: Left arm, Patient Position: Sitting, Cuff Size: Adult)   Pulse 84   Temp 97 8 °F (36 6 °C) (Temporal)   Ht 5' 8" (1 727 m)   Wt 82 6 kg (182 lb 3 2 oz) Comment: w cast & shoes  SpO2 98% Comment: ra  BMI 27 70 kg/m²     Recent Results (from the past 1344 hour(s))   HEMOGLOBIN A1C    Collection Time: 12/09/20  7:35 PM   Result Value Ref Range    Hemoglobin A1C 7 0 (H) <5 7 %    eAG, EST AVG Glucose 154 (H) <154 mg/dL   NOVEL CORONAVIRUS (COVID-19), PCR [facility]    Collection Time: 12/12/20 10:50 AM    Specimen type and source: Specimen (LAB)   Result Value Ref Range    Expected Procedure Date 20,201,214     SARS Coronavirus 2 PCR Not Detected Not Detected    Specimen Description NASOPHARYNGEAL SWAB     First test? YES     Prior test type? Not applicable     Prior test result? Not applicable     Prior test date?       ^^^UNKNA^Unknown or not applicable^L^^^Unknown or not applicable    Employed in healthcare setting? No     Symptomatic as defined by CDC? No     Date of symptom onset?       ^^^UNKNA^Unknown or not applicable^L^^^Unknown or not applicable    Currently hospitalized? YES     In ICU? No     Resident in congregate care setting? UNKNOWN     Pregnant? Not applicable             Physical Exam  Vitals signs reviewed  Constitutional:       General: He is not in acute distress  Appearance: He is not diaphoretic  HENT:      Head: Normocephalic and atraumatic  Cardiovascular:      Rate and Rhythm: Normal rate and regular rhythm  Pulmonary:      Effort: Pulmonary effort is normal  No respiratory distress  Breath sounds: Normal breath sounds  No wheezing, rhonchi or rales     Abdominal:      General: Bowel sounds are normal  There is no distension  Palpations: Abdomen is soft  Tenderness: There is no abdominal tenderness  Musculoskeletal:      Right lower leg: No edema  Left lower leg: No edema  Comments: Right foot in surgical shoe wrapped in ace bandage   Skin:     General: Skin is warm and dry  Neurological:      Mental Status: He is alert  Mental status is at baseline     Psychiatric:         Mood and Affect: Mood normal          Behavior: Behavior normal

## 2021-01-27 ENCOUNTER — VBI (OUTPATIENT)
Dept: ADMINISTRATIVE | Facility: OTHER | Age: 66
End: 2021-01-27

## 2021-03-10 DIAGNOSIS — Z23 ENCOUNTER FOR IMMUNIZATION: ICD-10-CM

## 2021-03-24 ENCOUNTER — IMMUNIZATIONS (OUTPATIENT)
Dept: FAMILY MEDICINE CLINIC | Facility: HOSPITAL | Age: 66
End: 2021-03-24

## 2021-03-24 DIAGNOSIS — Z23 ENCOUNTER FOR IMMUNIZATION: Primary | ICD-10-CM

## 2021-03-24 PROCEDURE — 0011A SARS-COV-2 / COVID-19 MRNA VACCINE (MODERNA) 100 MCG: CPT

## 2021-03-24 PROCEDURE — 91301 SARS-COV-2 / COVID-19 MRNA VACCINE (MODERNA) 100 MCG: CPT

## 2021-03-30 ENCOUNTER — APPOINTMENT (OUTPATIENT)
Dept: LAB | Facility: IMAGING CENTER | Age: 66
End: 2021-03-30
Payer: COMMERCIAL

## 2021-03-30 DIAGNOSIS — I10 ESSENTIAL HYPERTENSION: ICD-10-CM

## 2021-03-30 LAB
ALBUMIN SERPL BCP-MCNC: 4.1 G/DL (ref 3.5–5)
ALP SERPL-CCNC: 47 U/L (ref 46–116)
ALT SERPL W P-5'-P-CCNC: 29 U/L (ref 12–78)
ANION GAP SERPL CALCULATED.3IONS-SCNC: 5 MMOL/L (ref 4–13)
AST SERPL W P-5'-P-CCNC: 21 U/L (ref 5–45)
BASOPHILS # BLD AUTO: 0.04 THOUSANDS/ΜL (ref 0–0.1)
BASOPHILS NFR BLD AUTO: 1 % (ref 0–1)
BILIRUB SERPL-MCNC: 0.86 MG/DL (ref 0.2–1)
BUN SERPL-MCNC: 27 MG/DL (ref 5–25)
CALCIUM SERPL-MCNC: 9.6 MG/DL (ref 8.3–10.1)
CHLORIDE SERPL-SCNC: 105 MMOL/L (ref 100–108)
CHOLEST SERPL-MCNC: 112 MG/DL (ref 50–200)
CO2 SERPL-SCNC: 25 MMOL/L (ref 21–32)
CREAT SERPL-MCNC: 1.17 MG/DL (ref 0.6–1.3)
EOSINOPHIL # BLD AUTO: 0.24 THOUSAND/ΜL (ref 0–0.61)
EOSINOPHIL NFR BLD AUTO: 4 % (ref 0–6)
ERYTHROCYTE [DISTWIDTH] IN BLOOD BY AUTOMATED COUNT: 13.2 % (ref 11.6–15.1)
EST. AVERAGE GLUCOSE BLD GHB EST-MCNC: 131 MG/DL
GFR SERPL CREATININE-BSD FRML MDRD: 64 ML/MIN/1.73SQ M
GLUCOSE P FAST SERPL-MCNC: 137 MG/DL (ref 65–99)
HBA1C MFR BLD: 6.2 %
HCT VFR BLD AUTO: 36.6 % (ref 36.5–49.3)
HDLC SERPL-MCNC: 45 MG/DL
HGB BLD-MCNC: 12.1 G/DL (ref 12–17)
IMM GRANULOCYTES # BLD AUTO: 0.01 THOUSAND/UL (ref 0–0.2)
IMM GRANULOCYTES NFR BLD AUTO: 0 % (ref 0–2)
LDLC SERPL CALC-MCNC: 50 MG/DL (ref 0–100)
LYMPHOCYTES # BLD AUTO: 1.71 THOUSANDS/ΜL (ref 0.6–4.47)
LYMPHOCYTES NFR BLD AUTO: 30 % (ref 14–44)
MCH RBC QN AUTO: 29.3 PG (ref 26.8–34.3)
MCHC RBC AUTO-ENTMCNC: 33.1 G/DL (ref 31.4–37.4)
MCV RBC AUTO: 89 FL (ref 82–98)
MONOCYTES # BLD AUTO: 0.39 THOUSAND/ΜL (ref 0.17–1.22)
MONOCYTES NFR BLD AUTO: 7 % (ref 4–12)
NEUTROPHILS # BLD AUTO: 3.4 THOUSANDS/ΜL (ref 1.85–7.62)
NEUTS SEG NFR BLD AUTO: 58 % (ref 43–75)
NONHDLC SERPL-MCNC: 67 MG/DL
NRBC BLD AUTO-RTO: 0 /100 WBCS
PLATELET # BLD AUTO: 191 THOUSANDS/UL (ref 149–390)
PMV BLD AUTO: 10.2 FL (ref 8.9–12.7)
POTASSIUM SERPL-SCNC: 4.7 MMOL/L (ref 3.5–5.3)
PROT SERPL-MCNC: 7.7 G/DL (ref 6.4–8.2)
RBC # BLD AUTO: 4.13 MILLION/UL (ref 3.88–5.62)
SODIUM SERPL-SCNC: 135 MMOL/L (ref 136–145)
TRIGL SERPL-MCNC: 85 MG/DL
WBC # BLD AUTO: 5.79 THOUSAND/UL (ref 4.31–10.16)

## 2021-03-30 PROCEDURE — 80061 LIPID PANEL: CPT | Performed by: NURSE PRACTITIONER

## 2021-03-30 PROCEDURE — 80053 COMPREHEN METABOLIC PANEL: CPT

## 2021-03-30 PROCEDURE — 83036 HEMOGLOBIN GLYCOSYLATED A1C: CPT | Performed by: NURSE PRACTITIONER

## 2021-03-30 PROCEDURE — 36415 COLL VENOUS BLD VENIPUNCTURE: CPT | Performed by: NURSE PRACTITIONER

## 2021-03-30 PROCEDURE — 3044F HG A1C LEVEL LT 7.0%: CPT | Performed by: NURSE PRACTITIONER

## 2021-03-30 PROCEDURE — 85025 COMPLETE CBC W/AUTO DIFF WBC: CPT | Performed by: NURSE PRACTITIONER

## 2021-04-14 ENCOUNTER — RA CDI HCC (OUTPATIENT)
Dept: OTHER | Facility: HOSPITAL | Age: 66
End: 2021-04-14

## 2021-04-14 NOTE — PROGRESS NOTES
Based on clinical documentation indicated in your record, it appears that the patient may have the following conditions not coded in 2021:    Z89 [id number] Acquired absence of right great toe (noted 1/4/21 office visit)    If this is correct, please document and assess at your next visit April 21st    [place] [id number]  coding opportunities             Chart reviewed, (number of) suggestions sent to provider: 1           Patients insurance company: [employer] ([employer] and [employer])             [place] [id number]  coding opportunities             Chart reviewed, (number of) suggestions sent to provider: 1     Problem listed updated   Provider Accepted, (number of) suggestions accepted: 1        Patients insurance company: [employer] ([employer] and [employer])

## 2021-04-16 PROBLEM — Z89.411 ACQUIRED ABSENCE OF RIGHT GREAT TOE (HCC): Status: ACTIVE | Noted: 2021-04-16

## 2021-04-21 ENCOUNTER — OFFICE VISIT (OUTPATIENT)
Dept: INTERNAL MEDICINE CLINIC | Facility: CLINIC | Age: 66
End: 2021-04-21
Payer: COMMERCIAL

## 2021-04-21 VITALS
HEART RATE: 78 BPM | SYSTOLIC BLOOD PRESSURE: 138 MMHG | BODY MASS INDEX: 28.1 KG/M2 | OXYGEN SATURATION: 98 % | TEMPERATURE: 98.4 F | HEIGHT: 68 IN | DIASTOLIC BLOOD PRESSURE: 90 MMHG | RESPIRATION RATE: 18 BRPM | WEIGHT: 185.4 LBS

## 2021-04-21 DIAGNOSIS — Z89.411 ACQUIRED ABSENCE OF RIGHT GREAT TOE (HCC): ICD-10-CM

## 2021-04-21 DIAGNOSIS — Z12.11 SCREENING FOR COLON CANCER: Primary | ICD-10-CM

## 2021-04-21 DIAGNOSIS — M81.0 OSTEOPOROSIS WITHOUT CURRENT PATHOLOGICAL FRACTURE, UNSPECIFIED OSTEOPOROSIS TYPE: ICD-10-CM

## 2021-04-21 DIAGNOSIS — I73.9 PERIPHERAL VASCULAR DISEASE (HCC): ICD-10-CM

## 2021-04-21 DIAGNOSIS — E78.5 HYPERLIPIDEMIA, UNSPECIFIED HYPERLIPIDEMIA TYPE: ICD-10-CM

## 2021-04-21 DIAGNOSIS — E55.9 VITAMIN D DEFICIENCY: ICD-10-CM

## 2021-04-21 DIAGNOSIS — I10 ESSENTIAL HYPERTENSION: ICD-10-CM

## 2021-04-21 DIAGNOSIS — E11.65 TYPE 2 DIABETES MELLITUS WITH HYPERGLYCEMIA, WITHOUT LONG-TERM CURRENT USE OF INSULIN (HCC): ICD-10-CM

## 2021-04-21 PROCEDURE — 1036F TOBACCO NON-USER: CPT | Performed by: NURSE PRACTITIONER

## 2021-04-21 PROCEDURE — 3075F SYST BP GE 130 - 139MM HG: CPT | Performed by: NURSE PRACTITIONER

## 2021-04-21 PROCEDURE — 1160F RVW MEDS BY RX/DR IN RCRD: CPT | Performed by: NURSE PRACTITIONER

## 2021-04-21 PROCEDURE — 99214 OFFICE O/P EST MOD 30 MIN: CPT | Performed by: NURSE PRACTITIONER

## 2021-04-21 PROCEDURE — 3080F DIAST BP >= 90 MM HG: CPT | Performed by: NURSE PRACTITIONER

## 2021-04-21 PROCEDURE — 3008F BODY MASS INDEX DOCD: CPT | Performed by: NURSE PRACTITIONER

## 2021-04-21 RX ORDER — HYDROCHLOROTHIAZIDE 12.5 MG/1
12.5 TABLET ORAL DAILY
Qty: 90 TABLET | Refills: 1 | Status: SHIPPED | OUTPATIENT
Start: 2021-04-21 | End: 2021-11-17

## 2021-04-21 NOTE — ASSESSMENT & PLAN NOTE
Lab Results   Component Value Date    HGBA1C 6 2 (H) 03/30/2021    currently well controlled, will continue with metformin a 1000 mg tablet the morning and 500 mg in the evening  Patient will continue with glipizide 10 mg tablet  Patient is to continue to work on diet and exercise  Limit sugars and carbohydrate intake  Avoid soda, juice, sweets, cookies, desserts, pasta, bread    Eat more whole grains, exercised 30 min of cardio at least 3 times a week  Also recommended daily foot exams to check for sores, and recommended yearly eye exams

## 2021-04-21 NOTE — ASSESSMENT & PLAN NOTE
Currently not well controlled  Will continue with lisinopril 20mg tablet and add on HCTZ  Continue current regimen -   Continue to monitor blood pressure at home  Goal BP is < 130/80  Contact our office for consistent elevations  Recommend low sodium diet  Exercise 30 minutes 5 times a week as tolerated  Recommend yearly eye exam    Repeat CMP prior to next follow up

## 2021-04-21 NOTE — PROGRESS NOTES
Assessment/Plan:    Type 2 diabetes mellitus (HCC)    Lab Results   Component Value Date    HGBA1C 6 2 (H) 03/30/2021    currently well controlled, will continue with metformin a 1000 mg tablet the morning and 500 mg in the evening  Patient will continue with glipizide 10 mg tablet  Patient is to continue to work on diet and exercise  Limit sugars and carbohydrate intake  Avoid soda, juice, sweets, cookies, desserts, pasta, bread    Eat more whole grains, exercised 30 min of cardio at least 3 times a week  Also recommended daily foot exams to check for sores, and recommended yearly eye exams  Essential hypertension  Currently not well controlled  Will continue with lisinopril 20mg tablet and add on HCTZ  Continue current regimen -   Continue to monitor blood pressure at home  Goal BP is < 130/80  Contact our office for consistent elevations  Recommend low sodium diet  Exercise 30 minutes 5 times a week as tolerated  Recommend yearly eye exam    Repeat CMP prior to next follow up  Osteoporosis  DEXA scan from 10/2018 shows a T-score of -2 1  Continue with calcium 1200 mg and vitamin-D 2000 units daily supplementation  Vitamin D deficiency  Continue with vitamin D supplementations  Hyperlipemia  Patient will continue on Lipitor 10 mg tablet  Recommend healthy lifestyle choices for your cholesterol  Low fat/low cholesterol diet  Limit/avoid red meat  Eat more lean meat - chicken breast, ground turkey, fish  Exercise 30 mins at least 5 times a week as tolerated  Acquired absence of right great toe ([address] )  Form filled out for diabetes shoes  Diagnoses and all orders for this visit:    Screening for colon cancer  -     Cologuard; Future    Essential hypertension  -     hydrochlorothiazide (HYDRODIURIL) 12 5 mg tablet; Take 1 tablet (12 5 mg total) by mouth daily    Acquired absence of right great toe (HCC)  -     Comprehensive metabolic panel;  Future  -     CBC and differential    Peripheral vascular disease (HCC)    Hyperlipidemia, unspecified hyperlipidemia type  -     Lipid panel    Type 2 diabetes mellitus with hyperglycemia, without long-term current use of insulin (HCC)  -     Hemoglobin A1C    Osteoporosis without current pathological fracture, unspecified osteoporosis type    Vitamin D deficiency          Subjective:      Patient ID: Jones Garrett is a 74 y o  male  Patient presents today for follow-up on diabetes type 2, hypertension, hyperlipidemia, reviewed blood work while in the office today  Patient has gained weight as he has been eating more  Male screenings:  Colonoscopy- refuses to get one done, ordered cologuard while in office today  Hep C-will get with next blood-work  Eye Doctor- wears glasses, has not been  Dentist-has not gone  Podiatrist-does not go      Tobacco abuse- Quit in 78        Diabetes  He presents for his initial diabetic visit  He has type 2 diabetes mellitus  No MedicAlert identification noted  His disease course has been stable  There are no hypoglycemic associated symptoms  Pertinent negatives for hypoglycemia include no dizziness, headaches or nervousness/anxiousness  Pertinent negatives for diabetes include no chest pain, no fatigue, no polydipsia, no polyphagia, no polyuria, no weakness and no weight loss  There are no hypoglycemic complications  Symptoms are worsening  Risk factors for coronary artery disease include diabetes mellitus, dyslipidemia and male sex  Current diabetic treatments:  patient currently taking metformin 500 mg twice a day, and glipizide 10 mg  Compliance with diabetes treatment:  unsure of treatment compliance as patient seems forgetful however he did write his medications down prior to his office visit today  Weight trend:  patient is very thin  He rarely participates in exercise  (Patient reports BS 100s ) He does not see a podiatrist Eye exam is not current     Hypertension  This is a chronic problem  The current episode started more than 1 year ago  The problem has been gradually worsening since onset  The problem is controlled  Pertinent negatives include no anxiety, chest pain, headaches, neck pain, palpitations or shortness of breath  The following portions of the patient's history were reviewed and updated as appropriate: allergies, current medications, past family history, past medical history, past social history, past surgical history and problem list     Review of Systems   Constitutional: Negative for activity change, appetite change, chills, diaphoresis, fatigue, fever and weight loss  HENT: Negative for congestion, ear discharge, ear pain, postnasal drip, rhinorrhea, sinus pressure, sinus pain and sore throat  Eyes: Negative for pain, discharge, itching and visual disturbance  Respiratory: Negative for cough, chest tightness, shortness of breath and wheezing  Cardiovascular: Positive for leg swelling (Right LE)  Negative for chest pain and palpitations  Gastrointestinal: Negative for abdominal pain, constipation, diarrhea, nausea and vomiting  Endocrine: Negative for polydipsia, polyphagia and polyuria  Genitourinary: Negative for difficulty urinating, dysuria and urgency  Musculoskeletal: Negative for arthralgias, back pain and neck pain  Skin: Negative for rash and wound  Neurological: Negative for dizziness, weakness, numbness and headaches  Psychiatric/Behavioral: The patient is not nervous/anxious            Past Medical History:   Diagnosis Date    Accident due to mechanical fall without injury 9/12/2018    Diabetes mellitus ([facility] [address] )     H/O nicotine dependence     Hypertension     Prediabetes          Current Outpatient Medications:     ACCU-CHEK FASTCLIX LANCETS MISC, by Does not apply route 2 (two) times a day, Disp: 200 each, Rfl: 1    atorvastatin (LIPITOR) 10 mg tablet, Take 1 tablet (10 mg total) by mouth daily, Disp: 90 tablet, Rfl: 1    Blood Glucose Monitoring Suppl (ACCU-CHEK CHELY PLUS) w/Device KIT, by Does not apply route 2 (two) times a day, Disp: 1 kit, Rfl: 0    cholecalciferol 2000 units TABS, Take 1 tablet (2,000 Units total) by mouth daily, Disp: 30 tablet, Rfl: 0    glipiZIDE (GLUCOTROL XL) 10 mg 24 hr tablet, Take 1 tablet (10 mg total) by mouth daily, Disp: 90 tablet, Rfl: 1    glucose blood (ACCU-CHEK CHELY PLUS) test strip, 1 each by Other route 2 (two) times a day Use as instructed, Disp: 200 each, Rfl: 1    lisinopril (ZESTRIL) 20 mg tablet, Take 1 tablet (20 mg total) by mouth daily, Disp: 90 tablet, Rfl: 1    metFORMIN (GLUCOPHAGE) 1000 MG tablet, Talk one tablet (1,000mg total) by mouth with morning and 1/2 tablet (500mg) at HS , Disp: 180 tablet, Rfl: 1    Omega-3 Fatty Acids (FISH OIL) 1,000 mg, Take 1,000 mg by mouth daily, Disp: , Rfl:     Acetaminophen (TYLENOL EXTRA STRENGTH PO), As needed, Disp: , Rfl:     hydrochlorothiazide (HYDRODIURIL) 12 5 mg tablet, Take 1 tablet (12 5 mg total) by mouth daily, Disp: 90 tablet, Rfl: 1    No Known Allergies    Social History   Past Surgical History:   Procedure Laterality Date    HIP SURGERY      NOSE SURGERY      OR OPEN RX FEMUR FX+INTRAMED ADDISON Left 9/13/2018    Procedure: INSERTION NAIL IM FEMUR ANTEGRADE (TROCHANTERIC);   Surgeon: Jessee Carrillo MD;  [facility]: [facility];  Service: Orthopedics    TOE SURGERY Right 12/2020    right great toe removed due to cellulitis     Family History   Problem Relation Age of Onset    Lymphoma Mother     No Known Problems Father     No Known Problems Sister     Lung cancer Brother     Lymphoma Family         acute       Objective:  /90 (BP Location: Left arm, Patient Position: Sitting, Cuff Size: Standard)   Pulse 78   Temp 98 4 °F (36 9 °C) (Tympanic)   Resp 18   Ht 5' 8" (1 727 m)   Wt 84 1 kg (185 lb 6 4 oz)   SpO2 98%   BMI 28 19 kg/m²     Recent Results (from the past 1344 hour(s))   Hemoglobin A1C    Collection Time: 03/30/21 12:27 PM   Result Value Ref Range    Hemoglobin A1C 6 2 (H) Normal 3 8-5 6%; PreDiabetic 5 7-6 4%;  Diabetic >=6 5%; Glycemic control for adults with diabetes <7 0% %     mg/dl   CBC and differential    Collection Time: 03/30/21 12:27 PM   Result Value Ref Range    WBC 5 79 4 31 - 10 16 Thousand/uL    RBC 4 13 3 88 - 5 62 Million/uL    Hemoglobin 12 1 12 0 - 17 0 g/dL    Hematocrit 36 6 36 5 - 49 3 %    MCV 89 82 - 98 fL    MCH 29 3 26 8 - 34 3 pg    MCHC 33 1 31 4 - 37 4 g/dL    RDW 13 2 11 6 - 15 1 %    MPV 10 2 8 9 - 12 7 fL    Platelets 790 090 - 880 Thousands/uL    nRBC 0 /100 WBCs    Neutrophils Relative 58 43 - 75 %    Immat GRANS % 0 0 - 2 %    Lymphocytes Relative 30 14 - 44 %    Monocytes Relative 7 4 - 12 %    Eosinophils Relative 4 0 - 6 %    Basophils Relative 1 0 - 1 %    Neutrophils Absolute 3 40 1 85 - 7 62 Thousands/µL    Immature Grans Absolute 0 01 0 00 - 0 20 Thousand/uL    Lymphocytes Absolute 1 71 0 60 - 4 47 Thousands/µL    Monocytes Absolute 0 39 0 17 - 1 22 Thousand/µL    Eosinophils Absolute 0 24 0 00 - 0 61 Thousand/µL    Basophils Absolute 0 04 0 00 - 0 10 Thousands/µL   Lipid panel    Collection Time: 03/30/21 12:27 PM   Result Value Ref Range    Cholesterol 112 50 - 200 mg/dL    Triglycerides 85 <=150 mg/dL    HDL, Direct 45 >=40 mg/dL    LDL Calculated 50 0 - 100 mg/dL    Non-HDL-Chol (CHOL-HDL) 67 mg/dl   Comprehensive metabolic panel    Collection Time: 03/30/21 12:27 PM   Result Value Ref Range    Sodium 135 (L) 136 - 145 mmol/L    Potassium 4 7 3 5 - 5 3 mmol/L    Chloride 105 100 - 108 mmol/L    CO2 25 21 - 32 mmol/L    ANION GAP 5 4 - 13 mmol/L    BUN 27 (H) 5 - 25 mg/dL    Creatinine 1 17 0 60 - 1 30 mg/dL    Glucose, Fasting 137 (H) 65 - 99 mg/dL    Calcium 9 6 8 3 - 10 1 mg/dL    AST 21 5 - 45 U/L    ALT 29 12 - 78 U/L    Alkaline Phosphatase 47 46 - 116 U/L    Total Protein 7 7 6 4 - 8 2 g/dL    Albumin 4 1 3 5 - 5 0 g/dL    Total Bilirubin 0 86 0 20 - 1 00 mg/dL    eGFR 64 ml/min/1 73sq m            Physical Exam  Constitutional:       General: He is not in acute distress  Appearance: He is well-developed  He is not diaphoretic  HENT:      Head: Normocephalic and atraumatic  Right Ear: External ear normal       Left Ear: External ear normal       Nose: Nose normal       Mouth/Throat:      Pharynx: No oropharyngeal exudate  Eyes:      General:         Right eye: No discharge  Left eye: No discharge  Conjunctiva/sclera: Conjunctivae normal       Pupils: Pupils are equal, round, and reactive to light  Neck:      Musculoskeletal: Normal range of motion and neck supple  Thyroid: No thyromegaly  Cardiovascular:      Rate and Rhythm: Normal rate and regular rhythm  Heart sounds: Normal heart sounds  No murmur  No friction rub  No gallop  Pulmonary:      Effort: Pulmonary effort is normal  No respiratory distress  Breath sounds: Normal breath sounds  No stridor  No wheezing or rales  Abdominal:      General: Bowel sounds are normal  There is no distension  Palpations: Abdomen is soft  Tenderness: There is no abdominal tenderness  Musculoskeletal:      Right lower leg: Edema (patient wearing compression stocking) present  Lymphadenopathy:      Cervical: No cervical adenopathy  Skin:     General: Skin is warm and dry  Findings: No erythema or rash  Neurological:      Mental Status: He is alert and oriented to person, place, and time  Psychiatric:         Behavior: Behavior normal          Thought Content:  Thought content normal          Judgment: Judgment normal

## 2021-04-21 NOTE — ASSESSMENT & PLAN NOTE
Patient will continue on Lipitor 10 mg tablet  Recommend healthy lifestyle choices for your cholesterol  Low fat/low cholesterol diet  Limit/avoid red meat  Eat more lean meat - chicken breast, ground turkey, fish  Exercise 30 mins at least 5 times a week as tolerated

## 2021-04-22 ENCOUNTER — TELEPHONE (OUTPATIENT)
Dept: ADMINISTRATIVE | Facility: OTHER | Age: 66
End: 2021-04-22

## 2021-04-22 ENCOUNTER — VBI (OUTPATIENT)
Dept: ADMINISTRATIVE | Facility: OTHER | Age: 66
End: 2021-04-22

## 2021-04-22 NOTE — TELEPHONE ENCOUNTER
----- Message from Harshad Muller MA sent at 2021  1:37 PM EDT -----  Regarding: dm foot exam  21 1:37 PM    Hello, our patient Ramin Alonso has had Diabetic Foot Exam completed/performed  Please assist in updating the patient chart by making an External outreach to Dr Lillian Apgar facility located in [facility]  The date of service is multiple times a year due to a toe amputation  Can we just see if they have ever done a DM foot exam he states he gets checked regular         Thank you,  Harshad Muller, [address]  [facility]

## 2021-04-22 NOTE — TELEPHONE ENCOUNTER
Upon review of the In Basket request we have found/obtained the documentation  After careful review of the document we are unable to complete this request for Diabetic Foot Exam because the documentation does not have the proper verbiage (wording) needed to close the requested care gap(s)  Reviewed notes from Dr Maria Isabel Schulz within care everywhere and there was no mention of a diabetic foot exam with monofilament being completed at Dr Raya Cannon office  Any additional questions or concerns should be emailed to the Practice Liaisons via [email or web address] email, please do not reply via In Basket      Thank you  Edward Perez

## 2021-04-23 ENCOUNTER — IMMUNIZATIONS (OUTPATIENT)
Dept: FAMILY MEDICINE CLINIC | Facility: HOSPITAL | Age: 66
End: 2021-04-23

## 2021-04-23 DIAGNOSIS — Z23 ENCOUNTER FOR IMMUNIZATION: Primary | ICD-10-CM

## 2021-04-23 PROCEDURE — 0012A SARS-COV-2 / COVID-19 MRNA VACCINE (MODERNA) 100 MCG: CPT

## 2021-04-23 PROCEDURE — 91301 SARS-COV-2 / COVID-19 MRNA VACCINE (MODERNA) 100 MCG: CPT

## 2021-05-17 ENCOUNTER — TELEPHONE (OUTPATIENT)
Dept: INTERNAL MEDICINE CLINIC | Age: 66
End: 2021-05-17

## 2021-05-17 NOTE — TELEPHONE ENCOUNTER
LMOM for pt  To call back to confirm he did receive cologuard kit in the mail and to complete this month  Mon-Thursday to send in kit and can drop off in any 3 offices for shipping or [employer] will  for free at house      Thank you

## 2021-06-13 DIAGNOSIS — I10 ESSENTIAL HYPERTENSION: ICD-10-CM

## 2021-06-14 PROCEDURE — 4010F ACE/ARB THERAPY RXD/TAKEN: CPT | Performed by: NURSE PRACTITIONER

## 2021-06-14 RX ORDER — LISINOPRIL 20 MG/1
20 TABLET ORAL DAILY
Qty: 90 TABLET | Refills: 1 | Status: SHIPPED | OUTPATIENT
Start: 2021-06-14 | End: 2021-10-18 | Stop reason: SDUPTHER

## 2021-07-17 ENCOUNTER — APPOINTMENT (OUTPATIENT)
Dept: LAB | Facility: IMAGING CENTER | Age: 66
End: 2021-07-17
Payer: COMMERCIAL

## 2021-07-17 DIAGNOSIS — Z89.411 ACQUIRED ABSENCE OF RIGHT GREAT TOE (HCC): ICD-10-CM

## 2021-07-17 LAB
ALBUMIN SERPL BCP-MCNC: 3.8 G/DL (ref 3.5–5)
ALP SERPL-CCNC: 51 U/L (ref 46–116)
ALT SERPL W P-5'-P-CCNC: 32 U/L (ref 12–78)
ANION GAP SERPL CALCULATED.3IONS-SCNC: 3 MMOL/L (ref 4–13)
AST SERPL W P-5'-P-CCNC: 20 U/L (ref 5–45)
BASOPHILS # BLD AUTO: 0.06 THOUSANDS/ΜL (ref 0–0.1)
BASOPHILS NFR BLD AUTO: 1 % (ref 0–1)
BILIRUB SERPL-MCNC: 0.78 MG/DL (ref 0.2–1)
BUN SERPL-MCNC: 33 MG/DL (ref 5–25)
CALCIUM SERPL-MCNC: 8.9 MG/DL (ref 8.3–10.1)
CHLORIDE SERPL-SCNC: 107 MMOL/L (ref 100–108)
CHOLEST SERPL-MCNC: 120 MG/DL (ref 50–200)
CO2 SERPL-SCNC: 25 MMOL/L (ref 21–32)
CREAT SERPL-MCNC: 1.39 MG/DL (ref 0.6–1.3)
EOSINOPHIL # BLD AUTO: 0.37 THOUSAND/ΜL (ref 0–0.61)
EOSINOPHIL NFR BLD AUTO: 5 % (ref 0–6)
ERYTHROCYTE [DISTWIDTH] IN BLOOD BY AUTOMATED COUNT: 12.7 % (ref 11.6–15.1)
EST. AVERAGE GLUCOSE BLD GHB EST-MCNC: 143 MG/DL
GFR SERPL CREATININE-BSD FRML MDRD: 52 ML/MIN/1.73SQ M
GLUCOSE P FAST SERPL-MCNC: 146 MG/DL (ref 65–99)
HBA1C MFR BLD: 6.6 %
HCT VFR BLD AUTO: 32.4 % (ref 36.5–49.3)
HDLC SERPL-MCNC: 38 MG/DL
HGB BLD-MCNC: 10.9 G/DL (ref 12–17)
IMM GRANULOCYTES # BLD AUTO: 0.02 THOUSAND/UL (ref 0–0.2)
IMM GRANULOCYTES NFR BLD AUTO: 0 % (ref 0–2)
LDLC SERPL CALC-MCNC: 65 MG/DL (ref 0–100)
LYMPHOCYTES # BLD AUTO: 2.04 THOUSANDS/ΜL (ref 0.6–4.47)
LYMPHOCYTES NFR BLD AUTO: 28 % (ref 14–44)
MCH RBC QN AUTO: 29.8 PG (ref 26.8–34.3)
MCHC RBC AUTO-ENTMCNC: 33.6 G/DL (ref 31.4–37.4)
MCV RBC AUTO: 89 FL (ref 82–98)
MONOCYTES # BLD AUTO: 0.43 THOUSAND/ΜL (ref 0.17–1.22)
MONOCYTES NFR BLD AUTO: 6 % (ref 4–12)
NEUTROPHILS # BLD AUTO: 4.32 THOUSANDS/ΜL (ref 1.85–7.62)
NEUTS SEG NFR BLD AUTO: 60 % (ref 43–75)
NONHDLC SERPL-MCNC: 82 MG/DL
NRBC BLD AUTO-RTO: 0 /100 WBCS
PLATELET # BLD AUTO: 165 THOUSANDS/UL (ref 149–390)
PMV BLD AUTO: 10.5 FL (ref 8.9–12.7)
POTASSIUM SERPL-SCNC: 4.7 MMOL/L (ref 3.5–5.3)
PROT SERPL-MCNC: 7.2 G/DL (ref 6.4–8.2)
RBC # BLD AUTO: 3.66 MILLION/UL (ref 3.88–5.62)
SODIUM SERPL-SCNC: 135 MMOL/L (ref 136–145)
TRIGL SERPL-MCNC: 87 MG/DL
WBC # BLD AUTO: 7.24 THOUSAND/UL (ref 4.31–10.16)

## 2021-07-17 PROCEDURE — 80061 LIPID PANEL: CPT | Performed by: NURSE PRACTITIONER

## 2021-07-17 PROCEDURE — 36415 COLL VENOUS BLD VENIPUNCTURE: CPT | Performed by: NURSE PRACTITIONER

## 2021-07-17 PROCEDURE — 80053 COMPREHEN METABOLIC PANEL: CPT

## 2021-07-17 PROCEDURE — 85025 COMPLETE CBC W/AUTO DIFF WBC: CPT | Performed by: NURSE PRACTITIONER

## 2021-07-17 PROCEDURE — 83036 HEMOGLOBIN GLYCOSYLATED A1C: CPT | Performed by: NURSE PRACTITIONER

## 2021-07-22 ENCOUNTER — TELEPHONE (OUTPATIENT)
Dept: INTERNAL MEDICINE CLINIC | Facility: CLINIC | Age: 66
End: 2021-07-22

## 2021-07-22 NOTE — TELEPHONE ENCOUNTER
Patient called back he does not want to come sooner than 9/1 nga  Can someone call him and discuss with him about his results      Please call [phone]

## 2021-07-22 NOTE — TELEPHONE ENCOUNTER
His sept appt should be cancelled and moved up to next week   His kidney function is declined as well as his hemoglobin  We are happy to discuss this in the office  Telephone calls are not in place of appointments and his results should be addressed sooner than September

## 2021-08-18 DIAGNOSIS — E78.5 HYPERLIPIDEMIA, UNSPECIFIED HYPERLIPIDEMIA TYPE: ICD-10-CM

## 2021-08-19 RX ORDER — ATORVASTATIN CALCIUM 10 MG/1
10 TABLET, FILM COATED ORAL DAILY
Qty: 90 TABLET | Refills: 0 | Status: SHIPPED | OUTPATIENT
Start: 2021-08-19 | End: 2022-01-20 | Stop reason: SDUPTHER

## 2021-08-25 ENCOUNTER — RA CDI HCC (OUTPATIENT)
Dept: OTHER | Facility: HOSPITAL | Age: 66
End: 2021-08-25

## 2021-08-25 NOTE — PROGRESS NOTES
Previous suggestion used    [facility]  coding opportunities       Chart reviewed, no opportunity found: CHART REVIEWED, NO OPPORTUNITY FOUND                        Patients insurance company:  [employer] (Medicare Advantage and Commercial)

## 2021-09-01 ENCOUNTER — OFFICE VISIT (OUTPATIENT)
Dept: INTERNAL MEDICINE CLINIC | Facility: CLINIC | Age: 66
End: 2021-09-01
Payer: COMMERCIAL

## 2021-09-01 VITALS
OXYGEN SATURATION: 98 % | BODY MASS INDEX: 28.61 KG/M2 | DIASTOLIC BLOOD PRESSURE: 80 MMHG | HEIGHT: 68 IN | WEIGHT: 188.8 LBS | HEART RATE: 69 BPM | SYSTOLIC BLOOD PRESSURE: 136 MMHG | TEMPERATURE: 97.3 F

## 2021-09-01 DIAGNOSIS — R71.0 DROP IN HEMOGLOBIN: ICD-10-CM

## 2021-09-01 DIAGNOSIS — E11.65 TYPE 2 DIABETES MELLITUS WITH HYPERGLYCEMIA, WITHOUT LONG-TERM CURRENT USE OF INSULIN (HCC): Primary | ICD-10-CM

## 2021-09-01 DIAGNOSIS — Z23 NEED FOR PNEUMOCOCCAL VACCINATION: ICD-10-CM

## 2021-09-01 DIAGNOSIS — E78.5 HYPERLIPIDEMIA, UNSPECIFIED HYPERLIPIDEMIA TYPE: ICD-10-CM

## 2021-09-01 DIAGNOSIS — Z12.5 SCREENING FOR PROSTATE CANCER: ICD-10-CM

## 2021-09-01 DIAGNOSIS — E55.9 VITAMIN D DEFICIENCY: ICD-10-CM

## 2021-09-01 DIAGNOSIS — M81.0 OSTEOPOROSIS WITHOUT CURRENT PATHOLOGICAL FRACTURE, UNSPECIFIED OSTEOPOROSIS TYPE: ICD-10-CM

## 2021-09-01 DIAGNOSIS — I10 ESSENTIAL HYPERTENSION: ICD-10-CM

## 2021-09-01 DIAGNOSIS — R79.89 ELEVATED SERUM CREATININE: ICD-10-CM

## 2021-09-01 PROCEDURE — 90732 PPSV23 VACC 2 YRS+ SUBQ/IM: CPT

## 2021-09-01 PROCEDURE — 4040F PNEUMOC VAC/ADMIN/RCVD: CPT | Performed by: NURSE PRACTITIONER

## 2021-09-01 PROCEDURE — 99214 OFFICE O/P EST MOD 30 MIN: CPT | Performed by: NURSE PRACTITIONER

## 2021-09-01 PROCEDURE — 3725F SCREEN DEPRESSION PERFORMED: CPT | Performed by: NURSE PRACTITIONER

## 2021-09-01 PROCEDURE — 1160F RVW MEDS BY RX/DR IN RCRD: CPT | Performed by: NURSE PRACTITIONER

## 2021-09-01 PROCEDURE — 1036F TOBACCO NON-USER: CPT | Performed by: NURSE PRACTITIONER

## 2021-09-01 PROCEDURE — 3075F SYST BP GE 130 - 139MM HG: CPT | Performed by: NURSE PRACTITIONER

## 2021-09-01 PROCEDURE — G0009 ADMIN PNEUMOCOCCAL VACCINE: HCPCS

## 2021-09-01 PROCEDURE — 3008F BODY MASS INDEX DOCD: CPT | Performed by: NURSE PRACTITIONER

## 2021-09-01 PROCEDURE — 3079F DIAST BP 80-89 MM HG: CPT | Performed by: NURSE PRACTITIONER

## 2021-09-01 NOTE — ASSESSMENT & PLAN NOTE
Lab Results   Component Value Date    HGBA1C 6 6 (H) 07/17/2021    currently well controlled, will continue with metformin a 1000 mg tablet the morning and 500 mg in the evening  Patient will continue with glipizide 10 mg tablet  Patient is to continue to work on diet and exercise  Limit sugars and carbohydrate intake  Avoid soda, juice, sweets, cookies, desserts, pasta, bread    Eat more whole grains, exercised 30 min of cardio at least 3 times a week  Also recommended daily foot exams to check for sores, and recommended yearly eye exams

## 2021-09-01 NOTE — ASSESSMENT & PLAN NOTE
Controlled  Will continue with lisinopril 20mg tablet and HCTZ  Continue current regimen -   Continue to monitor blood pressure at home  Goal BP is < 130/80  Contact our office for consistent elevations  Recommend low sodium diet  Exercise 30 minutes 5 times a week as tolerated  Recommend yearly eye exam    Repeat CMP prior to next follow up

## 2021-09-01 NOTE — PROGRESS NOTES
Assessment/Plan:    Type 2 diabetes mellitus (HCC)    Lab Results   Component Value Date    HGBA1C 6 6 (H) 07/17/2021    currently well controlled, will continue with metformin a 1000 mg tablet the morning and 500 mg in the evening  Patient will continue with glipizide 10 mg tablet  Patient is to continue to work on diet and exercise  Limit sugars and carbohydrate intake  Avoid soda, juice, sweets, cookies, desserts, pasta, bread    Eat more whole grains, exercised 30 min of cardio at least 3 times a week  Also recommended daily foot exams to check for sores, and recommended yearly eye exams  Essential hypertension  Controlled  Will continue with lisinopril 20mg tablet and HCTZ  Continue current regimen -   Continue to monitor blood pressure at home  Goal BP is < 130/80  Contact our office for consistent elevations  Recommend low sodium diet  Exercise 30 minutes 5 times a week as tolerated  Recommend yearly eye exam    Repeat CMP prior to next follow up  Osteoporosis  DEXA scan from 10/2018 shows a T-score of -2 1  Continue with calcium 1200 mg and vitamin-D 2000 units daily supplementation  Vitamin D deficiency  Continue with vitamin D supplementations  Hyperlipemia  Patient will continue on Lipitor 10 mg tablet  Recommend healthy lifestyle choices for your cholesterol  Low fat/low cholesterol diet  Limit/avoid red meat  Eat more lean meat - chicken breast, ground turkey, fish  Exercise 30 mins at least 5 times a week as tolerated  Elevated serum creatinine  Encouraged good oral hydration  Will repeat BMP  Drop in hemoglobin  Will get iron panel and CBC-d  Cologuard negative  Denies blood in urine and stool  BMI Counseling: Body mass index is 28 71 kg/m²   The BMI is above normal  Nutrition recommendations include decreasing portion sizes, encouraging healthy choices of fruits and vegetables, decreasing fast food intake, consuming healthier snacks, limiting drinks that contain sugar, moderation in carbohydrate intake, increasing intake of lean protein, reducing intake of saturated and trans fat and reducing intake of cholesterol  Exercise recommendations include moderate physical activity 150 minutes/week and exercising 3-5 times per week  Diagnoses and all orders for this visit:    Type 2 diabetes mellitus with hyperglycemia, without long-term current use of insulin (HCC)  -     Hemoglobin A1C    Vitamin D deficiency  -     Vitamin D 25 hydroxy; Future    Hyperlipidemia, unspecified hyperlipidemia type  -     Lipid panel    Essential hypertension  -     Comprehensive metabolic panel; Future  -     CBC and differential  -     Iron Panel (Includes Ferritin, Iron Sat%, Iron, and TIBC); Future    Screening for prostate cancer  -     PSA, Total Screen; Future    Need for pneumococcal vaccination  -     PNEUMOCOCCAL POLYSACCHARIDE VACCINE 23-VALENT =>3YO SQ IM    Osteoporosis without current pathological fracture, unspecified osteoporosis type    Elevated serum creatinine    Drop in hemoglobin          Subjective:      Patient ID: Kishan Gannon is a 74 y o  male  Patient presents today for follow-up on diabetes type 2, hypertension, hyperlipidemia, reviewed blood work while in the office today  Patient has gained weight as he has been eating more  Male screenings:  Colonoscopy- refuses to get one done, ordered cologuard while in office today  Hep C-will get with next blood-work  Eye Doctor- wears glasses, has not been  Dentist-has not gone  Podiatrist-does not go      Tobacco abuse- Quit in 78        Diabetes  He presents for his initial diabetic visit  He has type 2 diabetes mellitus  No MedicAlert identification noted  His disease course has been stable  There are no hypoglycemic associated symptoms  Pertinent negatives for hypoglycemia include no dizziness, headaches or nervousness/anxiousness   Pertinent negatives for diabetes include no chest pain, no fatigue, no polydipsia, no polyphagia, no polyuria, no weakness and no weight loss  There are no hypoglycemic complications  Symptoms are worsening  Risk factors for coronary artery disease include diabetes mellitus, dyslipidemia and male sex  Current diabetic treatments:  patient currently taking metformin 500 mg twice a day, and glipizide 10 mg  Compliance with diabetes treatment:  unsure of treatment compliance as patient seems forgetful however he did write his medications down prior to his office visit today  Weight trend:  patient is very thin  He rarely participates in exercise  (Patient reports BS 100s ) He does not see a podiatrist Eye exam is not current  Hypertension  This is a chronic problem  The current episode started more than 1 year ago  The problem has been gradually worsening since onset  The problem is controlled  Pertinent negatives include no anxiety, chest pain, headaches, neck pain, palpitations or shortness of breath  The following portions of the patient's history were reviewed and updated as appropriate: allergies, current medications, past family history, past medical history, past social history, past surgical history and problem list     Review of Systems   Constitutional: Negative for activity change, appetite change, chills, diaphoresis, fatigue, fever and weight loss  HENT: Negative for congestion, ear discharge, ear pain, postnasal drip, rhinorrhea, sinus pressure, sinus pain and sore throat  Eyes: Negative for pain, discharge, itching and visual disturbance  Respiratory: Negative for cough, chest tightness, shortness of breath and wheezing  Cardiovascular: Negative for chest pain, palpitations and leg swelling  Gastrointestinal: Negative for abdominal pain, constipation, diarrhea, nausea and vomiting  Endocrine: Negative for polydipsia, polyphagia and polyuria  Genitourinary: Negative for difficulty urinating, dysuria and urgency     Musculoskeletal: Negative for arthralgias, back pain and neck pain  Skin: Negative for rash and wound  Neurological: Negative for dizziness, weakness, numbness and headaches  Psychiatric/Behavioral: The patient is not nervous/anxious            Past Medical History:   Diagnosis Date    Accident due to mechanical fall without injury 9/12/2018    [facility] mellitus ([facility] [address] )     H/O nicotine dependence     Hypertension     Prediabetes          Current Outpatient Medications:     ACCU-CHEK FASTCLIX LANCETS MISC, by Does not apply route 2 (two) times a day, Disp: 200 each, Rfl: 1    Acetaminophen (TYLENOL EXTRA STRENGTH PO), As needed, Disp: , Rfl:     atorvastatin (LIPITOR) 10 mg tablet, Take 1 tablet (10 mg total) by mouth daily, Disp: 90 tablet, Rfl: 0    Blood Glucose Monitoring Suppl (ACCU-CHEK CHELY PLUS) w/Device KIT, by Does not apply route 2 (two) times a day, Disp: 1 kit, Rfl: 0    cholecalciferol 2000 units TABS, Take 1 tablet (2,000 Units total) by mouth daily, Disp: 30 tablet, Rfl: 0    glipiZIDE (GLUCOTROL XL) 10 mg 24 hr tablet, Take 1 tablet (10 mg total) by mouth daily, Disp: 90 tablet, Rfl: 1    glucose blood (ACCU-CHEK CHELY PLUS) test strip, 1 each by Other route 2 (two) times a day Use as instructed, Disp: 200 each, Rfl: 1    hydrochlorothiazide (HYDRODIURIL) 12 5 mg tablet, Take 1 tablet (12 5 mg total) by mouth daily, Disp: 90 tablet, Rfl: 1    lisinopril (ZESTRIL) 20 mg tablet, Take 1 tablet (20 mg total) by mouth daily, Disp: 90 tablet, Rfl: 1    metFORMIN (GLUCOPHAGE) 1000 MG tablet, Talk one tablet (1,000mg total) by mouth with morning and 1/2 tablet (500mg) at HS , Disp: 180 tablet, Rfl: 1    Omega-3 Fatty Acids (FISH OIL) 1,000 mg, Take 1,000 mg by mouth daily, Disp: , Rfl:     No Known Allergies    Social History   Past Surgical History:   Procedure Laterality Date    HIP SURGERY      NOSE SURGERY      [place] OPEN RX FEMUR FX+INTRAMED ADDISON Left 9/13/2018    Procedure: INSERTION NAIL IM FEMUR ANTEGRADE (TROCHANTERIC); Surgeon: Priscilla Babinski, MD;  Location: [facility];  Service: Orthopedics    TOE SURGERY Right 12/2020    right great toe removed due to cellulitis     Family History   Problem Relation Age of Onset    Lymphoma Mother     No Known Problems Father     No Known Problems Sister     Lung cancer Brother     Lymphoma Family         acute       Objective:  /80 (BP Location: Left arm, Patient Position: Sitting, Cuff Size: Adult)   Pulse 69   Temp (!) 97 3 °F (36 3 °C) (Tympanic)   Ht 5' 8" (1 727 m)   Wt 85 6 kg (188 lb 12 8 oz)   SpO2 98%   BMI 28 71 kg/m²     Recent Results (from the past 1344 hour(s))   CBC and differential    Collection Time: 07/17/21 11:32 AM   Result Value Ref Range    WBC 7 24 4 31 - 10 16 Thousand/uL    RBC 3 66 (L) 3 88 - 5 62 Million/uL    Hemoglobin 10 9 (L) 12 0 - 17 0 g/dL    Hematocrit 32 4 (L) 36 5 - 49 3 %    MCV 89 82 - 98 fL    MCH 29 8 26 8 - 34 3 pg    MCHC 33 6 31 4 - 37 4 g/dL    RDW 12 7 11 6 - 15 1 %    MPV 10 5 8 9 - 12 7 fL    Platelets 107 664 - 324 Thousands/uL    nRBC 0 /100 WBCs    Neutrophils Relative 60 43 - 75 %    Immat GRANS % 0 0 - 2 %    Lymphocytes Relative 28 14 - 44 %    Monocytes Relative 6 4 - 12 %    Eosinophils Relative 5 0 - 6 %    Basophils Relative 1 0 - 1 %    Neutrophils Absolute 4 32 1 85 - 7 62 Thousands/µL    Immature Grans Absolute 0 02 0 00 - 0 20 Thousand/uL    Lymphocytes Absolute 2 04 0 60 - 4 47 Thousands/µL    Monocytes Absolute 0 43 0 17 - 1 22 Thousand/µL    Eosinophils Absolute 0 37 0 00 - 0 61 Thousand/µL    Basophils Absolute 0 06 0 00 - 0 10 Thousands/µL   Hemoglobin A1C    Collection Time: 07/17/21 11:32 AM   Result Value Ref Range    Hemoglobin A1C 6 6 (H) Normal 3 8-5 6%; PreDiabetic 5 7-6 4%;  Diabetic >=6 5%; Glycemic control for adults with diabetes <7 0% %     mg/dl   Lipid panel    Collection Time: 07/17/21 11:32 AM   Result Value Ref Range    Cholesterol 120 50 - 200 mg/dL Triglycerides 87 <=150 mg/dL    HDL, Direct 38 (L) >=40 mg/dL    LDL Calculated 65 0 - 100 mg/dL    Non-HDL-Chol (CHOL-HDL) 82 mg/dl   Comprehensive metabolic panel    Collection Time: 07/17/21 11:32 AM   Result Value Ref Range    Sodium 135 (L) 136 - 145 mmol/L    Potassium 4 7 3 5 - 5 3 mmol/L    Chloride 107 100 - 108 mmol/L    CO2 25 21 - 32 mmol/L    ANION GAP 3 (L) 4 - 13 mmol/L    BUN 33 (H) 5 - 25 mg/dL    Creatinine 1 39 (H) 0 60 - 1 30 mg/dL    Glucose, Fasting 146 (H) 65 - 99 mg/dL    Calcium 8 9 8 3 - 10 1 mg/dL    AST 20 5 - 45 U/L    ALT 32 12 - 78 U/L    Alkaline Phosphatase 51 46 - 116 U/L    Total Protein 7 2 6 4 - 8 2 g/dL    Albumin 3 8 3 5 - 5 0 g/dL    Total Bilirubin 0 78 0 20 - 1 00 mg/dL    eGFR 52 ml/min/1 73sq m            Physical Exam  Constitutional:       General: He is not in acute distress  Appearance: He is well-developed  He is not diaphoretic  HENT:      Head: Normocephalic and atraumatic  Right Ear: External ear normal       Left Ear: External ear normal       Nose: Nose normal       Mouth/Throat:      Pharynx: No oropharyngeal exudate  Eyes:      General:         Right eye: No discharge  Left eye: No discharge  Conjunctiva/sclera: Conjunctivae normal       Pupils: Pupils are equal, round, and reactive to light  Neck:      Thyroid: No thyromegaly  Cardiovascular:      Rate and Rhythm: Normal rate and regular rhythm  Heart sounds: Normal heart sounds  No murmur heard  No friction rub  No gallop  Pulmonary:      Effort: Pulmonary effort is normal  No respiratory distress  Breath sounds: Normal breath sounds  No stridor  No wheezing or rales  Abdominal:      General: Bowel sounds are normal  There is no distension  Palpations: Abdomen is soft  Tenderness: There is no abdominal tenderness  Musculoskeletal:      Cervical back: Normal range of motion and neck supple  Lymphadenopathy:      Cervical: No cervical adenopathy  Skin:     General: Skin is warm and dry  Findings: No erythema or rash  Neurological:      Mental Status: He is alert and oriented to person, place, and time  Psychiatric:         Behavior: Behavior normal          Thought Content:  Thought content normal          Judgment: Judgment normal

## 2021-09-01 NOTE — PROGRESS NOTES
Diabetic Foot Exam    Patient's shoes and socks removed  Right Foot/Ankle   Right Foot Inspection  Skin Exam: skin intact and dry skin no warmth, no callus, no erythema, no maceration, no abnormal color, no pre-ulcer, no ulcer and no callus                          Toe Exam: swellingno tenderness, erythema and  no right toe deformity  Sensory       Monofilament testing: intact  Vascular  Capillary refills: < 3 seconds  The right DP pulse is 2+  The right PT pulse is 2+  Left Foot/Ankle  Left Foot Inspection  Skin Exam: skin intact and dry skinno warmth, no erythema, no maceration, normal color, no pre-ulcer, no ulcer and no callus                         Toe Exam: no swelling, no tenderness, no erythema and no left toe deformity                   Sensory       Monofilament: intact  Vascular  Capillary refills: < 3 seconds  The left DP pulse is 2+  The left PT pulse is 2+  Assign Risk Category:  Deformity present;  No loss of protective sensation;        Risk: 1

## 2021-09-15 ENCOUNTER — VBI (OUTPATIENT)
Dept: ADMINISTRATIVE | Facility: OTHER | Age: 66
End: 2021-09-15

## 2021-10-19 PROCEDURE — 4010F ACE/ARB THERAPY RXD/TAKEN: CPT | Performed by: NURSE PRACTITIONER

## 2021-10-30 ENCOUNTER — APPOINTMENT (OUTPATIENT)
Dept: LAB | Facility: IMAGING CENTER | Age: 66
End: 2021-10-30
Payer: COMMERCIAL

## 2021-10-30 DIAGNOSIS — E55.9 VITAMIN D DEFICIENCY: ICD-10-CM

## 2021-10-30 DIAGNOSIS — I10 ESSENTIAL HYPERTENSION: ICD-10-CM

## 2021-10-30 DIAGNOSIS — Z12.5 SCREENING FOR PROSTATE CANCER: ICD-10-CM

## 2021-10-30 LAB
25(OH)D3 SERPL-MCNC: 31.7 NG/ML (ref 30–100)
ALBUMIN SERPL BCP-MCNC: 3.9 G/DL (ref 3.5–5)
ALP SERPL-CCNC: 43 U/L (ref 46–116)
ALT SERPL W P-5'-P-CCNC: 40 U/L (ref 12–78)
ANION GAP SERPL CALCULATED.3IONS-SCNC: 4 MMOL/L (ref 4–13)
AST SERPL W P-5'-P-CCNC: 24 U/L (ref 5–45)
BASOPHILS # BLD AUTO: 0.04 THOUSANDS/ΜL (ref 0–0.1)
BASOPHILS NFR BLD AUTO: 1 % (ref 0–1)
BILIRUB SERPL-MCNC: 0.99 MG/DL (ref 0.2–1)
BUN SERPL-MCNC: 28 MG/DL (ref 5–25)
CALCIUM SERPL-MCNC: 9.5 MG/DL (ref 8.3–10.1)
CHLORIDE SERPL-SCNC: 102 MMOL/L (ref 100–108)
CHOLEST SERPL-MCNC: 110 MG/DL (ref 50–200)
CO2 SERPL-SCNC: 26 MMOL/L (ref 21–32)
CREAT SERPL-MCNC: 1.32 MG/DL (ref 0.6–1.3)
EOSINOPHIL # BLD AUTO: 0.32 THOUSAND/ΜL (ref 0–0.61)
EOSINOPHIL NFR BLD AUTO: 5 % (ref 0–6)
ERYTHROCYTE [DISTWIDTH] IN BLOOD BY AUTOMATED COUNT: 12.5 % (ref 11.6–15.1)
EST. AVERAGE GLUCOSE BLD GHB EST-MCNC: 146 MG/DL
FERRITIN SERPL-MCNC: 767 NG/ML (ref 8–388)
GFR SERPL CREATININE-BSD FRML MDRD: 55 ML/MIN/1.73SQ M
GLUCOSE P FAST SERPL-MCNC: 146 MG/DL (ref 65–99)
HBA1C MFR BLD: 6.7 %
HCT VFR BLD AUTO: 34.2 % (ref 36.5–49.3)
HDLC SERPL-MCNC: 43 MG/DL
HGB BLD-MCNC: 11.6 G/DL (ref 12–17)
IMM GRANULOCYTES # BLD AUTO: 0.01 THOUSAND/UL (ref 0–0.2)
IMM GRANULOCYTES NFR BLD AUTO: 0 % (ref 0–2)
IRON SATN MFR SERPL: 44 % (ref 20–50)
IRON SERPL-MCNC: 110 UG/DL (ref 65–175)
LDLC SERPL CALC-MCNC: 52 MG/DL (ref 0–100)
LYMPHOCYTES # BLD AUTO: 1.74 THOUSANDS/ΜL (ref 0.6–4.47)
LYMPHOCYTES NFR BLD AUTO: 29 % (ref 14–44)
MCH RBC QN AUTO: 30.2 PG (ref 26.8–34.3)
MCHC RBC AUTO-ENTMCNC: 33.9 G/DL (ref 31.4–37.4)
MCV RBC AUTO: 89 FL (ref 82–98)
MONOCYTES # BLD AUTO: 0.39 THOUSAND/ΜL (ref 0.17–1.22)
MONOCYTES NFR BLD AUTO: 7 % (ref 4–12)
NEUTROPHILS # BLD AUTO: 3.48 THOUSANDS/ΜL (ref 1.85–7.62)
NEUTS SEG NFR BLD AUTO: 58 % (ref 43–75)
NONHDLC SERPL-MCNC: 67 MG/DL
NRBC BLD AUTO-RTO: 0 /100 WBCS
PLATELET # BLD AUTO: 168 THOUSANDS/UL (ref 149–390)
PMV BLD AUTO: 10.3 FL (ref 8.9–12.7)
POTASSIUM SERPL-SCNC: 5 MMOL/L (ref 3.5–5.3)
PROT SERPL-MCNC: 7.3 G/DL (ref 6.4–8.2)
PSA SERPL-MCNC: 0.2 NG/ML (ref 0–4)
RBC # BLD AUTO: 3.84 MILLION/UL (ref 3.88–5.62)
SODIUM SERPL-SCNC: 132 MMOL/L (ref 136–145)
TIBC SERPL-MCNC: 249 UG/DL (ref 250–450)
TRIGL SERPL-MCNC: 74 MG/DL
WBC # BLD AUTO: 5.98 THOUSAND/UL (ref 4.31–10.16)

## 2021-10-30 PROCEDURE — 3044F HG A1C LEVEL LT 7.0%: CPT | Performed by: NURSE PRACTITIONER

## 2021-10-30 PROCEDURE — 80053 COMPREHEN METABOLIC PANEL: CPT

## 2021-10-30 PROCEDURE — 82306 VITAMIN D 25 HYDROXY: CPT

## 2021-10-30 PROCEDURE — 83550 IRON BINDING TEST: CPT

## 2021-10-30 PROCEDURE — G0103 PSA SCREENING: HCPCS

## 2021-10-30 PROCEDURE — 36415 COLL VENOUS BLD VENIPUNCTURE: CPT | Performed by: NURSE PRACTITIONER

## 2021-10-30 PROCEDURE — 80061 LIPID PANEL: CPT | Performed by: NURSE PRACTITIONER

## 2021-10-30 PROCEDURE — 83540 ASSAY OF IRON: CPT

## 2021-10-30 PROCEDURE — 85025 COMPLETE CBC W/AUTO DIFF WBC: CPT | Performed by: NURSE PRACTITIONER

## 2021-10-30 PROCEDURE — 83036 HEMOGLOBIN GLYCOSYLATED A1C: CPT | Performed by: NURSE PRACTITIONER

## 2021-10-30 PROCEDURE — 82728 ASSAY OF FERRITIN: CPT

## 2021-11-10 ENCOUNTER — RA CDI HCC (OUTPATIENT)
Dept: OTHER | Facility: HOSPITAL | Age: 66
End: 2021-11-10

## 2021-11-17 ENCOUNTER — OFFICE VISIT (OUTPATIENT)
Dept: INTERNAL MEDICINE CLINIC | Facility: CLINIC | Age: 66
End: 2021-11-17
Payer: COMMERCIAL

## 2021-11-17 VITALS
WEIGHT: 195 LBS | OXYGEN SATURATION: 98 % | TEMPERATURE: 97 F | DIASTOLIC BLOOD PRESSURE: 80 MMHG | BODY MASS INDEX: 28.88 KG/M2 | HEART RATE: 84 BPM | SYSTOLIC BLOOD PRESSURE: 140 MMHG | HEIGHT: 69 IN

## 2021-11-17 DIAGNOSIS — N18.30 STAGE 3 CHRONIC KIDNEY DISEASE, UNSPECIFIED WHETHER STAGE 3A OR 3B CKD (HCC): ICD-10-CM

## 2021-11-17 DIAGNOSIS — M81.0 OSTEOPOROSIS WITHOUT CURRENT PATHOLOGICAL FRACTURE, UNSPECIFIED OSTEOPOROSIS TYPE: ICD-10-CM

## 2021-11-17 DIAGNOSIS — Z00.00 MEDICARE ANNUAL WELLNESS VISIT, SUBSEQUENT: ICD-10-CM

## 2021-11-17 DIAGNOSIS — R71.0 DROP IN HEMOGLOBIN: ICD-10-CM

## 2021-11-17 DIAGNOSIS — E78.5 HYPERLIPIDEMIA, UNSPECIFIED HYPERLIPIDEMIA TYPE: ICD-10-CM

## 2021-11-17 DIAGNOSIS — E55.9 VITAMIN D DEFICIENCY: ICD-10-CM

## 2021-11-17 DIAGNOSIS — E11.65 TYPE 2 DIABETES MELLITUS WITH HYPERGLYCEMIA, WITHOUT LONG-TERM CURRENT USE OF INSULIN (HCC): Primary | ICD-10-CM

## 2021-11-17 DIAGNOSIS — I10 ESSENTIAL HYPERTENSION: ICD-10-CM

## 2021-11-17 PROCEDURE — 1160F RVW MEDS BY RX/DR IN RCRD: CPT | Performed by: NURSE PRACTITIONER

## 2021-11-17 PROCEDURE — 99214 OFFICE O/P EST MOD 30 MIN: CPT | Performed by: NURSE PRACTITIONER

## 2021-11-17 PROCEDURE — 1170F FXNL STATUS ASSESSED: CPT | Performed by: NURSE PRACTITIONER

## 2021-11-17 PROCEDURE — 3079F DIAST BP 80-89 MM HG: CPT | Performed by: NURSE PRACTITIONER

## 2021-11-17 PROCEDURE — 1125F AMNT PAIN NOTED PAIN PRSNT: CPT | Performed by: NURSE PRACTITIONER

## 2021-11-17 PROCEDURE — 3077F SYST BP >= 140 MM HG: CPT | Performed by: NURSE PRACTITIONER

## 2021-11-17 PROCEDURE — 3008F BODY MASS INDEX DOCD: CPT | Performed by: NURSE PRACTITIONER

## 2021-11-17 PROCEDURE — 3725F SCREEN DEPRESSION PERFORMED: CPT | Performed by: NURSE PRACTITIONER

## 2021-11-17 PROCEDURE — 3288F FALL RISK ASSESSMENT DOCD: CPT | Performed by: NURSE PRACTITIONER

## 2021-11-17 PROCEDURE — G0439 PPPS, SUBSEQ VISIT: HCPCS | Performed by: NURSE PRACTITIONER

## 2021-11-17 PROCEDURE — 4010F ACE/ARB THERAPY RXD/TAKEN: CPT | Performed by: NURSE PRACTITIONER

## 2021-11-17 PROCEDURE — 1036F TOBACCO NON-USER: CPT | Performed by: NURSE PRACTITIONER

## 2021-11-17 RX ORDER — LISINOPRIL 30 MG/1
30 TABLET ORAL DAILY
Qty: 90 TABLET | Refills: 1 | Status: SHIPPED | OUTPATIENT
Start: 2021-11-17 | End: 2022-04-03 | Stop reason: SDUPTHER

## 2022-01-20 DIAGNOSIS — E78.5 HYPERLIPIDEMIA, UNSPECIFIED HYPERLIPIDEMIA TYPE: ICD-10-CM

## 2022-01-20 DIAGNOSIS — E11.65 TYPE 2 DIABETES MELLITUS WITH HYPERGLYCEMIA, WITHOUT LONG-TERM CURRENT USE OF INSULIN (HCC): ICD-10-CM

## 2022-01-20 RX ORDER — GLIPIZIDE 10 MG/1
10 TABLET, FILM COATED, EXTENDED RELEASE ORAL DAILY
Qty: 90 TABLET | Refills: 0 | Status: CANCELLED | OUTPATIENT
Start: 2022-01-20

## 2022-01-20 RX ORDER — ATORVASTATIN CALCIUM 10 MG/1
10 TABLET, FILM COATED ORAL DAILY
Qty: 90 TABLET | Refills: 0 | Status: SHIPPED | OUTPATIENT
Start: 2022-01-20 | End: 2022-05-18 | Stop reason: SDUPTHER

## 2022-01-31 ENCOUNTER — VBI (OUTPATIENT)
Dept: ADMINISTRATIVE | Facility: OTHER | Age: 67
End: 2022-01-31

## 2022-02-14 ENCOUNTER — TELEPHONE (OUTPATIENT)
Dept: INTERNAL MEDICINE CLINIC | Facility: OTHER | Age: 67
End: 2022-02-14

## 2022-02-14 NOTE — TELEPHONE ENCOUNTER
LMOM for pt to call me at [facility] office    Pt is due for diabetic eye exam   Please assist in scheduling

## 2022-02-15 NOTE — TELEPHONE ENCOUNTER
Called pt and notified him I scheduled his diabetic eye exam 3/31/2022 at 1:20 with Dr Henri Ruvalcaba

## 2022-02-16 NOTE — TELEPHONE ENCOUNTER
Comprehensive Intake Entered On:  5/18/2020 8:23 AM CDT    Performed On:  5/18/2020 8:19 AM CDT by Diane Garrett CMA               Summary   Chief Complaint :   Left eye pain when she blinks and swollen family has been icing since Thursday. No known injury. Verbal consent given for video visit.   Menstrual Status :   Premenarcheal   Languages :   English   RosaDiane ortega CMA - 5/18/2020 8:19 AM CDT   Health Status   Allergies Verified? :   Yes   Medication History Verified? :   Yes   Immunizations Current :   Yes   Pre-Visit Planning Status :   Completed   Tobacco Use? :   Never smoker   Diane Garrett CMA - 5/18/2020 8:19 AM CDT   Consents   Consent for Immunization Exchange :   Consent Granted   Consent for Immunizations to Providers :   Consent Granted   Diane Garrett CMA - 5/18/2020 8:19 AM CDT   Meds / Allergies   (As Of: 5/18/2020 8:23:54 AM CDT)   Allergies (Active)   adhesive tape  Estimated Onset Date:   Unspecified ; Created By:   Kaleigh Crespo; Reaction Status:   Active ; Category:   Other ; Substance:   adhesive tape ; Type:   Allergy ; Updated By:   Kaleigh Crespo; Reviewed Date:   5/18/2020 8:22 AM CDT      Peanuts  Estimated Onset Date:   Unspecified ; Created By:   Kaleigh Crespo; Reaction Status:   Active ; Category:   Food ; Substance:   Peanuts ; Type:   Allergy ; Severity:   Mild ; Updated By:   Kaleigh Crespo; Reviewed Date:   5/18/2020 8:22 AM CDT        Medication List   (As Of: 5/18/2020 8:23:54 AM CDT)   Prescription/Discharge Order    cyproheptadine  :   cyproheptadine ; Status:   Prescribed ; Ordered As Mnemonic:   cyproheptadine 4 mg oral tablet ; Simple Display Line:   4 mg, 1 tab(s), Oral, bid, Give two weeks on, two weeks off., 30 tab(s), 6 Refill(s) ; Ordering Provider:   Billie Raman MD; Catalog Code:   cyproheptadine ; Order Dt/Tm:   4/23/2020 3:53:10 PM CDT          sertraline  :   sertraline ; Status:   Prescribed ; Ordered As Mnemonic:   Zoloft 25 mg oral tablet ; Simple  Work note faxed and pt aware  Display Line:   50 mg, 2 tab(s), Oral, daily, 180 tab(s), 1 Refill(s) ; Ordering Provider:   Hung Roldan MD; Catalog Code:   sertraline ; Order Dt/Tm:   3/12/2020 12:17:58 PM CDT            Home Meds    acetaminophen  :   acetaminophen ; Status:   Documented ; Ordered As Mnemonic:   acetaminophen 500 mg oral tablet ; Simple Display Line:   500 mg, 1 tab(s), Oral, daily, PRN: as needed for pain, 0 Refill(s) ; Catalog Code:   acetaminophen ; Order Dt/Tm:   2/16/2020 11:06:35 AM CST          amoxicillin  :   amoxicillin ; Status:   Documented ; Ordered As Mnemonic:   amoxicillin ; Simple Display Line:   2,000 mg, Oral, taking 1 hour prior to dental work, 0 Refill(s) ; Catalog Code:   amoxicillin ; Order Dt/Tm:   2/16/2020 11:03:53 AM CST          calcium-vitamin D  :   calcium-vitamin D ; Status:   Documented ; Ordered As Mnemonic:   calcium (as citrate)-vitamin D 200 mg-250 intl units oral tablet ; Simple Display Line:   1 tab(s), Oral, daily, at 6:30am, 0 Refill(s) ; Catalog Code:   calcium-vitamin D ; Order Dt/Tm:   2/16/2020 11:05:18 AM CST          cetirizine  :   cetirizine ; Status:   Documented ; Ordered As Mnemonic:   ZyrTEC 10 mg oral tablet ; Simple Display Line:   10 mg, 1 tab(s), PO, Daily, at 6:30 am, PRN: for allergy symptoms, 10 tab(s), 0 Refill(s) ; Catalog Code:   cetirizine ; Order Dt/Tm:   10/29/2018 4:22:35 PM CDT          cholecalciferol  :   cholecalciferol ; Status:   Documented ; Ordered As Mnemonic:   Vitamin D3 2000 intl units oral tablet ; Simple Display Line:   2,000 International Unit, 1 tab(s), Oral, daily, at 6:30 am, 0 Refill(s) ; Catalog Code:   cholecalciferol ; Order Dt/Tm:   2/16/2020 11:07:15 AM CST          docusate-senna  :   docusate-senna ; Status:   Documented ; Ordered As Mnemonic:   docusate-senna 50 mg-8.6 mg oral tablet ; Simple Display Line:   2 tab(s), po, hs, at 8pm, 0 Refill(s) ; Catalog Code:   docusate-senna ; Order Dt/Tm:   9/3/2017 10:04:01 AM CDT           furosemide  :   furosemide ; Status:   Documented ; Ordered As Mnemonic:   Lasix 20 mg oral tablet ; Simple Display Line:   20 mg, 1 tab(s), Oral, daily, at 3-5pm, 0 Refill(s) ; Catalog Code:   furosemide ; Order Dt/Tm:   9/17/2019 7:10:12 PM CDT          gabapentin  :   gabapentin ; Status:   Documented ; Ordered As Mnemonic:   gabapentin 300 mg oral capsule ; Simple Display Line:   600 mg, 2 cap(s), Oral, tid, 6:30am, 11:15am, 8pm for migraines, 0 Refill(s) ; Catalog Code:   gabapentin ; Order Dt/Tm:   2/16/2020 11:11:31 AM CST          hyoscyamine  :   hyoscyamine ; Status:   Documented ; Ordered As Mnemonic:   hyoscyamine 0.125 mg oral tablet ; Simple Display Line:   0.125 mg, 1 tab(s), Oral, tid, as of 3/3/2020 - taking three times daily at 6:30am, 11:20am and 3-5pm, PRN: Other (see comment), 0 Refill(s) ; Catalog Code:   hyoscyamine ; Order Dt/Tm:   2/16/2020 11:14:21 AM CST          ibuprofen  :   ibuprofen ; Status:   Documented ; Ordered As Mnemonic:   ibuprofen 200 mg oral tablet ; Simple Display Line:   400 mg, 2 tab(s), Oral, daily, PRN: as needed for headache, 0 Refill(s) ; Catalog Code:   ibuprofen ; Order Dt/Tm:   2/16/2020 11:15:33 AM CST          losartan  :   losartan ; Status:   Documented ; Ordered As Mnemonic:   losartan 50 mg oral tablet ; Simple Display Line:   50 mg, 1 tab(s), PO, bid, 6:30 am and 8pm, 30 tab(s), 0 Refill(s) ; Catalog Code:   losartan ; Order Dt/Tm:   2/21/2019 7:11:26 PM CST          magnesium oxide  :   magnesium oxide ; Status:   Documented ; Ordered As Mnemonic:   magnesium oxide 400 mg (240 mg elemental magnesium) oral tablet ; Simple Display Line:   400 mg, 1 tab(s), Oral, daily, at 8pm, 0 Refill(s) ; Catalog Code:   magnesium oxide ; Order Dt/Tm:   2/16/2020 11:16:44 AM CST          melatonin  :   melatonin ; Status:   Documented ; Ordered As Mnemonic:   melatonin 3 mg oral tablet ; Simple Display Line:   3 mg, 1 tab(s), po, hs, PRN: for sleep, 0 Refill(s) ; Catalog  Code:   melatonin ; Order Dt/Tm:   11/29/2016 8:54:19 AM CST          multivitamin with minerals  :   multivitamin with minerals ; Status:   Documented ; Ordered As Mnemonic:   Celebrate Multivitamin ; Simple Display Line:   1 tab, Oral, daily, at 8pm, 0 Refill(s) ; Catalog Code:   multivitamin with minerals ; Order Dt/Tm:   2/16/2020 11:13:09 AM CST          propranolol  :   propranolol ; Status:   Documented ; Ordered As Mnemonic:   propranolol 20 mg/5 mL oral solution ; Simple Display Line:   See Instructions, Take 7.5 mL by mouth as needed for HR >180 bpm or highter for 30 minutes, 0 Refill(s) ; Catalog Code:   propranolol ; Order Dt/Tm:   2/16/2020 11:18:23 AM CST          riboflavin  :   riboflavin ; Status:   Documented ; Ordered As Mnemonic:   riboflavin 100 mg oral tablet ; Simple Display Line:   100 mg, 1 tab(s), po, daily, at 6:30am, 0 Refill(s) ; Catalog Code:   riboflavin ; Order Dt/Tm:   5/18/2017 11:41:24 AM CDT          senna  :   senna ; Status:   Documented ; Ordered As Mnemonic:   Ex-Lax Regular Strength Pills 15 mg oral tablet ; Simple Display Line:   See Instructions, Pt has 15 mg chocolate bar. Taking 1/4 bar at 8pm daily (in addition to senna-docusate tabs), 0 Refill(s) ; Catalog Code:   senna ; Order Dt/Tm:   2/16/2020 11:09:58 AM CST          zoledronic acid  :   zoledronic acid ; Status:   Documented ; Ordered As Mnemonic:   zoledronic acid 4 mg/100 mL intravenous solution ; Simple Display Line:   IV, q3 wks, 0 Refill(s) ; Catalog Code:   zoledronic acid ; Order Dt/Tm:   3/12/2020 9:46:52 AM CDT            ID Risk Screen   Recent Travel History :   No recent travel   Family Member Travel History :   No recent travel   Other Exposure to Infectious Disease :   Unknown   Diane Garrett CMA - 5/18/2020 8:19 AM CDT

## 2022-03-31 ENCOUNTER — DILATED FUNDUS EXAM (OUTPATIENT)
Dept: URBAN - METROPOLITAN AREA CLINIC 6 | Facility: CLINIC | Age: 67
End: 2022-03-31

## 2022-03-31 DIAGNOSIS — H26.493: ICD-10-CM

## 2022-03-31 DIAGNOSIS — Z96.1: ICD-10-CM

## 2022-03-31 DIAGNOSIS — H43.813: ICD-10-CM

## 2022-03-31 DIAGNOSIS — E11.3293: ICD-10-CM

## 2022-03-31 DIAGNOSIS — H40.1132: ICD-10-CM

## 2022-03-31 PROCEDURE — 92014 COMPRE OPH EXAM EST PT 1/>: CPT

## 2022-03-31 ASSESSMENT — TONOMETRY
OS_IOP_MMHG: 14
OD_IOP_MMHG: 13

## 2022-03-31 ASSESSMENT — VISUAL ACUITY
OU_SC: J7
OD_SC: 20/25-1
OS_SC: 20/25-2

## 2022-04-03 DIAGNOSIS — I10 ESSENTIAL HYPERTENSION: ICD-10-CM

## 2022-04-04 RX ORDER — LISINOPRIL 30 MG/1
30 TABLET ORAL DAILY
Qty: 90 TABLET | Refills: 0 | Status: SHIPPED | OUTPATIENT
Start: 2022-04-04 | End: 2022-05-18 | Stop reason: SDUPTHER

## 2022-04-27 ENCOUNTER — RA CDI HCC (OUTPATIENT)
Dept: OTHER | Facility: HOSPITAL | Age: 67
End: 2022-04-27

## 2022-04-27 NOTE — PROGRESS NOTES
E11 22, E11 51  [facility]  coding opportunities          Chart Reviewed number of suggestions sent to Provider: 2     Patients Insurance     Medicare Insurance: [employer] Advantage

## 2022-05-18 ENCOUNTER — OFFICE VISIT (OUTPATIENT)
Dept: INTERNAL MEDICINE CLINIC | Facility: OTHER | Age: 67
End: 2022-05-18
Payer: COMMERCIAL

## 2022-05-18 VITALS
BODY MASS INDEX: 28.52 KG/M2 | OXYGEN SATURATION: 98 % | TEMPERATURE: 98 F | SYSTOLIC BLOOD PRESSURE: 148 MMHG | WEIGHT: 199.2 LBS | HEART RATE: 66 BPM | HEIGHT: 70 IN | DIASTOLIC BLOOD PRESSURE: 70 MMHG

## 2022-05-18 DIAGNOSIS — D69.6 PLATELETS DECREASED (HCC): ICD-10-CM

## 2022-05-18 DIAGNOSIS — L97.519 ULCER OF RIGHT FOOT, UNSPECIFIED ULCER STAGE (HCC): ICD-10-CM

## 2022-05-18 DIAGNOSIS — R64 CACHECTIC (HCC): ICD-10-CM

## 2022-05-18 DIAGNOSIS — N18.30 STAGE 3 CHRONIC KIDNEY DISEASE, UNSPECIFIED WHETHER STAGE 3A OR 3B CKD (HCC): ICD-10-CM

## 2022-05-18 DIAGNOSIS — I73.9 PERIPHERAL VASCULAR DISEASE (HCC): ICD-10-CM

## 2022-05-18 DIAGNOSIS — E11.65 TYPE 2 DIABETES MELLITUS WITH HYPERGLYCEMIA, WITHOUT LONG-TERM CURRENT USE OF INSULIN (HCC): ICD-10-CM

## 2022-05-18 DIAGNOSIS — I10 ESSENTIAL HYPERTENSION: ICD-10-CM

## 2022-05-18 DIAGNOSIS — Z89.411 ACQUIRED ABSENCE OF RIGHT GREAT TOE (HCC): ICD-10-CM

## 2022-05-18 DIAGNOSIS — E11.3293 TYPE 2 DIABETES MELLITUS WITH BOTH EYES AFFECTED BY MILD NONPROLIFERATIVE RETINOPATHY WITHOUT MACULAR EDEMA, WITHOUT LONG-TERM CURRENT USE OF INSULIN (HCC): ICD-10-CM

## 2022-05-18 DIAGNOSIS — M81.0 OSTEOPOROSIS WITHOUT CURRENT PATHOLOGICAL FRACTURE, UNSPECIFIED OSTEOPOROSIS TYPE: ICD-10-CM

## 2022-05-18 DIAGNOSIS — E55.9 VITAMIN D DEFICIENCY: ICD-10-CM

## 2022-05-18 DIAGNOSIS — R71.0 DROP IN HEMOGLOBIN: Primary | ICD-10-CM

## 2022-05-18 DIAGNOSIS — E78.5 HYPERLIPIDEMIA, UNSPECIFIED HYPERLIPIDEMIA TYPE: ICD-10-CM

## 2022-05-18 PROBLEM — R79.89 ELEVATED SERUM CREATININE: Status: RESOLVED | Noted: 2021-09-01 | Resolved: 2022-05-18

## 2022-05-18 PROCEDURE — 1160F RVW MEDS BY RX/DR IN RCRD: CPT | Performed by: NURSE PRACTITIONER

## 2022-05-18 PROCEDURE — 3008F BODY MASS INDEX DOCD: CPT | Performed by: NURSE PRACTITIONER

## 2022-05-18 PROCEDURE — 4010F ACE/ARB THERAPY RXD/TAKEN: CPT | Performed by: NURSE PRACTITIONER

## 2022-05-18 PROCEDURE — 3078F DIAST BP <80 MM HG: CPT | Performed by: NURSE PRACTITIONER

## 2022-05-18 PROCEDURE — 99214 OFFICE O/P EST MOD 30 MIN: CPT | Performed by: NURSE PRACTITIONER

## 2022-05-18 PROCEDURE — 3077F SYST BP >= 140 MM HG: CPT | Performed by: NURSE PRACTITIONER

## 2022-05-18 PROCEDURE — 1036F TOBACCO NON-USER: CPT | Performed by: NURSE PRACTITIONER

## 2022-05-18 RX ORDER — ATORVASTATIN CALCIUM 10 MG/1
10 TABLET, FILM COATED ORAL DAILY
Qty: 90 TABLET | Refills: 1 | Status: SHIPPED | OUTPATIENT
Start: 2022-05-18

## 2022-05-18 RX ORDER — GLIPIZIDE 10 MG/1
10 TABLET, FILM COATED, EXTENDED RELEASE ORAL DAILY
Qty: 90 TABLET | Refills: 1 | Status: CANCELLED | OUTPATIENT
Start: 2022-05-18

## 2022-05-18 RX ORDER — LISINOPRIL 40 MG/1
40 TABLET ORAL DAILY
Qty: 90 TABLET | Refills: 1 | Status: SHIPPED | OUTPATIENT
Start: 2022-05-18

## 2022-05-18 NOTE — ASSESSMENT & PLAN NOTE
hbg improving since starting multiviatmin  Denies blood in urine and stool  Cologuard negative which was done this year

## 2022-05-18 NOTE — ASSESSMENT & PLAN NOTE
Uncontrolled  Will increase lisinopril to 40mg tablet  Continue current regimen -   Continue to monitor blood pressure at home  Goal BP is < 130/80  Contact our office for consistent elevations  Recommend low sodium diet  Exercise 30 minutes 5 times a week as tolerated    Recommend yearly eye exam

## 2022-05-18 NOTE — PROGRESS NOTES
Assessment/Plan:    Type 2 diabetes mellitus with both eyes affected by mild nonproliferative retinopathy without macular edema, without long-term current use of insulin (Formerly [facility])    Lab Results   Component Value Date    HGBA1C 7 5 (H) 04/14/2022    Currently well controlled, will increase metformin to 1000 mg tablet BID  Due to worsen creatinine will D/C glipizide and start Sadaf Dariel  Patient is to continue to work on diet and exercise  Limit sugars and carbohydrate intake  Avoid soda, juice, sweets, cookies, desserts, pasta, bread    Eat more whole grains, exercised 30 min of cardio at least 3 times a week  Also recommended daily foot exams to check for sores, and recommended yearly eye exams  Essential hypertension  Uncontrolled  Will increase lisinopril to 40mg tablet  Continue current regimen -   Continue to monitor blood pressure at home  Goal BP is < 130/80  Contact our office for consistent elevations  Recommend low sodium diet  Exercise 30 minutes 5 times a week as tolerated  Recommend yearly eye exam        Osteoporosis  DEXA scan from 10/2018 shows a T-score of -2 1  Continue with calcium 1200 mg and vitamin-D 2000 units daily supplementation  Stage 3 chronic kidney disease, unspecified whether stage 3a or 3b CKD ([facility] [address] )  Lab Results   Component Value Date    EGFR 55 10/30/2021    EGFR 52 07/17/2021    EGFR 64 03/30/2021    CREATININE 1 32 (H) 10/30/2021    CREATININE 1 39 (H) 07/17/2021    CREATININE 1 17 03/30/2021   Creatinine 1 52 worsening   Increase daily water intake   Avoid nephrotoxic medications       Vitamin D deficiency  Continue with vitamin D supplementations  Hyperlipemia  Patient will continue on Lipitor 10 mg tablet  Recommend healthy lifestyle choices for your cholesterol  Low fat/low cholesterol diet  Limit/avoid red meat  Eat more lean meat - chicken breast, ground turkey, fish  Exercise 30 mins at least 5 times a week as tolerated            Drop in hemoglobin  hbg improving since starting multiviatmin  Denies blood in urine and stool  Cologuard negative which was done this year  BMI Counseling: Body mass index is 28 91 kg/m²  The BMI is above normal  Nutrition recommendations include decreasing portion sizes, encouraging healthy choices of fruits and vegetables, decreasing fast food intake, consuming healthier snacks, limiting drinks that contain sugar, moderation in carbohydrate intake, increasing intake of lean protein, reducing intake of saturated and trans fat and reducing intake of cholesterol  Exercise recommendations include exercising 3-5 times per week  Rationale for BMI follow-up plan is due to patient being overweight or obese  Diagnoses and all orders for this visit:    Drop in hemoglobin  -     CBC and differential  -     Iron Panel (Includes Ferritin, Iron Sat%, Iron, and TIBC); Future  -     Vitamin B12; Future  -     UA w Reflex to Microscopic w Reflex to Culture -Lab Collect; Future    Hyperlipidemia, unspecified hyperlipidemia type  -     atorvastatin (LIPITOR) 10 mg tablet; Take 1 tablet (10 mg total) by mouth in the morning   -     Lipid panel    Type 2 diabetes mellitus with hyperglycemia, without long-term current use of insulin ([facility])  -     Discontinue: metFORMIN (GLUCOPHAGE) 1000 MG tablet; Talk one tablet (1,000mg total) by mouth with morning and 1/2 tablet (500mg) at HS   -     Dapagliflozin Propanediol (Farxiga) 10 MG TABS; Take 1 tablet (10 mg total) by mouth in the morning   -     Comprehensive metabolic panel; Future  -     Hemoglobin A1C  -     metFORMIN (GLUCOPHAGE) 1000 MG tablet; Take 1 tablet (1,000 mg total) by mouth in the morning and 1 tablet (1,000 mg total) in the evening  Take with meals      Acquired absence of right great toe (HCC)    Ulcer of right foot, unspecified ulcer stage ([address] )    Cachectic ([address] )    Peripheral vascular disease ([address] )    Platelets decreased ([address] )    Stage 3 chronic kidney disease, unspecified whether stage 3a or 3b CKD ([facility] [address] )    Essential hypertension  -     lisinopril (ZESTRIL) 40 mg tablet; Take 1 tablet (40 mg total) by mouth in the morning  Type 2 diabetes mellitus with both eyes affected by mild nonproliferative retinopathy without macular edema, without long-term current use of insulin ([facility] )  -     Ambulatory referral to Diabetic Education; Future    Osteoporosis without current pathological fracture, unspecified osteoporosis type    Vitamin D deficiency          Subjective:      Patient ID: Demarco Soto is a 69 y o  male  Patient presents today for follow-up on diabetes type 2, hypertension, hyperlipidemia, reviewed blood work while in the office today  Patient has gained weight as he has been eating more  Male screenings:  Colonoscopy- cologuard completed 2021  Hep C-done  Eye Doctor- wears glasses, has not been  Dentist-has not gone  Podiatrist-does not go      Tobacco abuse- Quit in 78        Diabetes  He presents for his initial diabetic visit  He has type 2 diabetes mellitus  No MedicAlert identification noted  His disease course has been stable  There are no hypoglycemic associated symptoms  Pertinent negatives for hypoglycemia include no dizziness, headaches or nervousness/anxiousness  Pertinent negatives for diabetes include no chest pain, no fatigue, no polydipsia, no polyphagia, no polyuria, no weakness and no weight loss  There are no hypoglycemic complications  Symptoms are worsening  Risk factors for coronary artery disease include diabetes mellitus, dyslipidemia and male sex  Current diabetic treatment includes oral agent (dual therapy)  Compliance with diabetes treatment:  unsure of treatment compliance as patient seems forgetful however he did write his medications down prior to his office visit today  He rarely participates in exercise  (Patient reports BS 100s ) He does not see a podiatrist Eye exam is not current     Hypertension  This is a chronic problem  The current episode started more than 1 year ago  The problem has been gradually worsening since onset  The problem is uncontrolled  Pertinent negatives include no anxiety, chest pain, headaches, neck pain, palpitations or shortness of breath  The following portions of the patient's history were reviewed and updated as appropriate: allergies, current medications, past family history, past medical history, past social history, past surgical history and problem list     Review of Systems   Constitutional: Negative for activity change, appetite change, chills, diaphoresis, fatigue, fever and weight loss  HENT: Negative for congestion, ear discharge, ear pain, postnasal drip, rhinorrhea, sinus pressure, sinus pain and sore throat  Eyes: Negative for pain, discharge, itching and visual disturbance  Respiratory: Negative for cough, chest tightness, shortness of breath and wheezing  Cardiovascular: Negative for chest pain, palpitations and leg swelling  Gastrointestinal: Negative for abdominal pain, constipation, diarrhea, nausea and vomiting  Endocrine: Negative for polydipsia, polyphagia and polyuria  Genitourinary: Negative for difficulty urinating, dysuria and urgency  Musculoskeletal: Negative for arthralgias, back pain and neck pain  Skin: Negative for rash and wound  Neurological: Negative for dizziness, weakness, numbness and headaches  Psychiatric/Behavioral: The patient is not nervous/anxious            Past Medical History:   Diagnosis Date    Accident due to mechanical fall without injury 9/12/2018    Diabetes mellitus ([facility] [address] )     H/O nicotine dependence     Hypertension     Prediabetes          Current Outpatient Medications:     ACCU-CHEK FASTCLIX LANCETS MISC, by Does not apply route 2 (two) times a day, Disp: 200 each, Rfl: 1    Acetaminophen (TYLENOL EXTRA STRENGTH PO), As needed, Disp: , Rfl:     atorvastatin (LIPITOR) 10 mg tablet, Take 1 tablet (10 mg total) by mouth in the morning , Disp: 90 tablet, Rfl: 1    Blood Glucose Monitoring Suppl (ACCU-CHEK CHELY PLUS) w/Device KIT, by Does not apply route 2 (two) times a day, Disp: 1 kit, Rfl: 0    cholecalciferol 2000 units TABS, Take 1 tablet (2,000 Units total) by mouth daily, Disp: 30 tablet, Rfl: 0    Dapagliflozin Propanediol (Farxiga) 10 MG TABS, Take 1 tablet (10 mg total) by mouth in the morning , Disp: 90 tablet, Rfl: 1    glipiZIDE (GLUCOTROL XL) 10 mg 24 hr tablet, Take 1 tablet (10 mg total) by mouth daily, Disp: 90 tablet, Rfl: 1    glucose blood (ACCU-CHEK CHELY PLUS) test strip, 1 each by Other route 2 (two) times a day Use as instructed, Disp: 200 each, Rfl: 1    lisinopril (ZESTRIL) 40 mg tablet, Take 1 tablet (40 mg total) by mouth in the morning , Disp: 90 tablet, Rfl: 1    metFORMIN (GLUCOPHAGE) 1000 MG tablet, Take 1 tablet (1,000 mg total) by mouth in the morning and 1 tablet (1,000 mg total) in the evening  Take with meals  , Disp: 180 tablet, Rfl: 1    Omega-3 Fatty Acids (FISH OIL) 1,000 mg, Take 1,000 mg by mouth daily, Disp: , Rfl:     No Known Allergies    Social History   Past Surgical History:   Procedure Laterality Date    HIP SURGERY      NOSE SURGERY      NE OPEN RX FEMUR FX+INTRAMED ADDISON Left 9/13/2018    Procedure: INSERTION NAIL IM FEMUR ANTEGRADE (TROCHANTERIC);   Surgeon: Leonid Danielson MD;  Location:  MAIN OR;  Service: Orthopedics    TOE SURGERY Right 12/2020    right great toe removed due to cellulitis     Family History   Problem Relation Age of Onset    Lymphoma Mother     No Known Problems Father     No Known Problems Sister     Lung cancer Brother     Lymphoma Family         acute       Objective:  /70 (BP Location: Left arm, Patient Position: Sitting, Cuff Size: Adult)   Pulse 66   Temp 98 °F (36 7 °C) (Temporal)   Ht 5' 9 6" (1 768 m)   Wt 90 4 kg (199 lb 3 2 oz)   SpO2 98%   BMI 28 91 kg/m²     Recent Results (from the past 1344 hour(s))  Diabetes Eye Exam    Collection Time: 03/31/22 12:00 AM   Result Value Ref Range    Right Eye Diabetic Retinopathy Mild     Left Eye Diabetic Retinopathy Mild    Hemoglobin A1C    Collection Time: 04/14/22 12:00 AM   Result Value Ref Range    Hemoglobin A1C 7 5    HEMOGLOBIN A1C    Collection Time: 04/14/22  4:18 PM   Result Value Ref Range    Hemoglobin A1C 7 5 (H) <5 7 %    eAG, EST AVG Glucose 169 (H) <154 mg/dL            Physical Exam  Constitutional:       General: He is not in acute distress  Appearance: He is well-developed  He is not diaphoretic  HENT:      Head: Normocephalic and atraumatic  Right Ear: External ear normal       Left Ear: External ear normal       Nose: Nose normal       Mouth/Throat:      Pharynx: No oropharyngeal exudate  Eyes:      General:         Right eye: No discharge  Left eye: No discharge  Conjunctiva/sclera: Conjunctivae normal       Pupils: Pupils are equal, round, and reactive to light  Neck:      Thyroid: No thyromegaly  Cardiovascular:      Rate and Rhythm: Normal rate and regular rhythm  Heart sounds: Normal heart sounds  No murmur heard  No friction rub  No gallop  Pulmonary:      Effort: Pulmonary effort is normal  No respiratory distress  Breath sounds: Normal breath sounds  No stridor  No wheezing or rales  Abdominal:      General: Bowel sounds are normal  There is no distension  Palpations: Abdomen is soft  Tenderness: There is no abdominal tenderness  Musculoskeletal:      Cervical back: Normal range of motion and neck supple  Lymphadenopathy:      Cervical: No cervical adenopathy  Skin:     General: Skin is warm and dry  Findings: No erythema or rash  Neurological:      Mental Status: He is alert and oriented to person, place, and time  Psychiatric:         Behavior: Behavior normal          Thought Content:  Thought content normal          Judgment: Judgment normal

## 2022-05-18 NOTE — ASSESSMENT & PLAN NOTE
Lab Results   Component Value Date    EGFR 55 10/30/2021    EGFR 52 07/17/2021    EGFR 64 03/30/2021    CREATININE 1 32 (H) 10/30/2021    CREATININE 1 39 (H) 07/17/2021    CREATININE 1 17 03/30/2021   Creatinine 1 52 worsening   Increase daily water intake   Avoid nephrotoxic medications

## 2022-05-18 NOTE — ASSESSMENT & PLAN NOTE
Lab Results   Component Value Date    HGBA1C 7 5 (H) 04/14/2022    Currently well controlled, will increase metformin to 1000 mg tablet BID  Due to worsen creatinine will D/C glipizide and start [address]  Patient is to continue to work on diet and exercise  Limit sugars and carbohydrate intake  Avoid soda, juice, sweets, cookies, desserts, pasta, bread    Eat more whole grains, exercised 30 min of cardio at least 3 times a week  Also recommended daily foot exams to check for sores, and recommended yearly eye exams

## 2022-05-20 ENCOUNTER — TELEPHONE (OUTPATIENT)
Dept: INTERNAL MEDICINE CLINIC | Facility: OTHER | Age: 67
End: 2022-05-20

## 2022-05-20 DIAGNOSIS — E11.65 TYPE 2 DIABETES MELLITUS WITH HYPERGLYCEMIA, WITHOUT LONG-TERM CURRENT USE OF INSULIN (HCC): ICD-10-CM

## 2022-05-20 NOTE — TELEPHONE ENCOUNTER
Patient calling because he is not sure about taking the Catherene Al because he read up on it and there are a lot of side effects  He would prefer to stay on the glipizide if possible

## 2022-05-23 NOTE — TELEPHONE ENCOUNTER
We changed his medication to help with kidney protection  Please call and notify patient that I would like him to continue with [place]

## 2022-05-25 RX ORDER — GLIPIZIDE 10 MG/1
10 TABLET, FILM COATED, EXTENDED RELEASE ORAL DAILY
Qty: 90 TABLET | Refills: 1 | Status: SHIPPED | OUTPATIENT
Start: 2022-05-25

## 2022-05-25 NOTE — TELEPHONE ENCOUNTER
Patient called and stated that he does not want to take Glennette Terri due to the potential side effects ad the cost oaf the medication  I offered an appointment to discuss this with a provider and he declined  I asked if he would do further investigating on the med and reconsider and his answer was a firm "NO"

## 2022-08-18 DIAGNOSIS — E11.65 TYPE 2 DIABETES MELLITUS WITH HYPERGLYCEMIA, WITHOUT LONG-TERM CURRENT USE OF INSULIN (HCC): ICD-10-CM

## 2022-08-18 DIAGNOSIS — E78.5 HYPERLIPIDEMIA, UNSPECIFIED HYPERLIPIDEMIA TYPE: ICD-10-CM

## 2022-08-18 DIAGNOSIS — I10 ESSENTIAL HYPERTENSION: ICD-10-CM

## 2022-08-18 RX ORDER — ATORVASTATIN CALCIUM 10 MG/1
10 TABLET, FILM COATED ORAL DAILY
Qty: 90 TABLET | Refills: 0 | Status: CANCELLED | OUTPATIENT
Start: 2022-08-18

## 2022-08-18 RX ORDER — LISINOPRIL 40 MG/1
40 TABLET ORAL DAILY
Qty: 90 TABLET | Refills: 0 | Status: CANCELLED | OUTPATIENT
Start: 2022-08-18

## 2022-08-18 RX ORDER — GLIPIZIDE 10 MG/1
10 TABLET, FILM COATED, EXTENDED RELEASE ORAL DAILY
Qty: 90 TABLET | Refills: 0 | Status: CANCELLED | OUTPATIENT
Start: 2022-08-18

## 2022-09-19 ENCOUNTER — VBI (OUTPATIENT)
Dept: ADMINISTRATIVE | Facility: OTHER | Age: 67
End: 2022-09-19

## 2022-10-01 ENCOUNTER — APPOINTMENT (OUTPATIENT)
Dept: LAB | Facility: IMAGING CENTER | Age: 67
End: 2022-10-01
Payer: COMMERCIAL

## 2022-10-01 DIAGNOSIS — R71.0 DROP IN HEMOGLOBIN: ICD-10-CM

## 2022-10-01 DIAGNOSIS — E11.65 TYPE 2 DIABETES MELLITUS WITH HYPERGLYCEMIA, WITHOUT LONG-TERM CURRENT USE OF INSULIN (HCC): ICD-10-CM

## 2022-10-01 LAB
ALBUMIN SERPL BCP-MCNC: 3.9 G/DL (ref 3.5–5)
ALP SERPL-CCNC: 38 U/L (ref 46–116)
ALT SERPL W P-5'-P-CCNC: 41 U/L (ref 12–78)
ANION GAP SERPL CALCULATED.3IONS-SCNC: 6 MMOL/L (ref 4–13)
AST SERPL W P-5'-P-CCNC: 24 U/L (ref 5–45)
BACTERIA UR QL AUTO: ABNORMAL /HPF
BASOPHILS # BLD AUTO: 0.05 THOUSANDS/ΜL (ref 0–0.1)
BASOPHILS NFR BLD AUTO: 1 % (ref 0–1)
BILIRUB SERPL-MCNC: 0.82 MG/DL (ref 0.2–1)
BILIRUB UR QL STRIP: NEGATIVE
BUN SERPL-MCNC: 32 MG/DL (ref 5–25)
CALCIUM SERPL-MCNC: 9.1 MG/DL (ref 8.3–10.1)
CHLORIDE SERPL-SCNC: 107 MMOL/L (ref 96–108)
CHOLEST SERPL-MCNC: 113 MG/DL
CLARITY UR: CLEAR
CO2 SERPL-SCNC: 24 MMOL/L (ref 21–32)
COLOR UR: YELLOW
CREAT SERPL-MCNC: 1.57 MG/DL (ref 0.6–1.3)
EOSINOPHIL # BLD AUTO: 0.22 THOUSAND/ΜL (ref 0–0.61)
EOSINOPHIL NFR BLD AUTO: 4 % (ref 0–6)
ERYTHROCYTE [DISTWIDTH] IN BLOOD BY AUTOMATED COUNT: 12.4 % (ref 11.6–15.1)
EST. AVERAGE GLUCOSE BLD GHB EST-MCNC: 137 MG/DL
FERRITIN SERPL-MCNC: 718 NG/ML (ref 8–388)
GFR SERPL CREATININE-BSD FRML MDRD: 44 ML/MIN/1.73SQ M
GLUCOSE P FAST SERPL-MCNC: 139 MG/DL (ref 65–99)
GLUCOSE UR STRIP-MCNC: NEGATIVE MG/DL
HBA1C MFR BLD: 6.4 %
HCT VFR BLD AUTO: 33.5 % (ref 36.5–49.3)
HDLC SERPL-MCNC: 38 MG/DL
HGB BLD-MCNC: 11.4 G/DL (ref 12–17)
HGB UR QL STRIP.AUTO: NEGATIVE
HYALINE CASTS #/AREA URNS LPF: ABNORMAL /LPF
IMM GRANULOCYTES # BLD AUTO: 0.02 THOUSAND/UL (ref 0–0.2)
IMM GRANULOCYTES NFR BLD AUTO: 0 % (ref 0–2)
IRON SATN MFR SERPL: 33 % (ref 20–50)
IRON SERPL-MCNC: 90 UG/DL (ref 65–175)
KETONES UR STRIP-MCNC: NEGATIVE MG/DL
LDLC SERPL CALC-MCNC: 59 MG/DL (ref 0–100)
LEUKOCYTE ESTERASE UR QL STRIP: NEGATIVE
LYMPHOCYTES # BLD AUTO: 1.63 THOUSANDS/ΜL (ref 0.6–4.47)
LYMPHOCYTES NFR BLD AUTO: 26 % (ref 14–44)
MCH RBC QN AUTO: 31 PG (ref 26.8–34.3)
MCHC RBC AUTO-ENTMCNC: 34 G/DL (ref 31.4–37.4)
MCV RBC AUTO: 91 FL (ref 82–98)
MONOCYTES # BLD AUTO: 0.37 THOUSAND/ΜL (ref 0.17–1.22)
MONOCYTES NFR BLD AUTO: 6 % (ref 4–12)
MUCOUS THREADS UR QL AUTO: ABNORMAL
NEUTROPHILS # BLD AUTO: 4 THOUSANDS/ΜL (ref 1.85–7.62)
NEUTS SEG NFR BLD AUTO: 63 % (ref 43–75)
NITRITE UR QL STRIP: NEGATIVE
NON-SQ EPI CELLS URNS QL MICRO: ABNORMAL /HPF
NONHDLC SERPL-MCNC: 75 MG/DL
NRBC BLD AUTO-RTO: 0 /100 WBCS
OTHER STN SPEC: ABNORMAL
PH UR STRIP.AUTO: 5.5 [PH]
PLATELET # BLD AUTO: 177 THOUSANDS/UL (ref 149–390)
PMV BLD AUTO: 10.6 FL (ref 8.9–12.7)
POTASSIUM SERPL-SCNC: 5 MMOL/L (ref 3.5–5.3)
PROT SERPL-MCNC: 7.5 G/DL (ref 6.4–8.4)
PROT UR STRIP-MCNC: ABNORMAL MG/DL
RBC # BLD AUTO: 3.68 MILLION/UL (ref 3.88–5.62)
RBC #/AREA URNS AUTO: ABNORMAL /HPF
SODIUM SERPL-SCNC: 137 MMOL/L (ref 135–147)
SP GR UR STRIP.AUTO: 1.01 (ref 1–1.03)
TIBC SERPL-MCNC: 270 UG/DL (ref 250–450)
TRIGL SERPL-MCNC: 82 MG/DL
UROBILINOGEN UR STRIP-ACNC: <2 MG/DL
VIT B12 SERPL-MCNC: 664 PG/ML (ref 100–900)
WBC # BLD AUTO: 6.29 THOUSAND/UL (ref 4.31–10.16)
WBC #/AREA URNS AUTO: ABNORMAL /HPF

## 2022-10-01 PROCEDURE — 83540 ASSAY OF IRON: CPT

## 2022-10-01 PROCEDURE — 82607 VITAMIN B-12: CPT

## 2022-10-01 PROCEDURE — 83550 IRON BINDING TEST: CPT

## 2022-10-01 PROCEDURE — 80053 COMPREHEN METABOLIC PANEL: CPT

## 2022-10-01 PROCEDURE — 82728 ASSAY OF FERRITIN: CPT

## 2022-10-01 PROCEDURE — 81001 URINALYSIS AUTO W/SCOPE: CPT

## 2022-11-15 ENCOUNTER — RA CDI HCC (OUTPATIENT)
Dept: OTHER | Facility: HOSPITAL | Age: 67
End: 2022-11-15

## 2022-11-15 DIAGNOSIS — E11.65 TYPE 2 DIABETES MELLITUS WITH HYPERGLYCEMIA, WITHOUT LONG-TERM CURRENT USE OF INSULIN (HCC): ICD-10-CM

## 2022-11-15 DIAGNOSIS — I10 ESSENTIAL HYPERTENSION: ICD-10-CM

## 2022-11-15 DIAGNOSIS — E78.5 HYPERLIPIDEMIA, UNSPECIFIED HYPERLIPIDEMIA TYPE: ICD-10-CM

## 2022-11-15 NOTE — PROGRESS NOTES
E11 22, E11 51  [facility]  coding opportunities          Chart Reviewed number of suggestions sent to Provider: 2     Patients Insurance     Medicare Insurance: [employer] Pt has been seen and examined with FP resident, resident supervised agree with a/p       Patient is a 64y old  Male who presents with a chief complaint of Anemia, Syncope (27 Jun 2020 08:23)          PHYSICAL EXAM:    -[id number]-aeeb, cta  -cvs-s1s2 normal   -p/a-soft,bs+      A/P    #ct supportive care, pain control,  rest of management as per surgical team     #dvt pr 	    #replace potassium

## 2022-11-16 RX ORDER — GLIPIZIDE 10 MG/1
10 TABLET, FILM COATED, EXTENDED RELEASE ORAL DAILY
Qty: 90 TABLET | Refills: 0 | Status: SHIPPED | OUTPATIENT
Start: 2022-11-16

## 2022-11-16 RX ORDER — ATORVASTATIN CALCIUM 10 MG/1
10 TABLET, FILM COATED ORAL DAILY
Qty: 90 TABLET | Refills: 0 | Status: SHIPPED | OUTPATIENT
Start: 2022-11-16

## 2022-11-16 RX ORDER — LISINOPRIL 40 MG/1
40 TABLET ORAL DAILY
Qty: 90 TABLET | Refills: 0 | Status: SHIPPED | OUTPATIENT
Start: 2022-11-16

## 2022-11-22 ENCOUNTER — OFFICE VISIT (OUTPATIENT)
Dept: INTERNAL MEDICINE CLINIC | Facility: OTHER | Age: 67
End: 2022-11-22

## 2022-11-22 ENCOUNTER — HOSPITAL ENCOUNTER (OUTPATIENT)
Dept: RADIOLOGY | Facility: IMAGING CENTER | Age: 67
Discharge: HOME | End: 2022-11-22

## 2022-11-22 VITALS
DIASTOLIC BLOOD PRESSURE: 98 MMHG | OXYGEN SATURATION: 98 % | HEART RATE: 76 BPM | HEIGHT: 69 IN | BODY MASS INDEX: 29.15 KG/M2 | WEIGHT: 196.8 LBS | SYSTOLIC BLOOD PRESSURE: 180 MMHG | TEMPERATURE: 97.3 F

## 2022-11-22 DIAGNOSIS — E78.5 HYPERLIPIDEMIA, UNSPECIFIED HYPERLIPIDEMIA TYPE: ICD-10-CM

## 2022-11-22 DIAGNOSIS — E11.3293 TYPE 2 DIABETES MELLITUS WITH BOTH EYES AFFECTED BY MILD NONPROLIFERATIVE RETINOPATHY WITHOUT MACULAR EDEMA, WITHOUT LONG-TERM CURRENT USE OF INSULIN (HCC): ICD-10-CM

## 2022-11-22 DIAGNOSIS — E11.628 DIABETIC FOOT INFECTION (HCC): ICD-10-CM

## 2022-11-22 DIAGNOSIS — Z00.00 MEDICARE ANNUAL WELLNESS VISIT, SUBSEQUENT: ICD-10-CM

## 2022-11-22 DIAGNOSIS — I10 ESSENTIAL HYPERTENSION: ICD-10-CM

## 2022-11-22 DIAGNOSIS — M81.0 OSTEOPOROSIS WITHOUT CURRENT PATHOLOGICAL FRACTURE, UNSPECIFIED OSTEOPOROSIS TYPE: ICD-10-CM

## 2022-11-22 DIAGNOSIS — E55.9 VITAMIN D DEFICIENCY: ICD-10-CM

## 2022-11-22 DIAGNOSIS — R71.0 DROP IN HEMOGLOBIN: ICD-10-CM

## 2022-11-22 DIAGNOSIS — L08.9 DIABETIC FOOT INFECTION (HCC): ICD-10-CM

## 2022-11-22 DIAGNOSIS — N18.30 STAGE 3 CHRONIC KIDNEY DISEASE, UNSPECIFIED WHETHER STAGE 3A OR 3B CKD (HCC): ICD-10-CM

## 2022-11-22 DIAGNOSIS — Z23 NEED FOR INFLUENZA VACCINATION: Primary | ICD-10-CM

## 2022-11-22 PROBLEM — R64 CACHECTIC (HCC): Status: RESOLVED | Noted: 2018-09-12 | Resolved: 2022-11-22

## 2022-11-22 RX ORDER — AMLODIPINE BESYLATE 5 MG/1
5 TABLET ORAL DAILY
Qty: 90 TABLET | Refills: 1 | Status: SHIPPED | OUTPATIENT
Start: 2022-11-22

## 2022-11-22 RX ORDER — MULTIVITAMIN
1 TABLET ORAL DAILY
COMMUNITY

## 2022-11-22 NOTE — ASSESSMENT & PLAN NOTE
hbg improving since starting multiviatmin  Denies blood in urine and stool  Cologuard negative which was done this year  Iron and B12 panel Within normal range

## 2022-11-22 NOTE — ASSESSMENT & PLAN NOTE
Lab Results   Component Value Date    EGFR 44 10/01/2022    EGFR 55 10/30/2021    EGFR 52 07/17/2021    CREATININE 1 57 (H) 10/01/2022    CREATININE 1 32 (H) 10/30/2021    CREATININE 1 39 (H) 07/17/2021    increase daily water intake, avoid nephrotoxic medications

## 2022-11-22 NOTE — PROGRESS NOTES
Assessment and Plan:     Problem List Items Addressed This Visit    None       Preventive health issues were discussed with patient, and age appropriate screening tests were ordered as noted in patient's After Visit Summary  Personalized health advice and appropriate referrals for health education or preventive services given if needed, as noted in patient's After Visit Summary  History of Present Illness:     Patient presents for a Medicare Wellness Visit      Patient presents today for follow-up on diabetes type 2, hypertension, hyperlipidemia, reviewed blood work while in the office today  Denies any new concerns  Male screenings:  Colonoscopy- cologuard completed 2021  Hep C-done  Eye Doctor- wears glasses, has not been  Dentist-has not gone  Podiatrist-does not go      Tobacco abuse- Quit in 78        Diabetes  He presents for his initial diabetic visit  He has type 2 diabetes mellitus  No MedicAlert identification noted  His disease course has been stable (HBA1C 6 3)  There are no hypoglycemic associated symptoms  Pertinent negatives for hypoglycemia include no dizziness, headaches or nervousness/anxiousness  Pertinent negatives for diabetes include no chest pain, no fatigue, no polydipsia, no polyphagia, no polyuria, no weakness and no weight loss  There are no hypoglycemic complications  Symptoms are stable  Risk factors for coronary artery disease include diabetes mellitus, dyslipidemia and male sex  Current diabetic treatment includes oral agent (dual therapy)  Compliance with diabetes treatment:  unsure of treatment compliance as patient seems forgetful however he did write his medications down prior to his office visit today  He rarely participates in exercise  He sees a podiatrist Eye exam is not current  Hypertension  This is a chronic problem  The current episode started more than 1 year ago  The problem has been gradually worsening since onset  The problem is uncontrolled   Pertinent negatives include no anxiety, chest pain, headaches, neck pain, palpitations or shortness of breath  Patient Care Team:  Shannon Frausto as PCP - General (Family Medicine)  Kristy Fernandez MD as PCP - Endocrinology (Endocrinology)     Review of Systems:     Review of Systems   Constitutional: Negative for activity change, appetite change, chills, diaphoresis, fatigue, fever and weight loss  HENT: Negative for congestion, ear discharge, ear pain, postnasal drip, rhinorrhea, sinus pressure, sinus pain and sore throat  Eyes: Negative for pain, discharge, itching and visual disturbance  Respiratory: Negative for cough, chest tightness, shortness of breath and wheezing  Cardiovascular: Negative for chest pain, palpitations and leg swelling  Gastrointestinal: Negative for abdominal pain, constipation, diarrhea, nausea and vomiting  Endocrine: Negative for polydipsia, polyphagia and polyuria  Genitourinary: Negative for difficulty urinating, dysuria and urgency  Musculoskeletal: Negative for arthralgias, back pain and neck pain  Skin: Negative for rash and wound  Neurological: Negative for dizziness, weakness, numbness and headaches  Psychiatric/Behavioral: The patient is not nervous/anxious           Problem List:     Patient Active Problem List   Diagnosis   • Type 2 diabetes mellitus with both eyes affected by mild nonproliferative retinopathy without macular edema, without long-term current use of insulin (HCC)   • Essential hypertension   • Cachectic (HCC)   • Osteoporosis   • Low testosterone   • Vitamin D deficiency   • S/P ORIF (open reduction internal fixation) fracture   • Hyperlipemia   • Left lumbar radiculopathy   • Diabetic foot infection (HCC)   • Ulcer of right foot (HCC)   • Platelets decreased ([facility])   • Acquired absence of right great toe ([facility])   • Peripheral vascular disease (HCC)   • Drop in hemoglobin   • Stage 3 chronic kidney disease, unspecified whether stage 3a or 3b CKD [facility])      Past Medical and Surgical History:     Past Medical History:   Diagnosis Date   • Accident due to mechanical fall without injury 2018   • Diabetes mellitus ([address] )    • H/O nicotine dependence    • Hypertension    • Prediabetes      Past Surgical History:   Procedure Laterality Date   • HIP SURGERY     • NOSE SURGERY     • WA OPEN RX FEMUR FX+INTRAMED ADDISON Left 2018    Procedure: INSERTION NAIL IM FEMUR ANTEGRADE (TROCHANTERIC);   Surgeon: Robin Cartagena MD;  [facility];  Service: Orthopedics   • TOE SURGERY Right 2020    right great toe removed due to cellulitis      Family History:     Family History   Problem Relation Age of Onset   • Lymphoma Mother    • No Known Problems Father    • No Known Problems Sister    • Lung cancer Brother    • Lymphoma Family         acute      Social History:     Social History     Socioeconomic History   • Marital status: Single     Spouse name: None   • Number of children: None   • Years of education: None   • Highest education level: None   Occupational History   • Occupation:    Tobacco Use   • Smoking status: Former     Packs/day: 2 00     Years: 15 00     Pack years: 30 00     Types: Cigarettes     Quit date: 1987     Years since quittin 2   • Smokeless tobacco: Never   Vaping Use   • Vaping Use: Never used   Substance and Sexual Activity   • Alcohol use: No   • Drug use: No   • Sexual activity: Not Currently   Other Topics Concern   • None   Social History Narrative    Denied history of exercise habits    Denied history of travel      Social Determinants of Health     Financial Resource Strain: Not on file   Food Insecurity: Not on file   Transportation Needs: Not on file   Physical Activity: Not on file   Stress: Not on file   Social Connections: Not on file   Intimate Partner Violence: Not on file   Housing Stability: Not on file      Medications and Allergies:     Current Outpatient Medications   Medication Sig Dispense Refill   • ACCU-CHEK FASTCLIX LANCETS MISC by Does not apply route 2 (two) times a day 200 each 1   • atorvastatin (LIPITOR) 10 mg tablet Take 1 tablet (10 mg total) by mouth daily 90 tablet 0   • Blood Glucose Monitoring Suppl (ACCU-CHEK CHELY PLUS) w/Device KIT by Does not apply route 2 (two) times a day 1 kit 0   • cholecalciferol 2000 units TABS Take 1 tablet (2,000 Units total) by mouth daily 30 tablet 0   • glipiZIDE (GLUCOTROL XL) 10 mg 24 hr tablet Take 1 tablet (10 mg total) by mouth daily 90 tablet 0   • glucose blood (ACCU-CHEK CHELY PLUS) test strip 1 each by Other route 2 (two) times a day Use as instructed 200 each 1   • lisinopril (ZESTRIL) 40 mg tablet Take 1 tablet (40 mg total) by mouth daily 90 tablet 0   • metFORMIN (GLUCOPHAGE) 1000 MG tablet Take 1 tablet (1,000 mg total) by mouth 2 (two) times a day with meals 180 tablet 0   • Multiple Vitamin (multivitamin) tablet Take 1 tablet by mouth daily     • Omega-3 Fatty Acids (FISH OIL) 1,000 mg Take 1,000 mg by mouth daily     • Acetaminophen (TYLENOL EXTRA STRENGTH PO) As needed (Patient not taking: Reported on 11/22/2022)       No current facility-administered medications for this visit       No Known Allergies   Immunizations:     Immunization History   Administered Date(s) Administered   • COVID-19 MODERNA VACC 0 5 ML IM 03/24/2021, 04/23/2021, 11/20/2021, 04/23/2022   • INFLUENZA 12/18/2021   • Influenza, high dose seasonal 0 7 mL 12/19/2018, 10/02/2019, 11/11/2020   • Pneumococcal Conjugate 13-Valent 06/27/2019   • Pneumococcal Polysaccharide PPV23 09/01/2021   • Tdap 09/12/2018   • Zoster 02/12/2018   • Zoster Vaccine Recombinant 09/24/2019, 11/30/2019      Health Maintenance:         Topic Date Due   • Colorectal Cancer Screening  05/11/2024   • Hepatitis C Screening  Completed         Topic Date Due   • Hepatitis B Vaccine (1 of 3 - 3-dose series) Never done   • Influenza Vaccine (1) 09/01/2022      Medicare Screening Tests and Risk Assessments:     Facundo Becerril is here for his Subsequent Wellness visit  Health Risk Assessment:   Patient rates overall health as fair  Patient feels that their physical health rating is same  Patient is satisfied with their life  Eyesight was rated as same  Hearing was rated as same  Patient feels that their emotional and mental health rating is same  Patients states they are sometimes angry  Patient states they are sometimes unusually tired/fatigued  Pain experienced in the last 7 days has been some  Patient's pain rating has been 4/10  Patient states that he has experienced no weight loss or gain in last 6 months  Depression Screening:   PHQ-2 Score: 0      Fall Risk Screening: In the past year, patient has experienced: no history of falling in past year      Home Safety:  Patient does not have trouble with stairs inside or outside of their home  Patient has working smoke alarms and has working carbon monoxide detector  Home safety hazards include: none  Nutrition:   Current diet is Diabetic  Medications:   Patient is currently taking over-the-counter supplements  OTC medications include: see medication list  Patient is able to manage medications  Activities of Daily Living (ADLs)/Instrumental Activities of Daily Living (IADLs):   Walk and transfer into and out of bed and chair?: Yes  Dress and groom yourself?: Yes    Bathe or shower yourself?: Yes    Feed yourself? Yes  Do your laundry/housekeeping?: Yes  Manage your money, pay your bills and track your expenses?: Yes  Make your own meals?: Yes    Do your own shopping?: Yes    Previous Hospitalizations:   Any hospitalizations or ED visits within the last 12 months?: No      Advance Care Planning:   Living will: Yes    Durable POA for healthcare:  Yes    Advanced directive: Yes    Advanced directive counseling given: Yes    Five wishes given: Yes      Cognitive Screening:   Provider or family/friend/caregiver concerned regarding cognition?: No    PREVENTIVE SCREENINGS      Cardiovascular Screening:    General: Screening Not Indicated, History Lipid Disorder and Screening Current      Diabetes Screening:     General: Screening Not Indicated, History Diabetes and Screening Current      Colorectal Cancer Screening:     General: Screening Current      Prostate Cancer Screening:    General: Screening Current      Osteoporosis Screening:    General: Screening Not Indicated and History Osteoporosis      Abdominal Aortic Aneurysm (AAA) Screening:    Risk factors include: age between 73-69 yo and tobacco use        General: Screening Not Indicated      Lung Cancer Screening:     General: Screening Not Indicated      Hepatitis C Screening:    General: Screening Current    Screening, Brief Intervention, and Referral to Treatment (SBIRT)    Screening  Typical number of drinks in a day: 0  Typical number of drinks in a week: 0  Interpretation: Low risk drinking behavior  Single Item Drug Screening:  How often have you used an illegal drug (including marijuana) or a prescription medication for non-medical reasons in the past year? never    Single Item Drug Screen Score: 0  Interpretation: Negative screen for possible drug use disorder    Other Counseling Topics:   Car/seat belt/driving safety, skin self-exam, sunscreen and regular weightbearing exercise and calcium and vitamin D intake  No results found       Physical Exam:     BP (!) 180/98 (BP Location: Left arm, Patient Position: Sitting, Cuff Size: Adult)   Pulse 76   Temp (!) 97 3 °F (36 3 °C) (Tympanic)   Ht 5' 8 5" (1 74 m)   Wt 89 3 kg (196 lb 12 8 oz)   SpO2 98% Comment: ra  BMI 29 49 kg/m²     Physical Exam  Musculoskeletal:        Feet:           SANDRA Pritchett

## 2022-11-22 NOTE — ASSESSMENT & PLAN NOTE
Lab Results   Component Value Date    HGBA1C 6 4 (H) 10/01/2022    Currently well controlled, continue metformin to 1000 mg tablet BID and glipizide  Patient is to continue to work on diet and exercise  Limit sugars and carbohydrate intake  Avoid soda, juice, sweets, cookies, desserts, pasta, bread    Eat more whole grains, exercised 30 min of cardio at least 3 times a week  Also recommended daily foot exams to check for sores, and recommended yearly eye exams

## 2022-11-22 NOTE — PROGRESS NOTES
Diabetic Foot Exam    Patient's shoes and socks removed  Right Foot/Ankle   Right Foot Inspection  Skin Exam: callus and callus  Toe Exam: swelling and right toe deformity  Sensory   Monofilament testing: diminished    Left Foot/Ankle  Left Foot Inspection  Skin Exam: callus       Sensory   Monofilament testing: diminished

## 2022-11-22 NOTE — ASSESSMENT & PLAN NOTE
Uncontrolled  Continue on lisinopril add on Norvasc  Continue current regimen -   Continue to monitor blood pressure at home  Goal BP is < 130/80  Contact our office for consistent elevations  Recommend low sodium diet  Exercise 30 minutes 5 times a week as tolerated  Recommend yearly eye exam      Follow up in one month for recheck

## 2022-11-30 NOTE — PROGRESS NOTES
Diabetic Foot Exam    Patient's shoes and socks removed  Right Foot/Ankle   Right Foot Inspection  Skin Exam: skin normal, skin intact, callus and callus  No dry skin, no warmth, no erythema, no maceration, no abnormal color, no pre-ulcer and no ulcer  Toe Exam: ROM and strength within normal limits, swelling and right toe deformity  Sensory   Monofilament testing: diminished    Left Foot/Ankle  Left Foot Inspection  Skin Exam: skin normal, skin intact and callus  No dry skin, no warmth, no erythema, no maceration, normal color, no pre-ulcer and no ulcer  Toe Exam: ROM and strength within normal limits       Sensory   Monofilament testing: diminished

## 2022-12-01 NOTE — PROGRESS NOTES
Diabetic Foot Exam    Patient's shoes and socks removed  Right Foot/Ankle   Right Foot Inspection  Skin Exam: skin normal and skin intact  No dry skin, no warmth, no callus, no erythema, no maceration, no abnormal color, no pre-ulcer, no ulcer and no callus  Toe Exam: right toe deformity  Sensory   Monofilament testing: diminished    Vascular  Capillary refills: < 3 seconds  The right DP pulse is 1+  The right PT pulse is 1+  Left Foot/Ankle  Left Foot Inspection  Skin Exam: skin normal and skin intact  No dry skin, no warmth, no erythema, no maceration, normal color, no pre-ulcer, no ulcer and no callus  Sensory   Monofilament testing: diminished    Vascular  Capillary refills: < 3 seconds  The left DP pulse is 1+  The left PT pulse is 1+       Assign Risk Category  Deformity present  Loss of protective sensation  Weak pulses  Risk: 2

## 2022-12-19 ENCOUNTER — VBI (OUTPATIENT)
Dept: ADMINISTRATIVE | Facility: OTHER | Age: 67
End: 2022-12-19

## 2022-12-27 ENCOUNTER — OFFICE VISIT (OUTPATIENT)
Dept: INTERNAL MEDICINE CLINIC | Facility: OTHER | Age: 67
End: 2022-12-27

## 2022-12-27 VITALS
TEMPERATURE: 97.4 F | DIASTOLIC BLOOD PRESSURE: 80 MMHG | RESPIRATION RATE: 18 BRPM | OXYGEN SATURATION: 98 % | SYSTOLIC BLOOD PRESSURE: 160 MMHG | HEART RATE: 52 BPM | BODY MASS INDEX: 29.53 KG/M2 | WEIGHT: 199.4 LBS | HEIGHT: 69 IN

## 2022-12-27 DIAGNOSIS — L08.9 DIABETIC FOOT INFECTION (HCC): ICD-10-CM

## 2022-12-27 DIAGNOSIS — R94.31 ABNORMAL EKG: ICD-10-CM

## 2022-12-27 DIAGNOSIS — E11.628 DIABETIC FOOT INFECTION (HCC): ICD-10-CM

## 2022-12-27 DIAGNOSIS — N18.30 STAGE 3 CHRONIC KIDNEY DISEASE, UNSPECIFIED WHETHER STAGE 3A OR 3B CKD (HCC): ICD-10-CM

## 2022-12-27 DIAGNOSIS — R00.2 PALPITATIONS: ICD-10-CM

## 2022-12-27 DIAGNOSIS — I10 ESSENTIAL HYPERTENSION: ICD-10-CM

## 2022-12-27 DIAGNOSIS — E11.3293 TYPE 2 DIABETES MELLITUS WITH BOTH EYES AFFECTED BY MILD NONPROLIFERATIVE RETINOPATHY WITHOUT MACULAR EDEMA, WITHOUT LONG-TERM CURRENT USE OF INSULIN (HCC): Primary | ICD-10-CM

## 2022-12-27 RX ORDER — AMLODIPINE BESYLATE 10 MG/1
10 TABLET ORAL DAILY
Qty: 90 TABLET | Refills: 1 | Status: SHIPPED | OUTPATIENT
Start: 2022-12-27

## 2022-12-27 NOTE — ASSESSMENT & PLAN NOTE
Lab Results   Component Value Date    EGFR 44 10/01/2022    EGFR 55 10/30/2021    EGFR 52 07/17/2021    CREATININE 1 57 (H) 10/01/2022    CREATININE 1 32 (H) 10/30/2021    CREATININE 1 39 (H) 07/17/2021   Avoid nephrotoxic agents, increase daily water intake

## 2022-12-27 NOTE — ASSESSMENT & PLAN NOTE
Continues to follow with podiatry     Lab Results   Component Value Date    HGBA1C 6 4 (H) 10/01/2022

## 2022-12-27 NOTE — PROGRESS NOTES
Assessment/Plan:    Diabetic foot infection ([facility] )  Continues to follow with podiatry  Lab Results   Component Value Date    HGBA1C 6 4 (H) 10/01/2022       Essential hypertension  Uncontrolled  Continue lisinopril, increase Norvasc to 10mg  Continue to monitor blood pressure at home  Goal BP is < 130/80  Contact our office for consistent elevations  Recommend low sodium diet  Exercise 30 minutes 5 times a week as tolerated  Recommend yearly eye exam        Stage 3 chronic kidney disease, unspecified whether stage 3a or 3b CKD ([place] [address] )  Lab Results   Component Value Date    EGFR 44 10/01/2022    EGFR 55 10/30/2021    EGFR 52 07/17/2021    CREATININE 1 57 (H) 10/01/2022    CREATININE 1 32 (H) 10/30/2021    CREATININE 1 39 (H) 07/17/2021   Avoid nephrotoxic agents, increase daily water intake              Diagnoses and all orders for this visit:    Type 2 diabetes mellitus with both eyes affected by mild nonproliferative retinopathy without macular edema, without long-term current use of insulin (HCC)  -     Hemoglobin A1C; Future    Essential hypertension  -     amLODIPine (NORVASC) 10 mg tablet; Take 1 tablet (10 mg total) by mouth daily  -     CBC and differential; Future  -     Comprehensive metabolic panel; Future  -     Lipid panel; Future    Diabetic foot infection ([place] [address] )    Stage 3 chronic kidney disease, unspecified whether stage 3a or 3b CKD ([facility])    Abnormal EKG    Other orders  -     mupirocin (BACTROBAN) 2 % ointment; APPLY A SMALL AMOUNT TOPICALLY TO THE AFFECTED AREA 1 TIME A DAY          Subjective:      Patient ID: Sg Rees is a 69 y o  male  Patient presents today to follow-up on hypertension  At last office visit patient was started on Norvasc and advised to continue with lisinopril  -150s at home   DBP 79-80s at home     He reports that he gets very nervous coming into the doctor's office, he reports no side effects with Norvasc    Abnormal heart rate heard while in office today EKG done and reviewed with Dr Villegas Failing  Patient has previously seen podiatry for possible diabetic osteomyelitis  Well-healed  The following portions of the patient's history were reviewed and updated as appropriate: allergies, current medications, past family history, past medical history, past social history, past surgical history and problem list     Review of Systems   Constitutional: Negative for activity change, appetite change, chills, diaphoresis and fever  HENT: Negative for congestion, ear discharge, ear pain, postnasal drip, rhinorrhea, sinus pressure, sinus pain and sore throat  Eyes: Negative for pain, discharge, itching and visual disturbance  Respiratory: Negative for cough, chest tightness, shortness of breath and wheezing  Cardiovascular: Negative for chest pain, palpitations and leg swelling  Gastrointestinal: Negative for abdominal pain, constipation, diarrhea, nausea and vomiting  Endocrine: Negative for polydipsia, polyphagia and polyuria  Genitourinary: Negative for difficulty urinating, dysuria and urgency  Musculoskeletal: Negative for arthralgias, back pain and neck pain  Skin: Negative for rash and wound  Neurological: Negative for dizziness, weakness, numbness and headaches           Past Medical History:   Diagnosis Date   • Accident due to mechanical fall without injury 9/12/2018   • Diabetes mellitus ([facility] )    • H/O nicotine dependence    • Hypertension    • Prediabetes          Current Outpatient Medications:   •  ACCU-CHEK FASTCLIX LANCETS MISC, by Does not apply route 2 (two) times a day, Disp: 200 each, Rfl: 1  •  amLODIPine (NORVASC) 10 mg tablet, Take 1 tablet (10 mg total) by mouth daily, Disp: 90 tablet, Rfl: 1  •  atorvastatin (LIPITOR) 10 mg tablet, Take 1 tablet (10 mg total) by mouth daily, Disp: 90 tablet, Rfl: 0  •  Blood Glucose Monitoring Suppl (ACCU-CHEK CHELY PLUS) w/Device KIT, by Does not apply route 2 (two) times a day, Disp: 1 kit, Rfl: 0  •  cholecalciferol 2000 units TABS, Take 1 tablet (2,000 Units total) by mouth daily, Disp: 30 tablet, Rfl: 0  •  glipiZIDE (GLUCOTROL XL) 10 mg 24 hr tablet, Take 1 tablet (10 mg total) by mouth daily, Disp: 90 tablet, Rfl: 0  •  glucose blood (ACCU-CHEK CHELY PLUS) test strip, 1 each by Other route 2 (two) times a day Use as instructed, Disp: 200 each, Rfl: 1  •  lisinopril (ZESTRIL) 40 mg tablet, Take 1 tablet (40 mg total) by mouth daily, Disp: 90 tablet, Rfl: 0  •  metFORMIN (GLUCOPHAGE) 1000 MG tablet, Take 1 tablet (1,000 mg total) by mouth 2 (two) times a day with meals, Disp: 180 tablet, Rfl: 0  •  Multiple Vitamin (multivitamin) tablet, Take 1 tablet by mouth daily, Disp: , Rfl:   •  mupirocin (BACTROBAN) 2 % ointment, APPLY A SMALL AMOUNT TOPICALLY TO THE AFFECTED AREA 1 TIME A DAY, Disp: , Rfl:   •  Omega-3 Fatty Acids (FISH OIL) 1,000 mg, Take 1,000 mg by mouth daily, Disp: , Rfl:   •  Acetaminophen (TYLENOL EXTRA STRENGTH PO), As needed (Patient not taking: Reported on 11/22/2022), Disp: , Rfl:     No Known Allergies    Social History   Past Surgical History:   Procedure Laterality Date   • HIP SURGERY     • NOSE SURGERY     • IN OPTX FEM SHFT FX W/INSJ IMED IMPLT W/WO SCREW Left 9/13/2018    Procedure: INSERTION NAIL IM FEMUR ANTEGRADE (TROCHANTERIC); Surgeon: Dimas Valadez MD;  Location[facility];  Service: Orthopedics   • TOE SURGERY Right 12/2020    right great toe removed due to cellulitis     Family History   Problem Relation Age of Onset   • Lymphoma Mother    • No Known Problems Father    • No Known Problems Sister    • Lung cancer Brother    • Lymphoma Family         acute       Objective:  /80   Pulse (!) 52   Temp (!) 97 4 °F (36 3 °C) (Temporal)   Resp 18   Ht 5' 8 5" (1 74 m)   Wt 90 4 kg (199 lb 6 4 oz)   SpO2 98%   BMI 29 88 kg/m²     No results found for this or any previous visit (from the past 1344 hour(s))           Physical Exam  Constitutional: General: He is not in acute distress  Appearance: He is well-developed  He is not diaphoretic  HENT:      Head: Normocephalic and atraumatic  Right Ear: External ear normal       Left Ear: External ear normal       Nose: Nose normal       Mouth/Throat:      Pharynx: No oropharyngeal exudate  Eyes:      General:         Right eye: No discharge  Left eye: No discharge  Conjunctiva/sclera: Conjunctivae normal       Pupils: Pupils are equal, round, and reactive to light  Neck:      Thyroid: No thyromegaly  Cardiovascular:      Rate and Rhythm: Normal rate  Rhythm regularly irregular  Heart sounds: Normal heart sounds  No murmur heard  No friction rub  No gallop  Pulmonary:      Effort: Pulmonary effort is normal  No respiratory distress  Breath sounds: Normal breath sounds  No stridor  No wheezing or rales  Abdominal:      General: Bowel sounds are normal  There is no distension  Palpations: Abdomen is soft  Tenderness: There is no abdominal tenderness  Musculoskeletal:      Cervical back: Normal range of motion and neck supple  Lymphadenopathy:      Cervical: No cervical adenopathy  Skin:     General: Skin is warm and dry  Findings: No erythema or rash  Neurological:      Mental Status: He is alert and oriented to person, place, and time  Psychiatric:         Behavior: Behavior normal          Thought Content:  Thought content normal          Judgment: Judgment normal

## 2022-12-27 NOTE — ASSESSMENT & PLAN NOTE
Uncontrolled  Continue lisinopril, increase Norvasc to 10mg  Continue to monitor blood pressure at home  Goal BP is < 130/80  Contact our office for consistent elevations  Recommend low sodium diet  Exercise 30 minutes 5 times a week as tolerated    Recommend yearly eye exam

## 2023-02-16 ENCOUNTER — VBI (OUTPATIENT)
Dept: ADMINISTRATIVE | Facility: OTHER | Age: 68
End: 2023-02-16

## 2023-02-17 DIAGNOSIS — E78.5 HYPERLIPIDEMIA, UNSPECIFIED HYPERLIPIDEMIA TYPE: ICD-10-CM

## 2023-02-17 DIAGNOSIS — E11.65 TYPE 2 DIABETES MELLITUS WITH HYPERGLYCEMIA, WITHOUT LONG-TERM CURRENT USE OF INSULIN (HCC): ICD-10-CM

## 2023-02-17 DIAGNOSIS — I10 ESSENTIAL HYPERTENSION: ICD-10-CM

## 2023-02-17 RX ORDER — GLIPIZIDE 10 MG/1
10 TABLET, FILM COATED, EXTENDED RELEASE ORAL DAILY
Qty: 90 TABLET | Refills: 1 | Status: SHIPPED | OUTPATIENT
Start: 2023-02-17

## 2023-02-17 RX ORDER — LISINOPRIL 40 MG/1
40 TABLET ORAL DAILY
Qty: 90 TABLET | Refills: 1 | Status: SHIPPED | OUTPATIENT
Start: 2023-02-17

## 2023-02-17 RX ORDER — ATORVASTATIN CALCIUM 10 MG/1
10 TABLET, FILM COATED ORAL DAILY
Qty: 90 TABLET | Refills: 1 | Status: SHIPPED | OUTPATIENT
Start: 2023-02-17

## 2023-05-13 ENCOUNTER — APPOINTMENT (OUTPATIENT)
Dept: LAB | Facility: IMAGING CENTER | Age: 68
End: 2023-05-13

## 2023-05-13 DIAGNOSIS — E11.3293 TYPE 2 DIABETES MELLITUS WITH BOTH EYES AFFECTED BY MILD NONPROLIFERATIVE RETINOPATHY WITHOUT MACULAR EDEMA, WITHOUT LONG-TERM CURRENT USE OF INSULIN (HCC): ICD-10-CM

## 2023-05-13 DIAGNOSIS — I10 ESSENTIAL HYPERTENSION: ICD-10-CM

## 2023-05-13 LAB
ALBUMIN SERPL BCP-MCNC: 3.7 G/DL (ref 3.5–5)
ALP SERPL-CCNC: 33 U/L (ref 46–116)
ALT SERPL W P-5'-P-CCNC: 31 U/L (ref 12–78)
ANION GAP SERPL CALCULATED.3IONS-SCNC: 3 MMOL/L (ref 4–13)
AST SERPL W P-5'-P-CCNC: 26 U/L (ref 5–45)
BASOPHILS # BLD AUTO: 0.05 THOUSANDS/ÂΜL (ref 0–0.1)
BASOPHILS NFR BLD AUTO: 1 % (ref 0–1)
BILIRUB SERPL-MCNC: 0.88 MG/DL (ref 0.2–1)
BUN SERPL-MCNC: 39 MG/DL (ref 5–25)
CALCIUM SERPL-MCNC: 9.1 MG/DL (ref 8.3–10.1)
CHLORIDE SERPL-SCNC: 107 MMOL/L (ref 96–108)
CHOLEST SERPL-MCNC: 124 MG/DL
CO2 SERPL-SCNC: 24 MMOL/L (ref 21–32)
CREAT SERPL-MCNC: 1.78 MG/DL (ref 0.6–1.3)
EOSINOPHIL # BLD AUTO: 0.22 THOUSAND/ÂΜL (ref 0–0.61)
EOSINOPHIL NFR BLD AUTO: 3 % (ref 0–6)
ERYTHROCYTE [DISTWIDTH] IN BLOOD BY AUTOMATED COUNT: 12.6 % (ref 11.6–15.1)
EST. AVERAGE GLUCOSE BLD GHB EST-MCNC: 154 MG/DL
GFR SERPL CREATININE-BSD FRML MDRD: 37 ML/MIN/1.73SQ M
GLUCOSE P FAST SERPL-MCNC: 146 MG/DL (ref 65–99)
HBA1C MFR BLD: 7 %
HCT VFR BLD AUTO: 32.2 % (ref 36.5–49.3)
HDLC SERPL-MCNC: 42 MG/DL
HGB BLD-MCNC: 11 G/DL (ref 12–17)
IMM GRANULOCYTES # BLD AUTO: 0.01 THOUSAND/UL (ref 0–0.2)
IMM GRANULOCYTES NFR BLD AUTO: 0 % (ref 0–2)
LDLC SERPL CALC-MCNC: 68 MG/DL (ref 0–100)
LYMPHOCYTES # BLD AUTO: 1.8 THOUSANDS/ÂΜL (ref 0.6–4.47)
LYMPHOCYTES NFR BLD AUTO: 28 % (ref 14–44)
MCH RBC QN AUTO: 30.5 PG (ref 26.8–34.3)
MCHC RBC AUTO-ENTMCNC: 34.2 G/DL (ref 31.4–37.4)
MCV RBC AUTO: 89 FL (ref 82–98)
MONOCYTES # BLD AUTO: 0.42 THOUSAND/ÂΜL (ref 0.17–1.22)
MONOCYTES NFR BLD AUTO: 7 % (ref 4–12)
NEUTROPHILS # BLD AUTO: 3.97 THOUSANDS/ÂΜL (ref 1.85–7.62)
NEUTS SEG NFR BLD AUTO: 61 % (ref 43–75)
NONHDLC SERPL-MCNC: 82 MG/DL
NRBC BLD AUTO-RTO: 0 /100 WBCS
PLATELET # BLD AUTO: 157 THOUSANDS/UL (ref 149–390)
PMV BLD AUTO: 10.6 FL (ref 8.9–12.7)
POTASSIUM SERPL-SCNC: 5 MMOL/L (ref 3.5–5.3)
PROT SERPL-MCNC: 7 G/DL (ref 6.4–8.4)
RBC # BLD AUTO: 3.61 MILLION/UL (ref 3.88–5.62)
SODIUM SERPL-SCNC: 134 MMOL/L (ref 135–147)
TRIGL SERPL-MCNC: 69 MG/DL
WBC # BLD AUTO: 6.47 THOUSAND/UL (ref 4.31–10.16)

## 2023-05-15 DIAGNOSIS — E11.3293 TYPE 2 DIABETES MELLITUS WITH BOTH EYES AFFECTED BY MILD NONPROLIFERATIVE RETINOPATHY WITHOUT MACULAR EDEMA, WITHOUT LONG-TERM CURRENT USE OF INSULIN (HCC): Primary | ICD-10-CM

## 2023-05-15 DIAGNOSIS — N18.9 CHRONIC KIDNEY DISEASE, UNSPECIFIED CKD STAGE: ICD-10-CM

## 2023-05-15 RX ORDER — DOXAZOSIN MESYLATE 1 MG/1
1 TABLET ORAL 2 TIMES DAILY
Qty: 90 TABLET | Refills: 0 | Status: SHIPPED | OUTPATIENT
Start: 2023-05-15 | End: 2023-06-28 | Stop reason: SDUPTHER

## 2023-05-15 RX ORDER — PIOGLITAZONEHYDROCHLORIDE 30 MG/1
30 TABLET ORAL DAILY
Qty: 90 TABLET | Refills: 0 | Status: SHIPPED | OUTPATIENT
Start: 2023-05-15 | End: 2023-08-07 | Stop reason: SDUPTHER

## 2023-05-16 ENCOUNTER — DOCUMENTATION (OUTPATIENT)
Dept: NEPHROLOGY | Facility: CLINIC | Age: 68
End: 2023-05-16

## 2023-05-18 ENCOUNTER — TELEPHONE (OUTPATIENT)
Dept: NEPHROLOGY | Facility: CLINIC | Age: 68
End: 2023-05-18

## 2023-05-19 ENCOUNTER — TELEPHONE (OUTPATIENT)
Dept: NEPHROLOGY | Facility: CLINIC | Age: 68
End: 2023-05-19

## 2023-05-19 NOTE — TELEPHONE ENCOUNTER
New Patient Intake Form   Patient Details   Joseluis Francisco     1955     [id number]     Insurance Information   Name of [address] [facility] REP    Does the patient need an insurance referral? no   If patient has Valentina Pool, please ask if they will be using their Valentina Pool  Appointment Information   Who is calling to schedule? If not patient, what is callers name? Patient   Referring Provider Harsha Arnold   Reason for Appt (Diagnosis) Chronic kidney disease, unspecified CKD stage   Does Patient have labs/urine done at [place] [address]? If not, where do they go? List the date of last lab / urine  *Please try to get labs 2 years back if not at  yes   Has patient been hospitalized recently? If yes, list name and location of hospital they were in no   Has patient been seen by a Nephrologist before? If yes, list name, location and phone number no   Has the patient had renal imaging done? If so, list the most recent date and type of imaging no    Does patient have a history of Kidney Stones? no   Appointment Details   Is there a referral on file?  yes    Appointment Date 8/16/23    Location  [place]   Miscellaneous

## 2023-05-21 DIAGNOSIS — E78.5 HYPERLIPIDEMIA, UNSPECIFIED HYPERLIPIDEMIA TYPE: ICD-10-CM

## 2023-05-22 RX ORDER — ATORVASTATIN CALCIUM 10 MG/1
10 TABLET, FILM COATED ORAL DAILY
Qty: 90 TABLET | Refills: 0 | OUTPATIENT
Start: 2023-05-22

## 2023-05-22 NOTE — TELEPHONE ENCOUNTER
NOV: 6/28/23      Atorvastatin was filled 2/17/23 for 90 plus 1 refill sent patient message via my chart to contact the pharmacy

## 2023-06-28 ENCOUNTER — OFFICE VISIT (OUTPATIENT)
Dept: INTERNAL MEDICINE CLINIC | Facility: OTHER | Age: 68
End: 2023-06-28
Payer: COMMERCIAL

## 2023-06-28 VITALS
DIASTOLIC BLOOD PRESSURE: 78 MMHG | HEART RATE: 61 BPM | SYSTOLIC BLOOD PRESSURE: 148 MMHG | WEIGHT: 196 LBS | TEMPERATURE: 97.6 F | HEIGHT: 69 IN | BODY MASS INDEX: 29.03 KG/M2 | OXYGEN SATURATION: 97 %

## 2023-06-28 DIAGNOSIS — E11.3293 TYPE 2 DIABETES MELLITUS WITH BOTH EYES AFFECTED BY MILD NONPROLIFERATIVE RETINOPATHY WITHOUT MACULAR EDEMA, WITHOUT LONG-TERM CURRENT USE OF INSULIN (HCC): Primary | ICD-10-CM

## 2023-06-28 DIAGNOSIS — L97.519 ULCER OF RIGHT FOOT, UNSPECIFIED ULCER STAGE (HCC): ICD-10-CM

## 2023-06-28 DIAGNOSIS — E11.65 TYPE 2 DIABETES MELLITUS WITH HYPERGLYCEMIA, WITHOUT LONG-TERM CURRENT USE OF INSULIN (HCC): ICD-10-CM

## 2023-06-28 DIAGNOSIS — N18.9 CHRONIC KIDNEY DISEASE, UNSPECIFIED CKD STAGE: ICD-10-CM

## 2023-06-28 DIAGNOSIS — Z89.411 ACQUIRED ABSENCE OF RIGHT GREAT TOE (HCC): ICD-10-CM

## 2023-06-28 DIAGNOSIS — I73.9 PERIPHERAL VASCULAR DISEASE (HCC): ICD-10-CM

## 2023-06-28 DIAGNOSIS — D69.6 PLATELETS DECREASED (HCC): ICD-10-CM

## 2023-06-28 DIAGNOSIS — E55.9 VITAMIN D DEFICIENCY: ICD-10-CM

## 2023-06-28 DIAGNOSIS — I10 ESSENTIAL HYPERTENSION: ICD-10-CM

## 2023-06-28 DIAGNOSIS — L08.9 DIABETIC FOOT INFECTION (HCC): ICD-10-CM

## 2023-06-28 DIAGNOSIS — N18.30 STAGE 3 CHRONIC KIDNEY DISEASE, UNSPECIFIED WHETHER STAGE 3A OR 3B CKD (HCC): ICD-10-CM

## 2023-06-28 DIAGNOSIS — E78.2 MIXED HYPERLIPIDEMIA: ICD-10-CM

## 2023-06-28 DIAGNOSIS — E11.628 DIABETIC FOOT INFECTION (HCC): ICD-10-CM

## 2023-06-28 DIAGNOSIS — M81.0 OSTEOPOROSIS WITHOUT CURRENT PATHOLOGICAL FRACTURE, UNSPECIFIED OSTEOPOROSIS TYPE: ICD-10-CM

## 2023-06-28 PROBLEM — Z87.81 S/P ORIF (OPEN REDUCTION INTERNAL FIXATION) FRACTURE: Status: RESOLVED | Noted: 2018-10-23 | Resolved: 2023-06-28

## 2023-06-28 PROBLEM — Z98.890 S/P ORIF (OPEN REDUCTION INTERNAL FIXATION) FRACTURE: Status: RESOLVED | Noted: 2018-10-23 | Resolved: 2023-06-28

## 2023-06-28 LAB
CREAT UR-MCNC: 76.5 MG/DL
MICROALBUMIN UR-MCNC: 94.4 MG/L (ref 0–20)
MICROALBUMIN/CREAT 24H UR: 123 MG/G CREATININE (ref 0–30)

## 2023-06-28 PROCEDURE — 99214 OFFICE O/P EST MOD 30 MIN: CPT | Performed by: NURSE PRACTITIONER

## 2023-06-28 PROCEDURE — 82570 ASSAY OF URINE CREATININE: CPT | Performed by: NURSE PRACTITIONER

## 2023-06-28 PROCEDURE — 82043 UR ALBUMIN QUANTITATIVE: CPT | Performed by: NURSE PRACTITIONER

## 2023-06-28 RX ORDER — DOXAZOSIN 2 MG/1
2 TABLET ORAL 2 TIMES DAILY
Qty: 90 TABLET | Refills: 1 | Status: SHIPPED | OUTPATIENT
Start: 2023-06-28

## 2023-06-28 NOTE — ASSESSMENT & PLAN NOTE
Lab Results   Component Value Date    EGFR 37 05/13/2023    EGFR 44 10/01/2022    EGFR 55 10/30/2021    CREATININE 1 78 (H) 05/13/2023    CREATININE 1 57 (H) 10/01/2022    CREATININE 1 32 (H) 10/30/2021   Avoid nephrotoxic agents, increase daily water intake  Patient has appointment scheduled with nephrology

## 2023-06-28 NOTE — PROGRESS NOTES
Assessment/Plan:    Type 2 diabetes mellitus with both eyes affected by mild nonproliferative retinopathy without macular edema, without long-term current use of insulin ([facility])    Lab Results   Component Value Date    HGBA1C 7 0 (H) 05/13/2023    Currently well controlled, metformin discontinued due to renal function, patient continues on glipizide and Actos  Diabetic foot infection ([facility] )    Lab Results   Component Value Date    HGBA1C 7 0 (H) 05/13/2023   Continues to follow with podiatry    Essential hypertension  Uncontrolled  Lisinopril recently discontinued due to renal function, continue with Norvasc 10 mg tablet daily, and increase Cardura to 2 mg twice daily  Osteoporosis  DEXA scan from 10/2018 shows a T-score of -2 1  Continue with calcium 1200 mg and vitamin-D 2000 units daily supplementation  Stage 3 chronic kidney disease, unspecified whether stage 3a or 3b CKD ([facility] )  Lab Results   Component Value Date    EGFR 37 05/13/2023    EGFR 44 10/01/2022    EGFR 55 10/30/2021    CREATININE 1 78 (H) 05/13/2023    CREATININE 1 57 (H) 10/01/2022    CREATININE 1 32 (H) 10/30/2021   Avoid nephrotoxic agents, increase daily water intake  Patient has appointment scheduled with nephrology    Vitamin D deficiency  Continue supplementation    Mixed hyperlipidemia  Patient will continue on Lipitor 10 mg tablet  Recommend healthy lifestyle choices for your cholesterol  Low fat/low cholesterol diet  Limit/avoid red meat  Eat more lean meat - chicken breast, ground turkey, fish  Exercise 30 mins at least 5 times a week as tolerated  BMI Counseling: Body mass index is 29 37 kg/m²   The BMI is above normal  Nutrition recommendations include decreasing portion sizes, encouraging healthy choices of fruits and vegetables, decreasing fast food intake, consuming healthier snacks, limiting drinks that contain sugar, moderation in carbohydrate intake, increasing intake of lean protein, reducing intake of saturated and trans fat and reducing intake of cholesterol  Exercise recommendations include moderate physical activity 150 minutes/week and exercising 3-5 times per week  Rationale for BMI follow-up plan is due to patient being overweight or obese  Depression Screening and Follow-up Plan: Patient was screened for depression during today's encounter  They screened negative with a PHQ-2 score of 0  Diagnoses and all orders for this visit:    Type 2 diabetes mellitus with both eyes affected by mild nonproliferative retinopathy without macular edema, without long-term current use of insulin ([facility] )  -     Ambulatory Referral to Ophthalmology; Future  -     Cancel: Albumin / creatinine urine ratio; Future  -     Cancel: Hemoglobin A1C; Future  -     Cancel: Comprehensive metabolic panel; Future  -     Cancel: CBC and Platelet; Future  -     Cancel: Lipid Panel with Direct LDL reflex; Future  -     doxazosin (CARDURA) 2 mg tablet; Take 1 tablet (2 mg total) by mouth 2 (two) times a day  -     Comprehensive metabolic panel; Future  -     Lipid Panel with Direct LDL reflex; Future  -     Hemoglobin A1c (w/out EAG) ([facility] ONLY); Future  -     CBC and differential; Future  -     Albumin / creatinine urine ratio; Future  -     Albumin / creatinine urine ratio    Type 2 diabetes mellitus with hyperglycemia, without long-term current use of insulin ([facility] )  -     Ambulatory Referral to Ophthalmology; Future  -     Cancel: Albumin / creatinine urine ratio; Future  -     Cancel: Hemoglobin A1C; Future  -     Cancel: Comprehensive metabolic panel; Future  -     Cancel: CBC and Platelet; Future  -     Cancel: Lipid Panel with Direct LDL reflex; Future  -     Albumin / creatinine urine ratio;  Future  -     Albumin / creatinine urine ratio    Acquired absence of right great toe (HCC)    Ulcer of right foot, unspecified ulcer stage ([facility] )    Peripheral vascular disease ([facility] )    Platelets decreased (HCC)    Stage 3 chronic kidney disease, unspecified whether stage 3a or 3b CKD (HCC)    Chronic kidney disease, unspecified CKD stage  -     doxazosin (CARDURA) 2 mg tablet; Take 1 tablet (2 mg total) by mouth 2 (two) times a day    Diabetic foot infection ([address] )    Essential hypertension    Osteoporosis without current pathological fracture, unspecified osteoporosis type    Vitamin D deficiency    Mixed hyperlipidemia          Subjective:      Patient ID: Negrito Das is a 77 y o  male  Patient presents today for follow-up on diabetes type 2, hypertension, hyperlipidemia, reviewed blood work while in the office today  Denies any new concerns  Male screenings:  Colonoscopy- cologuard completed 2021  Hep C-done  Eye Doctor- wears glasses, has not been  Dentist-has not gone  Podiatrist-does not go      Tobacco abuse- Quit in 78        Diabetes  He presents for his initial diabetic visit  He has type 2 diabetes mellitus  No MedicAlert identification noted  His disease course has been worsening (HBA1C 7 0)  There are no hypoglycemic associated symptoms  Pertinent negatives for hypoglycemia include no dizziness, headaches or nervousness/anxiousness  Pertinent negatives for diabetes include no chest pain, no fatigue, no polydipsia, no polyphagia, no polyuria, no weakness and no weight loss  There are no hypoglycemic complications  Symptoms are stable  Risk factors for coronary artery disease include diabetes mellitus, dyslipidemia and male sex  Current diabetic treatment includes oral agent (dual therapy)  He rarely participates in exercise  He sees a podiatrist Eye exam is not current  Hypertension  This is a chronic problem  The current episode started more than 1 year ago  The problem has been gradually worsening since onset  The problem is uncontrolled  Pertinent negatives include no anxiety, chest pain, headaches, neck pain, palpitations or shortness of breath         The following portions of the patient's history were reviewed and updated as appropriate: allergies, current medications, past family history, past medical history, past social history, past surgical history and problem list     Review of Systems   Constitutional: Negative for activity change, appetite change, chills, diaphoresis, fatigue, fever and weight loss  HENT: Negative for congestion, ear discharge, ear pain, postnasal drip, rhinorrhea, sinus pressure, sinus pain and sore throat  Eyes: Negative for pain, discharge, itching and visual disturbance  Respiratory: Negative for cough, chest tightness, shortness of breath and wheezing  Cardiovascular: Negative for chest pain, palpitations and leg swelling  Gastrointestinal: Negative for abdominal pain, constipation, diarrhea, nausea and vomiting  Endocrine: Negative for polydipsia, polyphagia and polyuria  Genitourinary: Negative for difficulty urinating, dysuria and urgency  Musculoskeletal: Negative for arthralgias, back pain and neck pain  Skin: Negative for rash and wound  Neurological: Negative for dizziness, weakness, numbness and headaches  Psychiatric/Behavioral: The patient is not nervous/anxious            Past Medical History:   Diagnosis Date   • Accident due to mechanical fall without injury 9/12/2018   • Diabetes mellitus ([facility] )    • H/O nicotine dependence    • Hypertension    • Prediabetes    • S/P ORIF (open reduction internal fixation) fracture 10/23/2018         Current Outpatient Medications:   •  ACCU-CHEK FASTCLIX LANCETS MISC, by Does not apply route 2 (two) times a day, Disp: 200 each, Rfl: 1  •  Acetaminophen (TYLENOL EXTRA STRENGTH PO), As needed, Disp: , Rfl:   •  amLODIPine (NORVASC) 10 mg tablet, Take 1 tablet (10 mg total) by mouth daily, Disp: 90 tablet, Rfl: 1  •  atorvastatin (LIPITOR) 10 mg tablet, Take 1 tablet (10 mg total) by mouth daily, Disp: 90 tablet, Rfl: 1  •  Blood Glucose Monitoring Suppl (ACCU-CHEK CHELY PLUS) w/Device KIT, by Does not apply route 2 (two) "times a day, Disp: 1 kit, Rfl: 0  •  cholecalciferol 2000 units TABS, Take 1 tablet (2,000 Units total) by mouth daily, Disp: 30 tablet, Rfl: 0  •  doxazosin (CARDURA) 2 mg tablet, Take 1 tablet (2 mg total) by mouth 2 (two) times a day, Disp: 90 tablet, Rfl: 1  •  glipiZIDE (GLUCOTROL XL) 10 mg 24 hr tablet, Take 1 tablet (10 mg total) by mouth daily, Disp: 90 tablet, Rfl: 1  •  glucose blood (ACCU-CHEK CHELY PLUS) test strip, 1 each by Other route 2 (two) times a day Use as instructed, Disp: 200 each, Rfl: 1  •  Multiple Vitamin (multivitamin) tablet, Take 1 tablet by mouth daily, Disp: , Rfl:   •  Omega-3 Fatty Acids (FISH OIL) 1,000 mg, Take 1,000 mg by mouth daily, Disp: , Rfl:   •  pioglitazone (ACTOS) 30 mg tablet, Take 1 tablet (30 mg total) by mouth daily, Disp: 90 tablet, Rfl: 0  •  mupirocin (BACTROBAN) 2 % ointment, APPLY A SMALL AMOUNT TOPICALLY TO THE AFFECTED AREA 1 TIME A DAY (Patient not taking: Reported on 6/28/2023), Disp: , Rfl:     Allergies   Allergen Reactions   • Lactose - Food Allergy Diarrhea       Social History   Past Surgical History:   Procedure Laterality Date   • HIP SURGERY     • NOSE SURGERY     • NC OPTX FEM SHFT FX W/INSJ IMED IMPLT W/WO SCREW Left 9/13/2018    Procedure: INSERTION NAIL IM FEMUR ANTEGRADE (TROCHANTERIC);   Surgeon: Arlette Phelps MD;  Location:  MAIN OR;  Service: Orthopedics   • TOE SURGERY Right 12/2020    right great toe removed due to cellulitis     Family History   Problem Relation Age of Onset   • Lymphoma Mother    • No Known Problems Father    • No Known Problems Sister    • Lung cancer Brother    • Lymphoma Family         acute       Objective:  /78 (BP Location: Left arm, Patient Position: Sitting, Cuff Size: Large)   Pulse 61   Temp 97 6 °F (36 4 °C) (Temporal)   Ht 5' 8 5\" (1 74 m)   Wt 88 9 kg (196 lb)   SpO2 97% Comment: room air  BMI 29 37 kg/m²     Recent Results (from the past 1344 hour(s))   CBC and differential    Collection Time: " 05/13/23 12:14 PM   Result Value Ref Range    WBC 6 47 4 31 - 10 16 Thousand/uL    RBC 3 61 (L) 3 88 - 5 62 Million/uL    Hemoglobin 11 0 (L) 12 0 - 17 0 g/dL    Hematocrit 32 2 (L) 36 5 - 49 3 %    MCV 89 82 - 98 fL    MCH 30 5 26 8 - 34 3 pg    MCHC 34 2 31 4 - 37 4 g/dL    RDW 12 6 11 6 - 15 1 %    MPV 10 6 8 9 - 12 7 fL    Platelets 787 915 - 743 Thousands/uL    nRBC 0 /100 WBCs    Neutrophils Relative 61 43 - 75 %    Immat GRANS % 0 0 - 2 %    Lymphocytes Relative 28 14 - 44 %    Monocytes Relative 7 4 - 12 %    Eosinophils Relative 3 0 - 6 %    Basophils Relative 1 0 - 1 %    Neutrophils Absolute 3 97 1 85 - 7 62 Thousands/µL    Immature Grans Absolute 0 01 0 00 - 0 20 Thousand/uL    Lymphocytes Absolute 1 80 0 60 - 4 47 Thousands/µL    Monocytes Absolute 0 42 0 17 - 1 22 Thousand/µL    Eosinophils Absolute 0 22 0 00 - 0 61 Thousand/µL    Basophils Absolute 0 05 0 00 - 0 10 Thousands/µL   Comprehensive metabolic panel    Collection Time: 05/13/23 12:14 PM   Result Value Ref Range    Sodium 134 (L) 135 - 147 mmol/L    Potassium 5 0 3 5 - 5 3 mmol/L    Chloride 107 96 - 108 mmol/L    CO2 24 21 - 32 mmol/L    ANION GAP 3 (L) 4 - 13 mmol/L    BUN 39 (H) 5 - 25 mg/dL    Creatinine 1 78 (H) 0 60 - 1 30 mg/dL    Glucose, Fasting 146 (H) 65 - 99 mg/dL    Calcium 9 1 8 3 - 10 1 mg/dL    AST 26 5 - 45 U/L    ALT 31 12 - 78 U/L    Alkaline Phosphatase 33 (L) 46 - 116 U/L    Total Protein 7 0 6 4 - 8 4 g/dL    Albumin 3 7 3 5 - 5 0 g/dL    Total Bilirubin 0 88 0 20 - 1 00 mg/dL    eGFR 37 ml/min/1 73sq m   Hemoglobin A1C    Collection Time: 05/13/23 12:14 PM   Result Value Ref Range    Hemoglobin A1C 7 0 (H) Normal 3 8-5 6%; PreDiabetic 5 7-6 4%;  Diabetic >=6 5%; Glycemic control for adults with diabetes <7 0% %     mg/dl   Lipid panel    Collection Time: 05/13/23 12:14 PM   Result Value Ref Range    Cholesterol 124 See Comment mg/dL    Triglycerides 69 See Comment mg/dL    HDL, Direct 42 >=40 mg/dL    LDL Calculated 68 0 - 100 mg/dL    Non-HDL-Chol (CHOL-HDL) 82 mg/dl            Physical Exam  Constitutional:       General: He is not in acute distress  Appearance: He is well-developed  He is not diaphoretic  HENT:      Head: Normocephalic and atraumatic  Right Ear: External ear normal       Left Ear: External ear normal       Nose: Nose normal       Mouth/Throat:      Pharynx: No oropharyngeal exudate  Eyes:      General:         Right eye: No discharge  Left eye: No discharge  Conjunctiva/sclera: Conjunctivae normal       Pupils: Pupils are equal, round, and reactive to light  Neck:      Thyroid: No thyromegaly  Cardiovascular:      Rate and Rhythm: Normal rate and regular rhythm  Heart sounds: Normal heart sounds  No murmur heard  No friction rub  No gallop  Pulmonary:      Effort: Pulmonary effort is normal  No respiratory distress  Breath sounds: Normal breath sounds  No stridor  No wheezing or rales  Abdominal:      General: Bowel sounds are normal  There is no distension  Palpations: Abdomen is soft  Tenderness: There is no abdominal tenderness  Musculoskeletal:      Cervical back: Normal range of motion and neck supple  Lymphadenopathy:      Cervical: No cervical adenopathy  Skin:     General: Skin is warm and dry  Findings: No erythema or rash  Neurological:      Mental Status: He is alert and oriented to person, place, and time  Psychiatric:         Behavior: Behavior normal          Thought Content:  Thought content normal          Judgment: Judgment normal

## 2023-06-28 NOTE — ASSESSMENT & PLAN NOTE
Uncontrolled  Lisinopril recently discontinued due to renal function, continue with Norvasc 10 mg tablet daily, and increase Cardura to 2 mg twice daily

## 2023-06-28 NOTE — ASSESSMENT & PLAN NOTE
Lab Results   Component Value Date    HGBA1C 7 0 (H) 05/13/2023    Currently well controlled, metformin discontinued due to renal function, patient continues on glipizide and Actos

## 2023-08-07 DIAGNOSIS — N18.9 CHRONIC KIDNEY DISEASE, UNSPECIFIED CKD STAGE: ICD-10-CM

## 2023-08-07 DIAGNOSIS — E11.3293 TYPE 2 DIABETES MELLITUS WITH BOTH EYES AFFECTED BY MILD NONPROLIFERATIVE RETINOPATHY WITHOUT MACULAR EDEMA, WITHOUT LONG-TERM CURRENT USE OF INSULIN (HCC): ICD-10-CM

## 2023-08-08 RX ORDER — PIOGLITAZONEHYDROCHLORIDE 30 MG/1
30 TABLET ORAL DAILY
Qty: 90 TABLET | Refills: 0 | Status: SHIPPED | OUTPATIENT
Start: 2023-08-08

## 2023-08-16 ENCOUNTER — CONSULT (OUTPATIENT)
Dept: NEPHROLOGY | Facility: CLINIC | Age: 68
End: 2023-08-16

## 2023-08-16 VITALS
HEIGHT: 69 IN | HEART RATE: 68 BPM | BODY MASS INDEX: 29.03 KG/M2 | SYSTOLIC BLOOD PRESSURE: 128 MMHG | WEIGHT: 196 LBS | DIASTOLIC BLOOD PRESSURE: 76 MMHG

## 2023-08-16 DIAGNOSIS — N18.9 CHRONIC KIDNEY DISEASE, UNSPECIFIED CKD STAGE: ICD-10-CM

## 2023-08-16 PROBLEM — N18.30 STAGE 3 CHRONIC KIDNEY DISEASE, UNSPECIFIED WHETHER STAGE 3A OR 3B CKD (HCC): Status: RESOLVED | Noted: 2021-11-17 | Resolved: 2023-08-16

## 2023-08-16 LAB
SL AMB  POCT GLUCOSE, UA: NORMAL
SL AMB LEUKOCYTE ESTERASE,UA: NORMAL
SL AMB POCT BILIRUBIN,UA: NORMAL
SL AMB POCT BLOOD,UA: 50
SL AMB POCT CLARITY,UA: CLEAR
SL AMB POCT COLOR,UA: YELLOW
SL AMB POCT KETONES,UA: NORMAL
SL AMB POCT NITRITE,UA: NORMAL
SL AMB POCT PH,UA: 5
SL AMB POCT SPECIFIC GRAVITY,UA: 1.01
SL AMB POCT URINE PROTEIN: NORMAL
SL AMB POCT UROBILINOGEN: 0.2

## 2023-08-16 NOTE — LETTER
August 16, 2023     Krystle Soto, [address] [facility]    Patient: Obdulia Kinney   YOB: 1955   Date of Visit: 8/16/2023       Dear Dr. Janki Cabrera:    Thank you for referring Obdulia Kinney to me for evaluation. Below are my notes for this consultation. If you have questions, please do not hesitate to call me. I look forward to following your patient along with you. Sincerely,        Richard Daugherty MD        CC: No Recipients    Richard Daugherty MD  8/16/2023  8:48 AM  Sign when Signing Visit  Consultation - Nephrology   Obdulia Kinney 77 y.o. male MRN: [id number]  Unit/Bed#:  Encounter: [id number]      Assessment/Plan     Assessment / Plan:  1. Renal    The patient is a longstanding diabetic so that puts him at risk for kidney disease. His primary physician has been monitoring urine protein estimations by microalbumin screens and lab work routinely. The creatinine seems to be running around 1.3-1.5 since July 2021 through October 2022 and then labs from May of this year showed a creatinine of 1.7. The issue is whether or not this was just due to subtle prerenal azotemia on that day or other medication effects. Urine protein estimation done the end of July still showed very low microalbumin of around 123. That tells me that he does not have overt diabetic nephropathy and it would not be responsible for his increased creatinine. Potentially has nephrosclerosis as he does have underlying vascular disease he is a longstanding diabetic he was a heavy smoker in the past.    He feels well states he is emptying his bladder well is not taking any anti-inflammatories. He is off metformin. We will repeat BMP with labs he is doing for his family physician visit and they ordered an ultrasound and PVR as well we will follow-up on that once it is done.     We will then contact the patient to determine if any further testing is needed and/or follow-up based on the results of the blood work. I told him to continue current good glycemic control as his A1c is around 7%. Lipid profile looks excellent blood pressures at target. History of Present Illness   Physician Requesting Consult: No att. providers found  Reason for Consult / Principal Problem: Chronic kidney disease  Hx and PE limited by:   HPI: Richard Live is a 77y.o. year old male who presents for his first visit. The patient has had diabetes that he is aware of for about 5 years. He has been living his life states things are going well he no longer smokes or drinks alcohol and is happy about that. He had routine blood work done that showed a creatinine increased to 1.7 this was done in May. He is here for evaluation and recommendations. He offers no complaints today. History obtained from chart review and the patient. Consults    Review of Systems   Constitutional: Negative for chills, diaphoresis, fatigue and fever. HENT: Negative. Eyes: Negative. Respiratory: Negative. Negative for cough, chest tightness, shortness of breath and wheezing. Cardiovascular: Negative. Negative for chest pain, palpitations and leg swelling. Gastrointestinal: Negative. Negative for abdominal pain, diarrhea, nausea and vomiting. Genitourinary: Negative for difficulty urinating, dysuria, flank pain and hematuria. Neurological: Negative for dizziness, syncope, light-headedness and headaches. Psychiatric/Behavioral: Negative for agitation, behavioral problems, confusion and decreased concentration.        Historical Information   Patient Active Problem List   Diagnosis   • Type 2 diabetes mellitus with both eyes affected by mild nonproliferative retinopathy without macular edema, without long-term current use of insulin (HCC)   • Essential hypertension   • Osteoporosis   • Low testosterone   • Vitamin D deficiency   • Mixed hyperlipidemia   • Left lumbar radiculopathy   • Diabetic foot infection ([address])   • Ulcer of right foot ([address])   • Platelets decreased ([address])   • Acquired absence of right great toe [facility])   • Peripheral vascular disease ([address])   • Drop in hemoglobin   • Abnormal EKG   • CKD (chronic kidney disease)     Past Medical History:   Diagnosis Date   • Accident due to mechanical fall without injury 2018   • Diabetes mellitus ([address])    • H/O nicotine dependence    • Hypertension    • Prediabetes    • S/P ORIF (open reduction internal fixation) fracture 10/23/2018   No history of heart disease, myocardial infarction, heart failure, liver disease, hepatitis, lung disease, kidney stones, cancer, stroke. Past Surgical History:   Procedure Laterality Date   • HIP SURGERY     • NOSE SURGERY     • IN OPTX FEM SHFT FX W/INSJ IMED IMPLT W/WO SCREW Left 2018    Procedure: INSERTION NAIL IM FEMUR ANTEGRADE (TROCHANTERIC); Surgeon: Kamran Magallon MD;  Location: [facility];  Service: Orthopedics   • TOE SURGERY Right 2020    right great toe removed due to cellulitis     Social History   Social History     Substance and Sexual Activity   Alcohol Use No   Positive past tobacco and ethanol use. Currently no tobacco ethanol or drug abuse. He is retired and worked as a . Also was in the [employer] [address] [employer] and served in [employer]. Social History     Substance and Sexual Activity   Drug Use No     Social History     Tobacco Use   Smoking Status Former   • Packs/day: 2.00   • Years: 15.00   • Total pack years: 30.00   • Types: Cigarettes   • Quit date: 1985   • Years since quittin.9   Smokeless Tobacco Never     Family History   Problem Relation Age of Onset   • Lymphoma Mother    • No Known Problems Father    • No Known Problems Sister    • Lung cancer Brother    • Lymphoma Family         acute     Father had emphysema mother had cancer. No history of kidney disease that he is aware of. Meds/Allergies   current meds:   No current facility-administered medications for this visit.      Allergies   Allergen Reactions   • Lactose - Food Allergy Diarrhea       Objective   [unfilled]  Body mass index is 29.37 kg/m². Invasive Devices:        PHYSICAL EXAM:  Ht 5' 8.5" (1.74 m)   Wt 88.9 kg (196 lb)   BMI 29.37 kg/m²     Physical Exam  Constitutional:       General: He is not in acute distress. Appearance: He is not ill-appearing or toxic-appearing. HENT:      Head: Normocephalic and atraumatic. Nose: Nose normal.      Mouth/Throat:      Mouth: Mucous membranes are moist.   Eyes:      General: No scleral icterus. Extraocular Movements: Extraocular movements intact. Cardiovascular:      Rate and Rhythm: Normal rate and regular rhythm. Heart sounds: No friction rub. No gallop. Comments: No edema. Pulmonary:      Effort: Pulmonary effort is normal. No respiratory distress. Breath sounds: No wheezing, rhonchi or rales. Abdominal:      General: Bowel sounds are normal. There is no distension. Palpations: Abdomen is soft. Tenderness: There is no abdominal tenderness. There is no rebound. Musculoskeletal:      Cervical back: Normal range of motion and neck supple. Neurological:      General: No focal deficit present. Mental Status: He is alert and oriented to person, place, and time. Mental status is at baseline. Psychiatric:         Mood and Affect: Mood normal.         Behavior: Behavior normal.         Thought Content:  Thought content normal.         Judgment: Judgment normal.           Current Weight: Weight - Scale: 88.9 kg (196 lb)  First Weight: Weight - Scale: 88.9 kg (196 lb)    Lab Results:              Invalid input(s): "LABGLOM"        Invalid input(s): "LABALBU"

## 2023-08-16 NOTE — PATIENT INSTRUCTIONS
You are here for your first visit. The blood test for the kidney function was running around the 1.3 from over a year ago and then the most recent one in May had gone up to 1.7 and the doctor referred you. Sounds like you been in good health you had a medication stop called metformin since then your blood sugars have been under good control. Diabetics are at risk for chronic kidney disease but I explained to you do not have a lot of protein in the urine so that makes me say that you do not have diabetic kidney disease at this point so its not responsible for the changes in the blood test.  My suspicion it could be medications a little bit of aging in the kidney called nephrosclerosis and may be fluctuations due to subtle dehydration. I think it is unlikely that you picked up another disease that was attacking the kidneys because there is not a lot of protein in your urine and that was checked at the end of June and most kidney diseases cause a lot of protein in the urine. I am just going to repeat blood work prior to your doctor's visit with your family doctor keep yourself hydrated as best you can for now continue current medications good blood sugar control and I will contact you with the results to determine if other testing is needed based on that result. If anything would come up before you hear from me please give the office a call.

## 2023-08-16 NOTE — PROGRESS NOTES
Consultation - Nephrology   Francisco Coleman 77 y.o. male MRN: [id number]  Unit/Bed#:  Encounter: [id number]      Assessment/Plan     Assessment / Plan:  1. Renal    The patient is a longstanding diabetic so that puts him at risk for kidney disease. His primary physician has been monitoring urine protein estimations by microalbumin screens and lab work routinely. The creatinine seems to be running around 1.3-1.5 since July 2021 through October 2022 and then labs from May of this year showed a creatinine of 1.7. The issue is whether or not this was just due to subtle prerenal azotemia on that day or other medication effects. Urine protein estimation done the end of July still showed very low microalbumin of around 123. That tells me that he does not have overt diabetic nephropathy and it would not be responsible for his increased creatinine. Potentially has nephrosclerosis as he does have underlying vascular disease he is a longstanding diabetic he was a heavy smoker in the past.    He feels well states he is emptying his bladder well is not taking any anti-inflammatories. He is off metformin. We will repeat BMP with labs he is doing for his family physician visit and they ordered an ultrasound and PVR as well we will follow-up on that once it is done. We will then contact the patient to determine if any further testing is needed and/or follow-up based on the results of the blood work. I told him to continue current good glycemic control as his A1c is around 7%. Lipid profile looks excellent blood pressures at target. History of Present Illness   Physician Requesting Consult: No att. providers found  Reason for Consult / Principal Problem: Chronic kidney disease  Hx and PE limited by:   HPI: Francisco Coleman is a 77y.o. year old male who presents for his first visit. The patient has had diabetes that he is aware of for about 5 years.   He has been living his life states things are going well he no longer smokes or drinks alcohol and is happy about that. He had routine blood work done that showed a creatinine increased to 1.7 this was done in May. He is here for evaluation and recommendations. He offers no complaints today. History obtained from chart review and the patient. Consults    Review of Systems   Constitutional: Negative for chills, diaphoresis, fatigue and fever. HENT: Negative. Eyes: Negative. Respiratory: Negative. Negative for cough, chest tightness, shortness of breath and wheezing. Cardiovascular: Negative. Negative for chest pain, palpitations and leg swelling. Gastrointestinal: Negative. Negative for abdominal pain, diarrhea, nausea and vomiting. Genitourinary: Negative for difficulty urinating, dysuria, flank pain and hematuria. Neurological: Negative for dizziness, syncope, light-headedness and headaches. Psychiatric/Behavioral: Negative for agitation, behavioral problems, confusion and decreased concentration.        Historical Information   Patient Active Problem List   Diagnosis   • Type 2 diabetes mellitus with both eyes affected by mild nonproliferative retinopathy without macular edema, without long-term current use of insulin (HCC)   • Essential hypertension   • Osteoporosis   • Low testosterone   • Vitamin D deficiency   • Mixed hyperlipidemia   • Left lumbar radiculopathy   • Diabetic foot infection ([address])   • Ulcer of right foot (HCC)   • Platelets decreased (HCC)   • Acquired absence of right great toe (HCC)   • Peripheral vascular disease (HCC)   • Drop in hemoglobin   • Abnormal EKG   • CKD (chronic kidney disease)     Past Medical History:   Diagnosis Date   • Accident due to mechanical fall without injury 9/12/2018   • Diabetes mellitus ([address])    • H/O nicotine dependence    • Hypertension    • Prediabetes    • S/P ORIF (open reduction internal fixation) fracture 10/23/2018   No history of heart disease, myocardial infarction, heart failure, liver disease, hepatitis, lung disease, kidney stones, cancer, stroke. Past Surgical History:   Procedure Laterality Date   • HIP SURGERY     • NOSE SURGERY     • ND OPTX FEM SHFT FX W/INSJ IMED IMPLT W/WO SCREW Left 2018    Procedure: INSERTION NAIL IM FEMUR ANTEGRADE (TROCHANTERIC); Surgeon: Valentin Ratliff MD;  [facility];  Service: Orthopedics   • TOE SURGERY Right 2020    right great toe removed due to cellulitis     Social History   Social History     Substance and Sexual Activity   Alcohol Use No   Positive past tobacco and ethanol use. Currently no tobacco ethanol or drug abuse. He is retired and worked as a . Also was in the [employer] and served in [employer]. Social History     Substance and Sexual Activity   Drug Use No     Social History     Tobacco Use   Smoking Status Former   • Packs/day: 2.00   • Years: 15.00   • Total pack years: 30.00   • Types: Cigarettes   • Quit date: 1985   • Years since quittin.9   Smokeless Tobacco Never     Family History   Problem Relation Age of Onset   • Lymphoma Mother    • No Known Problems Father    • No Known Problems Sister    • Lung cancer Brother    • Lymphoma Family         acute     Father had emphysema mother had cancer. No history of kidney disease that he is aware of. Meds/Allergies   current meds:   No current facility-administered medications for this visit. Allergies   Allergen Reactions   • Lactose - Food Allergy Diarrhea       Objective   [unfilled]  Body mass index is 29.37 kg/m². Invasive Devices:        PHYSICAL EXAM:   5' 8.5" (1.74 m)   Wt 88.9 kg (196 lb)   BMI 29.37 kg/m²     Physical Exam  Constitutional:       General: He is not in acute distress. Appearance: He is not ill-appearing or toxic-appearing. HENT:      Head: Normocephalic and atraumatic. Nose: Nose normal.      Mouth/Throat:      Mouth: Mucous membranes are moist.   Eyes:      General: No scleral icterus.      Extraocular Movements: Extraocular movements intact. Cardiovascular:      Rate and Rhythm: Normal rate and regular rhythm. Heart sounds: No friction rub. No gallop. Comments: No edema. Pulmonary:      Effort: Pulmonary effort is normal. No respiratory distress. Breath sounds: No wheezing, rhonchi or rales. Abdominal:      General: Bowel sounds are normal. There is no distension. Palpations: Abdomen is soft. Tenderness: There is no abdominal tenderness. There is no rebound. Musculoskeletal:      Cervical back: Normal range of motion and neck supple. Neurological:      General: No focal deficit present. Mental Status: He is alert and oriented to person, place, and time. Mental status is at baseline. Psychiatric:         Mood and Affect: Mood normal.         Behavior: Behavior normal.         Thought Content:  Thought content normal.         Judgment: Judgment normal.           Current Weight: Weight - Scale: 88.9 kg (196 lb)  First Weight: Weight - Scale: 88.9 kg (196 lb)    Lab Results:              Invalid input(s): "LABGLOM"        Invalid input(s): "LABALBU"

## 2023-08-29 ENCOUNTER — TELEPHONE (OUTPATIENT)
Dept: INTERNAL MEDICINE CLINIC | Facility: OTHER | Age: 68
End: 2023-08-29

## 2023-08-29 NOTE — TELEPHONE ENCOUNTER
Attempted to call pt but no answer and no answering machine. Pt is due for a diabetic eye exam . Please assist with scheduling.

## 2023-09-05 DIAGNOSIS — E78.5 HYPERLIPIDEMIA, UNSPECIFIED HYPERLIPIDEMIA TYPE: ICD-10-CM

## 2023-09-05 RX ORDER — ATORVASTATIN CALCIUM 10 MG/1
10 TABLET, FILM COATED ORAL DAILY
Qty: 90 TABLET | Refills: 1 | Status: SHIPPED | OUTPATIENT
Start: 2023-09-05

## 2023-09-09 ENCOUNTER — APPOINTMENT (OUTPATIENT)
Dept: LAB | Facility: IMAGING CENTER | Age: 68
End: 2023-09-09
Payer: COMMERCIAL

## 2023-09-09 DIAGNOSIS — E11.3293 TYPE 2 DIABETES MELLITUS WITH MILD NONPROLIFERATIVE RETINOPATHY OF BOTH EYES, WITHOUT LONG-TERM CURRENT USE OF INSULIN, MACULAR EDEMA PRESENCE UNSPECIFIED (HCC): ICD-10-CM

## 2023-09-09 DIAGNOSIS — N18.9 CHRONIC KIDNEY DISEASE, UNSPECIFIED CKD STAGE: ICD-10-CM

## 2023-09-09 DIAGNOSIS — E11.3293 TYPE 2 DIABETES MELLITUS WITH BOTH EYES AFFECTED BY MILD NONPROLIFERATIVE RETINOPATHY WITHOUT MACULAR EDEMA, WITHOUT LONG-TERM CURRENT USE OF INSULIN (HCC): ICD-10-CM

## 2023-09-09 LAB
ALBUMIN SERPL BCP-MCNC: 4.2 G/DL (ref 3.5–5)
ALP SERPL-CCNC: 27 U/L (ref 34–104)
ALT SERPL W P-5'-P-CCNC: 18 U/L (ref 7–52)
ANION GAP SERPL CALCULATED.3IONS-SCNC: 5 MMOL/L
AST SERPL W P-5'-P-CCNC: 22 U/L (ref 13–39)
BASOPHILS # BLD AUTO: 0.03 THOUSANDS/ÂΜL (ref 0–0.1)
BASOPHILS NFR BLD AUTO: 1 % (ref 0–1)
BILIRUB SERPL-MCNC: 0.81 MG/DL (ref 0.2–1)
BUN SERPL-MCNC: 36 MG/DL (ref 5–25)
CALCIUM SERPL-MCNC: 9.2 MG/DL (ref 8.4–10.2)
CHLORIDE SERPL-SCNC: 103 MMOL/L (ref 96–108)
CHOLEST SERPL-MCNC: 139 MG/DL
CO2 SERPL-SCNC: 25 MMOL/L (ref 21–32)
CREAT SERPL-MCNC: 1.71 MG/DL (ref 0.6–1.3)
EOSINOPHIL # BLD AUTO: 0.14 THOUSAND/ÂΜL (ref 0–0.61)
EOSINOPHIL NFR BLD AUTO: 3 % (ref 0–6)
ERYTHROCYTE [DISTWIDTH] IN BLOOD BY AUTOMATED COUNT: 13 % (ref 11.6–15.1)
EST. AVERAGE GLUCOSE BLD GHB EST-MCNC: 157 MG/DL
GFR SERPL CREATININE-BSD FRML MDRD: 39 ML/MIN/1.73SQ M
GLUCOSE P FAST SERPL-MCNC: 134 MG/DL (ref 65–99)
HBA1C MFR BLD: 7.1 %
HCT VFR BLD AUTO: 31.2 % (ref 36.5–49.3)
HDLC SERPL-MCNC: 43 MG/DL
HGB BLD-MCNC: 10 G/DL (ref 12–17)
IMM GRANULOCYTES # BLD AUTO: 0 THOUSAND/UL (ref 0–0.2)
IMM GRANULOCYTES NFR BLD AUTO: 0 % (ref 0–2)
LDLC SERPL CALC-MCNC: 83 MG/DL (ref 0–100)
LYMPHOCYTES # BLD AUTO: 1.35 THOUSANDS/ÂΜL (ref 0.6–4.47)
LYMPHOCYTES NFR BLD AUTO: 30 % (ref 14–44)
MCH RBC QN AUTO: 29.9 PG (ref 26.8–34.3)
MCHC RBC AUTO-ENTMCNC: 32.1 G/DL (ref 31.4–37.4)
MCV RBC AUTO: 93 FL (ref 82–98)
MONOCYTES # BLD AUTO: 0.34 THOUSAND/ÂΜL (ref 0.17–1.22)
MONOCYTES NFR BLD AUTO: 8 % (ref 4–12)
NEUTROPHILS # BLD AUTO: 2.64 THOUSANDS/ÂΜL (ref 1.85–7.62)
NEUTS SEG NFR BLD AUTO: 58 % (ref 43–75)
NRBC BLD AUTO-RTO: 0 /100 WBCS
PLATELET # BLD AUTO: 132 THOUSANDS/UL (ref 149–390)
PMV BLD AUTO: 10.6 FL (ref 8.9–12.7)
POTASSIUM SERPL-SCNC: 4.8 MMOL/L (ref 3.5–5.3)
PROT SERPL-MCNC: 6.8 G/DL (ref 6.4–8.4)
RBC # BLD AUTO: 3.35 MILLION/UL (ref 3.88–5.62)
SODIUM SERPL-SCNC: 133 MMOL/L (ref 135–147)
TRIGL SERPL-MCNC: 63 MG/DL
WBC # BLD AUTO: 4.5 THOUSAND/UL (ref 4.31–10.16)

## 2023-09-09 PROCEDURE — 83036 HEMOGLOBIN GLYCOSYLATED A1C: CPT

## 2023-09-09 PROCEDURE — 85025 COMPLETE CBC W/AUTO DIFF WBC: CPT

## 2023-09-09 PROCEDURE — 80061 LIPID PANEL: CPT

## 2023-09-09 PROCEDURE — 80053 COMPREHEN METABOLIC PANEL: CPT

## 2023-09-09 PROCEDURE — 36415 COLL VENOUS BLD VENIPUNCTURE: CPT

## 2023-09-12 ENCOUNTER — HOSPITAL ENCOUNTER (OUTPATIENT)
Dept: RADIOLOGY | Facility: IMAGING CENTER | Age: 68
Discharge: HOME | End: 2023-09-12
Payer: COMMERCIAL

## 2023-09-12 DIAGNOSIS — M79.671 PAIN IN RIGHT FOOT: ICD-10-CM

## 2023-09-12 PROCEDURE — 73630 X-RAY EXAM OF FOOT: CPT

## 2023-09-24 DIAGNOSIS — E11.3293 TYPE 2 DIABETES MELLITUS WITH BOTH EYES AFFECTED BY MILD NONPROLIFERATIVE RETINOPATHY WITHOUT MACULAR EDEMA, WITHOUT LONG-TERM CURRENT USE OF INSULIN (HCC): ICD-10-CM

## 2023-09-24 DIAGNOSIS — N18.9 CHRONIC KIDNEY DISEASE, UNSPECIFIED CKD STAGE: ICD-10-CM

## 2023-09-25 RX ORDER — DOXAZOSIN 2 MG/1
2 TABLET ORAL 2 TIMES DAILY
Qty: 180 TABLET | Refills: 0 | Status: SHIPPED | OUTPATIENT
Start: 2023-09-25

## 2023-10-04 ENCOUNTER — RA CDI HCC (OUTPATIENT)
Dept: OTHER | Facility: HOSPITAL | Age: 68
End: 2023-10-04

## 2023-10-04 NOTE — PROGRESS NOTES
[address] [facility] coding opportunities          Chart Reviewed number of suggestions sent to Provider: 1   * Per Nephrology note; CKD not due to diabetes  E11.51    Patients Insurance     Medicare Insurance: [facility]

## 2023-10-11 ENCOUNTER — OFFICE VISIT (OUTPATIENT)
Dept: INTERNAL MEDICINE CLINIC | Facility: OTHER | Age: 68
End: 2023-10-11
Payer: COMMERCIAL

## 2023-10-11 VITALS
OXYGEN SATURATION: 99 % | BODY MASS INDEX: 30.51 KG/M2 | HEART RATE: 78 BPM | HEIGHT: 69 IN | DIASTOLIC BLOOD PRESSURE: 88 MMHG | TEMPERATURE: 98.3 F | RESPIRATION RATE: 20 BRPM | WEIGHT: 206 LBS | SYSTOLIC BLOOD PRESSURE: 146 MMHG

## 2023-10-11 DIAGNOSIS — E11.3293 TYPE 2 DIABETES MELLITUS WITH BOTH EYES AFFECTED BY MILD NONPROLIFERATIVE RETINOPATHY WITHOUT MACULAR EDEMA, WITHOUT LONG-TERM CURRENT USE OF INSULIN (HCC): ICD-10-CM

## 2023-10-11 DIAGNOSIS — E78.2 MIXED HYPERLIPIDEMIA: ICD-10-CM

## 2023-10-11 DIAGNOSIS — E55.9 VITAMIN D DEFICIENCY: ICD-10-CM

## 2023-10-11 DIAGNOSIS — I10 ESSENTIAL HYPERTENSION: ICD-10-CM

## 2023-10-11 DIAGNOSIS — M81.0 OSTEOPOROSIS WITHOUT CURRENT PATHOLOGICAL FRACTURE, UNSPECIFIED OSTEOPOROSIS TYPE: ICD-10-CM

## 2023-10-11 DIAGNOSIS — Z23 ENCOUNTER FOR IMMUNIZATION: Primary | ICD-10-CM

## 2023-10-11 DIAGNOSIS — R71.0 DROP IN HEMOGLOBIN: ICD-10-CM

## 2023-10-11 DIAGNOSIS — N18.9 CHRONIC KIDNEY DISEASE, UNSPECIFIED CKD STAGE: ICD-10-CM

## 2023-10-11 DIAGNOSIS — Z23 NEEDS FLU SHOT: ICD-10-CM

## 2023-10-11 PROCEDURE — G0008 ADMIN INFLUENZA VIRUS VAC: HCPCS

## 2023-10-11 PROCEDURE — 99214 OFFICE O/P EST MOD 30 MIN: CPT | Performed by: NURSE PRACTITIONER

## 2023-10-11 PROCEDURE — 90662 IIV NO PRSV INCREASED AG IM: CPT

## 2023-10-11 RX ORDER — AMLODIPINE BESYLATE 10 MG/1
5 TABLET ORAL DAILY
Qty: 90 TABLET | Refills: 1 | Status: SHIPPED | OUTPATIENT
Start: 2023-10-11 | End: 2023-10-11 | Stop reason: SDUPTHER

## 2023-10-11 RX ORDER — AMLODIPINE BESYLATE 5 MG/1
5 TABLET ORAL DAILY
Qty: 90 TABLET | Refills: 1 | Status: SHIPPED | OUTPATIENT
Start: 2023-10-11

## 2023-10-11 NOTE — ASSESSMENT & PLAN NOTE
Lab Results   Component Value Date    HGBA1C 7.1 (H) 09/09/2023    Currently well controlled, metformin discontinued due to renal function, patient continues on glipizide and Actos.

## 2023-10-11 NOTE — PROGRESS NOTES
Assessment/Plan:    Type 2 diabetes mellitus with both eyes affected by mild nonproliferative retinopathy without macular edema, without long-term current use of insulin ([facility])    Lab Results   Component Value Date    HGBA1C 7.1 (H) 09/09/2023    Currently well controlled, metformin discontinued due to renal function, patient continues on glipizide and Actos. Essential hypertension  Uncontrolled. Lisinopril recently discontinued due to renal function, continue with Norvasc 5mg tablet daily (patient reports he was not taking), and Cardura to 2 mg twice daily. Osteoporosis  DEXA scan from 10/2018 shows a T-score of -2.1. Continue with calcium 1200 mg and vitamin-D 2000 units daily supplementation. CKD (chronic kidney disease)  Lab Results   Component Value Date    EGFR 39 09/09/2023    EGFR 37 05/13/2023    EGFR 44 10/01/2022    CREATININE 1.71 (H) 09/09/2023    CREATININE 1.78 (H) 05/13/2023    CREATININE 1.57 (H) 10/01/2022   -due for follow up with nephrology   -US of kidney and bladder  -encouraged good oral hydration  -avoid nephrotoxins    Vitamin D deficiency  Continue supplementation    Mixed hyperlipidemia  Patient will continue on Lipitor 10 mg tablet. Drop in hemoglobin  hbg improving since starting multiviatmin. Denies blood in urine and stool. Cologuard negative which was done this year. Iron and B12 panel Within normal range. BMI Counseling: Body mass index is 30.87 kg/m². The BMI is above normal. Nutrition recommendations include decreasing portion sizes, encouraging healthy choices of fruits and vegetables, decreasing fast food intake, consuming healthier snacks, limiting drinks that contain sugar, moderation in carbohydrate intake, increasing intake of lean protein, reducing intake of saturated and trans fat and reducing intake of cholesterol. Exercise recommendations include moderate physical activity 150 minutes/week and exercising 3-5 times per week.  Rationale for BMI follow-up plan is due to patient being overweight or obese. Diagnoses and all orders for this visit:    Encounter for immunization  -     influenza vaccine, high-dose, PF 0.7 mL (FLUZONE HIGH-DOSE)    Essential hypertension  -     Discontinue: amLODIPine (NORVASC) 10 mg tablet; Take 0.5 tablets (5 mg total) by mouth daily  -     amLODIPine (NORVASC) 5 mg tablet; Take 1 tablet (5 mg total) by mouth daily  -     CBC and differential; Future  -     Comprehensive metabolic panel; Future  -     Hemoglobin A1C; Future  -     Lipid panel; Future    Needs flu shot  -     influenza vaccine, high-dose, PF 0.7 mL (FLUZONE HIGH-DOSE)    Type 2 diabetes mellitus with both eyes affected by mild nonproliferative retinopathy without macular edema, without long-term current use of insulin ([address])  -     Ambulatory referral to Diabetic Education - use to refer for diabetes group classes, individual diabetes education, medical nutrition therapy, device training; Future    Osteoporosis without current pathological fracture, unspecified osteoporosis type    Chronic kidney disease, unspecified CKD stage    Vitamin D deficiency    Mixed hyperlipidemia    Drop in hemoglobin          Subjective:      Patient ID: Felix Rojas is a 77 y.o. male. Patient presents today for follow-up on diabetes type 2, hypertension, hyperlipidemia, reviewed blood work while in the office today. Denies any new concerns. Male screenings:  Colonoscopy- cologuard completed 2021  Hep C-done  Eye Doctor- wears glasses, has not been  Dentist-has not gone  Podiatrist-does not go      Tobacco abuse- Quit in 78        Diabetes  He presents for his initial diabetic visit. He has type 2 diabetes mellitus. No MedicAlert identification noted. His disease course has been worsening (HBA1C 7.1). There are no hypoglycemic associated symptoms. Pertinent negatives for hypoglycemia include no dizziness, headaches or nervousness/anxiousness.  Pertinent negatives for diabetes include no chest pain, no fatigue, no polydipsia, no polyphagia, no polyuria, no weakness and no weight loss. There are no hypoglycemic complications. Symptoms are stable. Risk factors for coronary artery disease include diabetes mellitus, dyslipidemia and male sex. Current diabetic treatment includes oral agent (dual therapy). He rarely participates in exercise. He sees a podiatrist.Eye exam is not current. Hypertension  This is a chronic problem. The current episode started more than 1 year ago. The problem has been gradually worsening since onset. The problem is uncontrolled. Pertinent negatives include no anxiety, chest pain, headaches, neck pain, palpitations or shortness of breath. The following portions of the patient's history were reviewed and updated as appropriate: allergies, current medications, past family history, past medical history, past social history, past surgical history and problem list.    Review of Systems   Constitutional:  Negative for activity change, appetite change, chills, diaphoresis, fatigue, fever and weight loss. HENT:  Negative for congestion, ear discharge, ear pain, postnasal drip, rhinorrhea, sinus pressure, sinus pain and sore throat. Eyes:  Negative for pain, discharge, itching and visual disturbance. Respiratory:  Negative for cough, chest tightness, shortness of breath and wheezing. Cardiovascular:  Negative for chest pain, palpitations and leg swelling. Gastrointestinal:  Negative for abdominal pain, constipation, diarrhea, nausea and vomiting. Endocrine: Negative for polydipsia, polyphagia and polyuria. Genitourinary:  Negative for difficulty urinating, dysuria and urgency. Musculoskeletal:  Negative for arthralgias, back pain and neck pain. Skin:  Negative for rash and wound. Neurological:  Negative for dizziness, weakness, numbness and headaches. Psychiatric/Behavioral:  The patient is not nervous/anxious. Past Medical History:   Diagnosis Date    Accident due to mechanical fall without injury 9/12/2018    Diabetes mellitus ([address])     H/O nicotine dependence     Hypertension     Prediabetes     S/P ORIF (open reduction internal fixation) fracture 10/23/2018         Current Outpatient Medications:     ACCU-CHEK FASTCLIX LANCETS MISC, by Does not apply route 2 (two) times a day, Disp: 200 each, Rfl: 1    amLODIPine (NORVASC) 5 mg tablet, Take 1 tablet (5 mg total) by mouth daily, Disp: 90 tablet, Rfl: 1    atorvastatin (LIPITOR) 10 mg tablet, Take 1 tablet (10 mg total) by mouth daily, Disp: 90 tablet, Rfl: 1    cholecalciferol 2000 units TABS, Take 1 tablet (2,000 Units total) by mouth daily, Disp: 30 tablet, Rfl: 0    doxazosin (CARDURA) 2 mg tablet, Take 1 tablet (2 mg total) by mouth 2 (two) times a day, Disp: 180 tablet, Rfl: 0    glipiZIDE (GLUCOTROL XL) 10 mg 24 hr tablet, Take 1 tablet (10 mg total) by mouth daily, Disp: 90 tablet, Rfl: 1    glucose blood (ACCU-CHEK CHELY PLUS) test strip, 1 each by Other route 2 (two) times a day Use as instructed, Disp: 200 each, Rfl: 1    Multiple Vitamin (multivitamin) tablet, Take 1 tablet by mouth daily, Disp: , Rfl:     Omega-3 Fatty Acids (FISH OIL) 1,000 mg, Take 1,000 mg by mouth daily, Disp: , Rfl:     pioglitazone (ACTOS) 30 mg tablet, Take 1 tablet (30 mg total) by mouth daily, Disp: 90 tablet, Rfl: 0    Blood Glucose Monitoring Suppl (ACCU-CHEK CHELY PLUS) w/Device KIT, by Does not apply route 2 (two) times a day, Disp: 1 kit, Rfl: 0    Allergies   Allergen Reactions    Lactose - Food Allergy Diarrhea       Social History   Past Surgical History:   Procedure Laterality Date    HIP SURGERY      NOSE SURGERY      MA OPTX FEM SHFT FX W/INSJ IMED IMPLT W/WO SCREW Left 9/13/2018    Procedure: INSERTION NAIL IM FEMUR ANTEGRADE (TROCHANTERIC);   Surgeon: Nahum Infante MD;  Location: [facility];  Service: Orthopedics    TOE SURGERY Right 12/2020    right great toe removed due to cellulitis     Family History   Problem Relation Age of Onset    Lymphoma Mother     No Known Problems Father     No Known Problems Sister     Lung cancer Brother     Lymphoma Family         acute       Objective:  /88 (BP Location: Left arm, Patient Position: Sitting, Cuff Size: Standard)   Pulse 78   Temp 98.3 °F (36.8 °C) (Temporal)   Resp 20   Ht 5' 8.5" (1.74 m)   Wt 93.4 kg (206 lb)   SpO2 99%   BMI 30.87 kg/m²     Recent Results (from the past 1344 hour(s))   Comprehensive metabolic panel    Collection Time: 09/09/23 11:43 AM   Result Value Ref Range    Sodium 133 (L) 135 - 147 mmol/L    Potassium 4.8 3.5 - 5.3 mmol/L    Chloride 103 96 - 108 mmol/L    CO2 25 21 - 32 mmol/L    ANION GAP 5 mmol/L    BUN 36 (H) 5 - 25 mg/dL    Creatinine 1.71 (H) 0.60 - 1.30 mg/dL    Glucose, Fasting 134 (H) 65 - 99 mg/dL    Calcium 9.2 8.4 - 10.2 mg/dL    AST 22 13 - 39 U/L    ALT 18 7 - 52 U/L    Alkaline Phosphatase 27 (L) 34 - 104 U/L    Total Protein 6.8 6.4 - 8.4 g/dL    Albumin 4.2 3.5 - 5.0 g/dL    Total Bilirubin 0.81 0.20 - 1.00 mg/dL    eGFR 39 ml/min/1.73sq m   Lipid Panel with Direct LDL reflex    Collection Time: 09/09/23 11:43 AM   Result Value Ref Range    Cholesterol 139 See Comment mg/dL    Triglycerides 63 See Comment mg/dL    HDL, Direct 43 >=40 mg/dL    LDL Calculated 83 0 - 100 mg/dL   CBC and differential    Collection Time: 09/09/23 11:43 AM   Result Value Ref Range    WBC 4.50 4.31 - 10.16 Thousand/uL    RBC 3.35 (L) 3.88 - 5.62 Million/uL    Hemoglobin 10.0 (L) 12.0 - 17.0 g/dL    Hematocrit 31.2 (L) 36.5 - 49.3 %    MCV 93 82 - 98 fL    MCH 29.9 26.8 - 34.3 pg    MCHC 32.1 31.4 - 37.4 g/dL    RDW 13.0 11.6 - 15.1 %    MPV 10.6 8.9 - 12.7 fL    Platelets 651 (L) 831 - 390 Thousands/uL    nRBC 0 /100 WBCs    Neutrophils Relative 58 43 - 75 %    Immat GRANS % 0 0 - 2 %    Lymphocytes Relative 30 14 - 44 %    Monocytes Relative 8 4 - 12 %    Eosinophils Relative 3 0 - 6 % Basophils Relative 1 0 - 1 %    Neutrophils Absolute 2.64 1.85 - 7.62 Thousands/µL    Immature Grans Absolute 0.00 0.00 - 0.20 Thousand/uL    Lymphocytes Absolute 1.35 0.60 - 4.47 Thousands/µL    Monocytes Absolute 0.34 0.17 - 1.22 Thousand/µL    Eosinophils Absolute 0.14 0.00 - 0.61 Thousand/µL    Basophils Absolute 0.03 0.00 - 0.10 Thousands/µL   Hemoglobin A1C    Collection Time: 09/09/23 11:43 AM   Result Value Ref Range    Hemoglobin A1C 7.1 (H) Normal 4.0-5.6%; PreDiabetic 5.7-6.4%; Diabetic >=6.5%; Glycemic control for adults with diabetes <7.0% %     mg/dl            Physical Exam  Constitutional:       General: He is not in acute distress. Appearance: He is well-developed. He is not diaphoretic. HENT:      Head: Normocephalic and atraumatic. Right Ear: External ear normal.      Left Ear: External ear normal.      Nose: Nose normal.      Mouth/Throat:      Pharynx: No oropharyngeal exudate. Eyes:      General:         Right eye: No discharge. Left eye: No discharge. Conjunctiva/sclera: Conjunctivae normal.      Pupils: Pupils are equal, round, and reactive to light. Neck:      Thyroid: No thyromegaly. Cardiovascular:      Rate and Rhythm: Normal rate and regular rhythm. Heart sounds: Normal heart sounds. No murmur heard. No friction rub. No gallop. Pulmonary:      Effort: Pulmonary effort is normal. No respiratory distress. Breath sounds: Normal breath sounds. No stridor. No wheezing or rales. Abdominal:      General: Bowel sounds are normal. There is no distension. Palpations: Abdomen is soft. Tenderness: There is no abdominal tenderness. Musculoskeletal:      Cervical back: Normal range of motion and neck supple. Lymphadenopathy:      Cervical: No cervical adenopathy. Skin:     General: Skin is warm and dry. Findings: No erythema or rash. Neurological:      Mental Status: He is alert and oriented to person, place, and time. Psychiatric:         Behavior: Behavior normal.         Thought Content:  Thought content normal.         Judgment: Judgment normal.

## 2023-10-11 NOTE — ASSESSMENT & PLAN NOTE
Uncontrolled. Lisinopril recently discontinued due to renal function, continue with Norvasc 5mg tablet daily (patient reports he was not taking), and Cardura to 2 mg twice daily.

## 2023-10-11 NOTE — ASSESSMENT & PLAN NOTE
DEXA scan from 10/2018 shows a T-score of -2.1. Continue with calcium 1200 mg and vitamin-D 2000 units daily supplementation.

## 2023-10-11 NOTE — ASSESSMENT & PLAN NOTE
Lab Results   Component Value Date    EGFR 39 09/09/2023    EGFR 37 05/13/2023    EGFR 44 10/01/2022    CREATININE 1.71 (H) 09/09/2023    CREATININE 1.78 (H) 05/13/2023    CREATININE 1.57 (H) 10/01/2022   -due for follow up with nephrology   -US of kidney and bladder  -encouraged good oral hydration  -avoid nephrotoxins

## 2023-10-11 NOTE — ASSESSMENT & PLAN NOTE
hbg improving since starting multiviatmin. Denies blood in urine and stool. Cologuard negative which was done this year. Iron and B12 panel Within normal range.

## 2023-10-20 ENCOUNTER — HOSPITAL ENCOUNTER (OUTPATIENT)
Dept: RADIOLOGY | Facility: IMAGING CENTER | Age: 68
End: 2023-10-20
Payer: COMMERCIAL

## 2023-10-20 DIAGNOSIS — N18.9 CHRONIC KIDNEY DISEASE, UNSPECIFIED CKD STAGE: ICD-10-CM

## 2023-10-20 PROCEDURE — 76775 US EXAM ABDO BACK WALL LIM: CPT

## 2023-10-23 ENCOUNTER — TELEPHONE (OUTPATIENT)
Dept: INTERNAL MEDICINE CLINIC | Facility: OTHER | Age: 68
End: 2023-10-23

## 2023-10-23 NOTE — TELEPHONE ENCOUNTER
LMOM for pt to call me at [facility] office    Documented in CC last visit that patient had eye exam scheduled. Need to find out what location pt had done so I can obtain report.

## 2023-10-29 DIAGNOSIS — E11.3293 TYPE 2 DIABETES MELLITUS WITH BOTH EYES AFFECTED BY MILD NONPROLIFERATIVE RETINOPATHY WITHOUT MACULAR EDEMA, WITHOUT LONG-TERM CURRENT USE OF INSULIN (HCC): ICD-10-CM

## 2023-10-29 DIAGNOSIS — N18.9 CHRONIC KIDNEY DISEASE, UNSPECIFIED CKD STAGE: ICD-10-CM

## 2023-10-30 RX ORDER — PIOGLITAZONEHYDROCHLORIDE 30 MG/1
30 TABLET ORAL DAILY
Qty: 90 TABLET | Refills: 1 | Status: SHIPPED | OUTPATIENT
Start: 2023-10-30

## 2023-11-01 LAB
LEFT EYE DIABETIC RETINOPATHY: POSITIVE
RIGHT EYE DIABETIC RETINOPATHY: POSITIVE

## 2023-11-02 ENCOUNTER — VBI (OUTPATIENT)
Dept: ADMINISTRATIVE | Facility: OTHER | Age: 68
End: 2023-11-02

## 2023-11-02 ENCOUNTER — TELEPHONE (OUTPATIENT)
Dept: ADMINISTRATIVE | Facility: OTHER | Age: 68
End: 2023-11-02

## 2023-11-02 NOTE — TELEPHONE ENCOUNTER
Upon review of the In Basket request we have found this is a duplicate request and no further action is needed. This request was completed upon initial request, the patient chart is up to date, and this message will now be closed. Any additional questions or concerns should be emailed to the Practice Liaisons via the appropriate education email address, please do not reply via In Basket.     Thank you  Sumeet Younger

## 2023-11-02 NOTE — TELEPHONE ENCOUNTER
Upon review of the In Basket request we were able to locate, review, and update the patient chart as requested for Diabetic Eye Exam.    Any additional questions or concerns should be emailed to the Practice Liaisons via the appropriate education email address, please do not reply via In Basket.     Thank you  Richard Hastigns MA

## 2023-11-02 NOTE — TELEPHONE ENCOUNTER
----- Message from Betsy Samson sent at 11/2/2023  8:02 AM EDT -----  11/02/23 8:03 AM    Hello, our patient Oseas Astorga has had Diabetic Eye Exam completed/performed. Please assist in updating the patient chart by making an External outreach to Dr Bianka Harp facility located in [place]. The date of service is 11/1/2023.     Thank you,  [phone]

## 2023-11-29 ENCOUNTER — OFFICE VISIT (OUTPATIENT)
Dept: INTERNAL MEDICINE CLINIC | Facility: OTHER | Age: 68
End: 2023-11-29
Payer: COMMERCIAL

## 2023-11-29 VITALS
DIASTOLIC BLOOD PRESSURE: 80 MMHG | TEMPERATURE: 97.4 F | HEIGHT: 69 IN | HEART RATE: 77 BPM | BODY MASS INDEX: 30.21 KG/M2 | OXYGEN SATURATION: 98 % | WEIGHT: 204 LBS | SYSTOLIC BLOOD PRESSURE: 130 MMHG

## 2023-11-29 DIAGNOSIS — Z12.5 SCREENING FOR PROSTATE CANCER: ICD-10-CM

## 2023-11-29 DIAGNOSIS — Z12.11 SCREENING FOR COLORECTAL CANCER: Primary | ICD-10-CM

## 2023-11-29 DIAGNOSIS — Z12.12 SCREENING FOR COLORECTAL CANCER: Primary | ICD-10-CM

## 2023-11-29 PROCEDURE — G0439 PPPS, SUBSEQ VISIT: HCPCS | Performed by: NURSE PRACTITIONER

## 2023-11-29 NOTE — PATIENT INSTRUCTIONS
Medicare Preventive Visit Patient Instructions  Thank you for completing your Welcome to Medicare Visit or Medicare Annual Wellness Visit today. Your next wellness visit will be due in one year (11/29/2024). The screening/preventive services that you may require over the next 5-10 years are detailed below. Some tests may not apply to you based off risk factors and/or age. Screening tests ordered at today's visit but not completed yet may show as past due. Also, please note that scanned in results may not display below. Preventive Screenings:  Service Recommendations Previous Testing/Comments   Colorectal Cancer Screening  Colonoscopy    Fecal Occult Blood Test (FOBT)/Fecal Immunochemical Test (FIT)  Fecal DNA/Cologuard Test  Flexible Sigmoidoscopy Age: 43-73 years old   Colonoscopy: every 10 years (May be performed more frequently if at higher risk)  OR  FOBT/FIT: every 1 year  OR  Cologuard: every 3 years  OR  Sigmoidoscopy: every 5 years  Screening may be recommended earlier than age 39 if at higher risk for colorectal cancer. Also, an individualized decision between you and your healthcare provider will decide whether screening between the ages of 77-80 would be appropriate.  Colonoscopy: 05/11/2021  FOBT/FIT: Not on file  Cologuard: 05/11/2021  Sigmoidoscopy: Not on file    Screening Current     Prostate Cancer Screening Individualized decision between patient and health care provider in men between ages of 53-66   Medicare will cover every 12 months beginning on the day after your 50th birthday PSA: 0.2 ng/mL           Hepatitis C Screening Once for adults born between 1945 and 1965  More frequently in patients at high risk for Hepatitis C Hep C Antibody: 10/10/2019    Screening Current   Diabetes Screening 1-2 times per year if you're at risk for diabetes or have pre-diabetes Fasting glucose: 134 mg/dL (9/9/2023)  A1C: 7.1 % (9/9/2023)  Screening Not Indicated  History Diabetes   Cholesterol Screening Once every 5 years if you don't have a lipid disorder. May order more often based on risk factors. Lipid panel: 09/09/2023  Screening Not Indicated  History Lipid Disorder      Other Preventive Screenings Covered by Medicare:  Abdominal Aortic Aneurysm (AAA) Screening: covered once if your at risk. You're considered to be at risk if you have a family history of AAA or a male between the age of 70-76 who smoking at least 100 cigarettes in your lifetime. Lung Cancer Screening: covers low dose CT scan once per year if you meet all of the following conditions: (1) Age 48-67; (2) No signs or symptoms of lung cancer; (3) Current smoker or have quit smoking within the last 15 years; (4) You have a tobacco smoking history of at least 20 pack years (packs per day x number of years you smoked); (5) You get a written order from a healthcare provider. Glaucoma Screening: covered annually if you're considered high risk: (1) You have diabetes OR (2) Family history of glaucoma OR (3)  aged 48 and older OR (3)  American aged 72 and older  Osteoporosis Screening: covered every 2 years if you meet one of the following conditions: (1) Have a vertebral abnormality; (2) On glucocorticoid therapy for more than 3 months; (3) Have primary hyperparathyroidism; (4) On osteoporosis medications and need to assess response to drug therapy. HIV Screening: covered annually if you're between the age of 14-79. Also covered annually if you are younger than 13 and older than 72 with risk factors for HIV infection. For pregnant patients, it is covered up to 3 times per pregnancy.     Immunizations:  Immunization Recommendations   Influenza Vaccine Annual influenza vaccination during flu season is recommended for all persons aged >= 6 months who do not have contraindications   Pneumococcal Vaccine   * Pneumococcal conjugate vaccine = PCV13 (Prevnar 13), PCV15 (Vaxneuvance), PCV20 (Prevnar 20)  * Pneumococcal polysaccharide vaccine = PPSV23 (Pneumovax) Adults 77-03 yo with certain risk factors or if 69+ yo  If never received any pneumonia vaccine: recommend Prevnar 20 (PCV20)  Give PCV20 if previously received 1 dose of PCV13 or PPSV23   Hepatitis B Vaccine 3 dose series if at intermediate or high risk (ex: diabetes, end stage renal disease, liver disease)   Respiratory syncytial virus (RSV) Vaccine - COVERED BY MEDICARE PART D  * RSVPreF3 (Arexvy) CDC recommends that adults 61years of age and older may receive a single dose of RSV vaccine using shared clinical decision-making (SCDM)   Tetanus (Td) Vaccine - COST NOT COVERED BY MEDICARE PART B Following completion of primary series, a booster dose should be given every 10 years to maintain immunity against tetanus. Td may also be given as tetanus wound prophylaxis. Tdap Vaccine - COST NOT COVERED BY MEDICARE PART B Recommended at least once for all adults. For pregnant patients, recommended with each pregnancy. Shingles Vaccine (Shingrix) - COST NOT COVERED BY MEDICARE PART B  2 shot series recommended in those 19 years and older who have or will have weakened immune systems or those 50 years and older     Health Maintenance Due:      Topic Date Due   • Colorectal Cancer Screening  05/11/2024   • Hepatitis C Screening  Completed     Immunizations Due:      Topic Date Due   • COVID-19 Vaccine (5 - Moderna series) 06/18/2022     Advance Directives   What are advance directives? Advance directives are legal documents that state your wishes and plans for medical care. These plans are made ahead of time in case you lose your ability to make decisions for yourself. Advance directives can apply to any medical decision, such as the treatments you want, and if you want to donate organs. What are the types of advance directives? There are many types of advance directives, and each state has rules about how to use them. You may choose a combination of any of the following:  Living will:   This is a written record of the treatment you want. You can also choose which treatments you do not want, which to limit, and which to stop at a certain time. This includes surgery, medicine, IV fluid, and tube feedings. Durable power of  for [facility]): This is a written record that states who you want to make healthcare choices for you when you are unable to make them for yourself. This person, called a proxy, is usually a family member or a friend. You may choose more than 1 proxy. Do not resuscitate (DNR) order:  A DNR order is used in case your heart stops beating or you stop breathing. It is a request not to have certain forms of treatment, such as CPR. A DNR order may be included in other types of advance directives. Medical directive: This covers the care that you want if you are in a coma, near death, or unable to make decisions for yourself. You can list the treatments you want for each condition. Treatment may include pain medicine, surgery, blood transfusions, dialysis, IV or tube feedings, and a ventilator (breathing machine). Values history: This document has questions about your views, beliefs, and how you feel and think about life. This information can help others choose the care that you would choose. Why are advance directives important? An advance directive helps you control your care. Although spoken wishes may be used, it is better to have your wishes written down. Spoken wishes can be misunderstood, or not followed. Treatments may be given even if you do not want them. An advance directive may make it easier for your family to make difficult choices about your care. Weight Management   Why it is important to manage your weight:  Being overweight increases your risk of health conditions such as heart disease, high blood pressure, type 2 diabetes, and certain types of cancer. It can also increase your risk for osteoarthritis, sleep apnea, and other respiratory problems.  Aim for a slow, steady weight loss. Even a small amount of weight loss can lower your risk of health problems. How to lose weight safely:  A safe and healthy way to lose weight is to eat fewer calories and get regular exercise. You can lose up about 1 pound a week by decreasing the number of calories you eat by 500 calories each day. Healthy meal plan for weight management:  A healthy meal plan includes a variety of foods, contains fewer calories, and helps you stay healthy. A healthy meal plan includes the following:  Eat whole-grain foods more often. A healthy meal plan should contain fiber. Fiber is the part of grains, fruits, and vegetables that is not broken down by your body. Whole-grain foods are healthy and provide extra fiber in your diet. Some examples of whole-grain foods are whole-wheat breads and pastas, oatmeal, brown rice, and bulgur. Eat a variety of vegetables every day. Include dark, leafy greens such as spinach, kale, ko greens, and mustard greens. Eat yellow and orange vegetables such as carrots, sweet potatoes, and winter squash. Eat a variety of fruits every day. Choose fresh or canned fruit (canned in its own juice or light syrup) instead of juice. Fruit juice has very little or no fiber. Eat low-fat dairy foods. Drink fat-free (skim) milk or 1% milk. Eat fat-free yogurt and low-fat cottage cheese. Try low-fat cheeses such as mozzarella and other reduced-fat cheeses. Choose meat and other protein foods that are low in fat. Choose beans or other legumes such as split peas or lentils. Choose fish, skinless poultry (chicken or turkey), or lean cuts of red meat (beef or pork). Before you cook meat or poultry, cut off any visible fat. Use less fat and oil. Try baking foods instead of frying them. Add less fat, such as margarine, sour cream, regular salad dressing and mayonnaise to foods. Eat fewer high-fat foods.  Some examples of high-fat foods include french fries, doughnuts, ice cream, and cakes. Eat fewer sweets. Limit foods and drinks that are high in sugar. This includes candy, cookies, regular soda, and sweetened drinks. Exercise:  Exercise at least 30 minutes per day on most days of the week. Some examples of exercise include walking, biking, dancing, and swimming. You can also fit in more physical activity by taking the stairs instead of the elevator or parking farther away from stores. Ask your healthcare provider about the best exercise plan for you. © Copyright [employer] 2018 Information is for End User's use only and may not be sold, redistributed or otherwise used for commercial purposes.  All illustrations and images included in CareNotes® are the copyrighted property of A.D.A.M., Inc. or  [employer]

## 2023-11-29 NOTE — PROGRESS NOTES
Diabetic Foot Exam    Patient's shoes and socks removed. Right Foot/Ankle   Right Foot Inspection  Skin Exam: skin intact, dry skin and pre-ulcer. Pre-Ulcer Size (cm): 2Amputation: amputation right foot (Comments: BIG TOE AMPUTATED)    Toe Exam: ROM and strength within normal limits. Sensory   Monofilament testing: diminished    Vascular  Capillary refills: < 3 seconds  The right DP pulse is 2+. The right PT pulse is 2+. Left Foot/Ankle  Left Foot Inspection  Skin Exam: skin normal, skin intact and dry skin. No warmth, no erythema, no maceration, normal color, no pre-ulcer, no ulcer and no callus. Toe Exam: ROM and strength within normal limits. Sensory   Monofilament testing: intact    Vascular  Capillary refills: < 3 seconds  The left DP pulse is 2+. The left PT pulse is 2+. Assign Risk Category  No deformity present  No loss of protective sensation  No weak pulses  Risk: 0       Assessment and Plan:     Problem List Items Addressed This Visit    None  Visit Diagnoses       Screening for colorectal cancer    -  Primary    Relevant Orders    Cologuard    Screening for prostate cancer        Relevant Orders    PSA, Total Screen            Depression Screening and Follow-up Plan: Patient was screened for depression during today's encounter. They screened negative with a PHQ-2 score of 0. Preventive health issues were discussed with patient, and age appropriate screening tests were ordered as noted in patient's After Visit Summary. Personalized health advice and appropriate referrals for health education or preventive services given if needed, as noted in patient's After Visit Summary.      History of Present Illness:     Patient presents for a Medicare Wellness Visit    HPI   Patient Care Team:  Danyel Lynn as PCP - General (Family Medicine)  Otilia Spear MD (Nephrology)     Review of Systems:     Review of Systems   Constitutional:  Negative for activity change, appetite change, chills, diaphoresis and fever. HENT:  Negative for congestion, ear discharge, ear pain, postnasal drip, rhinorrhea, sinus pressure, sinus pain and sore throat. Eyes:  Negative for pain, discharge, itching and visual disturbance. Respiratory:  Negative for cough, chest tightness, shortness of breath and wheezing. Cardiovascular:  Negative for chest pain, palpitations and leg swelling. Gastrointestinal:  Negative for abdominal pain, constipation, diarrhea, nausea and vomiting. Endocrine: Negative for polydipsia, polyphagia and polyuria. Genitourinary:  Negative for difficulty urinating, dysuria and urgency. Musculoskeletal:  Negative for arthralgias, back pain and neck pain. Skin:  Negative for rash and wound. Neurological:  Negative for dizziness, weakness, numbness and headaches.         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)    Peripheral vascular disease (HCC)    Drop in hemoglobin    Abnormal EKG    CKD (chronic kidney disease)      Past Medical and Surgical History:     Past Medical History:   Diagnosis Date    Accident due to mechanical fall without injury 9/12/2018    Diabetes mellitus ([address] [facility])     H/O nicotine dependence     Hypertension     Prediabetes     S/P ORIF (open reduction internal fixation) fracture 10/23/2018     Past Surgical History:   Procedure Laterality Date    HIP SURGERY      NOSE SURGERY      [place] OPTX FEM SHFT FX W/INSJ IMED IMPLT W/WO SCREW Left 9/13/2018    Procedure: INSERTION NAIL IM FEMUR ANTEGRADE (TROCHANTERIC); Surgeon: Meghan Huerta MD;  Location: [facility];  Service: Orthopedics    TOE SURGERY Right 2020    right great toe removed due to cellulitis      Family History:     Family History   Problem Relation Age of Onset    Lymphoma Mother     No Known Problems Father     No Known Problems Sister     Lung cancer Brother     Lymphoma Family         acute      Social History:     Social History     Socioeconomic History    Marital status: Single     Spouse name: None    Number of children: None    Years of education: None    Highest education level: None   Occupational History    Occupation:    Tobacco Use    Smoking status: Former     Packs/day: 2.00     Years: 15.00     Total pack years: 30.00     Types: Cigarettes     Quit date: 1985     Years since quittin.2    Smokeless tobacco: Never   Vaping Use    Vaping Use: Never used   Substance and Sexual Activity    Alcohol use: No    Drug use: No    Sexual activity: Not Currently   Other Topics Concern    None   Social History Narrative    Denied history of exercise habits    Denied history of travel      Social Determinants of Health     Financial Resource Strain: Low Risk  (2023)    Overall Financial Resource Strain (CARDIA)     Difficulty of Paying Living Expenses: Not hard at all   Food Insecurity: Not on file   Transportation Needs: No Transportation Needs (2023)    PRAPARE - Transportation     Lack of Transportation (Medical): No     Lack of Transportation (Non-Medical):  No   Physical Activity: Not on file   Stress: Not on file   Social Connections: Not on file   Intimate Partner Violence: Not on file   Housing Stability: Not on file      Medications and Allergies:     Current Outpatient Medications   Medication Sig Dispense Refill    ACCU-CHEK FASTCLIX LANCETS MISC by Does not apply route 2 (two) times a day 200 each 1    amLODIPine (NORVASC) 5 mg tablet Take 1 tablet (5 mg total) by mouth daily 90 tablet 1 atorvastatin (LIPITOR) 10 mg tablet Take 1 tablet (10 mg total) by mouth daily 90 tablet 1    Blood Glucose Monitoring Suppl (ACCU-CHEK CHELY PLUS) w/Device KIT by Does not apply route 2 (two) times a day 1 kit 0    cholecalciferol 2000 units TABS Take 1 tablet (2,000 Units total) by mouth daily 30 tablet 0    doxazosin (CARDURA) 2 mg tablet Take 1 tablet (2 mg total) by mouth 2 (two) times a day 180 tablet 0    glipiZIDE (GLUCOTROL XL) 10 mg 24 hr tablet Take 1 tablet (10 mg total) by mouth daily 90 tablet 1    glucose blood (ACCU-CHEK CHELY PLUS) test strip 1 each by Other route 2 (two) times a day Use as instructed 200 each 1    Multiple Vitamin (multivitamin) tablet Take 1 tablet by mouth daily      Omega-3 Fatty Acids (FISH OIL) 1,000 mg Take 1,000 mg by mouth daily      pioglitazone (ACTOS) 30 mg tablet Take 1 tablet (30 mg total) by mouth daily 90 tablet 1     No current facility-administered medications for this visit. Allergies   Allergen Reactions    Lactose - Food Allergy Diarrhea      Immunizations:     Immunization History   Administered Date(s) Administered    COVID-19 MODERNA VACC 0.5 ML IM 03/24/2021, 04/23/2021, 11/20/2021, 04/23/2022    INFLUENZA 12/18/2021, 10/11/2023    Influenza, high dose seasonal 0.7 mL 12/19/2018, 10/02/2019, 11/11/2020, 11/22/2022, 10/11/2023    Pneumococcal Conjugate 13-Valent 06/27/2019    Pneumococcal Polysaccharide PPV23 09/01/2021    Tdap 09/12/2018    Zoster 02/12/2018    Zoster Vaccine Recombinant 09/24/2019, 11/30/2019      Health Maintenance:         Topic Date Due    Colorectal Cancer Screening  05/11/2024    Hepatitis C Screening  Completed         Topic Date Due    COVID-19 Vaccine (5 - Grace Goltz series) 06/18/2022      Medicare Screening Tests and Risk Assessments:     Juan Ralph is here for his Subsequent Wellness visit. Last Medicare Wellness visit information reviewed, patient interviewed and updates made to the record today.       Health Risk Assessment: Patient rates overall health as good. Patient feels that their physical health rating is same. Patient is satisfied with their life. Eyesight was rated as same. Hearing was rated as same. Patient feels that their emotional and mental health rating is same. Patients states they are sometimes angry. Patient states they are sometimes unusually tired/fatigued. Pain experienced in the last 7 days has been none. Patient states that he has experienced no weight loss or gain in last 6 months. Depression Screening:   PHQ-2 Score: 0      Fall Risk Screening: In the past year, patient has experienced: no history of falling in past year      Home Safety:  Patient does not have trouble with stairs inside or outside of their home. Patient has working smoke alarms and has working carbon monoxide detector. Home safety hazards include: none. Nutrition:   Current diet is Regular. Medications:   Patient is currently taking over-the-counter supplements. OTC medications include: see medication list. Patient is able to manage medications. Activities of Daily Living (ADLs)/Instrumental Activities of Daily Living (IADLs):   Walk and transfer into and out of bed and chair?: Yes  Dress and groom yourself?: Yes    Bathe or shower yourself?: Yes    Feed yourself? Yes  Do your laundry/housekeeping?: Yes  Manage your money, pay your bills and track your expenses?: Yes  Make your own meals?: Yes    Do your own shopping?: Yes    Previous Hospitalizations:   Any hospitalizations or ED visits within the last 12 months?: No      Advance Care Planning:   Living will: Yes    Durable POA for healthcare:  Yes    Advanced directive: Yes    Advanced directive counseling given: Yes    ACP document given: Yes      PREVENTIVE SCREENINGS      Cardiovascular Screening:    General: Screening Not Indicated, History Lipid Disorder and Screening Current      Diabetes Screening:     General: Screening Not Indicated, History Diabetes and Screening Current      Colorectal Cancer Screening:     General: Screening Current      Prostate Cancer Screening:    General: Risks and Benefits Discussed    Due for: PSA      Osteoporosis Screening:    General: Screening Not Indicated and History Osteoporosis      Abdominal Aortic Aneurysm (AAA) Screening:    Risk factors include: age between 70-75 yo and tobacco use        General: Patient Declines    Due for: Screening AAA Ultrasound      Lung Cancer Screening:     General: Screening Not Indicated      Hepatitis C Screening:    General: Screening Current    Screening, Brief Intervention, and Referral to Treatment (SBIRT)    Screening  Typical number of drinks in a day: 0  Typical number of drinks in a week: 0  Interpretation: Low risk drinking behavior. Single Item Drug Screening:  How often have you used an illegal drug (including marijuana) or a prescription medication for non-medical reasons in the past year? never    Single Item Drug Screen Score: 0  Interpretation: Negative screen for possible drug use disorder    Other Counseling Topics:   Car/seat belt/driving safety, skin self-exam, sunscreen and regular weightbearing exercise and calcium and vitamin D intake. No results found. Physical Exam:     /80   Pulse 77   Temp (!) 97.4 °F (36.3 °C) (Temporal)   Ht 5' 8.5" (1.74 m)   Wt 92.5 kg (204 lb)   SpO2 98% Comment: room air  BMI 30.57 kg/m²     Physical Exam  Vitals and nursing note reviewed. Constitutional:       General: He is not in acute distress. Appearance: He is well-developed. HENT:      Head: Normocephalic and atraumatic. Eyes:      Conjunctiva/sclera: Conjunctivae normal.   Cardiovascular:      Rate and Rhythm: Normal rate and regular rhythm. Pulses: no weak pulses          Dorsalis pedis pulses are 2+ on the right side and 2+ on the left side. Posterior tibial pulses are 2+ on the right side and 2+ on the left side.       Heart sounds: No murmur heard.  Pulmonary:      Effort: Pulmonary effort is normal. No respiratory distress. Breath sounds: Normal breath sounds. Abdominal:      Palpations: Abdomen is soft. Tenderness: There is no abdominal tenderness. Musculoskeletal:         General: No swelling. Cervical back: Neck supple. Right lower leg: Edema present. Left lower leg: Edema present. Feet:    Feet:      Right foot: amputated     Skin integrity: Dry skin present. Left foot:      Skin integrity: Dry skin present. No ulcer, skin breakdown, erythema, warmth or callus. Skin:     General: Skin is warm and dry. Capillary Refill: Capillary refill takes less than 2 seconds. Neurological:      Mental Status: He is alert.    Psychiatric:         Mood and Affect: Mood normal.          SANDRA Cruz

## 2023-12-04 ENCOUNTER — TELEPHONE (OUTPATIENT)
Dept: NEPHROLOGY | Facility: CLINIC | Age: 68
End: 2023-12-04

## 2023-12-04 DIAGNOSIS — E55.9 VITAMIN D DEFICIENCY: ICD-10-CM

## 2023-12-04 DIAGNOSIS — N18.9 CHRONIC KIDNEY DISEASE, UNSPECIFIED CKD STAGE: Primary | ICD-10-CM

## 2023-12-04 DIAGNOSIS — I10 ESSENTIAL HYPERTENSION: ICD-10-CM

## 2023-12-04 NOTE — TELEPHONE ENCOUNTER
----- Message from Michoacano Medrano MD sent at 12/3/2023  9:06 AM EST -----  Let him know creatinine stable at 1.7 and ultrasound was NORMAL. So all good so far . Set up for follow up with me March with repeat BMP and urine p protein/creatinine ratio.

## 2023-12-04 NOTE — TELEPHONE ENCOUNTER
Called patient and left a voicemail stating the following information:    Patients creatinine stable at 1.7 and ultrasound was NORMAL. So all good so far .  et up for follow up with me March with repeat BMP and urine p protein/creatinine ratio. I asked the patient please call back with further questions. Patient as been added to the March recall list. Labs have been added to the patients chart and mailed to the home address.

## 2023-12-09 ENCOUNTER — APPOINTMENT (OUTPATIENT)
Dept: LAB | Facility: IMAGING CENTER | Age: 68
End: 2023-12-09
Payer: COMMERCIAL

## 2023-12-09 DIAGNOSIS — I10 ESSENTIAL HYPERTENSION: ICD-10-CM

## 2023-12-09 DIAGNOSIS — Z12.5 SCREENING FOR PROSTATE CANCER: ICD-10-CM

## 2023-12-09 LAB
ALBUMIN SERPL BCP-MCNC: 4.5 G/DL (ref 3.5–5)
ALP SERPL-CCNC: 30 U/L (ref 34–104)
ALT SERPL W P-5'-P-CCNC: 30 U/L (ref 7–52)
ANION GAP SERPL CALCULATED.3IONS-SCNC: 8 MMOL/L
AST SERPL W P-5'-P-CCNC: 29 U/L (ref 13–39)
BASOPHILS # BLD AUTO: 0.04 THOUSANDS/ÂΜL (ref 0–0.1)
BASOPHILS NFR BLD AUTO: 1 % (ref 0–1)
BILIRUB SERPL-MCNC: 0.74 MG/DL (ref 0.2–1)
BUN SERPL-MCNC: 46 MG/DL (ref 5–25)
CALCIUM SERPL-MCNC: 9.7 MG/DL (ref 8.4–10.2)
CHLORIDE SERPL-SCNC: 101 MMOL/L (ref 96–108)
CHOLEST SERPL-MCNC: 143 MG/DL
CO2 SERPL-SCNC: 22 MMOL/L (ref 21–32)
CREAT SERPL-MCNC: 1.7 MG/DL (ref 0.6–1.3)
EOSINOPHIL # BLD AUTO: 0.09 THOUSAND/ÂΜL (ref 0–0.61)
EOSINOPHIL NFR BLD AUTO: 2 % (ref 0–6)
ERYTHROCYTE [DISTWIDTH] IN BLOOD BY AUTOMATED COUNT: 12.7 % (ref 11.6–15.1)
EST. AVERAGE GLUCOSE BLD GHB EST-MCNC: 154 MG/DL
GFR SERPL CREATININE-BSD FRML MDRD: 39 ML/MIN/1.73SQ M
GLUCOSE P FAST SERPL-MCNC: 168 MG/DL (ref 65–99)
HBA1C MFR BLD: 7 %
HCT VFR BLD AUTO: 32.6 % (ref 36.5–49.3)
HDLC SERPL-MCNC: 48 MG/DL
HGB BLD-MCNC: 11.1 G/DL (ref 12–17)
IMM GRANULOCYTES # BLD AUTO: 0.01 THOUSAND/UL (ref 0–0.2)
IMM GRANULOCYTES NFR BLD AUTO: 0 % (ref 0–2)
LDLC SERPL CALC-MCNC: 81 MG/DL (ref 0–100)
LYMPHOCYTES # BLD AUTO: 1.15 THOUSANDS/ÂΜL (ref 0.6–4.47)
LYMPHOCYTES NFR BLD AUTO: 23 % (ref 14–44)
MCH RBC QN AUTO: 31.1 PG (ref 26.8–34.3)
MCHC RBC AUTO-ENTMCNC: 34 G/DL (ref 31.4–37.4)
MCV RBC AUTO: 91 FL (ref 82–98)
MONOCYTES # BLD AUTO: 0.3 THOUSAND/ÂΜL (ref 0.17–1.22)
MONOCYTES NFR BLD AUTO: 6 % (ref 4–12)
NEUTROPHILS # BLD AUTO: 3.51 THOUSANDS/ÂΜL (ref 1.85–7.62)
NEUTS SEG NFR BLD AUTO: 68 % (ref 43–75)
NONHDLC SERPL-MCNC: 95 MG/DL
NRBC BLD AUTO-RTO: 0 /100 WBCS
PLATELET # BLD AUTO: 125 THOUSANDS/UL (ref 149–390)
PMV BLD AUTO: 10.6 FL (ref 8.9–12.7)
POTASSIUM SERPL-SCNC: 4.8 MMOL/L (ref 3.5–5.3)
PROT SERPL-MCNC: 7.1 G/DL (ref 6.4–8.4)
PSA SERPL-MCNC: 0.24 NG/ML (ref 0–4)
RBC # BLD AUTO: 3.57 MILLION/UL (ref 3.88–5.62)
SODIUM SERPL-SCNC: 131 MMOL/L (ref 135–147)
TRIGL SERPL-MCNC: 70 MG/DL
WBC # BLD AUTO: 5.1 THOUSAND/UL (ref 4.31–10.16)

## 2023-12-09 PROCEDURE — 80053 COMPREHEN METABOLIC PANEL: CPT

## 2023-12-09 PROCEDURE — 83036 HEMOGLOBIN GLYCOSYLATED A1C: CPT

## 2023-12-09 PROCEDURE — 80061 LIPID PANEL: CPT

## 2023-12-09 PROCEDURE — G0103 PSA SCREENING: HCPCS

## 2023-12-09 PROCEDURE — 85025 COMPLETE CBC W/AUTO DIFF WBC: CPT

## 2023-12-09 PROCEDURE — 36415 COLL VENOUS BLD VENIPUNCTURE: CPT

## 2023-12-11 DIAGNOSIS — E11.3293 TYPE 2 DIABETES MELLITUS WITH BOTH EYES AFFECTED BY MILD NONPROLIFERATIVE RETINOPATHY WITHOUT MACULAR EDEMA, WITHOUT LONG-TERM CURRENT USE OF INSULIN (HCC): Primary | ICD-10-CM

## 2023-12-28 ENCOUNTER — TELEPHONE (OUTPATIENT)
Dept: NEPHROLOGY | Facility: CLINIC | Age: 68
End: 2023-12-28

## 2024-01-02 DIAGNOSIS — I10 ESSENTIAL HYPERTENSION: ICD-10-CM

## 2024-01-02 DIAGNOSIS — N18.9 CHRONIC KIDNEY DISEASE, UNSPECIFIED CKD STAGE: ICD-10-CM

## 2024-01-02 DIAGNOSIS — E78.5 HYPERLIPIDEMIA, UNSPECIFIED HYPERLIPIDEMIA TYPE: ICD-10-CM

## 2024-01-02 DIAGNOSIS — E11.3293 TYPE 2 DIABETES MELLITUS WITH BOTH EYES AFFECTED BY MILD NONPROLIFERATIVE RETINOPATHY WITHOUT MACULAR EDEMA, WITHOUT LONG-TERM CURRENT USE OF INSULIN (HCC): ICD-10-CM

## 2024-01-02 RX ORDER — DOXAZOSIN 2 MG/1
2 TABLET ORAL 2 TIMES DAILY
Qty: 180 TABLET | Refills: 0 | Status: SHIPPED | OUTPATIENT
Start: 2024-01-02

## 2024-01-02 RX ORDER — ATORVASTATIN CALCIUM 10 MG/1
10 TABLET, FILM COATED ORAL DAILY
Qty: 90 TABLET | Refills: 0 | Status: SHIPPED | OUTPATIENT
Start: 2024-01-02

## 2024-01-02 RX ORDER — AMLODIPINE BESYLATE 5 MG/1
5 TABLET ORAL DAILY
Qty: 90 TABLET | Refills: 0 | Status: SHIPPED | OUTPATIENT
Start: 2024-01-02

## 2024-01-05 ENCOUNTER — VBI (OUTPATIENT)
Dept: ADMINISTRATIVE | Facility: OTHER | Age: 69
End: 2024-01-05

## 2024-01-22 DIAGNOSIS — E11.65 TYPE 2 DIABETES MELLITUS WITH HYPERGLYCEMIA, WITHOUT LONG-TERM CURRENT USE OF INSULIN (HCC): ICD-10-CM

## 2024-01-22 DIAGNOSIS — N18.9 CHRONIC KIDNEY DISEASE, UNSPECIFIED CKD STAGE: ICD-10-CM

## 2024-01-22 DIAGNOSIS — E11.3293 TYPE 2 DIABETES MELLITUS WITH BOTH EYES AFFECTED BY MILD NONPROLIFERATIVE RETINOPATHY WITHOUT MACULAR EDEMA, WITHOUT LONG-TERM CURRENT USE OF INSULIN (HCC): ICD-10-CM

## 2024-01-22 RX ORDER — PIOGLITAZONEHYDROCHLORIDE 30 MG/1
30 TABLET ORAL DAILY
Qty: 90 TABLET | Refills: 0 | Status: CANCELLED | OUTPATIENT
Start: 2024-01-22

## 2024-01-22 RX ORDER — GLIPIZIDE 10 MG/1
10 TABLET, FILM COATED, EXTENDED RELEASE ORAL DAILY
Qty: 90 TABLET | Refills: 1 | Status: SHIPPED | OUTPATIENT
Start: 2024-01-22

## 2024-02-10 ENCOUNTER — APPOINTMENT (OUTPATIENT)
Dept: LAB | Facility: IMAGING CENTER | Age: 69
End: 2024-02-10
Payer: COMMERCIAL

## 2024-02-10 DIAGNOSIS — E11.3293 TYPE 2 DIABETES MELLITUS WITH BOTH EYES AFFECTED BY MILD NONPROLIFERATIVE RETINOPATHY WITHOUT MACULAR EDEMA, WITHOUT LONG-TERM CURRENT USE OF INSULIN (HCC): ICD-10-CM

## 2024-02-10 DIAGNOSIS — E55.9 VITAMIN D DEFICIENCY: ICD-10-CM

## 2024-02-10 DIAGNOSIS — I10 ESSENTIAL HYPERTENSION: ICD-10-CM

## 2024-02-10 DIAGNOSIS — N18.9 CHRONIC KIDNEY DISEASE, UNSPECIFIED CKD STAGE: ICD-10-CM

## 2024-02-10 LAB
ALBUMIN SERPL BCP-MCNC: 4.3 G/DL (ref 3.5–5)
ALP SERPL-CCNC: 32 U/L (ref 34–104)
ALT SERPL W P-5'-P-CCNC: 30 U/L (ref 7–52)
ANION GAP SERPL CALCULATED.3IONS-SCNC: 10 MMOL/L
AST SERPL W P-5'-P-CCNC: 24 U/L (ref 13–39)
BASOPHILS # BLD AUTO: 0.04 THOUSANDS/ÂΜL (ref 0–0.1)
BASOPHILS NFR BLD AUTO: 1 % (ref 0–1)
BILIRUB SERPL-MCNC: 0.77 MG/DL (ref 0.2–1)
BUN SERPL-MCNC: 33 MG/DL (ref 5–25)
CALCIUM SERPL-MCNC: 9.6 MG/DL (ref 8.4–10.2)
CHLORIDE SERPL-SCNC: 99 MMOL/L (ref 96–108)
CHOLEST SERPL-MCNC: 140 MG/DL
CO2 SERPL-SCNC: 24 MMOL/L (ref 21–32)
CREAT SERPL-MCNC: 1.77 MG/DL (ref 0.6–1.3)
CREAT UR-MCNC: 94.6 MG/DL
EOSINOPHIL # BLD AUTO: 0.13 THOUSAND/ÂΜL (ref 0–0.61)
EOSINOPHIL NFR BLD AUTO: 3 % (ref 0–6)
ERYTHROCYTE [DISTWIDTH] IN BLOOD BY AUTOMATED COUNT: 12.9 % (ref 11.6–15.1)
GFR SERPL CREATININE-BSD FRML MDRD: 37 ML/MIN/1.73SQ M
GLUCOSE P FAST SERPL-MCNC: 185 MG/DL (ref 65–99)
HCT VFR BLD AUTO: 34.4 % (ref 36.5–49.3)
HDLC SERPL-MCNC: 42 MG/DL
HGB BLD-MCNC: 11.9 G/DL (ref 12–17)
IMM GRANULOCYTES # BLD AUTO: 0.03 THOUSAND/UL (ref 0–0.2)
IMM GRANULOCYTES NFR BLD AUTO: 1 % (ref 0–2)
LDLC SERPL CALC-MCNC: 73 MG/DL (ref 0–100)
LYMPHOCYTES # BLD AUTO: 1.37 THOUSANDS/ÂΜL (ref 0.6–4.47)
LYMPHOCYTES NFR BLD AUTO: 27 % (ref 14–44)
MCH RBC QN AUTO: 30.9 PG (ref 26.8–34.3)
MCHC RBC AUTO-ENTMCNC: 34.6 G/DL (ref 31.4–37.4)
MCV RBC AUTO: 89 FL (ref 82–98)
MONOCYTES # BLD AUTO: 0.34 THOUSAND/ÂΜL (ref 0.17–1.22)
MONOCYTES NFR BLD AUTO: 7 % (ref 4–12)
NEUTROPHILS # BLD AUTO: 3.13 THOUSANDS/ÂΜL (ref 1.85–7.62)
NEUTS SEG NFR BLD AUTO: 61 % (ref 43–75)
NONHDLC SERPL-MCNC: 98 MG/DL
NRBC BLD AUTO-RTO: 0 /100 WBCS
PLATELET # BLD AUTO: 128 THOUSANDS/UL (ref 149–390)
PMV BLD AUTO: 10.6 FL (ref 8.9–12.7)
POTASSIUM SERPL-SCNC: 4.6 MMOL/L (ref 3.5–5.3)
PROT SERPL-MCNC: 7.3 G/DL (ref 6.4–8.4)
PROT UR-MCNC: 15 MG/DL
PROT/CREAT UR: 0.16 MG/G{CREAT} (ref 0–0.1)
RBC # BLD AUTO: 3.85 MILLION/UL (ref 3.88–5.62)
SODIUM SERPL-SCNC: 133 MMOL/L (ref 135–147)
TRIGL SERPL-MCNC: 123 MG/DL
WBC # BLD AUTO: 5.04 THOUSAND/UL (ref 4.31–10.16)

## 2024-02-10 PROCEDURE — 83036 HEMOGLOBIN GLYCOSYLATED A1C: CPT

## 2024-02-10 PROCEDURE — 36415 COLL VENOUS BLD VENIPUNCTURE: CPT

## 2024-02-10 PROCEDURE — 84156 ASSAY OF PROTEIN URINE: CPT

## 2024-02-10 PROCEDURE — 82570 ASSAY OF URINE CREATININE: CPT

## 2024-02-10 PROCEDURE — 80053 COMPREHEN METABOLIC PANEL: CPT

## 2024-02-10 PROCEDURE — 80061 LIPID PANEL: CPT

## 2024-02-10 PROCEDURE — 85025 COMPLETE CBC W/AUTO DIFF WBC: CPT

## 2024-02-11 LAB
EST. AVERAGE GLUCOSE BLD GHB EST-MCNC: 174 MG/DL
HBA1C MFR BLD: 7.7 %

## 2024-03-07 ENCOUNTER — OFFICE VISIT (OUTPATIENT)
Dept: NEPHROLOGY | Facility: CLINIC | Age: 69
End: 2024-03-07
Payer: COMMERCIAL

## 2024-03-07 VITALS
HEIGHT: 69 IN | SYSTOLIC BLOOD PRESSURE: 148 MMHG | DIASTOLIC BLOOD PRESSURE: 76 MMHG | BODY MASS INDEX: 31.1 KG/M2 | WEIGHT: 210 LBS | HEART RATE: 88 BPM

## 2024-03-07 DIAGNOSIS — N18.9 CHRONIC KIDNEY DISEASE, UNSPECIFIED CKD STAGE: ICD-10-CM

## 2024-03-07 DIAGNOSIS — N18.9 CHRONIC RENAL IMPAIRMENT, UNSPECIFIED CKD STAGE: Primary | ICD-10-CM

## 2024-03-07 DIAGNOSIS — R80.9 MICROALBUMINURIA: ICD-10-CM

## 2024-03-07 PROCEDURE — 99214 OFFICE O/P EST MOD 30 MIN: CPT | Performed by: INTERNAL MEDICINE

## 2024-03-07 NOTE — PROGRESS NOTES
NEPHROLOGY PROGRESS NOTE    Cecilio Munoz 69 y.o. male MRN: [id number]  Unit/Bed#:  Encounter: [id number]  Reason for Consult: Chronic renal insufficiency and microalbuminuria      The patient is here for his routine follow-up after my first visit with him.  He looks well and has been doing well he does not need to worry about the visit with him to get another that he has no complaints for me that the bladder well.  He did say he had to drink lots of water so is trying to stay hydrated.    ASSESSMENT/PLAN:  1.  Renal    Patient is chronic kidney disease referred to me with creatinine today increased to 1.7 with prior baseline of around 1.5.  It remained stable at 1.7 over the last 4 checks suggested no progression or worsening.  I done a renal ultrasound and it was normal and he has no bladder residual or abnormality structurally his kidneys.  I do not feel he has underlying diabetic nephropathy that is responsible for his renal insufficiency and he still has very low levels of proteinuria.  I did a urine total protein estimation it was 160 mg so stable but microalbumin range proteinuria.  His hemoglobin A1c is slightly above 7% of the total but actually all should focus on his diabetes control target A1c of 7%.  He is presently on a statin.  Blood pressure fluctuates we need to monitor this and if it is persistently elevated may need to titrate medications.    For now continue current medications  Labs and follow-up as scheduled    I have spent a total time of 30 minutes on 03/07/24 in caring for this patient including Diagnostic results, Impressions, Reviewing / ordering tests, medicine, procedures  , and Obtaining or reviewing history  .       SUBJECTIVE:  Review of Systems   Constitutional: Negative for chills, diaphoresis and fever.   HENT: Negative.     Eyes: Negative.    Cardiovascular: Negative.  Negative for chest pain, dyspnea on exertion, leg swelling and orthopnea.   Respiratory: Negative.  Negative for  "cough, shortness of breath, sputum production and wheezing.    Gastrointestinal:  Negative for abdominal pain, diarrhea, nausea and vomiting.   Genitourinary: Negative.    Neurological:  Negative for dizziness, focal weakness, headaches and weakness.   Psychiatric/Behavioral:  Negative for altered mental status, hallucinations and hypervigilance. The patient is not nervous/anxious.        OBJECTIVE:  Current Weight: Weight - Scale: 95.3 kg (210 lb)  Vitals:@TMAX(24)@Current:     Blood pressure 148/76, pulse 88, height 5' 8.5\" (1.74 m), weight 95.3 kg (210 lb). , Body mass index is 31.47 kg/m².    [unfilled]    Physical Exam: /76 (BP Location: Left arm, Patient Position: Sitting, Cuff Size: Standard)   Pulse 88   Ht 5' 8.5\" (1.74 m)   Wt 95.3 kg (210 lb)   BMI 31.47 kg/m²   Physical Exam  Constitutional:       General: He is not in acute distress.     Appearance: He is not ill-appearing or toxic-appearing.   HENT:      Head: Normocephalic and atraumatic.      Nose: Nose normal.      Mouth/Throat:      Mouth: Mucous membranes are moist.   Eyes:      General: No scleral icterus.     Extraocular Movements: Extraocular movements intact.   Cardiovascular:      Rate and Rhythm: Normal rate and regular rhythm.      Heart sounds:      No friction rub. No gallop.      Comments: No edema.  Pulmonary:      Effort: No respiratory distress.      Breath sounds: No wheezing, rhonchi or rales.   Abdominal:      General: Bowel sounds are normal. There is no distension.      Palpations: Abdomen is soft.      Tenderness: There is no abdominal tenderness. There is no rebound.   Musculoskeletal:      Cervical back: Normal range of motion and neck supple.   Neurological:      General: No focal deficit present.      Mental Status: He is alert and oriented to person, place, and time. Mental status is at baseline.   Psychiatric:         Mood and Affect: Mood normal.         Behavior: Behavior normal.         Thought Content: Thought " "content normal.         Medications:    Current Outpatient Medications:     ACCU-CHEK FASTCLIX LANCETS MISC, by Does not apply route 2 (two) times a day, Disp: 200 each, Rfl: 1    amLODIPine (NORVASC) 5 mg tablet, Take 1 tablet (5 mg total) by mouth daily, Disp: 90 tablet, Rfl: 0    atorvastatin (LIPITOR) 10 mg tablet, Take 1 tablet (10 mg total) by mouth daily, Disp: 90 tablet, Rfl: 0    Blood Glucose Monitoring Suppl (ACCU-CHEK CHELY PLUS) w/Device KIT, by Does not apply route 2 (two) times a day, Disp: 1 kit, Rfl: 0    cholecalciferol 2000 units TABS, Take 1 tablet (2,000 Units total) by mouth daily, Disp: 30 tablet, Rfl: 0    doxazosin (CARDURA) 2 mg tablet, Take 1 tablet (2 mg total) by mouth 2 (two) times a day, Disp: 180 tablet, Rfl: 0    glipiZIDE (GLUCOTROL XL) 10 mg 24 hr tablet, Take 1 tablet (10 mg total) by mouth daily, Disp: 90 tablet, Rfl: 1    glucose blood (ACCU-CHEK CHELY PLUS) test strip, 1 each by Other route 2 (two) times a day Use as instructed, Disp: 200 each, Rfl: 1    Multiple Vitamin (multivitamin) tablet, Take 1 tablet by mouth daily, Disp: , Rfl:     Omega-3 Fatty Acids (FISH OIL) 1,000 mg, Take 1,000 mg by mouth daily, Disp: , Rfl:     pioglitazone (ACTOS) 30 mg tablet, Take 1 tablet (30 mg total) by mouth daily, Disp: 90 tablet, Rfl: 1    Laboratory Results:  Lab Results   Component Value Date    WBC 5.04 02/10/2024    HGB 11.9 (L) 02/10/2024    HCT 34.4 (L) 02/10/2024    MCV 89 02/10/2024     (L) 02/10/2024     Lab Results   Component Value Date    SODIUM 133 (L) 02/10/2024    K 4.6 02/10/2024    CL 99 02/10/2024    CO2 24 02/10/2024    BUN 33 (H) 02/10/2024    CREATININE 1.77 (H) 02/10/2024    GLUC 102 (H) 04/14/2022    CALCIUM 9.6 02/10/2024     Lab Results   Component Value Date    CALCIUM 9.6 02/10/2024     No results found for: \"LABPROT\"      "

## 2024-03-07 NOTE — LETTER
March 7, 2024     SANDRA Cary  [address]  [address]  [facility] [address]    Patient: Cecilio Munoz   YOB: 1955   Date of Visit: 3/7/2024       Dear Dr. Shephedr:    Thank you for referring Cecilio Munoz to me for evaluation. Below are my notes for this consultation.    If you have questions, please do not hesitate to call me. I look forward to following your patient along with you.         Sincerely,        Cam Baird MD        CC: No Recipients    Cam Baird MD  3/7/2024 12:57 PM  Sign when Signing Visit  NEPHROLOGY PROGRESS NOTE    Cecilio Munoz 69 y.o. male MRN: [id number]  Unit/Bed#:  Encounter: [id number]  Reason for Consult: Chronic renal insufficiency and microalbuminuria      The patient is here for his routine follow-up after my first visit with him.  He looks well and has been doing well he does not need to worry about the visit with him to get another that he has no complaints for me that the bladder well.  He did say he had to drink lots of water so is trying to stay hydrated.    ASSESSMENT/PLAN:  1.  Renal    Patient is chronic kidney disease referred to me with creatinine today increased to 1.7 with prior baseline of around 1.5.  It remained stable at 1.7 over the last 4 checks suggested no progression or worsening.  I done a renal ultrasound and it was normal and he has no bladder residual or abnormality structurally his kidneys.  I do not feel he has underlying diabetic nephropathy that is responsible for his renal insufficiency and he still has very low levels of proteinuria.  I did a urine total protein estimation it was 160 mg so stable but microalbumin range proteinuria.  His hemoglobin A1c is slightly above 7% of the total but actually all should focus on his diabetes control target A1c of 7%.  He is presently on a statin.  Blood pressure fluctuates we need to monitor this and if it is persistently elevated may need to titrate medications.    For now continue  "current medications  Labs and follow-up as scheduled    I have spent a total time of 30 minutes on 03/07/24 in caring for this patient including Diagnostic results, Impressions, Reviewing / ordering tests, medicine, procedures  , and Obtaining or reviewing history  .       SUBJECTIVE:  Review of Systems   Constitutional: Negative for chills, diaphoresis and fever.   HENT: Negative.     Eyes: Negative.    Cardiovascular: Negative.  Negative for chest pain, dyspnea on exertion, leg swelling and orthopnea.   Respiratory: Negative.  Negative for cough, shortness of breath, sputum production and wheezing.    Gastrointestinal:  Negative for abdominal pain, diarrhea, nausea and vomiting.   Genitourinary: Negative.    Neurological:  Negative for dizziness, focal weakness, headaches and weakness.   Psychiatric/Behavioral:  Negative for altered mental status, hallucinations and hypervigilance. The patient is not nervous/anxious.        OBJECTIVE:  Current Weight: Weight - Scale: 95.3 kg (210 lb)  Vitals:@TMAX(24)@Current:     Blood pressure 148/76, pulse 88, height 5' 8.5\" (1.74 m), weight 95.3 kg (210 lb). , Body mass index is 31.47 kg/m².    [unfilled]    Physical Exam: /76 (BP Location: Left arm, Patient Position: Sitting, Cuff Size: Standard)   Pulse 88   Ht 5' 8.5\" (1.74 m)   Wt 95.3 kg (210 lb)   BMI 31.47 kg/m²   Physical Exam  Constitutional:       General: He is not in acute distress.     Appearance: He is not ill-appearing or toxic-appearing.   HENT:      Head: Normocephalic and atraumatic.      Nose: Nose normal.      Mouth/Throat:      Mouth: Mucous membranes are moist.   Eyes:      General: No scleral icterus.     Extraocular Movements: Extraocular movements intact.   Cardiovascular:      Rate and Rhythm: Normal rate and regular rhythm.      Heart sounds:      No friction rub. No gallop.      Comments: No edema.  Pulmonary:      Effort: No respiratory distress.      Breath sounds: No wheezing, rhonchi or " rales.   Abdominal:      General: Bowel sounds are normal. There is no distension.      Palpations: Abdomen is soft.      Tenderness: There is no abdominal tenderness. There is no rebound.   Musculoskeletal:      Cervical back: Normal range of motion and neck supple.   Neurological:      General: No focal deficit present.      Mental Status: He is alert and oriented to person, place, and time. Mental status is at baseline.   Psychiatric:         Mood and Affect: Mood normal.         Behavior: Behavior normal.         Thought Content: Thought content normal.         Medications:    Current Outpatient Medications:   •  ACCU-CHEK FASTCLIX LANCETS MISC, by Does not apply route 2 (two) times a day, Disp: 200 each, Rfl: 1  •  amLODIPine (NORVASC) 5 mg tablet, Take 1 tablet (5 mg total) by mouth daily, Disp: 90 tablet, Rfl: 0  •  atorvastatin (LIPITOR) 10 mg tablet, Take 1 tablet (10 mg total) by mouth daily, Disp: 90 tablet, Rfl: 0  •  Blood Glucose Monitoring Suppl (ACCU-CHEK CHELY PLUS) w/Device KIT, by Does not apply route 2 (two) times a day, Disp: 1 kit, Rfl: 0  •  cholecalciferol 2000 units TABS, Take 1 tablet (2,000 Units total) by mouth daily, Disp: 30 tablet, Rfl: 0  •  doxazosin (CARDURA) 2 mg tablet, Take 1 tablet (2 mg total) by mouth 2 (two) times a day, Disp: 180 tablet, Rfl: 0  •  glipiZIDE (GLUCOTROL XL) 10 mg 24 hr tablet, Take 1 tablet (10 mg total) by mouth daily, Disp: 90 tablet, Rfl: 1  •  glucose blood (ACCU-CHEK CHELY PLUS) test strip, 1 each by Other route 2 (two) times a day Use as instructed, Disp: 200 each, Rfl: 1  •  Multiple Vitamin (multivitamin) tablet, Take 1 tablet by mouth daily, Disp: , Rfl:   •  Omega-3 Fatty Acids (FISH OIL) 1,000 mg, Take 1,000 mg by mouth daily, Disp: , Rfl:   •  pioglitazone (ACTOS) 30 mg tablet, Take 1 tablet (30 mg total) by mouth daily, Disp: 90 tablet, Rfl: 1    Laboratory Results:  Lab Results   Component Value Date    WBC 5.04 02/10/2024    HGB 11.9 (L)  "02/10/2024    HCT 34.4 (L) 02/10/2024    MCV 89 02/10/2024     (L) 02/10/2024     Lab Results   Component Value Date    SODIUM 133 (L) 02/10/2024    K 4.6 02/10/2024    CL 99 02/10/2024    CO2 24 02/10/2024    BUN 33 (H) 02/10/2024    CREATININE 1.77 (H) 02/10/2024    GLUC 102 (H) 04/14/2022    CALCIUM 9.6 02/10/2024     Lab Results   Component Value Date    CALCIUM 9.6 02/10/2024     No results found for: \"LABPROT\"      "

## 2024-03-07 NOTE — PATIENT INSTRUCTIONS
You are here for follow-up we discussed your kidney function the creatinine is the blood test for the kidney function and that is 1.7 that is the same as it was when you referred to me.  Being a diabetic you can be at risk for kidney disease but there is not a lot of protein in your urine it still below 300 mg so tells me it is not diabetic kidney disease and that is a good thing because I would have a higher chance of progressing quicker.  Saying that you probably have nephrosclerosis or aging.  The way to help avoid it getting worse is continue to try and work with good sugar control and blood pressure will monitor.    You are concerned because of the EGFR and noticed that that looks like it was really low.  That number is a calculated number it is another way to talk about kidney function personally I do not really use that and honestly it is not even accurate.

## 2024-03-16 ENCOUNTER — OFFICE VISIT (OUTPATIENT)
Dept: URGENT CARE | Age: 69
End: 2024-03-16
Payer: COMMERCIAL

## 2024-03-16 VITALS
RESPIRATION RATE: 18 BRPM | DIASTOLIC BLOOD PRESSURE: 78 MMHG | WEIGHT: 200 LBS | BODY MASS INDEX: 29.62 KG/M2 | OXYGEN SATURATION: 96 % | HEART RATE: 82 BPM | SYSTOLIC BLOOD PRESSURE: 170 MMHG | HEIGHT: 69 IN | TEMPERATURE: 98.3 F

## 2024-03-16 DIAGNOSIS — E11.628 DIABETIC FOOT INFECTION  (HCC): ICD-10-CM

## 2024-03-16 DIAGNOSIS — L08.9 DIABETIC FOOT INFECTION  (HCC): ICD-10-CM

## 2024-03-16 DIAGNOSIS — S90.821A INFECTED BLISTER OF RIGHT FOOT, INITIAL ENCOUNTER: Primary | ICD-10-CM

## 2024-03-16 DIAGNOSIS — L08.9 INFECTED BLISTER OF RIGHT FOOT, INITIAL ENCOUNTER: Primary | ICD-10-CM

## 2024-03-16 PROCEDURE — 99213 OFFICE O/P EST LOW 20 MIN: CPT | Performed by: NURSE PRACTITIONER

## 2024-03-16 RX ORDER — AMOXICILLIN AND CLAVULANATE POTASSIUM 500; 125 MG/1; MG/1
1 TABLET, FILM COATED ORAL EVERY 12 HOURS SCHEDULED
Qty: 20 TABLET | Refills: 0 | Status: SHIPPED | OUTPATIENT
Start: 2024-03-16 | End: 2024-03-26

## 2024-03-16 NOTE — PROGRESS NOTES
[facility]        NAME: Cecilio Munoz is a 69 y.o. male  : 1955    MRN: [id number]  DATE: 2024  TIME: 8:16 AM      Assessment and Plan     Infected blister of right foot, initial encounter [S90.821A, L08.9]  1. Infected blister of right foot, initial encounter  amoxicillin-clavulanate (AUGMENTIN) 500-125 mg per tablet    Ambulatory Referral to Wound Care      2. Diabetic foot infection   amoxicillin-clavulanate (AUGMENTIN) 500-125 mg per tablet    Ambulatory Referral to Wound Care        Clean dressing applied by myself--nonstick gauze, 4x4, roll gauze, paper tape.    Patient Instructions     Patient Instructions   After 24 hours, symptoms should not get worse.  It may take a few days to start to see improvement.  If symptoms are worsening after 24 hours or if you are not seeing improvement, you should be seen again.  Keep the dressing clean and dry; change it at least daily but likely 2-3x/day the next few days due to drainage.  Follow-up with your podiatrist (call this week to see if they have a sooner appointment than 3/28) and wound care.  Cellulitis   AMBULATORY CARE:   Cellulitis  is a skin infection caused by bacteria. Cellulitis is common and can become severe. Cellulitis usually appears on the lower legs. It can also appear on the arms, face, and other areas. Cellulitis develops when bacteria enter a crack or break in your skin, such as a scratch, bite, or cut.       Common signs and symptoms:  Signs and symptoms usually appear on one side of your body. You may have any of the following:  A fever    A red, warm, swollen area on your skin    Pain when the area is touched    Red spots, bumps, or blisters    Bumpy, raised skin that feels like an orange peel    Seek care immediately if:   Your wound gets larger and more painful.    You feel a crackling under your skin when you touch it.    You have purple dots or bumps on your skin, or you see bleeding under your skin.    You see red  streaks coming from the infected area.    Call your doctor if:   The red, warm, swollen area gets larger.    Your fever or pain does not go away or gets worse.    The area does not get smaller after 3 days of antibiotics.    You have questions or concerns about your condition or care.    Treatment:  You should start to see improvement in 3 days. If your cellulitis is severe, you may need IV antibiotics in the hospital. If cellulitis is not treated, the infection can spread through your body and become life-threatening. You may need any of the following medicines:  Antibiotics  help treat a bacterial infection.    Acetaminophen  decreases pain and fever. It is available without a doctor's order. Ask how much to take and how often to take it. Follow directions. Read the labels of all other medicines you are using to see if they also contain acetaminophen, or ask your doctor or pharmacist. Acetaminophen can cause liver damage if not taken correctly.    NSAIDs , such as ibuprofen, help decrease swelling, pain, and fever. This medicine is available with or without a doctor's order. NSAIDs can cause stomach bleeding or kidney problems in certain people. If you take blood thinner medicine, always ask your healthcare provider if NSAIDs are safe for you. Always read the medicine label and follow directions.    Take your medicine as directed.  Contact your healthcare provider if you think your medicine is not helping or if you have side effects. Tell your provider if you are allergic to any medicine. Keep a list of the medicines, vitamins, and herbs you take. Include the amounts, and when and why you take them. Bring the list or the pill bottles to follow-up visits. Carry your medicine list with you in case of an emergency.    Self-care:   Wash the area with soap and water every day.  Gently pat dry. Use bandages if directed by your healthcare provider.    Apply cream or ointment as directed.  These help protect the area.  Most over-the-counter products, such as petroleum jelly, are good to use. Ask your healthcare provider about specific creams or ointments you should use.    Place a cool, damp cloth on the area.  Use clean cloths and clean water. You can do this as often as you need to. Cool, damp cloths may help decrease pain.    Elevate the area above the level of your heart  as often as you can. This will help decrease swelling and pain. Prop the area on pillows or blankets to keep it elevated comfortably.       Prevent cellulitis:   Do not scratch bug bites or areas of injury.  You increase your risk for cellulitis by scratching these areas.    Do not share personal items, such as towels, clothing, and razors.    Clean exercise equipment  with germ-killing  before and after you use it.    Treat athlete's foot.  This can help prevent the spread of a bacterial skin infection.    Wash your hands often.  Use soap and water. Wash your hands after you use the bathroom, change a child's diapers, or sneeze. Wash your hands before you prepare or eat food. Use lotion to prevent dry, cracked skin.       Follow up with your doctor within 3 days, or as directed:  He or she will check if your cellulitis is getting better. Write down your questions so you remember to ask them during your visits.  © Copyright [employer] 2023 Information is for End User's use only and may not be sold, redistributed or otherwise used for commercial purposes.  The above information is an  only. It is not intended as medical advice for individual conditions or treatments. Talk to your doctor, nurse or pharmacist before following any medical regimen to see if it is safe and effective for you.    Acute Wounds   AMBULATORY CARE:   An acute wound  is an injury that causes a break in the skin. As your wound begins to heal, it is normal to have some swelling, pain, and redness. Your body's immune system is working to keep your wound from getting  infected. Your wound may develop a scab. The scab protects your wound as it heals.  Call your local emergency number (911 in the [place]) if:   You suddenly have trouble breathing or have chest pain.      Seek care immediately if:   Blood soaks through your bandage.    You have pus or a foul odor coming from the wound.    Your stitches come apart or your wound reopens.    Call your doctor if:   You continue to have pain even after you have taken pain medicine.    You have muscle, joint, or body aches, sweating, or a fever.    You have increased swelling, redness, or bleeding in your wound.    Your skin is itchy, swollen, or you have a rash.    You have questions or concerns about your condition or care.    Treatment for an acute wound  will depend on how severe the wound is and where it is located. It may also depend on the length of time you have had the injury. You may need any of the following:  NSAIDs , such as ibuprofen, help decrease swelling, pain, and fever. This medicine is available with or without a doctor's order. NSAIDs can cause stomach bleeding or kidney problems in certain people. If you take blood thinner medicine, always ask if NSAIDs are safe for you. Always read the medicine label and follow directions. Do not give these medicines to children younger than 6 months without direction from a healthcare provider.     Acetaminophen  decreases pain and fever. It is available without a doctor's order. Ask how much to take and how often to take it. Follow directions. Read the labels of all other medicines you are using to see if they also contain acetaminophen, or ask your doctor or pharmacist. Acetaminophen can cause liver damage if not taken correctly.    Antibiotics  may be given to prevent or treat an infection caused by bacteria.    Td vaccine  is a booster shot used to help prevent tetanus and diphtheria. The Td booster may be given to adolescents and adults every 10 years or for certain wounds and  injuries.    Wound care may include any of the following:      Cleaning and debridement  is done to clean and remove objects, dirt, or dead tissues from the open wound. Your healthcare provider may use soap and water or a different solution to clean your wound. He or she may use a syringe to push the solution into all areas of your wound. The force from the syringe will help push out dirt.    Closure of the wound  is done with stitches, staples, skin adhesive, or other treatments. This may be done if the wound is wide or deep. Some wounds may need to be packed with wet gauze for some time before closure. Some wounds may not need closure at all to heal.    Care for your wound as directed:  Follow your healthcare provider's instructions on caring for your type of wound. The following care items are for most wounds:  Keep your wound covered with a clean and dry bandage.  Change your bandage if it becomes wet or dirty. This will decrease the risk for infection in your wound. Follow your healthcare provider's instructions for changing your dressing.     Do not soak in a tub or swim  until your healthcare provider says it is okay. Your wound may open if you get it too wet. Dirt from the water can also get into your wound and cause an infection.    Keep pets away from your wound.  Pets carry germs that can cause a wound infection.     Do not pick or scratch scabs.  Let scabs fall off on their own. You may damage new skin that is forming under the scab. You may have a worse scar after the damage.    Eat healthy foods and drink liquids as directed.  Healthy foods give your body the nutrients it needs to heal your wound. Liquids prevent dehydration that can decrease the blood supply to your wound. Healthy foods include fruits, vegetables, grains (breads and cereals), dairy, and protein foods. Protein foods include meat, fish, nuts, and soy products. Protein, calories, vitamin C, and zinc help wounds heal. Ask your healthcare  provider for more information about the foods you should eat to improve healing.       Follow up with your doctor as directed:  Write down your questions so you remember to ask them during your visits.  © Copyright [employer] 2023 Information is for End User's use only and may not be sold, redistributed or otherwise used for commercial purposes.  The above information is an  only. It is not intended as medical advice for individual conditions or treatments. Talk to your doctor, nurse or pharmacist before following any medical regimen to see if it is safe and effective for you.      Follow up with PCP in 3-5 days.  Proceed to  ER if symptoms worsen.    Chief Complaint     Chief Complaint   Patient presents with    Blister     Blister on right foot pain began yesterday         History of Present Illness     Patient has diabetes.  Had right great toe amputated a couple years ago.  Wore different shoes yesterday and developed a large blister where the toe had been.  It is not open with foul smelling drainage and redness around the site that is starting to spread  to his foot.  He has a chronic callus/wound on the plantar aspect of his foot that he already sees podiatry for; next visit 3/28.  He presents with gauze over the site and wearing a surgical shoe (in good condition).        Review of Systems     Review of Systems   Skin:  Positive for color change and wound.   All other systems reviewed and are negative.        Current Medications       Current Outpatient Medications:     amLODIPine (NORVASC) 5 mg tablet, Take 1 tablet (5 mg total) by mouth daily, Disp: 90 tablet, Rfl: 0    amoxicillin-clavulanate (AUGMENTIN) 500-125 mg per tablet, Take 1 tablet by mouth every 12 (twelve) hours for 10 days, Disp: 20 tablet, Rfl: 0    atorvastatin (LIPITOR) 10 mg tablet, Take 1 tablet (10 mg total) by mouth daily, Disp: 90 tablet, Rfl: 0    cholecalciferol 2000 units TABS, Take 1 tablet (2,000 Units total) by mouth  daily, Disp: 30 tablet, Rfl: 0    doxazosin (CARDURA) 2 mg tablet, Take 1 tablet (2 mg total) by mouth 2 (two) times a day, Disp: 180 tablet, Rfl: 0    glipiZIDE (GLUCOTROL XL) 10 mg 24 hr tablet, Take 1 tablet (10 mg total) by mouth daily, Disp: 90 tablet, Rfl: 1    Multiple Vitamin (multivitamin) tablet, Take 1 tablet by mouth daily, Disp: , Rfl:     Omega-3 Fatty Acids (FISH OIL) 1,000 mg, Take 1,000 mg by mouth daily, Disp: , Rfl:     pioglitazone (ACTOS) 30 mg tablet, Take 1 tablet (30 mg total) by mouth daily, Disp: 90 tablet, Rfl: 1    ACCU-CHEK FASTCLIX LANCETS MISC, by Does not apply route 2 (two) times a day, Disp: 200 each, Rfl: 1    Blood Glucose Monitoring Suppl (ACCU-CHEK CHELY PLUS) w/Device KIT, by Does not apply route 2 (two) times a day, Disp: 1 kit, Rfl: 0    glucose blood (ACCU-CHEK CHELY PLUS) test strip, 1 each by Other route 2 (two) times a day Use as instructed, Disp: 200 each, Rfl: 1    Current Allergies     Allergies as of 03/16/2024 - Reviewed 03/16/2024   Allergen Reaction Noted    Lactose - food allergy Diarrhea 06/28/2023              The following portions of the patient's history were reviewed and updated as appropriate: allergies, current medications, past family history, past medical history, past social history, past surgical history and problem list.     Past Medical History:   Diagnosis Date    Accident due to mechanical fall without injury 9/12/2018    Diabetes mellitus (HCC)     H/O nicotine dependence     Hypertension     Prediabetes     S/P ORIF (open reduction internal fixation) fracture 10/23/2018       Past Surgical History:   Procedure Laterality Date    HIP SURGERY      NOSE SURGERY      GA OPTX FEM SHFT FX W/INSJ IMED IMPLT W/WO SCREW Left 9/13/2018    Procedure: INSERTION NAIL IM FEMUR ANTEGRADE (TROCHANTERIC);  Surgeon: Asher Sandoval MD;  Location: [facility];  Service: Orthopedics    TOE SURGERY Right 12/2020    right great toe removed due to cellulitis  "      Family History   Problem Relation Age of Onset    Lymphoma Mother     No Known Problems Father     No Known Problems Sister     Lung cancer Brother     Lymphoma Family         acute         Medications have been verified.        Objective     /78   Pulse 82   Temp 98.3 °F (36.8 °C)   Resp 18   Ht 5' 9\" (1.753 m)   Wt 90.7 kg (200 lb)   SpO2 96%   BMI 29.53 kg/m²   No LMP for male patient.         Physical Exam     Physical Exam  Vitals and nursing note reviewed.   Constitutional:       General: He is not in acute distress.     Appearance: Normal appearance. He is well-developed. He is not ill-appearing, toxic-appearing or diaphoretic.   HENT:      Head: Normocephalic and atraumatic.   Musculoskeletal:         General: Normal range of motion.        Feet:    Feet:      Right foot:      Skin integrity: Ulcer, blister, skin breakdown, erythema and callus present.   Skin:     General: Skin is warm and dry.      Capillary Refill: Capillary refill takes less than 2 seconds.   Neurological:      General: No focal deficit present.      Mental Status: He is alert and oriented to person, place, and time.   Psychiatric:         Mood and Affect: Mood normal.         Behavior: Behavior normal.         Thought Content: Thought content normal.         Judgment: Judgment normal.       "

## 2024-03-16 NOTE — PATIENT INSTRUCTIONS
After 24 hours, symptoms should not get worse.  It may take a few days to start to see improvement.  If symptoms are worsening after 24 hours or if you are not seeing improvement, you should be seen again.  Keep the dressing clean and dry; change it at least daily but likely 2-3x/day the next few days due to drainage.  Follow-up with your podiatrist (call this week to see if they have a sooner appointment than 3/28) and wound care.  Cellulitis   AMBULATORY CARE:   Cellulitis  is a skin infection caused by bacteria. Cellulitis is common and can become severe. Cellulitis usually appears on the lower legs. It can also appear on the arms, face, and other areas. Cellulitis develops when bacteria enter a crack or break in your skin, such as a scratch, bite, or cut.       Common signs and symptoms:  Signs and symptoms usually appear on one side of your body. You may have any of the following:  A fever    A red, warm, swollen area on your skin    Pain when the area is touched    Red spots, bumps, or blisters    Bumpy, raised skin that feels like an orange peel    Seek care immediately if:   Your wound gets larger and more painful.    You feel a crackling under your skin when you touch it.    You have purple dots or bumps on your skin, or you see bleeding under your skin.    You see red streaks coming from the infected area.    Call your doctor if:   The red, warm, swollen area gets larger.    Your fever or pain does not go away or gets worse.    The area does not get smaller after 3 days of antibiotics.    You have questions or concerns about your condition or care.    Treatment:  You should start to see improvement in 3 days. If your cellulitis is severe, you may need IV antibiotics in the hospital. If cellulitis is not treated, the infection can spread through your body and become life-threatening. You may need any of the following medicines:  Antibiotics  help treat a bacterial infection.    Acetaminophen  decreases pain  and fever. It is available without a doctor's order. Ask how much to take and how often to take it. Follow directions. Read the labels of all other medicines you are using to see if they also contain acetaminophen, or ask your doctor or pharmacist. Acetaminophen can cause liver damage if not taken correctly.    NSAIDs , such as ibuprofen, help decrease swelling, pain, and fever. This medicine is available with or without a doctor's order. NSAIDs can cause stomach bleeding or kidney problems in certain people. If you take blood thinner medicine, always ask your healthcare provider if NSAIDs are safe for you. Always read the medicine label and follow directions.    Take your medicine as directed.  Contact your healthcare provider if you think your medicine is not helping or if you have side effects. Tell your provider if you are allergic to any medicine. Keep a list of the medicines, vitamins, and herbs you take. Include the amounts, and when and why you take them. Bring the list or the pill bottles to follow-up visits. Carry your medicine list with you in case of an emergency.    Self-care:   Wash the area with soap and water every day.  Gently pat dry. Use bandages if directed by your healthcare provider.    Apply cream or ointment as directed.  These help protect the area. Most over-the-counter products, such as petroleum jelly, are good to use. Ask your healthcare provider about specific creams or ointments you should use.    Place a cool, damp cloth on the area.  Use clean cloths and clean water. You can do this as often as you need to. Cool, damp cloths may help decrease pain.    Elevate the area above the level of your heart  as often as you can. This will help decrease swelling and pain. Prop the area on pillows or blankets to keep it elevated comfortably.       Prevent cellulitis:   Do not scratch bug bites or areas of injury.  You increase your risk for cellulitis by scratching these areas.    Do not share  personal items, such as towels, clothing, and razors.    Clean exercise equipment  with germ-killing  before and after you use it.    Treat athlete's foot.  This can help prevent the spread of a bacterial skin infection.    Wash your hands often.  Use soap and water. Wash your hands after you use the bathroom, change a child's diapers, or sneeze. Wash your hands before you prepare or eat food. Use lotion to prevent dry, cracked skin.       Follow up with your doctor within 3 days, or as directed:  He or she will check if your cellulitis is getting better. Write down your questions so you remember to ask them during your visits.  © Copyright [employer] 2023 Information is for End User's use only and may not be sold, redistributed or otherwise used for commercial purposes.  The above information is an  only. It is not intended as medical advice for individual conditions or treatments. Talk to your doctor, nurse or pharmacist before following any medical regimen to see if it is safe and effective for you.    Acute Wounds   AMBULATORY CARE:   An acute wound  is an injury that causes a break in the skin. As your wound begins to heal, it is normal to have some swelling, pain, and redness. Your body's immune system is working to keep your wound from getting infected. Your wound may develop a scab. The scab protects your wound as it heals.  Call your local emergency number (911 in the [place]) if:   You suddenly have trouble breathing or have chest pain.      Seek care immediately if:   Blood soaks through your bandage.    You have pus or a foul odor coming from the wound.    Your stitches come apart or your wound reopens.    Call your doctor if:   You continue to have pain even after you have taken pain medicine.    You have muscle, joint, or body aches, sweating, or a fever.    You have increased swelling, redness, or bleeding in your wound.    Your skin is itchy, swollen, or you have a rash.    You have  questions or concerns about your condition or care.    Treatment for an acute wound  will depend on how severe the wound is and where it is located. It may also depend on the length of time you have had the injury. You may need any of the following:  NSAIDs , such as ibuprofen, help decrease swelling, pain, and fever. This medicine is available with or without a doctor's order. NSAIDs can cause stomach bleeding or kidney problems in certain people. If you take blood thinner medicine, always ask if NSAIDs are safe for you. Always read the medicine label and follow directions. Do not give these medicines to children younger than 6 months without direction from a healthcare provider.     Acetaminophen  decreases pain and fever. It is available without a doctor's order. Ask how much to take and how often to take it. Follow directions. Read the labels of all other medicines you are using to see if they also contain acetaminophen, or ask your doctor or pharmacist. Acetaminophen can cause liver damage if not taken correctly.    Antibiotics  may be given to prevent or treat an infection caused by bacteria.    Td vaccine  is a booster shot used to help prevent tetanus and diphtheria. The Td booster may be given to adolescents and adults every 10 years or for certain wounds and injuries.    Wound care may include any of the following:      Cleaning and debridement  is done to clean and remove objects, dirt, or dead tissues from the open wound. Your healthcare provider may use soap and water or a different solution to clean your wound. He or she may use a syringe to push the solution into all areas of your wound. The force from the syringe will help push out dirt.    Closure of the wound  is done with stitches, staples, skin adhesive, or other treatments. This may be done if the wound is wide or deep. Some wounds may need to be packed with wet gauze for some time before closure. Some wounds may not need closure at all to  heal.    Care for your wound as directed:  Follow your healthcare provider's instructions on caring for your type of wound. The following care items are for most wounds:  Keep your wound covered with a clean and dry bandage.  Change your bandage if it becomes wet or dirty. This will decrease the risk for infection in your wound. Follow your healthcare provider's instructions for changing your dressing.     Do not soak in a tub or swim  until your healthcare provider says it is okay. Your wound may open if you get it too wet. Dirt from the water can also get into your wound and cause an infection.    Keep pets away from your wound.  Pets carry germs that can cause a wound infection.     Do not pick or scratch scabs.  Let scabs fall off on their own. You may damage new skin that is forming under the scab. You may have a worse scar after the damage.    Eat healthy foods and drink liquids as directed.  Healthy foods give your body the nutrients it needs to heal your wound. Liquids prevent dehydration that can decrease the blood supply to your wound. Healthy foods include fruits, vegetables, grains (breads and cereals), dairy, and protein foods. Protein foods include meat, fish, nuts, and soy products. Protein, calories, vitamin C, and zinc help wounds heal. Ask your healthcare provider for more information about the foods you should eat to improve healing.       Follow up with your doctor as directed:  Write down your questions so you remember to ask them during your visits.  © Copyright [employer] 2023 Information is for End User's use only and may not be sold, redistributed or otherwise used for commercial purposes.  The above information is an  only. It is not intended as medical advice for individual conditions or treatments. Talk to your doctor, nurse or pharmacist before following any medical regimen to see if it is safe and effective for you.

## 2024-03-26 ENCOUNTER — HOSPITAL ENCOUNTER (OUTPATIENT)
Dept: RADIOLOGY | Facility: IMAGING CENTER | Age: 69
Discharge: HOME | End: 2024-03-26
Payer: COMMERCIAL

## 2024-03-26 DIAGNOSIS — M79.671 RIGHT FOOT PAIN: ICD-10-CM

## 2024-03-26 PROCEDURE — 73630 X-RAY EXAM OF FOOT: CPT

## 2024-04-12 DIAGNOSIS — E78.5 HYPERLIPIDEMIA, UNSPECIFIED HYPERLIPIDEMIA TYPE: ICD-10-CM

## 2024-04-12 DIAGNOSIS — E11.3293 TYPE 2 DIABETES MELLITUS WITH BOTH EYES AFFECTED BY MILD NONPROLIFERATIVE RETINOPATHY WITHOUT MACULAR EDEMA, WITHOUT LONG-TERM CURRENT USE OF INSULIN (HCC): ICD-10-CM

## 2024-04-12 DIAGNOSIS — N18.9 CHRONIC KIDNEY DISEASE, UNSPECIFIED CKD STAGE: ICD-10-CM

## 2024-04-12 DIAGNOSIS — I10 ESSENTIAL HYPERTENSION: ICD-10-CM

## 2024-04-12 PROCEDURE — 3066F NEPHROPATHY DOC TX: CPT | Performed by: NURSE PRACTITIONER

## 2024-04-12 RX ORDER — DOXAZOSIN 2 MG/1
2 TABLET ORAL 2 TIMES DAILY
Qty: 180 TABLET | Refills: 0 | Status: SHIPPED | OUTPATIENT
Start: 2024-04-12

## 2024-04-12 RX ORDER — ATORVASTATIN CALCIUM 10 MG/1
10 TABLET, FILM COATED ORAL DAILY
Qty: 90 TABLET | Refills: 0 | Status: SHIPPED | OUTPATIENT
Start: 2024-04-12

## 2024-04-12 RX ORDER — AMLODIPINE BESYLATE 5 MG/1
5 TABLET ORAL DAILY
Qty: 90 TABLET | Refills: 0 | Status: SHIPPED | OUTPATIENT
Start: 2024-04-12

## 2024-04-16 ENCOUNTER — TELEPHONE (OUTPATIENT)
Dept: INTERNAL MEDICINE CLINIC | Facility: OTHER | Age: 69
End: 2024-04-16

## 2024-04-16 DIAGNOSIS — Z12.11 SCREEN FOR COLON CANCER: Primary | ICD-10-CM

## 2024-04-16 NOTE — TELEPHONE ENCOUNTER
Patient had a cologuard kit ordered back in November and he is calling because he never received one in the mail to complete.  He is asking if we can have them resend it?

## 2024-04-17 NOTE — TELEPHONE ENCOUNTER
"Last order was from 2021.     Last order Destini aragon was sent to \"future\" and never was routed to [facility].  Needs to be changed to \"normal\"      Pt not due until 5/15/2024.  I will place a new order so it is in the system, but it won't ship until 5/15/2024 when pt is due.    Please notify patient to return within a week of receipt of kit.  Must return either day of sample collection or very next day sample collected.  Thanks.   "

## 2024-04-25 ENCOUNTER — RA CDI HCC (OUTPATIENT)
Dept: OTHER | Facility: HOSPITAL | Age: 69
End: 2024-04-25

## 2024-04-25 NOTE — PROGRESS NOTES
HCC coding opportunities          Chart Reviewed number of suggestions sent to Provider: 3   E11.51  E11.3293 see 11/1/23  E11.65    Patients Insurance     Medicare Insurance: [employer]

## 2024-04-26 DIAGNOSIS — N18.9 CHRONIC KIDNEY DISEASE, UNSPECIFIED CKD STAGE: ICD-10-CM

## 2024-04-26 DIAGNOSIS — E11.3293 TYPE 2 DIABETES MELLITUS WITH BOTH EYES AFFECTED BY MILD NONPROLIFERATIVE RETINOPATHY WITHOUT MACULAR EDEMA, WITHOUT LONG-TERM CURRENT USE OF INSULIN (HCC): ICD-10-CM

## 2024-04-26 NOTE — TELEPHONE ENCOUNTER
Reason for call:   [x] Refill   [] Prior Auth  [] Other:     Office:   [x] PCP/Provider -   [] Specialty/Provider -     Medication: pioglitazone (ACTOS) 30 mg tablet     Dose/Frequency:  Take 1 tablet (30 mg total) by mouth daily,     Quantity: 90 tablet     Pharmacy: [id number] [address] [phone]     Does the patient have enough for 3 days?   [x] Yes   [] No - Send as HP to POD

## 2024-04-27 RX ORDER — PIOGLITAZONEHYDROCHLORIDE 30 MG/1
30 TABLET ORAL DAILY
Qty: 90 TABLET | Refills: 1 | Status: SHIPPED | OUTPATIENT
Start: 2024-04-27

## 2024-04-30 ENCOUNTER — TELEPHONE (OUTPATIENT)
Dept: ADMINISTRATIVE | Facility: OTHER | Age: 69
End: 2024-04-30

## 2024-04-30 NOTE — TELEPHONE ENCOUNTER
04/30/24 2:50 PM    Patient contacted (no answer/ line busy) to bring Advance Directive, POLST, or Living Will document to next scheduled pcp visit.    Thank you.  Caroline Ponce  [facility]

## 2024-05-01 ENCOUNTER — OFFICE VISIT (OUTPATIENT)
Dept: INTERNAL MEDICINE CLINIC | Facility: OTHER | Age: 69
End: 2024-05-01
Payer: COMMERCIAL

## 2024-05-01 VITALS
BODY MASS INDEX: 30.21 KG/M2 | DIASTOLIC BLOOD PRESSURE: 86 MMHG | TEMPERATURE: 98.5 F | SYSTOLIC BLOOD PRESSURE: 132 MMHG | WEIGHT: 204 LBS | HEART RATE: 84 BPM | HEIGHT: 69 IN | OXYGEN SATURATION: 99 %

## 2024-05-01 DIAGNOSIS — L08.9 DIABETIC FOOT INFECTION  (HCC): ICD-10-CM

## 2024-05-01 DIAGNOSIS — L97.519 ULCER OF RIGHT FOOT, UNSPECIFIED ULCER STAGE (HCC): ICD-10-CM

## 2024-05-01 DIAGNOSIS — E11.3293 TYPE 2 DIABETES MELLITUS WITH BOTH EYES AFFECTED BY MILD NONPROLIFERATIVE RETINOPATHY WITHOUT MACULAR EDEMA, WITHOUT LONG-TERM CURRENT USE OF INSULIN (HCC): ICD-10-CM

## 2024-05-01 DIAGNOSIS — R73.01 ELEVATED FASTING GLUCOSE: ICD-10-CM

## 2024-05-01 DIAGNOSIS — E11.65 TYPE 2 DIABETES MELLITUS WITH HYPERGLYCEMIA, WITHOUT LONG-TERM CURRENT USE OF INSULIN (HCC): ICD-10-CM

## 2024-05-01 DIAGNOSIS — Z12.11 SCREENING FOR COLON CANCER: Primary | ICD-10-CM

## 2024-05-01 DIAGNOSIS — M81.0 OSTEOPOROSIS WITHOUT CURRENT PATHOLOGICAL FRACTURE, UNSPECIFIED OSTEOPOROSIS TYPE: ICD-10-CM

## 2024-05-01 DIAGNOSIS — Z89.411 ACQUIRED ABSENCE OF RIGHT GREAT TOE (HCC): ICD-10-CM

## 2024-05-01 DIAGNOSIS — E78.5 HYPERLIPIDEMIA, UNSPECIFIED HYPERLIPIDEMIA TYPE: ICD-10-CM

## 2024-05-01 DIAGNOSIS — E55.9 VITAMIN D DEFICIENCY: ICD-10-CM

## 2024-05-01 DIAGNOSIS — I73.9 PERIPHERAL VASCULAR DISEASE (HCC): ICD-10-CM

## 2024-05-01 DIAGNOSIS — D69.6 PLATELETS DECREASED (HCC): ICD-10-CM

## 2024-05-01 DIAGNOSIS — E11.628 DIABETIC FOOT INFECTION  (HCC): ICD-10-CM

## 2024-05-01 DIAGNOSIS — N18.32 STAGE 3B CHRONIC KIDNEY DISEASE (HCC): ICD-10-CM

## 2024-05-01 DIAGNOSIS — Z13.228 SCREENING FOR METABOLIC DISORDER: ICD-10-CM

## 2024-05-01 DIAGNOSIS — I10 ESSENTIAL HYPERTENSION: ICD-10-CM

## 2024-05-01 DIAGNOSIS — E78.2 MIXED HYPERLIPIDEMIA: ICD-10-CM

## 2024-05-01 PROBLEM — N18.9 CKD (CHRONIC KIDNEY DISEASE): Status: RESOLVED | Noted: 2023-08-16 | Resolved: 2024-05-01

## 2024-05-01 PROCEDURE — 1159F MED LIST DOCD IN RCRD: CPT | Performed by: NURSE PRACTITIONER

## 2024-05-01 PROCEDURE — 3075F SYST BP GE 130 - 139MM HG: CPT | Performed by: NURSE PRACTITIONER

## 2024-05-01 PROCEDURE — G2211 COMPLEX E/M VISIT ADD ON: HCPCS | Performed by: NURSE PRACTITIONER

## 2024-05-01 PROCEDURE — 99214 OFFICE O/P EST MOD 30 MIN: CPT | Performed by: NURSE PRACTITIONER

## 2024-05-01 PROCEDURE — 3079F DIAST BP 80-89 MM HG: CPT | Performed by: NURSE PRACTITIONER

## 2024-05-01 PROCEDURE — 1160F RVW MEDS BY RX/DR IN RCRD: CPT | Performed by: NURSE PRACTITIONER

## 2024-05-01 PROCEDURE — 3725F SCREEN DEPRESSION PERFORMED: CPT | Performed by: NURSE PRACTITIONER

## 2024-05-01 RX ORDER — GLIPIZIDE 2.5 MG/1
2.5 TABLET, EXTENDED RELEASE ORAL DAILY
Qty: 90 TABLET | Refills: 1 | Status: SHIPPED | OUTPATIENT
Start: 2024-05-01 | End: 2024-05-01

## 2024-05-01 RX ORDER — GLIPIZIDE 10 MG/1
20 TABLET, FILM COATED, EXTENDED RELEASE ORAL DAILY
Qty: 180 TABLET | Refills: 0 | Status: SHIPPED | OUTPATIENT
Start: 2024-05-01 | End: 2024-07-30

## 2024-05-01 NOTE — ASSESSMENT & PLAN NOTE
Controlled while in office today, continue with Norvasc 5 mg tablet, and Cardura 2 mg tablet twice daily

## 2024-05-01 NOTE — ASSESSMENT & PLAN NOTE
Lab Results   Component Value Date    EGFR 37 02/10/2024    EGFR 39 12/09/2023    EGFR 39 09/09/2023    CREATININE 1.77 (H) 02/10/2024    CREATININE 1.70 (H) 12/09/2023    CREATININE 1.71 (H) 09/09/2023   Avoid nephrotoxins, increase daily water intake, follow-up with nephrology, encouraged good diabetic and blood pressure control, encouraged the use of Jardiance or Farxiga however patient is reluctant due to financial constraints

## 2024-05-01 NOTE — ASSESSMENT & PLAN NOTE
Lab Results   Component Value Date    HGBA1C 7.7 (H) 02/10/2024   Continue to follow with podiatry

## 2024-05-01 NOTE — ASSESSMENT & PLAN NOTE
Lab Results   Component Value Date    HGBA1C 7.7 (H) 02/10/2024   Uncontrolled, continue on Actos, increase glipizide to 20 mg tablet extended release

## 2024-05-01 NOTE — PROGRESS NOTES
Assessment/Plan:    Essential hypertension  Controlled while in office today, continue with Norvasc 5 mg tablet, and Cardura 2 mg tablet twice daily      Type 2 diabetes mellitus with both eyes affected by mild nonproliferative retinopathy without macular edema, without long-term current use of insulin (HCC)    Lab Results   Component Value Date    HGBA1C 7.7 (H) 02/10/2024   Uncontrolled, continue on Actos, increase glipizide to 20 mg tablet extended release    Diabetic foot infection (HCC)    Lab Results   Component Value Date    HGBA1C 7.7 (H) 02/10/2024   Continue to follow with podiatry    Osteoporosis  DEXA scan from 10/2018 shows a T-score of -2.1.  Continue with calcium 1200 mg and vitamin-D 2000 units daily supplementation.    Stage 3b chronic kidney disease (HCC)  Lab Results   Component Value Date    EGFR 37 02/10/2024    EGFR 39 12/09/2023    EGFR 39 09/09/2023    CREATININE 1.77 (H) 02/10/2024    CREATININE 1.70 (H) 12/09/2023    CREATININE 1.71 (H) 09/09/2023   Avoid nephrotoxins, increase daily water intake, follow-up with nephrology, encouraged good diabetic and blood pressure control, encouraged the use of Jardiance or Farxiga however patient is reluctant due to financial constraints    Vitamin D deficiency  Continue supplementation    Mixed hyperlipidemia  Patient will continue on Lipitor 10 mg tablet.                   Diagnoses and all orders for this visit:    Screening for colon cancer  -     Cologuard    Type 2 diabetes mellitus with hyperglycemia, without long-term current use of insulin (HCC)  -     glipiZIDE (GLUCOTROL XL) 10 mg 24 hr tablet; Take 2 tablets (20 mg total) by mouth daily  -     Albumin / creatinine urine ratio; Future  -     Discontinue: glipiZIDE (GLUCOTROL XL) 2.5 mg 24 hr tablet; Take 1 tablet (2.5 mg total) by mouth daily    Ulcer of right foot, unspecified ulcer stage (HCC)    Acquired absence of right great toe (HCC)    Peripheral vascular disease (HCC)    Platelets  decreased (HCC)    Stage 3b chronic kidney disease (HCC)    Screening for metabolic disorder  -     Comprehensive metabolic panel; Future  -     CBC and differential; Future  -     Lipid panel; Future    Hyperlipidemia, unspecified hyperlipidemia type  -     Lipid panel; Future    Elevated fasting glucose  -     Hemoglobin A1C; Future    Essential hypertension    Type 2 diabetes mellitus with both eyes affected by mild nonproliferative retinopathy without macular edema, without long-term current use of insulin (HCC)    Diabetic foot infection  (HCC)    Osteoporosis without current pathological fracture, unspecified osteoporosis type    Vitamin D deficiency    Mixed hyperlipidemia          Subjective:      Patient ID: Cecilio Munoz is a 69 y.o. male.      Patient presents today for follow-up on diabetes type 2, hypertension, hyperlipidemia, reviewed blood work while in the office today.   Denies any new concerns.    CKD stage III-currently follows nephrology, creatinine 1.77, GFR 37, baseline creatinine 1.5    Male screenings:  Colonoscopy- cologuard completed 2021  Hep C-done  Eye Doctor- wears glasses, has not been  Dentist-has not gone  Podiatrist-does not go      Tobacco abuse- Quit in 84        Diabetes  He presents for his initial diabetic visit. He has type 2 diabetes mellitus. No MedicAlert identification noted. His disease course has been worsening (HBA1C 7.7). There are no hypoglycemic associated symptoms. Pertinent negatives for hypoglycemia include no dizziness, headaches or nervousness/anxiousness. Pertinent negatives for diabetes include no chest pain, no fatigue, no polydipsia, no polyphagia, no polyuria, no weakness and no weight loss. There are no hypoglycemic complications. Symptoms are stable. Risk factors for coronary artery disease include diabetes mellitus, dyslipidemia and male sex. Current diabetic treatment includes oral agent (dual therapy). He rarely participates in exercise. He sees a  podiatrist.Eye exam is not current.   Hypertension  This is a chronic problem. The current episode started more than 1 year ago. The problem is unchanged. The problem is uncontrolled. Pertinent negatives include no anxiety, chest pain, headaches, neck pain, palpitations or shortness of breath.   Hyperlipidemia  This is a new (Cholesterol 140, triglycerides 123, LDL 73) problem. The current episode started more than 1 month ago. The problem is controlled. Pertinent negatives include no chest pain or shortness of breath.       The following portions of the patient's history were reviewed and updated as appropriate: allergies, current medications, past family history, past medical history, past social history, past surgical history, and problem list.    Review of Systems   Constitutional:  Negative for activity change, appetite change, chills, diaphoresis, fatigue, fever and weight loss.   HENT:  Negative for congestion, ear discharge, ear pain, postnasal drip, rhinorrhea, sinus pressure, sinus pain and sore throat.    Eyes:  Negative for pain, discharge, itching and visual disturbance.   Respiratory:  Negative for cough, chest tightness, shortness of breath and wheezing.    Cardiovascular:  Negative for chest pain, palpitations and leg swelling.   Gastrointestinal:  Negative for abdominal pain, constipation, diarrhea, nausea and vomiting.   Endocrine: Negative for polydipsia, polyphagia and polyuria.   Genitourinary:  Negative for difficulty urinating, dysuria and urgency.   Musculoskeletal:  Negative for arthralgias, back pain and neck pain.   Skin:  Negative for rash and wound.   Neurological:  Negative for dizziness, weakness, numbness and headaches.   Psychiatric/Behavioral:  The patient is not nervous/anxious.          Past Medical History:   Diagnosis Date    Accident due to mechanical fall without injury 9/12/2018    Diabetes mellitus (HCC)     H/O nicotine dependence     Hypertension     Prediabetes     S/P  ORIF (open reduction internal fixation) fracture 10/23/2018         Current Outpatient Medications:     ACCU-CHEK FASTCLIX LANCETS MISC, by Does not apply route 2 (two) times a day, Disp: 200 each, Rfl: 1    amLODIPine (NORVASC) 5 mg tablet, Take 1 tablet (5 mg total) by mouth daily, Disp: 90 tablet, Rfl: 0    atorvastatin (LIPITOR) 10 mg tablet, Take 1 tablet (10 mg total) by mouth daily, Disp: 90 tablet, Rfl: 0    Blood Glucose Monitoring Suppl (ACCU-CHEK CHELY PLUS) w/Device KIT, by Does not apply route 2 (two) times a day, Disp: 1 kit, Rfl: 0    cholecalciferol 2000 units TABS, Take 1 tablet (2,000 Units total) by mouth daily, Disp: 30 tablet, Rfl: 0    doxazosin (CARDURA) 2 mg tablet, Take 1 tablet (2 mg total) by mouth 2 (two) times a day, Disp: 180 tablet, Rfl: 0    glipiZIDE (GLUCOTROL XL) 10 mg 24 hr tablet, Take 2 tablets (20 mg total) by mouth daily, Disp: 180 tablet, Rfl: 0    glucose blood (ACCU-CHEK CHELY PLUS) test strip, 1 each by Other route 2 (two) times a day Use as instructed, Disp: 200 each, Rfl: 1    Multiple Vitamin (multivitamin) tablet, Take 1 tablet by mouth daily, Disp: , Rfl:     Omega-3 Fatty Acids (FISH OIL) 1,000 mg, Take 1,000 mg by mouth daily, Disp: , Rfl:     pioglitazone (ACTOS) 30 mg tablet, Take 1 tablet (30 mg total) by mouth daily, Disp: 90 tablet, Rfl: 1    Allergies   Allergen Reactions    Lactose - Food Allergy Diarrhea       Social History   Past Surgical History:   Procedure Laterality Date    HIP SURGERY      NOSE SURGERY      WI OPTX FEM SHFT FX W/INSJ IMED IMPLT W/WO SCREW Left 9/13/2018    Procedure: INSERTION NAIL IM FEMUR ANTEGRADE (TROCHANTERIC);  Surgeon: Asher Sandoval MD;  Location: [facility];  Service: Orthopedics    TOE SURGERY Right 12/2020    right great toe removed due to cellulitis     Family History   Problem Relation Age of Onset    Lymphoma Mother     No Known Problems Father     No Known Problems Sister     Lung cancer Brother     Lymphoma Family      "    acute       Objective:  /86 (BP Location: Left arm, Patient Position: Sitting, Cuff Size: Adult)   Pulse 84   Temp 98.5 °F (36.9 °C)   Ht 5' 9\" (1.753 m)   Wt 92.5 kg (204 lb)   SpO2 99%   BMI 30.13 kg/m²     No results found for this or any previous visit (from the past 1344 hour(s)).         Physical Exam  Constitutional:       General: He is not in acute distress.     Appearance: He is well-developed. He is not diaphoretic.   HENT:      Head: Normocephalic and atraumatic.      Right Ear: External ear normal.      Left Ear: External ear normal.      Nose: Nose normal.      Mouth/Throat:      Mouth: Mucous membranes are moist.      Pharynx: No oropharyngeal exudate or posterior oropharyngeal erythema.   Eyes:      General:         Right eye: No discharge.         Left eye: No discharge.      Conjunctiva/sclera: Conjunctivae normal.      Pupils: Pupils are equal, round, and reactive to light.   Neck:      Thyroid: No thyromegaly.   Cardiovascular:      Rate and Rhythm: Normal rate and regular rhythm.      Heart sounds: Normal heart sounds. No murmur heard.     No friction rub. No gallop.   Pulmonary:      Effort: Pulmonary effort is normal. No respiratory distress.      Breath sounds: Normal breath sounds. No stridor. No wheezing or rales.   Abdominal:      General: Bowel sounds are normal. There is no distension.      Palpations: Abdomen is soft.      Tenderness: There is no abdominal tenderness.   Musculoskeletal:      Cervical back: Normal range of motion and neck supple.   Lymphadenopathy:      Cervical: No cervical adenopathy.   Skin:     General: Skin is warm and dry.      Findings: No erythema or rash.   Neurological:      Mental Status: He is alert and oriented to person, place, and time.   Psychiatric:         Behavior: Behavior normal.         Thought Content: Thought content normal.         Judgment: Judgment normal.         "

## 2024-05-28 LAB — COLOGUARD RESULT REPORTABLE: NEGATIVE

## 2024-06-13 NOTE — ASSESSMENT & PLAN NOTE
Lab Results   Component Value Date    HGBA1C 12 4 (H) 09/13/2018       No results for input(s): POCGLU in the last 72 hours  Blood Sugar Average: Last 72 hrs:    Patient was not taking his metformin as prescribed, patient was only taking metformin 500 in the morning, and metformin 750 at night, I did advise patient to start taking metformin a 1000 in the morning and a 1000 at night with meals  Patient discontinued his glipizide as he felt that he lost his appetite with this medication  Will add on Januvia 100 milligram tablet daily  Will have patient get a follow-up hemoglobin A1c in 3 months  Discussed in length with patient that he may be losing weight due to having uncontrolled diabetes  Patient is to continue to work on diet  Limit sugars and carbohydrate intake  Avoid soda, juice, sweets, cookies, desserts, pasta, bread    Eat more whole grains, exercised 30 min of cardio at least 3 times a week  Also recommended daily foot exams to check for sores, and recommended yearly eye exams 
Patient very thin and may be related to his diabetes  Patient does report low appetite  May need to consider at follow-up appointment appetite stimulator, or possible chest x-ray to rule out lung cancer as patient was a previous smoker  Patients TSH within normal limits  Will get fasting blood-work prior to follow-up with patient in 3 months 
Patient's blood pressure within normal limits at 124/78, will continue to monitor  Patient currently not on medication 
Opt out

## 2024-06-14 ENCOUNTER — APPOINTMENT (OUTPATIENT)
Dept: LAB | Facility: IMAGING CENTER | Age: 69
End: 2024-06-14
Payer: COMMERCIAL

## 2024-06-14 DIAGNOSIS — N18.9 CHRONIC RENAL IMPAIRMENT, UNSPECIFIED CKD STAGE: ICD-10-CM

## 2024-06-14 LAB
CREAT UR-MCNC: 40.7 MG/DL
PROT UR-MCNC: 7 MG/DL
PROT/CREAT UR: 0.17 MG/G{CREAT} (ref 0–0.1)

## 2024-06-14 PROCEDURE — 84156 ASSAY OF PROTEIN URINE: CPT

## 2024-06-14 PROCEDURE — 82570 ASSAY OF URINE CREATININE: CPT

## 2024-06-26 ENCOUNTER — TELEPHONE (OUTPATIENT)
Dept: ADMINISTRATIVE | Facility: OTHER | Age: 69
End: 2024-06-26

## 2024-06-27 NOTE — TELEPHONE ENCOUNTER
06/27/24 1:59 PM     Chart reviewed for CRC: Colonoscopy ; nothing is submitted to the patient's insurance at this time.     Radha Sawyer MA   [facility]

## 2024-07-20 DIAGNOSIS — I10 ESSENTIAL HYPERTENSION: ICD-10-CM

## 2024-07-20 DIAGNOSIS — N18.9 CHRONIC KIDNEY DISEASE, UNSPECIFIED CKD STAGE: ICD-10-CM

## 2024-07-20 DIAGNOSIS — E11.3293 TYPE 2 DIABETES MELLITUS WITH BOTH EYES AFFECTED BY MILD NONPROLIFERATIVE RETINOPATHY WITHOUT MACULAR EDEMA, WITHOUT LONG-TERM CURRENT USE OF INSULIN (HCC): ICD-10-CM

## 2024-07-20 DIAGNOSIS — E78.5 HYPERLIPIDEMIA, UNSPECIFIED HYPERLIPIDEMIA TYPE: ICD-10-CM

## 2024-07-20 RX ORDER — AMLODIPINE BESYLATE 5 MG/1
5 TABLET ORAL DAILY
Qty: 90 TABLET | Refills: 1 | Status: SHIPPED | OUTPATIENT
Start: 2024-07-20

## 2024-07-20 RX ORDER — ATORVASTATIN CALCIUM 10 MG/1
10 TABLET, FILM COATED ORAL DAILY
Qty: 90 TABLET | Refills: 1 | Status: SHIPPED | OUTPATIENT
Start: 2024-07-20

## 2024-07-20 RX ORDER — DOXAZOSIN 2 MG/1
2 TABLET ORAL 2 TIMES DAILY
Qty: 180 TABLET | Refills: 1 | Status: SHIPPED | OUTPATIENT
Start: 2024-07-20

## 2024-07-20 RX ORDER — PIOGLITAZONEHYDROCHLORIDE 30 MG/1
30 TABLET ORAL DAILY
Qty: 90 TABLET | Refills: 1 | Status: SHIPPED | OUTPATIENT
Start: 2024-07-20

## 2024-07-22 ENCOUNTER — APPOINTMENT (OUTPATIENT)
Dept: LAB | Facility: IMAGING CENTER | Age: 69
End: 2024-07-22
Payer: COMMERCIAL

## 2024-07-22 DIAGNOSIS — N18.9 CHRONIC RENAL IMPAIRMENT, UNSPECIFIED CKD STAGE: ICD-10-CM

## 2024-07-22 LAB
ANION GAP SERPL CALCULATED.3IONS-SCNC: 10 MMOL/L (ref 4–13)
BUN SERPL-MCNC: 40 MG/DL (ref 5–25)
CALCIUM SERPL-MCNC: 9.5 MG/DL (ref 8.4–10.2)
CHLORIDE SERPL-SCNC: 100 MMOL/L (ref 96–108)
CO2 SERPL-SCNC: 23 MMOL/L (ref 21–32)
CREAT SERPL-MCNC: 1.6 MG/DL (ref 0.6–1.3)
GFR SERPL CREATININE-BSD FRML MDRD: 42 ML/MIN/1.73SQ M
GLUCOSE P FAST SERPL-MCNC: 162 MG/DL (ref 65–99)
POTASSIUM SERPL-SCNC: 4.8 MMOL/L (ref 3.5–5.3)
SODIUM SERPL-SCNC: 133 MMOL/L (ref 135–147)

## 2024-07-22 PROCEDURE — 36415 COLL VENOUS BLD VENIPUNCTURE: CPT

## 2024-07-22 PROCEDURE — 80048 BASIC METABOLIC PNL TOTAL CA: CPT

## 2024-08-20 ENCOUNTER — OFFICE VISIT (OUTPATIENT)
Dept: NEPHROLOGY | Facility: CLINIC | Age: 69
End: 2024-08-20
Payer: COMMERCIAL

## 2024-08-20 VITALS
WEIGHT: 213.4 LBS | BODY MASS INDEX: 31.61 KG/M2 | DIASTOLIC BLOOD PRESSURE: 62 MMHG | HEIGHT: 69 IN | SYSTOLIC BLOOD PRESSURE: 130 MMHG | HEART RATE: 68 BPM

## 2024-08-20 DIAGNOSIS — N18.9 CHRONIC KIDNEY DISEASE, UNSPECIFIED CKD STAGE: Primary | ICD-10-CM

## 2024-08-20 PROBLEM — N18.32 STAGE 3B CHRONIC KIDNEY DISEASE (HCC): Status: RESOLVED | Noted: 2021-11-17 | Resolved: 2024-08-20

## 2024-08-20 PROCEDURE — 99214 OFFICE O/P EST MOD 30 MIN: CPT | Performed by: INTERNAL MEDICINE

## 2024-08-20 NOTE — LETTER
August 20, 2024     SANDRA Cary  [address]  [address]  [facility] [address]    Patient: Cecilio Munoz   YOB: 1955   Date of Visit: 8/20/2024       Dear Dr. Shepherd:    Thank you for referring Cecilio Munoz to me for evaluation. Below are my notes for this consultation.    If you have questions, please do not hesitate to call me. I look forward to following your patient along with you.         Sincerely,        Cam Baird MD        CC: No Recipients    Cam Baird MD  8/20/2024  3:41 PM  Sign when Signing Visit  NEPHROLOGY PROGRESS NOTE    Cecilio Munoz 69 y.o. male MRN: [id number]  Unit/Bed#:  Encounter: [id number]  Reason for Consult: Chronic kidney disease    The patient is here for routine follow-up he looks well states that he is really just been doing well with his health reports no intercurrent illnesses and really had no complaints for me today.    ASSESSMENT/PLAN:  1.  Renal    The patient is a longstanding diabetic with chronic kidney disease.  His latest creatinine is 1.6 which is stable without progression.  Urine protein estimation in terms of total protein was 170 mg so tells me he does not have diabetic nephropathy.  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  Things have remained stable with respect to his creatinine I encouraged him to continue with good glycemic control trying to keep his A1c around 7% to reduce risk of diabetic complications.  Lipid profile was excellent.  At this point can continue current medications and we will continue to monitor every 6 months.    Labs and follow-up as scheduled  I told him to call if there is any problems or concerns before next visit    I have spent a total time of 30 minutes in caring for this patient on the day of the visit/encounter including Diagnostic results, Prognosis, Impressions, Reviewing / ordering tests, medicine, procedures  , and  Obtaining or reviewing history  .     SUBJECTIVE:  Review of Systems   Constitutional: Negative for chills, diaphoresis and fever.   HENT: Negative.     Eyes: Negative.    Cardiovascular:  Negative for chest pain, dyspnea on exertion, leg swelling and orthopnea.   Respiratory: Negative.  Negative for cough, shortness of breath and sputum production.    Gastrointestinal:  Negative for abdominal pain, diarrhea, nausea and vomiting.   Genitourinary: Negative.    Neurological:  Negative for dizziness and headaches.   Psychiatric/Behavioral:  Negative for altered mental status.        OBJECTIVE:  Current Weight:    Vitals:@TMAX(24)@Current:     There were no vitals taken for this visit. , There is no height or weight on file to calculate BMI.    [unfilled]    Physical Exam: There were no vitals taken for this visit.  Physical Exam  Constitutional:       General: He is not in acute distress.     Appearance: He is not toxic-appearing.   HENT:      Head: Normocephalic and atraumatic.      Nose: Nose normal.      Mouth/Throat:      Mouth: Mucous membranes are moist.   Eyes:      General: No scleral icterus.     Extraocular Movements: Extraocular movements intact.   Cardiovascular:      Rate and Rhythm: Normal rate and regular rhythm.      Heart sounds:      No friction rub. No gallop.   Pulmonary:      Effort: Pulmonary effort is normal. No respiratory distress.      Breath sounds: No wheezing or rales.   Abdominal:      General: Bowel sounds are normal. There is no distension.      Palpations: Abdomen is soft.      Tenderness: There is no abdominal tenderness.   Musculoskeletal:      Cervical back: Normal range of motion and neck supple.   Neurological:      Mental Status: He is alert and oriented to person, place, and time. Mental status is at baseline.   Psychiatric:         Mood and Affect: Mood normal.         Behavior: Behavior normal.         Medications:    Current Outpatient Medications:   •  ACCU-CHEK FASTCLIX  "LANCETS MISC, by Does not apply route 2 (two) times a day, Disp: 200 each, Rfl: 1  •  amLODIPine (NORVASC) 5 mg tablet, Take 1 tablet (5 mg total) by mouth daily, Disp: 90 tablet, Rfl: 1  •  atorvastatin (LIPITOR) 10 mg tablet, Take 1 tablet (10 mg total) by mouth daily, Disp: 90 tablet, Rfl: 1  •  Blood Glucose Monitoring Suppl (ACCU-CHEK CHELY PLUS) w/Device KIT, by Does not apply route 2 (two) times a day, Disp: 1 kit, Rfl: 0  •  cholecalciferol 2000 units TABS, Take 1 tablet (2,000 Units total) by mouth daily, Disp: 30 tablet, Rfl: 0  •  doxazosin (CARDURA) 2 mg tablet, Take 1 tablet (2 mg total) by mouth 2 (two) times a day, Disp: 180 tablet, Rfl: 1  •  glipiZIDE (GLUCOTROL XL) 10 mg 24 hr tablet, Take 2 tablets (20 mg total) by mouth daily, Disp: 180 tablet, Rfl: 0  •  glucose blood (ACCU-CHEK CHELY PLUS) test strip, 1 each by Other route 2 (two) times a day Use as instructed, Disp: 200 each, Rfl: 1  •  Multiple Vitamin (multivitamin) tablet, Take 1 tablet by mouth daily, Disp: , Rfl:   •  Omega-3 Fatty Acids (FISH OIL) 1,000 mg, Take 1,000 mg by mouth daily, Disp: , Rfl:   •  pioglitazone (ACTOS) 30 mg tablet, Take 1 tablet (30 mg total) by mouth daily, Disp: 90 tablet, Rfl: 1    Laboratory Results:  Lab Results   Component Value Date    WBC 5.04 02/10/2024    HGB 11.9 (L) 02/10/2024    HCT 34.4 (L) 02/10/2024    MCV 89 02/10/2024     (L) 02/10/2024     Lab Results   Component Value Date    SODIUM 133 (L) 07/22/2024    K 4.8 07/22/2024     07/22/2024    CO2 23 07/22/2024    BUN 40 (H) 07/22/2024    CREATININE 1.60 (H) 07/22/2024    GLUC 102 (H) 04/14/2022    CALCIUM 9.5 07/22/2024     Lab Results   Component Value Date    CALCIUM 9.5 07/22/2024     No results found for: \"LABPROT\"      "

## 2024-08-20 NOTE — PROGRESS NOTES
NEPHROLOGY PROGRESS NOTE    Cecilio Munoz 69 y.o. male MRN: [id number]  Unit/Bed#:  Encounter: [id number]  Reason for Consult: Chronic kidney disease    The patient is here for routine follow-up he looks well states that he is really just been doing well with his health reports no intercurrent illnesses and really had no complaints for me today.    ASSESSMENT/PLAN:  1.  Renal    The patient is a longstanding diabetic with chronic kidney disease.  His latest creatinine is 1.6 which is stable without progression.  Urine protein estimation in terms of total protein was 170 mg so tells me he does not have diabetic nephropathy.  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  Things have remained stable with respect to his creatinine I encouraged him to continue with good glycemic control trying to keep his A1c around 7% to reduce risk of diabetic complications.  Lipid profile was excellent.  At this point can continue current medications and we will continue to monitor every 6 months.    Labs and follow-up as scheduled  I told him to call if there is any problems or concerns before next visit    I have spent a total time of 30 minutes in caring for this patient on the day of the visit/encounter including Diagnostic results, Prognosis, Impressions, Reviewing / ordering tests, medicine, procedures  , and Obtaining or reviewing history  .     SUBJECTIVE:  Review of Systems   Constitutional: Negative for chills, diaphoresis and fever.   HENT: Negative.     Eyes: Negative.    Cardiovascular:  Negative for chest pain, dyspnea on exertion, leg swelling and orthopnea.   Respiratory: Negative.  Negative for cough, shortness of breath and sputum production.    Gastrointestinal:  Negative for abdominal pain, diarrhea, nausea and vomiting.   Genitourinary: Negative.    Neurological:  Negative for dizziness and headaches.   Psychiatric/Behavioral:  Negative for  altered mental status.        OBJECTIVE:  Current Weight:    Vitals:@TMAX(24)@Current:     There were no vitals taken for this visit. , There is no height or weight on file to calculate BMI.    [unfilled]    Physical Exam: There were no vitals taken for this visit.  Physical Exam  Constitutional:       General: He is not in acute distress.     Appearance: He is not toxic-appearing.   HENT:      Head: Normocephalic and atraumatic.      Nose: Nose normal.      Mouth/Throat:      Mouth: Mucous membranes are moist.   Eyes:      General: No scleral icterus.     Extraocular Movements: Extraocular movements intact.   Cardiovascular:      Rate and Rhythm: Normal rate and regular rhythm.      Heart sounds:      No friction rub. No gallop.   Pulmonary:      Effort: Pulmonary effort is normal. No respiratory distress.      Breath sounds: No wheezing or rales.   Abdominal:      General: Bowel sounds are normal. There is no distension.      Palpations: Abdomen is soft.      Tenderness: There is no abdominal tenderness.   Musculoskeletal:      Cervical back: Normal range of motion and neck supple.   Neurological:      Mental Status: He is alert and oriented to person, place, and time. Mental status is at baseline.   Psychiatric:         Mood and Affect: Mood normal.         Behavior: Behavior normal.         Medications:    Current Outpatient Medications:     ACCU-CHEK FASTCLIX LANCETS MISC, by Does not apply route 2 (two) times a day, Disp: 200 each, Rfl: 1    amLODIPine (NORVASC) 5 mg tablet, Take 1 tablet (5 mg total) by mouth daily, Disp: 90 tablet, Rfl: 1    atorvastatin (LIPITOR) 10 mg tablet, Take 1 tablet (10 mg total) by mouth daily, Disp: 90 tablet, Rfl: 1    Blood Glucose Monitoring Suppl (ACCU-CHEK CHELY PLUS) w/Device KIT, by Does not apply route 2 (two) times a day, Disp: 1 kit, Rfl: 0    cholecalciferol 2000 units TABS, Take 1 tablet (2,000 Units total) by mouth daily, Disp: 30 tablet, Rfl: 0    doxazosin (CARDURA)  "2 mg tablet, Take 1 tablet (2 mg total) by mouth 2 (two) times a day, Disp: 180 tablet, Rfl: 1    glipiZIDE (GLUCOTROL XL) 10 mg 24 hr tablet, Take 2 tablets (20 mg total) by mouth daily, Disp: 180 tablet, Rfl: 0    glucose blood (ACCU-CHEK CHELY PLUS) test strip, 1 each by Other route 2 (two) times a day Use as instructed, Disp: 200 each, Rfl: 1    Multiple Vitamin (multivitamin) tablet, Take 1 tablet by mouth daily, Disp: , Rfl:     Omega-3 Fatty Acids (FISH OIL) 1,000 mg, Take 1,000 mg by mouth daily, Disp: , Rfl:     pioglitazone (ACTOS) 30 mg tablet, Take 1 tablet (30 mg total) by mouth daily, Disp: 90 tablet, Rfl: 1    Laboratory Results:  Lab Results   Component Value Date    WBC 5.04 02/10/2024    HGB 11.9 (L) 02/10/2024    HCT 34.4 (L) 02/10/2024    MCV 89 02/10/2024     (L) 02/10/2024     Lab Results   Component Value Date    SODIUM 133 (L) 07/22/2024    K 4.8 07/22/2024     07/22/2024    CO2 23 07/22/2024    BUN 40 (H) 07/22/2024    CREATININE 1.60 (H) 07/22/2024    GLUC 102 (H) 04/14/2022    CALCIUM 9.5 07/22/2024     Lab Results   Component Value Date    CALCIUM 9.5 07/22/2024     No results found for: \"LABPROT\"      "

## 2024-08-20 NOTE — PATIENT INSTRUCTIONS
You are here for follow-up I am glad to hear your health is doing well.  With respect to your kidney function the creatinine level was 1.6.  Diabetics are at risk for kidney disease but you do not have diabetic kidney disease at this point.  The reason I know that is because the amount of protein in your urine is very low.  When diabetics have less than 300 in the urine which you have we just referred to that his microalbuminuria which means a little bit of protein in the urine.  At this point the kidney function will not deteriorate due to diabetes.  That occurs if you get heavier protein generally over 500 mg and often in the thousands which again you are nowhere near.    So saying that you probably have nephrosclerosis or aging you still could get diabetic kidney disease so continue with sugar control try to keep your A1c around 7% which is close to.  You have good cholesterol control your blood pressure is good.    Continue current medications please call me if there is any problems before next visit.

## 2024-09-09 DIAGNOSIS — E11.65 TYPE 2 DIABETES MELLITUS WITH HYPERGLYCEMIA, WITHOUT LONG-TERM CURRENT USE OF INSULIN (HCC): ICD-10-CM

## 2024-09-09 RX ORDER — GLIPIZIDE 10 MG/1
20 TABLET, FILM COATED, EXTENDED RELEASE ORAL DAILY
Qty: 180 TABLET | Refills: 1 | Status: SHIPPED | OUTPATIENT
Start: 2024-09-09 | End: 2025-03-08

## 2024-10-19 DIAGNOSIS — I10 ESSENTIAL HYPERTENSION: ICD-10-CM

## 2024-10-19 DIAGNOSIS — E78.5 HYPERLIPIDEMIA, UNSPECIFIED HYPERLIPIDEMIA TYPE: ICD-10-CM

## 2024-10-19 DIAGNOSIS — E11.3293 TYPE 2 DIABETES MELLITUS WITH BOTH EYES AFFECTED BY MILD NONPROLIFERATIVE RETINOPATHY WITHOUT MACULAR EDEMA, WITHOUT LONG-TERM CURRENT USE OF INSULIN (HCC): ICD-10-CM

## 2024-10-19 DIAGNOSIS — N18.9 CHRONIC KIDNEY DISEASE, UNSPECIFIED CKD STAGE: ICD-10-CM

## 2024-10-21 RX ORDER — PIOGLITAZONEHYDROCHLORIDE 30 MG/1
30 TABLET ORAL DAILY
Qty: 90 TABLET | Refills: 1 | Status: SHIPPED | OUTPATIENT
Start: 2024-10-21

## 2024-10-21 RX ORDER — DOXAZOSIN 2 MG/1
2 TABLET ORAL 2 TIMES DAILY
Qty: 180 TABLET | Refills: 1 | Status: SHIPPED | OUTPATIENT
Start: 2024-10-21

## 2024-10-21 RX ORDER — AMLODIPINE BESYLATE 5 MG/1
5 TABLET ORAL DAILY
Qty: 90 TABLET | Refills: 1 | Status: SHIPPED | OUTPATIENT
Start: 2024-10-21

## 2024-10-21 RX ORDER — ATORVASTATIN CALCIUM 10 MG/1
10 TABLET, FILM COATED ORAL DAILY
Qty: 90 TABLET | Refills: 1 | Status: SHIPPED | OUTPATIENT
Start: 2024-10-21

## 2024-11-04 ENCOUNTER — VBI (OUTPATIENT)
Dept: ADMINISTRATIVE | Facility: OTHER | Age: 69
End: 2024-11-04

## 2024-11-04 LAB
LEFT EYE DIABETIC RETINOPATHY: POSITIVE
RIGHT EYE DIABETIC RETINOPATHY: POSITIVE
SEVERITY (EYE EXAM): NORMAL

## 2024-11-11 ENCOUNTER — APPOINTMENT (OUTPATIENT)
Dept: LAB | Facility: IMAGING CENTER | Age: 69
End: 2024-11-11
Payer: COMMERCIAL

## 2024-11-11 DIAGNOSIS — E78.5 HYPERLIPIDEMIA, UNSPECIFIED HYPERLIPIDEMIA TYPE: ICD-10-CM

## 2024-11-11 DIAGNOSIS — R73.01 ELEVATED FASTING GLUCOSE: ICD-10-CM

## 2024-11-11 DIAGNOSIS — E11.65 TYPE 2 DIABETES MELLITUS WITH HYPERGLYCEMIA, WITHOUT LONG-TERM CURRENT USE OF INSULIN (HCC): ICD-10-CM

## 2024-11-11 DIAGNOSIS — Z13.228 SCREENING FOR METABOLIC DISORDER: ICD-10-CM

## 2024-11-11 LAB
ALBUMIN SERPL BCG-MCNC: 4.1 G/DL (ref 3.5–5)
ALP SERPL-CCNC: 32 U/L (ref 34–104)
ALT SERPL W P-5'-P-CCNC: 18 U/L (ref 7–52)
ANION GAP SERPL CALCULATED.3IONS-SCNC: 8 MMOL/L (ref 4–13)
AST SERPL W P-5'-P-CCNC: 24 U/L (ref 13–39)
BASOPHILS # BLD AUTO: 0.03 THOUSANDS/ÂΜL (ref 0–0.1)
BASOPHILS NFR BLD AUTO: 1 % (ref 0–1)
BILIRUB SERPL-MCNC: 0.78 MG/DL (ref 0.2–1)
BUN SERPL-MCNC: 28 MG/DL (ref 5–25)
CALCIUM SERPL-MCNC: 9.1 MG/DL (ref 8.4–10.2)
CHLORIDE SERPL-SCNC: 92 MMOL/L (ref 96–108)
CHOLEST SERPL-MCNC: 129 MG/DL
CO2 SERPL-SCNC: 24 MMOL/L (ref 21–32)
CREAT SERPL-MCNC: 1.42 MG/DL (ref 0.6–1.3)
CREAT UR-MCNC: 32.5 MG/DL
EOSINOPHIL # BLD AUTO: 0.07 THOUSAND/ÂΜL (ref 0–0.61)
EOSINOPHIL NFR BLD AUTO: 1 % (ref 0–6)
ERYTHROCYTE [DISTWIDTH] IN BLOOD BY AUTOMATED COUNT: 12.4 % (ref 11.6–15.1)
EST. AVERAGE GLUCOSE BLD GHB EST-MCNC: 174 MG/DL
GFR SERPL CREATININE-BSD FRML MDRD: 49 ML/MIN/1.73SQ M
GLUCOSE P FAST SERPL-MCNC: 169 MG/DL (ref 65–99)
HBA1C MFR BLD: 7.7 %
HCT VFR BLD AUTO: 32.3 % (ref 36.5–49.3)
HDLC SERPL-MCNC: 46 MG/DL
HGB BLD-MCNC: 11.6 G/DL (ref 12–17)
IMM GRANULOCYTES # BLD AUTO: 0.02 THOUSAND/UL (ref 0–0.2)
IMM GRANULOCYTES NFR BLD AUTO: 0 % (ref 0–2)
LDLC SERPL CALC-MCNC: 62 MG/DL (ref 0–100)
LYMPHOCYTES # BLD AUTO: 1.28 THOUSANDS/ÂΜL (ref 0.6–4.47)
LYMPHOCYTES NFR BLD AUTO: 25 % (ref 14–44)
MCH RBC QN AUTO: 30.8 PG (ref 26.8–34.3)
MCHC RBC AUTO-ENTMCNC: 35.9 G/DL (ref 31.4–37.4)
MCV RBC AUTO: 86 FL (ref 82–98)
MICROALBUMIN UR-MCNC: 56.7 MG/L
MICROALBUMIN/CREAT 24H UR: 174 MG/G CREATININE (ref 0–30)
MONOCYTES # BLD AUTO: 0.39 THOUSAND/ÂΜL (ref 0.17–1.22)
MONOCYTES NFR BLD AUTO: 8 % (ref 4–12)
NEUTROPHILS # BLD AUTO: 3.24 THOUSANDS/ÂΜL (ref 1.85–7.62)
NEUTS SEG NFR BLD AUTO: 65 % (ref 43–75)
NONHDLC SERPL-MCNC: 83 MG/DL
NRBC BLD AUTO-RTO: 0 /100 WBCS
PLATELET # BLD AUTO: 158 THOUSANDS/UL (ref 149–390)
PMV BLD AUTO: 9.8 FL (ref 8.9–12.7)
POTASSIUM SERPL-SCNC: 4.4 MMOL/L (ref 3.5–5.3)
PROT SERPL-MCNC: 7 G/DL (ref 6.4–8.4)
RBC # BLD AUTO: 3.77 MILLION/UL (ref 3.88–5.62)
SODIUM SERPL-SCNC: 124 MMOL/L (ref 135–147)
TRIGL SERPL-MCNC: 104 MG/DL
WBC # BLD AUTO: 5.03 THOUSAND/UL (ref 4.31–10.16)

## 2024-11-11 PROCEDURE — 85025 COMPLETE CBC W/AUTO DIFF WBC: CPT

## 2024-11-11 PROCEDURE — 80061 LIPID PANEL: CPT

## 2024-11-11 PROCEDURE — 83036 HEMOGLOBIN GLYCOSYLATED A1C: CPT

## 2024-11-11 PROCEDURE — 82043 UR ALBUMIN QUANTITATIVE: CPT

## 2024-11-11 PROCEDURE — 36415 COLL VENOUS BLD VENIPUNCTURE: CPT

## 2024-11-11 PROCEDURE — 82570 ASSAY OF URINE CREATININE: CPT

## 2024-11-11 PROCEDURE — 80053 COMPREHEN METABOLIC PANEL: CPT

## 2024-11-18 NOTE — TELEPHONE ENCOUNTER
Upon review of the In Basket request we were able to locate, review, and update the patient chart as requested for Diabetic Eye Exam.    Any additional questions or concerns should be emailed to the Practice Liaisons via the appropriate education email address, please do not reply via In Basket.    Thank you  Allie Jimenez   [facility]

## 2024-12-04 ENCOUNTER — OFFICE VISIT (OUTPATIENT)
Dept: INTERNAL MEDICINE CLINIC | Facility: OTHER | Age: 69
End: 2024-12-04
Payer: COMMERCIAL

## 2024-12-04 VITALS
OXYGEN SATURATION: 98 % | WEIGHT: 210 LBS | SYSTOLIC BLOOD PRESSURE: 139 MMHG | HEART RATE: 65 BPM | DIASTOLIC BLOOD PRESSURE: 89 MMHG | TEMPERATURE: 98 F | BODY MASS INDEX: 30.06 KG/M2 | HEIGHT: 70 IN

## 2024-12-04 DIAGNOSIS — E11.65 TYPE 2 DIABETES MELLITUS WITH HYPERGLYCEMIA, WITHOUT LONG-TERM CURRENT USE OF INSULIN (HCC): ICD-10-CM

## 2024-12-04 DIAGNOSIS — I49.9 IRREGULAR HEARTBEAT: ICD-10-CM

## 2024-12-04 DIAGNOSIS — E55.9 VITAMIN D DEFICIENCY: ICD-10-CM

## 2024-12-04 DIAGNOSIS — E78.2 MIXED HYPERLIPIDEMIA: ICD-10-CM

## 2024-12-04 DIAGNOSIS — R94.31 ABNORMAL EKG: ICD-10-CM

## 2024-12-04 DIAGNOSIS — M81.0 OSTEOPOROSIS WITHOUT CURRENT PATHOLOGICAL FRACTURE, UNSPECIFIED OSTEOPOROSIS TYPE: ICD-10-CM

## 2024-12-04 DIAGNOSIS — R80.9 MICROALBUMINURIA: ICD-10-CM

## 2024-12-04 DIAGNOSIS — E87.1 HYPONATREMIA: ICD-10-CM

## 2024-12-04 DIAGNOSIS — E11.3293 TYPE 2 DIABETES MELLITUS WITH BOTH EYES AFFECTED BY MILD NONPROLIFERATIVE RETINOPATHY WITHOUT MACULAR EDEMA, WITHOUT LONG-TERM CURRENT USE OF INSULIN (HCC): ICD-10-CM

## 2024-12-04 DIAGNOSIS — N18.9 CHRONIC KIDNEY DISEASE, UNSPECIFIED CKD STAGE: ICD-10-CM

## 2024-12-04 DIAGNOSIS — E46 PROTEIN-CALORIE MALNUTRITION, UNSPECIFIED SEVERITY (HCC): ICD-10-CM

## 2024-12-04 DIAGNOSIS — Z87.891 HISTORY OF TOBACCO ABUSE: ICD-10-CM

## 2024-12-04 DIAGNOSIS — Z23 NEEDS FLU SHOT: ICD-10-CM

## 2024-12-04 DIAGNOSIS — R79.89 ABNORMAL TSH: Primary | ICD-10-CM

## 2024-12-04 DIAGNOSIS — R71.0 DROP IN HEMOGLOBIN: ICD-10-CM

## 2024-12-04 DIAGNOSIS — I10 ESSENTIAL HYPERTENSION: ICD-10-CM

## 2024-12-04 DIAGNOSIS — E03.9 HYPOTHYROIDISM, UNSPECIFIED TYPE: ICD-10-CM

## 2024-12-04 DIAGNOSIS — E11.628 DIABETIC FOOT INFECTION  (HCC): ICD-10-CM

## 2024-12-04 DIAGNOSIS — Z89.411 ACQUIRED ABSENCE OF RIGHT GREAT TOE (HCC): ICD-10-CM

## 2024-12-04 DIAGNOSIS — I49.3 PVC (PREMATURE VENTRICULAR CONTRACTION): ICD-10-CM

## 2024-12-04 DIAGNOSIS — L08.9 DIABETIC FOOT INFECTION  (HCC): ICD-10-CM

## 2024-12-04 DIAGNOSIS — Z12.5 SCREENING FOR PROSTATE CANCER: ICD-10-CM

## 2024-12-04 PROCEDURE — 99214 OFFICE O/P EST MOD 30 MIN: CPT | Performed by: NURSE PRACTITIONER

## 2024-12-04 PROCEDURE — 90471 IMMUNIZATION ADMIN: CPT

## 2024-12-04 PROCEDURE — 90662 IIV NO PRSV INCREASED AG IM: CPT

## 2024-12-04 PROCEDURE — 93000 ELECTROCARDIOGRAM COMPLETE: CPT | Performed by: NURSE PRACTITIONER

## 2024-12-04 PROCEDURE — G0439 PPPS, SUBSEQ VISIT: HCPCS | Performed by: NURSE PRACTITIONER

## 2024-12-04 RX ORDER — GLIPIZIDE 10 MG/1
20 TABLET, FILM COATED, EXTENDED RELEASE ORAL DAILY
Qty: 180 TABLET | Refills: 1 | Status: SHIPPED | OUTPATIENT
Start: 2024-12-04 | End: 2025-06-02

## 2024-12-04 NOTE — PROGRESS NOTES
Diabetic Foot Exam    Patient's shoes and socks removed.    Right Foot/Ankle   Right Foot Inspection  Skin Exam: skin intact, dry skin, callus and callus. No warmth, no erythema, no maceration, no abnormal color, no pre-ulcer and no ulcer.     Toe Exam: ROM and strength within normal limits and right toe deformity. No swelling, no tenderness and erythema    Sensory   Monofilament testing: intact    Vascular  Capillary refills: < 3 seconds  The right DP pulse is 2+.     Left Foot/Ankle  Left Foot Inspection  Skin Exam: skin intact, dry skin and callus. No warmth, no erythema, no maceration, normal color, no pre-ulcer and no ulcer.     Toe Exam: ROM and strength within normal limits. No swelling, no tenderness, no erythema and no left toe deformity.     Sensory   Monofilament testing: intact    Vascular  Capillary refills: < 3 seconds  The left DP pulse is 2+.     Assign Risk Category  Deformity present  No loss of protective sensation  No weak pulses  Risk: 0      Name: Cecilio Munoz      : 1955      MRN: [id number]  Encounter Provider: SANDRA Cary  Encounter Date: 2024   Encounter department: [facility] [place]    Assessment & Plan  Abnormal TSH    Orders:    Iron Panel (Includes Ferritin, Iron Sat%, Iron, and TIBC); Future    Vitamin B12; Future    TSH, 3rd generation with Free T4 reflex    Hypothyroidism, unspecified type    Orders:    TSH, 3rd generation with Free T4 reflex    Drop in hemoglobin  hbg improving since starting multiviatmin.  Denies blood in urine and stool.  Cologuard negative which was done this year.   Iron and B12 panel Within normal range.     Orders:    Vitamin B12; Future    UA w Reflex to Microscopic w Reflex to Culture; Future    Hyponatremia  -will repeat CMP and get TSH   -continue to monitor  Orders:    Comprehensive metabolic panel; Future    TSH, 3rd generation with Free T4 reflex    Needs flu shot    Orders:    influenza  vaccine, high-dose, PF 0.5 mL (Fluzone High Dose)    Type 2 diabetes mellitus with hyperglycemia, without long-term current use of insulin (HCC)    Lab Results   Component Value Date    HGBA1C 7.7 (H) 11/11/2024       Orders:    glipiZIDE (GLUCOTROL XL) 10 mg 24 hr tablet; Take 2 tablets (20 mg total) by mouth daily    Comprehensive metabolic panel; Future    Hemoglobin A1c (w/out EAG) ([facility] ONLY); Future    Lipid Panel with Direct LDL reflex; Future    TSH, 3rd generation with Free T4 reflex; Future    CBC and differential; Future    Protein-calorie malnutrition, unspecified severity ([facility])    Orders:    Vitamin B12; Future    Screening for prostate cancer    Orders:    PSA, Total Screen; Future    History of tobacco abuse    Orders:    US abdominal aorta screening aaa; Future    Essential hypertension  Uncontrolled while in office today  Patient reports blood pressures are well-controlled at home  Continue Norvasc 5 mg tablet daily, Cardura 2 mg tablet daily  Continue to monitor blood pressure at home.  Goal BP is < 130/80.  Contact our office for consistent elevations.  Recommend low sodium diet.  Exercise 30 minutes 5 times a week as tolerated.  Recommend yearly eye exam.            Irregular heartbeat    Orders:    POCT ECG    PVC (premature ventricular contraction)    Orders:    Holter monitor; Future    Ambulatory Referral to Cardiology; Future    Abnormal EKG  -EKG with PVC, reviewed with Dr. Tai, patient asymptomatic  -Will get Holter monitor, and place referral to cardiology         Type 2 diabetes mellitus with both eyes affected by mild nonproliferative retinopathy without macular edema, without long-term current use of insulin (HCC)    Lab Results   Component Value Date    HGBA1C 7.7 (H) 11/11/2024   Uncontrolled, continue on Actos, and glipizide to 20 mg tablet extended release         Diabetic foot infection  (HCC)    Lab Results   Component Value Date    HGBA1C 7.7 (H) 11/11/2024   Continue to  follow with podiatry         Osteoporosis without current pathological fracture, unspecified osteoporosis type  DEXA scan from 10/2018 shows a T-score of -2.1.  Continue with calcium 1200 mg and vitamin-D 2000 units daily supplementation.         Chronic kidney disease, unspecified CKD stage  Lab Results   Component Value Date    EGFR 49 11/11/2024    EGFR 42 07/22/2024    EGFR 37 02/10/2024    CREATININE 1.42 (H) 11/11/2024    CREATININE 1.60 (H) 07/22/2024    CREATININE 1.77 (H) 02/10/2024   -Avoid nephrotoxins  -Ensure adequate hydration  -Continue to follow with nephrology         Acquired absence of right great toe (HCC)         Vitamin D deficiency  Continue supplementation         Mixed hyperlipidemia  Patient will continue on Lipitor 10 mg tablet.                Microalbuminuria  -ensure good DM control   -continue to monitor             Preventive health issues were discussed with patient, and age appropriate screening tests were ordered as noted in patient's After Visit Summary. Personalized health advice and appropriate referrals for health education or preventive services given if needed, as noted in patient's After Visit Summary.    History of Present Illness       Patient presents today for follow-up on diabetes type 2, hypertension, hyperlipidemia, reviewed blood work while in the office today.   Denies any new concerns.    CKD stage III-currently follows nephrology, creatinine 1.42, GFR 49, baseline creatinine 1.5    Type 2 DM-hemoglobin A1c 7.7, uncontrolled, at last office visit glipizide was increased to 20 mg, he continues with Actos 30 mg  He denies hypoglycemia  He reports that he checks his blood sugars randomly last blood sugar 150    HTN-blood pressure elevated while in office today, patient does report whitecoat syndrome, he is compliant with medication, denies chest pain, shortness of breath, palpitations, changes of vision  He reports that his blood pressures at home are very  well-controlled    Hyperlipidemia-compliant with Lipitor, denies side effects with this medication, cholesterol 129, triglycerides 104, HDL 46, LDL 62    Hemoglobin noted to be decreased at 11.6, denies loss of blood in urine or stool    Patient noted to be hyponatremic at 124, corrected sodium 126, he reports that the time of his blood work he was over hydrating with water, he is noted to be chronically hyponatremic, denies symptoms of hyponatremia, denies dizziness, confusion, syncope    Male screenings:  Colonoscopy- cologuard completed 2021  Hep C-done  Eye Doctor- wears glasses, has not been  Dentist-has not gone  Podiatrist-does not go      Tobacco abuse- Quit in 84         Patient Care Team:  SANDRA Cary as PCP - General (Family Medicine)  Cam Baird MD (Nephrology)    Review of Systems   Constitutional:  Negative for activity change, appetite change, chills, diaphoresis and fever.   HENT:  Negative for congestion, ear discharge, ear pain, postnasal drip, rhinorrhea, sinus pressure, sinus pain and sore throat.    Eyes:  Negative for pain, discharge, itching and visual disturbance.   Respiratory:  Negative for cough, chest tightness, shortness of breath and wheezing.    Cardiovascular:  Negative for chest pain, palpitations and leg swelling.   Gastrointestinal:  Negative for abdominal pain, constipation, diarrhea, nausea and vomiting.   Endocrine: Negative for polydipsia, polyphagia and polyuria.   Genitourinary:  Negative for difficulty urinating, dysuria and urgency.   Musculoskeletal:  Negative for arthralgias, back pain and neck pain.   Skin:  Negative for rash and wound.   Neurological:  Negative for dizziness, weakness, numbness and headaches.     Medical History Reviewed by provider this encounter:  Problems       Annual Wellness Visit Questionnaire   Cecilio is here for his Subsequent Wellness visit.     Health Risk Assessment:   Patient rates overall health as good. Patient feels that  their physical health rating is same. Patient is dissatisfied with their life. Eyesight was rated as same. Hearing was rated as same. Patient feels that their emotional and mental health rating is same. Patients states they are never, rarely angry. Patient states they are never, rarely unusually tired/fatigued. Pain experienced in the last 7 days has been none. Patient states that he has experienced no weight loss or gain in last 6 months.     Depression Screening:   PHQ-2 Score: 0      Fall Risk Screening:   In the past year, patient has experienced: no history of falling in past year      Home Safety:  Patient does not have trouble with stairs inside or outside of their home. Patient has working smoke alarms and has working carbon monoxide detector.     Nutrition:   Current diet is Regular.     Medications:   Patient is currently taking over-the-counter supplements. OTC medications include: see medication list. Patient is able to manage medications.     Activities of Daily Living (ADLs)/Instrumental Activities of Daily Living (IADLs):   Walk and transfer into and out of bed and chair?: Yes  Dress and groom yourself?: Yes    Bathe or shower yourself?: Yes    Feed yourself? Yes  Do your laundry/housekeeping?: Yes  Manage your money, pay your bills and track your expenses?: Yes  Make your own meals?: Yes    Do your own shopping?: Yes    Previous Hospitalizations:   Any hospitalizations or ED visits within the last 12 months?: No      Advance Care Planning:   Living will: Yes    Durable POA for healthcare: Yes    Advanced directive: Yes    Advanced directive counseling given: Yes    ACP document given: Yes      Cognitive Screening:   Provider or family/friend/caregiver concerned regarding cognition?: No    PREVENTIVE SCREENINGS      Cardiovascular Screening:    General: Screening Not Indicated, History Lipid Disorder and Screening Current      Diabetes Screening:     General: Screening Not Indicated, History Diabetes  and Screening Current      Colorectal Cancer Screening:     General: Screening Current      Prostate Cancer Screening:    General: Screening Current    Due for: PSA      Osteoporosis Screening:    General: Screening Not Indicated, History Osteoporosis and Screening Current      Abdominal Aortic Aneurysm (AAA) Screening:    Risk factors include: age between 65-74 yo and tobacco use        General: Risks and Benefits Discussed    Due for: Screening AAA Ultrasound      Lung Cancer Screening:     General: Screening Not Indicated      Hepatitis C Screening:    General: Screening Current    Screening, Brief Intervention, and Referral to Treatment (SBIRT)    Screening  Typical number of drinks in a day: 0  Typical number of drinks in a week: 0  Interpretation: Low risk drinking behavior.    Single Item Drug Screening:  How often have you used an illegal drug (including marijuana) or a prescription medication for non-medical reasons in the past year? never    Single Item Drug Screen Score: 0  Interpretation: Negative screen for possible drug use disorder    Other Counseling Topics:   Car/seat belt/driving safety, skin self-exam, sunscreen and regular weightbearing exercise and calcium and vitamin D intake.     Social Drivers of Health     Financial Resource Strain: Low Risk  (11/29/2023)    Overall Financial Resource Strain (CARDIA)     Difficulty of Paying Living Expenses: Not hard at all   Food Insecurity: No Food Insecurity (12/4/2024)    Hunger Vital Sign     Worried About Running Out of Food in the Last Year: Never true     Ran Out of Food in the Last Year: Never true   Transportation Needs: No Transportation Needs (12/4/2024)    PRAPARE - Transportation     Lack of Transportation (Medical): No     Lack of Transportation (Non-Medical): No   Housing Stability: Low Risk  (12/4/2024)    Housing Stability Vital Sign     Unable to Pay for Housing in the Last Year: No     Number of Times Moved in the Last Year: 0      "Homeless in the Last Year: No   Utilities: Not At Risk (12/4/2024)    [facility] Utilities     Threatened with loss of utilities: No     No results found.    Objective   /80 (BP Location: Left arm, Patient Position: Sitting, Cuff Size: Adult)   Pulse 65   Temp 98 °F (36.7 °C)   Ht 5' 10\" (1.778 m)   Wt 95.3 kg (210 lb)   SpO2 98%   BMI 30.13 kg/m²     Physical Exam  Constitutional:       General: He is not in acute distress.     Appearance: He is well-developed. He is not diaphoretic.   HENT:      Head: Normocephalic and atraumatic.      Right Ear: External ear normal.      Left Ear: External ear normal.      Nose: Nose normal.      Mouth/Throat:      Mouth: Mucous membranes are moist.      Pharynx: No oropharyngeal exudate or posterior oropharyngeal erythema.   Eyes:      General:         Right eye: No discharge.         Left eye: No discharge.      Conjunctiva/sclera: Conjunctivae normal.      Pupils: Pupils are equal, round, and reactive to light.   Neck:      Thyroid: No thyromegaly.   Cardiovascular:      Rate and Rhythm: Normal rate. Rhythm regularly irregular.      Pulses: no weak pulses.           Dorsalis pedis pulses are 2+ on the right side and 2+ on the left side.      Heart sounds: Normal heart sounds. No murmur heard.     No friction rub. No gallop.   Pulmonary:      Effort: Pulmonary effort is normal. No respiratory distress.      Breath sounds: Normal breath sounds. No stridor. No wheezing or rales.   Abdominal:      General: Bowel sounds are normal. There is no distension.      Palpations: Abdomen is soft.      Tenderness: There is no abdominal tenderness.   Musculoskeletal:      Cervical back: Normal range of motion and neck supple.        Feet:    Feet:      Right foot:      Skin integrity: Callus and dry skin present. No ulcer, skin breakdown, erythema or warmth.      Left foot:      Skin integrity: Callus and dry skin present. No ulcer, skin breakdown, erythema or warmth.   Lymphadenopathy: "      Cervical: No cervical adenopathy.   Skin:     General: Skin is warm and dry.      Findings: No erythema or rash.   Neurological:      Mental Status: He is alert and oriented to person, place, and time.   Psychiatric:         Behavior: Behavior normal.         Thought Content: Thought content normal.         Judgment: Judgment normal.

## 2024-12-04 NOTE — PROGRESS NOTES
Name: Cecilio Munoz      : 1955      MRN: [id number]  Encounter Provider: SANDRA Cary  Encounter Date: 2024   Encounter department: [facility]  :  Assessment & Plan  Abnormal TSH    Orders:    Iron Panel (Includes Ferritin, Iron Sat%, Iron, and TIBC); Future    Vitamin B12; Future    TSH, 3rd generation with Free T4 reflex    Hypothyroidism, unspecified type    Orders:    TSH, 3rd generation with Free T4 reflex    Drop in hemoglobin    Orders:    Vitamin B12; Future    UA w Reflex to Microscopic w Reflex to Culture; Future    Hyponatremia    Orders:    Comprehensive metabolic panel; Future    TSH, 3rd generation with Free T4 reflex    Needs flu shot    Orders:    influenza vaccine, high-dose, PF 0.5 mL (Fluzone High Dose)    Type 2 diabetes mellitus with hyperglycemia, without long-term current use of insulin (HCC)    Lab Results   Component Value Date    HGBA1C 7.7 (H) 2024       Orders:    glipiZIDE (GLUCOTROL XL) 10 mg 24 hr tablet; Take 2 tablets (20 mg total) by mouth daily    Comprehensive metabolic panel; Future    Hemoglobin A1c (w/out EAG) ([facility] ONLY); Future    Lipid Panel with Direct LDL reflex; Future    TSH, 3rd generation with Free T4 reflex; Future    CBC and differential; Future    Protein-calorie malnutrition, unspecified severity (HCC)    Orders:    Vitamin B12; Future           History of Present Illness       Patient presents today for follow-up on diabetes type 2, hypertension, hyperlipidemia, reviewed blood work while in the office today.   Denies any new concerns.    CKD stage III-currently follows nephrology, creatinine 1.42, GFR 49, baseline creatinine 1.5    Hemoglobin noted to be decreased at 11.6    Patient noted to be hyponatremic at 124, corrected sodium 126    Male screenings:  Colonoscopy- cologuard completed   Hep C-done  Eye Doctor- wears glasses, has not been  Dentist-has not gone  Podiatrist-does not  "go      Tobacco abuse- Quit in 84        Diabetes  He presents for his follow-up diabetic visit. He has type 2 diabetes mellitus. No MedicAlert identification noted. His disease course has been worsening (HBA1C 7.7, glipizide increased at last office visit). There are no hypoglycemic associated symptoms. Pertinent negatives for hypoglycemia include no dizziness, headaches or nervousness/anxiousness. Pertinent negatives for diabetes include no chest pain, no fatigue, no polydipsia, no polyphagia, no polyuria, no weakness and no weight loss. There are no hypoglycemic complications. Symptoms are stable. Risk factors for coronary artery disease include diabetes mellitus, dyslipidemia and male sex. Current diabetic treatment includes oral agent (dual therapy). He rarely participates in exercise. (150s  Denies lows sugars) He sees a podiatrist.Eye exam is not current.   Hypertension  This is a chronic problem. The current episode started more than 1 year ago. The problem is unchanged. The problem is uncontrolled. Pertinent negatives include no anxiety, chest pain, headaches, neck pain, palpitations or shortness of breath.   Hyperlipidemia  This is a new (Cholesterol 129 triglycerides 104, HDL 46 LDL 62) problem. The current episode started more than 1 month ago. The problem is controlled. Pertinent negatives include no chest pain or shortness of breath.     Review of Systems   Constitutional:  Negative for fatigue and weight loss.   Respiratory:  Negative for shortness of breath.    Cardiovascular:  Negative for chest pain and palpitations.   Endocrine: Negative for polydipsia, polyphagia and polyuria.   Musculoskeletal:  Negative for neck pain.   Neurological:  Negative for dizziness, weakness and headaches.   Psychiatric/Behavioral:  The patient is not nervous/anxious.           Objective   /80 (BP Location: Left arm, Patient Position: Sitting, Cuff Size: Adult)   Pulse 65   Temp 98 °F (36.7 °C)   Ht 5' 10\" " (1.778 m)   Wt 95.3 kg (210 lb)   SpO2 98%   BMI 30.13 kg/m²      Physical Exam

## 2024-12-04 NOTE — PATIENT INSTRUCTIONS
Medicare Preventive Visit Patient Instructions  Thank you for completing your Welcome to Medicare Visit or Medicare Annual Wellness Visit today. Your next wellness visit will be due in one year (12/5/2025).  The screening/preventive services that you may require over the next 5-10 years are detailed below. Some tests may not apply to you based off risk factors and/or age. Screening tests ordered at today's visit but not completed yet may show as past due. Also, please note that scanned in results may not display below.  Preventive Screenings:  Service Recommendations Previous Testing/Comments   Colorectal Cancer Screening  Colonoscopy    Fecal Occult Blood Test (FOBT)/Fecal Immunochemical Test (FIT)  Fecal DNA/Cologuard Test  Flexible Sigmoidoscopy Age: 45-75 years old   Colonoscopy: every 10 years (May be performed more frequently if at higher risk)  OR  FOBT/FIT: every 1 year  OR  Cologuard: every 3 years  OR  Sigmoidoscopy: every 5 years  Screening may be recommended earlier than age 45 if at higher risk for colorectal cancer. Also, an individualized decision between you and your healthcare provider will decide whether screening between the ages of 76-85 would be appropriate. Colonoscopy: Not on file  FOBT/FIT: Not on file  Cologuard: 05/21/2024  Sigmoidoscopy: Not on file    Screening Current     Prostate Cancer Screening Individualized decision between patient and health care provider in men between ages of 55-69   Medicare will cover every 12 months beginning on the day after your 50th birthday PSA: 0.24 ng/mL     Screening Current     Hepatitis C Screening Once for adults born between 1945 and 1965  More frequently in patients at high risk for Hepatitis C Hep C Antibody: 10/10/2019    Screening Current   Diabetes Screening 1-2 times per year if you're at risk for diabetes or have pre-diabetes Fasting glucose: 169 mg/dL (11/11/2024)  A1C: 7.7 % (11/11/2024)  Screening Not Indicated  History Diabetes    Cholesterol Screening Once every 5 years if you don't have a lipid disorder. May order more often based on risk factors. Lipid panel: 11/11/2024  Screening Not Indicated  History Lipid Disorder      Other Preventive Screenings Covered by Medicare:  Abdominal Aortic Aneurysm (AAA) Screening: covered once if your at risk. You're considered to be at risk if you have a family history of AAA or a male between the age of 65-75 who smoking at least 100 cigarettes in your lifetime.  Lung Cancer Screening: covers low dose CT scan once per year if you meet all of the following conditions: (1) Age 55-77; (2) No signs or symptoms of lung cancer; (3) Current smoker or have quit smoking within the last 15 years; (4) You have a tobacco smoking history of at least 20 pack years (packs per day x number of years you smoked); (5) You get a written order from a healthcare provider.  Glaucoma Screening: covered annually if you're considered high risk: (1) You have diabetes OR (2) Family history of glaucoma OR (3)  aged 50 and older OR (4)  American aged 65 and older  Osteoporosis Screening: covered every 2 years if you meet one of the following conditions: (1) Have a vertebral abnormality; (2) On glucocorticoid therapy for more than 3 months; (3) Have primary hyperparathyroidism; (4) On osteoporosis medications and need to assess response to drug therapy.  HIV Screening: covered annually if you're between the age of 15-65. Also covered annually if you are younger than 15 and older than 65 with risk factors for HIV infection. For pregnant patients, it is covered up to 3 times per pregnancy.    Immunizations:  Immunization Recommendations   Influenza Vaccine Annual influenza vaccination during flu season is recommended for all persons aged >= 6 months who do not have contraindications   Pneumococcal Vaccine   * Pneumococcal conjugate vaccine = PCV13 (Prevnar 13), PCV15 (Vaxneuvance), PCV20 (Prevnar 20)  *  Pneumococcal polysaccharide vaccine = PPSV23 (Pneumovax) Adults 19-65 yo with certain risk factors or if 65+ yo  If never received any pneumonia vaccine: recommend Prevnar 20 (PCV20)  Give PCV20 if previously received 1 dose of PCV13 or PPSV23   Hepatitis B Vaccine 3 dose series if at intermediate or high risk (ex: diabetes, end stage renal disease, liver disease)   Respiratory syncytial virus (RSV) Vaccine - COVERED BY MEDICARE PART D  * RSVPreF3 (Arexvy) CDC recommends that adults 60 years of age and older may receive a single dose of RSV vaccine using shared clinical decision-making (SCDM)   Tetanus (Td) Vaccine - COST NOT COVERED BY MEDICARE PART B Following completion of primary series, a booster dose should be given every 10 years to maintain immunity against tetanus. Td may also be given as tetanus wound prophylaxis.   Tdap Vaccine - COST NOT COVERED BY MEDICARE PART B Recommended at least once for all adults. For pregnant patients, recommended with each pregnancy.   Shingles Vaccine (Shingrix) - COST NOT COVERED BY MEDICARE PART B  2 shot series recommended in those 19 years and older who have or will have weakened immune systems or those 50 years and older     Health Maintenance Due:      Topic Date Due   • Colorectal Cancer Screening  05/21/2027   • Hepatitis C Screening  Completed     Immunizations Due:      Topic Date Due   • COVID-19 Vaccine (5 - 2024-25 season) 09/01/2024     Advance Directives   What are advance directives?  Advance directives are legal documents that state your wishes and plans for medical care. These plans are made ahead of time in case you lose your ability to make decisions for yourself. Advance directives can apply to any medical decision, such as the treatments you want, and if you want to donate organs.   What are the types of advance directives?  There are many types of advance directives, and each state has rules about how to use them. You may choose a combination of any of  the following:  Living will:  This is a written record of the treatment you want. You can also choose which treatments you do not want, which to limit, and which to stop at a certain time. This includes surgery, medicine, IV fluid, and tube feedings.   Durable power of  for healthcare (DPAHC):  This is a written record that states who you want to make healthcare choices for you when you are unable to make them for yourself. This person, called a proxy, is usually a family member or a friend. You may choose more than 1 proxy.  Do not resuscitate (DNR) order:  A DNR order is used in case your heart stops beating or you stop breathing. It is a request not to have certain forms of treatment, such as CPR. A DNR order may be included in other types of advance directives.  Medical directive:  This covers the care that you want if you are in a coma, near death, or unable to make decisions for yourself. You can list the treatments you want for each condition. Treatment may include pain medicine, surgery, blood transfusions, dialysis, IV or tube feedings, and a ventilator (breathing machine).  Values history:  This document has questions about your views, beliefs, and how you feel and think about life. This information can help others choose the care that you would choose.  Why are advance directives important?  An advance directive helps you control your care. Although spoken wishes may be used, it is better to have your wishes written down. Spoken wishes can be misunderstood, or not followed. Treatments may be given even if you do not want them. An advance directive may make it easier for your family to make difficult choices about your care.   Weight Management   Why it is important to manage your weight:  Being overweight increases your risk of health conditions such as heart disease, high blood pressure, type 2 diabetes, and certain types of cancer. It can also increase your risk for osteoarthritis, sleep apnea,  and other respiratory problems. Aim for a slow, steady weight loss. Even a small amount of weight loss can lower your risk of health problems.  How to lose weight safely:  A safe and healthy way to lose weight is to eat fewer calories and get regular exercise. You can lose up about 1 pound a week by decreasing the number of calories you eat by 500 calories each day.   Healthy meal plan for weight management:  A healthy meal plan includes a variety of foods, contains fewer calories, and helps you stay healthy. A healthy meal plan includes the following:  Eat whole-grain foods more often.  A healthy meal plan should contain fiber. Fiber is the part of grains, fruits, and vegetables that is not broken down by your body. Whole-grain foods are healthy and provide extra fiber in your diet. Some examples of whole-grain foods are whole-wheat breads and pastas, oatmeal, brown rice, and bulgur.  Eat a variety of vegetables every day.  Include dark, leafy greens such as spinach, kale, ko greens, and mustard greens. Eat yellow and orange vegetables such as carrots, sweet potatoes, and winter squash.   Eat a variety of fruits every day.  Choose fresh or canned fruit (canned in its own juice or light syrup) instead of juice. Fruit juice has very little or no fiber.  Eat low-fat dairy foods.  Drink fat-free (skim) milk or 1% milk. Eat fat-free yogurt and low-fat cottage cheese. Try low-fat cheeses such as mozzarella and other reduced-fat cheeses.  Choose meat and other protein foods that are low in fat.  Choose beans or other legumes such as split peas or lentils. Choose fish, skinless poultry (chicken or turkey), or lean cuts of red meat (beef or pork). Before you cook meat or poultry, cut off any visible fat.   Use less fat and oil.  Try baking foods instead of frying them. Add less fat, such as margarine, sour cream, regular salad dressing and mayonnaise to foods. Eat fewer high-fat foods. Some examples of high-fat foods  include french fries, doughnuts, ice cream, and cakes.  Eat fewer sweets.  Limit foods and drinks that are high in sugar. This includes candy, cookies, regular soda, and sweetened drinks.  Exercise:  Exercise at least 30 minutes per day on most days of the week. Some examples of exercise include walking, biking, dancing, and swimming. You can also fit in more physical activity by taking the stairs instead of the elevator or parking farther away from stores. Ask your healthcare provider about the best exercise plan for you.      © Copyright [employer] 2018 Information is for End User's use only and may not be sold, redistributed or otherwise used for commercial purposes. All illustrations and images included in CareNotes® are the copyrighted property of A.[employer].A[employer]M., Inc. or [employer]

## 2024-12-04 NOTE — ASSESSMENT & PLAN NOTE
-will repeat CMP and get TSH   -continue to monitor  Orders:    Comprehensive metabolic panel; Future    TSH, 3rd generation with Free T4 reflex

## 2024-12-04 NOTE — ASSESSMENT & PLAN NOTE
hbg improving since starting multiviatmin.  Denies blood in urine and stool.  Cologuard negative which was done this year.   Iron and B12 panel Within normal range.     Orders:    Vitamin B12; Future    UA w Reflex to Microscopic w Reflex to Culture; Future

## 2024-12-04 NOTE — ASSESSMENT & PLAN NOTE
Lab Results   Component Value Date    EGFR 49 11/11/2024    EGFR 42 07/22/2024    EGFR 37 02/10/2024    CREATININE 1.42 (H) 11/11/2024    CREATININE 1.60 (H) 07/22/2024    CREATININE 1.77 (H) 02/10/2024   -Avoid nephrotoxins  -Ensure adequate hydration  -Continue to follow with nephrology

## 2024-12-04 NOTE — ASSESSMENT & PLAN NOTE
Uncontrolled while in office today  Patient reports blood pressures are well-controlled at home  Continue Norvasc 5 mg tablet daily, Cardura 2 mg tablet daily  Continue to monitor blood pressure at home.  Goal BP is < 130/80.  Contact our office for consistent elevations.  Recommend low sodium diet.  Exercise 30 minutes 5 times a week as tolerated.  Recommend yearly eye exam.

## 2024-12-04 NOTE — ASSESSMENT & PLAN NOTE
-EKG with PVC, reviewed with Dr. Tai, patient asymptomatic  -Will get Holter monitor, and place referral to cardiology

## 2024-12-04 NOTE — ASSESSMENT & PLAN NOTE
Lab Results   Component Value Date    HGBA1C 7.7 (H) 11/11/2024   Continue to follow with podiatry

## 2024-12-04 NOTE — ASSESSMENT & PLAN NOTE
Lab Results   Component Value Date    HGBA1C 7.7 (H) 11/11/2024   Uncontrolled, continue on Actos, and glipizide to 20 mg tablet extended release

## 2024-12-05 ENCOUNTER — APPOINTMENT (OUTPATIENT)
Dept: LAB | Facility: IMAGING CENTER | Age: 69
End: 2024-12-05
Payer: COMMERCIAL

## 2024-12-05 DIAGNOSIS — R71.0 DROP IN HEMOGLOBIN: ICD-10-CM

## 2024-12-05 DIAGNOSIS — E46 PROTEIN-CALORIE MALNUTRITION, UNSPECIFIED SEVERITY (HCC): ICD-10-CM

## 2024-12-05 DIAGNOSIS — E87.1 HYPONATREMIA: ICD-10-CM

## 2024-12-05 DIAGNOSIS — R79.89 ABNORMAL TSH: ICD-10-CM

## 2024-12-05 DIAGNOSIS — N18.9 CHRONIC KIDNEY DISEASE, UNSPECIFIED CKD STAGE: ICD-10-CM

## 2024-12-05 LAB
ALBUMIN SERPL BCG-MCNC: 4.3 G/DL (ref 3.5–5)
ALP SERPL-CCNC: 30 U/L (ref 34–104)
ALT SERPL W P-5'-P-CCNC: 18 U/L (ref 7–52)
ANION GAP SERPL CALCULATED.3IONS-SCNC: 5 MMOL/L (ref 4–13)
AST SERPL W P-5'-P-CCNC: 19 U/L (ref 13–39)
BACTERIA UR QL AUTO: NORMAL /HPF
BILIRUB SERPL-MCNC: 0.61 MG/DL (ref 0.2–1)
BILIRUB UR QL STRIP: NEGATIVE
BUN SERPL-MCNC: 44 MG/DL (ref 5–25)
CALCIUM SERPL-MCNC: 9.6 MG/DL (ref 8.4–10.2)
CHLORIDE SERPL-SCNC: 101 MMOL/L (ref 96–108)
CLARITY UR: CLEAR
CO2 SERPL-SCNC: 27 MMOL/L (ref 21–32)
COLOR UR: COLORLESS
CREAT SERPL-MCNC: 1.78 MG/DL (ref 0.6–1.3)
FERRITIN SERPL-MCNC: 560 NG/ML (ref 24–336)
GFR SERPL CREATININE-BSD FRML MDRD: 37 ML/MIN/1.73SQ M
GLUCOSE SERPL-MCNC: 186 MG/DL (ref 65–140)
GLUCOSE UR STRIP-MCNC: NEGATIVE MG/DL
HGB UR QL STRIP.AUTO: NEGATIVE
KETONES UR STRIP-MCNC: NEGATIVE MG/DL
LEUKOCYTE ESTERASE UR QL STRIP: NEGATIVE
NITRITE UR QL STRIP: NEGATIVE
NON-SQ EPI CELLS URNS QL MICRO: NORMAL /HPF
PH UR STRIP.AUTO: 6.5 [PH]
POTASSIUM SERPL-SCNC: 4.5 MMOL/L (ref 3.5–5.3)
PROT SERPL-MCNC: 7.3 G/DL (ref 6.4–8.4)
PROT UR STRIP-MCNC: ABNORMAL MG/DL
RBC #/AREA URNS AUTO: NORMAL /HPF
SODIUM SERPL-SCNC: 133 MMOL/L (ref 135–147)
SP GR UR STRIP.AUTO: 1.01 (ref 1–1.03)
TSH SERPL DL<=0.05 MIU/L-ACNC: 2.78 UIU/ML (ref 0.45–4.5)
UROBILINOGEN UR STRIP-ACNC: <2 MG/DL
VIT B12 SERPL-MCNC: 383 PG/ML (ref 180–914)
WBC #/AREA URNS AUTO: NORMAL /HPF

## 2024-12-05 PROCEDURE — 80053 COMPREHEN METABOLIC PANEL: CPT

## 2024-12-05 PROCEDURE — 84443 ASSAY THYROID STIM HORMONE: CPT | Performed by: NURSE PRACTITIONER

## 2024-12-05 PROCEDURE — 83550 IRON BINDING TEST: CPT

## 2024-12-05 PROCEDURE — 81001 URINALYSIS AUTO W/SCOPE: CPT

## 2024-12-05 PROCEDURE — 82728 ASSAY OF FERRITIN: CPT

## 2024-12-05 PROCEDURE — 83540 ASSAY OF IRON: CPT

## 2024-12-05 PROCEDURE — 36415 COLL VENOUS BLD VENIPUNCTURE: CPT

## 2024-12-05 PROCEDURE — 82607 VITAMIN B-12: CPT

## 2024-12-06 LAB
IRON SATN MFR SERPL: 33 % (ref 15–50)
IRON SERPL-MCNC: 98 UG/DL (ref 50–212)
TIBC SERPL-MCNC: 297 UG/DL (ref 250–450)
UIBC SERPL-MCNC: 199 UG/DL (ref 155–355)

## 2025-01-23 ENCOUNTER — OFFICE VISIT (OUTPATIENT)
Dept: NEPHROLOGY | Facility: CLINIC | Age: 70
End: 2025-01-23
Payer: COMMERCIAL

## 2025-01-23 VITALS
DIASTOLIC BLOOD PRESSURE: 78 MMHG | SYSTOLIC BLOOD PRESSURE: 148 MMHG | HEIGHT: 70 IN | BODY MASS INDEX: 31.21 KG/M2 | HEART RATE: 68 BPM | WEIGHT: 218 LBS

## 2025-01-23 DIAGNOSIS — R80.9 MICROALBUMINURIA: ICD-10-CM

## 2025-01-23 DIAGNOSIS — N18.9 CHRONIC KIDNEY DISEASE, UNSPECIFIED CKD STAGE: Primary | ICD-10-CM

## 2025-01-23 PROCEDURE — 99214 OFFICE O/P EST MOD 30 MIN: CPT | Performed by: INTERNAL MEDICINE

## 2025-01-23 NOTE — ASSESSMENT & PLAN NOTE
The patient is diabetic he has microalbuminuria so at this part he does not have overt nephropathy.  His last albumin to creatinine ratio was 174 mg so continue to monitor.    I have spent a total time of 30 minutes in caring for this patient on the day of the visit/encounter including Diagnostic results, Prognosis, Patient and family education, Impressions, Reviewing / ordering tests, medicine, procedures  , and Obtaining or reviewing history  .

## 2025-01-23 NOTE — LETTER
2025     SANDRA Cary  [address]  [facility] [address]    Patient: Cecilio Munoz   YOB: 1955   Date of Visit: 2025       Dear Dr. Shepherd:    Thank you for referring Cecilio Munoz to me for evaluation. Below are my notes for this consultation.    If you have questions, please do not hesitate to call me. I look forward to following your patient along with you.         Sincerely,        Cam Baird MD        CC: No Recipients    Cam Baird MD  2025 12:57 PM  Sign when Signing Visit  Name: Cecilio Munoz      : 1955      MRN: [id number]  Encounter Provider: Cam Baird MD  Encounter Date: 2025   Encounter department: [facility]  :  Assessment & Plan  Chronic kidney disease, unspecified CKD stage  Lab Results   Component Value Date    EGFR 37 2024    EGFR 49 2024    EGFR 42 2024    CREATININE 1.78 (H) 2024    CREATININE 1.42 (H) 2024    CREATININE 1.60 (H) 2024     The patient has chronic kidney disease and he is a longstanding diabetic but I do not think he has diabetic nephropathy as his urine protein estimations are still in the microalbuminuria range.  His latest creatinine is 1.7 which is stable without progression over couple years.  His last creatinine was 1.4 his sodium concentration was also low but he told me he was drinking a lot of excess fluids.  Since he went back to normal sodium concentration improved back towards his baseline his creatinine is 1.7 and stable.    I explained to him he likely has nephrosclerosis which is aging of the kidney as opposed to a significant intrinsic disease given lack of proteinuria.  It is important to continue with good sugar control try to keep A1c of 7% to help reduce the risk of diabetic complications in the future.    I told him continue with glycemic control, blood pressure control, atorvastatin and will continue to  "monitor.    Patient's blood pressure was a little higher than it usually is but he was telling me he was very anxious about his blood pressure today and generally he says it is in the 130 millimeter mercury systolic.  I told him to call me if it would be elevated or if he has any concerns.      Orders:  •  Basic metabolic panel; Future    Microalbuminuria    The patient is diabetic he has microalbuminuria so at this part he does not have overt nephropathy.  His last albumin to creatinine ratio was 174 mg so continue to monitor.    I have spent a total time of 30 minutes in caring for this patient on the day of the visit/encounter including Diagnostic results, Prognosis, Patient and family education, Impressions, Reviewing / ordering tests, medicine, procedures  , and Obtaining or reviewing history  .              History of Present Illness  HPI  Cecilio Munoz is a 70 y.o. male who presents for follow-up to monitor his kidney function.  He states he has been doing well with his health but he just feels very anxious today is worried his blood pressure is going to be elevated.  Other than that he has no acute complaints he recently celebrated a birthday.  History obtained from: patient    Review of Systems   Constitutional:  Negative for appetite change, chills, diaphoresis and fever.   HENT: Negative.     Eyes: Negative.    Respiratory:  Negative for cough, chest tightness and shortness of breath.    Cardiovascular:  Negative for chest pain, palpitations and leg swelling.   Gastrointestinal:  Negative for abdominal pain, diarrhea, nausea and vomiting.   Genitourinary: Negative.    Neurological:  Negative for dizziness and headaches.   Psychiatric/Behavioral:  Negative for agitation, behavioral problems, confusion and decreased concentration.           Objective  /78 (BP Location: Left arm, Patient Position: Sitting, Cuff Size: Standard)   Pulse 68   Ht 5' 10\" (1.778 m)   Wt 98.9 kg (218 lb)   BMI 31.28 kg/m² "      Physical Exam  Constitutional:       General: He is not in acute distress.     Appearance: He is not ill-appearing or toxic-appearing.   HENT:      Head: Normocephalic and atraumatic.      Nose: Nose normal.      Mouth/Throat:      Mouth: Mucous membranes are moist.   Eyes:      General: No scleral icterus.     Extraocular Movements: Extraocular movements intact.   Cardiovascular:      Rate and Rhythm: Normal rate and regular rhythm.      Heart sounds:      No gallop.      Comments: No significant edema.  Pulmonary:      Effort: Pulmonary effort is normal. No respiratory distress.      Breath sounds: No wheezing or rales.   Abdominal:      General: Bowel sounds are normal. There is no distension.      Palpations: Abdomen is soft.      Tenderness: There is no abdominal tenderness.   Neurological:      Mental Status: He is alert and oriented to person, place, and time. Mental status is at baseline.   Psychiatric:         Mood and Affect: Mood normal.         Behavior: Behavior normal.

## 2025-01-23 NOTE — ASSESSMENT & PLAN NOTE
Lab Results   Component Value Date    EGFR 37 12/05/2024    EGFR 49 11/11/2024    EGFR 42 07/22/2024    CREATININE 1.78 (H) 12/05/2024    CREATININE 1.42 (H) 11/11/2024    CREATININE 1.60 (H) 07/22/2024     The patient has chronic kidney disease and he is a longstanding diabetic but I do not think he has diabetic nephropathy as his urine protein estimations are still in the microalbuminuria range.  His latest creatinine is 1.7 which is stable without progression over couple years.  His last creatinine was 1.4 his sodium concentration was also low but he told me he was drinking a lot of excess fluids.  Since he went back to normal sodium concentration improved back towards his baseline his creatinine is 1.7 and stable.    I explained to him he likely has nephrosclerosis which is aging of the kidney as opposed to a significant intrinsic disease given lack of proteinuria.  It is important to continue with good sugar control try to keep A1c of 7% to help reduce the risk of diabetic complications in the future.    I told him continue with glycemic control, blood pressure control, atorvastatin and will continue to monitor.    Patient's blood pressure was a little higher than it usually is but he was telling me he was very anxious about his blood pressure today and generally he says it is in the 130 millimeter mercury systolic.  I told him to call me if it would be elevated or if he has any concerns.      Orders:    Basic metabolic panel; Future

## 2025-01-23 NOTE — PROGRESS NOTES
Name: Cecilio Munoz      : 1955      MRN: [id number]  Encounter Provider: Cam Baird MD  Encounter Date: 2025   Encounter department: [facility]  :  Assessment & Plan  Chronic kidney disease, unspecified CKD stage  Lab Results   Component Value Date    EGFR 37 2024    EGFR 49 2024    EGFR 42 2024    CREATININE 1.78 (H) 2024    CREATININE 1.42 (H) 2024    CREATININE 1.60 (H) 2024     The patient has chronic kidney disease and he is a longstanding diabetic but I do not think he has diabetic nephropathy as his urine protein estimations are still in the microalbuminuria range.  His latest creatinine is 1.7 which is stable without progression over couple years.  His last creatinine was 1.4 his sodium concentration was also low but he told me he was drinking a lot of excess fluids.  Since he went back to normal sodium concentration improved back towards his baseline his creatinine is 1.7 and stable.    I explained to him he likely has nephrosclerosis which is aging of the kidney as opposed to a significant intrinsic disease given lack of proteinuria.  It is important to continue with good sugar control try to keep A1c of 7% to help reduce the risk of diabetic complications in the future.    I told him continue with glycemic control, blood pressure control, atorvastatin and will continue to monitor.    Patient's blood pressure was a little higher than it usually is but he was telling me he was very anxious about his blood pressure today and generally he says it is in the 130 millimeter mercury systolic.  I told him to call me if it would be elevated or if he has any concerns.      Orders:    Basic metabolic panel; Future    Microalbuminuria    The patient is diabetic he has microalbuminuria so at this part he does not have overt nephropathy.  His last albumin to creatinine ratio was 174 mg so continue to monitor.    I have spent a total time  "of 30 minutes in caring for this patient on the day of the visit/encounter including Diagnostic results, Prognosis, Patient and family education, Impressions, Reviewing / ordering tests, medicine, procedures  , and Obtaining or reviewing history  .              History of Present Illness   HPI  Cecilio Mnuoz is a 70 y.o. male who presents for follow-up to monitor his kidney function.  He states he has been doing well with his health but he just feels very anxious today is worried his blood pressure is going to be elevated.  Other than that he has no acute complaints he recently celebrated a birthday.  History obtained from: patient    Review of Systems   Constitutional:  Negative for appetite change, chills, diaphoresis and fever.   HENT: Negative.     Eyes: Negative.    Respiratory:  Negative for cough, chest tightness and shortness of breath.    Cardiovascular:  Negative for chest pain, palpitations and leg swelling.   Gastrointestinal:  Negative for abdominal pain, diarrhea, nausea and vomiting.   Genitourinary: Negative.    Neurological:  Negative for dizziness and headaches.   Psychiatric/Behavioral:  Negative for agitation, behavioral problems, confusion and decreased concentration.           Objective   /78 (BP Location: Left arm, Patient Position: Sitting, Cuff Size: Standard)   Pulse 68   Ht 5' 10\" (1.778 m)   Wt 98.9 kg (218 lb)   BMI 31.28 kg/m²      Physical Exam  Constitutional:       General: He is not in acute distress.     Appearance: He is not ill-appearing or toxic-appearing.   HENT:      Head: Normocephalic and atraumatic.      Nose: Nose normal.      Mouth/Throat:      Mouth: Mucous membranes are moist.   Eyes:      General: No scleral icterus.     Extraocular Movements: Extraocular movements intact.   Cardiovascular:      Rate and Rhythm: Normal rate and regular rhythm.      Heart sounds:      No gallop.      Comments: No significant edema.  Pulmonary:      Effort: Pulmonary effort is " normal. No respiratory distress.      Breath sounds: No wheezing or rales.   Abdominal:      General: Bowel sounds are normal. There is no distension.      Palpations: Abdomen is soft.      Tenderness: There is no abdominal tenderness.   Neurological:      Mental Status: He is alert and oriented to person, place, and time. Mental status is at baseline.   Psychiatric:         Mood and Affect: Mood normal.         Behavior: Behavior normal.

## 2025-01-23 NOTE — PATIENT INSTRUCTIONS
You are here for follow-up sounds like your health is doing well.  With respect to the kidneys the creatinine which is the blood test is 1.7 and that stable over a couple years without any worsening.  Also there is not a lot of protein in the urine so that tells me you do not have diabetic kidney disease and that is good because that can progress.  And also more serious kidney diseases cause a lot of protein in the urine so is good that it is not there.    You likely have nephrosclerosis or aging in the kidney and for now just focus on routine good health management sugar control to avoid those problems in the future.    If your blood pressure would be elevated contact me but you are telling me how you get anxious so was a little higher today but you monitor at home and sounds like it is under good control but of course call me if there is a problem.

## 2025-01-29 ENCOUNTER — VBI (OUTPATIENT)
Dept: ADMINISTRATIVE | Facility: OTHER | Age: 70
End: 2025-01-29

## 2025-01-29 NOTE — TELEPHONE ENCOUNTER
01/29/25 7:37 AM     Chart reviewed for Hemoglobin A1c was/were submitted to the patient's insurance.     Lucrecia Lema MA   [facility]

## 2025-01-30 DIAGNOSIS — I10 ESSENTIAL HYPERTENSION: ICD-10-CM

## 2025-01-30 DIAGNOSIS — E78.5 HYPERLIPIDEMIA, UNSPECIFIED HYPERLIPIDEMIA TYPE: ICD-10-CM

## 2025-01-30 DIAGNOSIS — E11.3293 TYPE 2 DIABETES MELLITUS WITH BOTH EYES AFFECTED BY MILD NONPROLIFERATIVE RETINOPATHY WITHOUT MACULAR EDEMA, WITHOUT LONG-TERM CURRENT USE OF INSULIN (HCC): ICD-10-CM

## 2025-01-30 DIAGNOSIS — N18.9 CHRONIC KIDNEY DISEASE, UNSPECIFIED CKD STAGE: ICD-10-CM

## 2025-01-30 RX ORDER — AMLODIPINE BESYLATE 5 MG/1
5 TABLET ORAL DAILY
Qty: 90 TABLET | Refills: 1 | Status: SHIPPED | OUTPATIENT
Start: 2025-01-30

## 2025-01-30 RX ORDER — ATORVASTATIN CALCIUM 10 MG/1
10 TABLET, FILM COATED ORAL DAILY
Qty: 90 TABLET | Refills: 1 | Status: SHIPPED | OUTPATIENT
Start: 2025-01-30

## 2025-01-30 RX ORDER — PIOGLITAZONE 30 MG/1
30 TABLET ORAL DAILY
Qty: 90 TABLET | Refills: 1 | Status: SHIPPED | OUTPATIENT
Start: 2025-01-30

## 2025-01-30 RX ORDER — DOXAZOSIN 2 MG/1
2 TABLET ORAL 2 TIMES DAILY
Qty: 180 TABLET | Refills: 1 | Status: SHIPPED | OUTPATIENT
Start: 2025-01-30

## 2025-03-04 DIAGNOSIS — E11.65 TYPE 2 DIABETES MELLITUS WITH HYPERGLYCEMIA, WITHOUT LONG-TERM CURRENT USE OF INSULIN (HCC): ICD-10-CM

## 2025-03-05 RX ORDER — GLIPIZIDE 10 MG/1
20 TABLET, FILM COATED, EXTENDED RELEASE ORAL DAILY
Qty: 180 TABLET | Refills: 1 | Status: SHIPPED | OUTPATIENT
Start: 2025-03-05 | End: 2025-09-01

## 2025-04-23 DIAGNOSIS — I10 ESSENTIAL HYPERTENSION: ICD-10-CM

## 2025-04-23 DIAGNOSIS — E78.5 HYPERLIPIDEMIA, UNSPECIFIED HYPERLIPIDEMIA TYPE: ICD-10-CM

## 2025-04-23 DIAGNOSIS — E11.3293 TYPE 2 DIABETES MELLITUS WITH BOTH EYES AFFECTED BY MILD NONPROLIFERATIVE RETINOPATHY WITHOUT MACULAR EDEMA, WITHOUT LONG-TERM CURRENT USE OF INSULIN (HCC): ICD-10-CM

## 2025-04-23 DIAGNOSIS — N18.9 CHRONIC KIDNEY DISEASE, UNSPECIFIED CKD STAGE: ICD-10-CM

## 2025-04-24 RX ORDER — ATORVASTATIN CALCIUM 10 MG/1
10 TABLET, FILM COATED ORAL DAILY
Qty: 90 TABLET | Refills: 1 | Status: SHIPPED | OUTPATIENT
Start: 2025-04-24

## 2025-04-24 RX ORDER — PIOGLITAZONE 30 MG/1
30 TABLET ORAL DAILY
Qty: 90 TABLET | Refills: 1 | Status: SHIPPED | OUTPATIENT
Start: 2025-04-24

## 2025-04-24 RX ORDER — DOXAZOSIN 2 MG/1
2 TABLET ORAL 2 TIMES DAILY
Qty: 180 TABLET | Refills: 1 | Status: SHIPPED | OUTPATIENT
Start: 2025-04-24

## 2025-04-24 RX ORDER — AMLODIPINE BESYLATE 5 MG/1
5 TABLET ORAL DAILY
Qty: 90 TABLET | Refills: 1 | Status: SHIPPED | OUTPATIENT
Start: 2025-04-24

## 2025-05-06 LAB
LEFT EYE DIABETIC RETINOPATHY: POSITIVE
RIGHT EYE DIABETIC RETINOPATHY: POSITIVE

## 2025-05-07 ENCOUNTER — APPOINTMENT (OUTPATIENT)
Dept: LAB | Facility: IMAGING CENTER | Age: 70
End: 2025-05-07
Payer: COMMERCIAL

## 2025-05-07 DIAGNOSIS — Z12.5 SCREENING FOR PROSTATE CANCER: ICD-10-CM

## 2025-05-07 DIAGNOSIS — E11.65 TYPE 2 DIABETES MELLITUS WITH HYPERGLYCEMIA, WITHOUT LONG-TERM CURRENT USE OF INSULIN (HCC): ICD-10-CM

## 2025-05-07 LAB
ALBUMIN SERPL BCG-MCNC: 4.4 G/DL (ref 3.5–5)
ALP SERPL-CCNC: 32 U/L (ref 34–104)
ALT SERPL W P-5'-P-CCNC: 22 U/L (ref 7–52)
ANION GAP SERPL CALCULATED.3IONS-SCNC: 9 MMOL/L (ref 4–13)
AST SERPL W P-5'-P-CCNC: 27 U/L (ref 13–39)
BASOPHILS # BLD AUTO: 0.04 THOUSANDS/ÂΜL (ref 0–0.1)
BASOPHILS NFR BLD AUTO: 1 % (ref 0–1)
BILIRUB SERPL-MCNC: 0.65 MG/DL (ref 0.2–1)
BUN SERPL-MCNC: 29 MG/DL (ref 5–25)
CALCIUM SERPL-MCNC: 9.7 MG/DL (ref 8.4–10.2)
CHLORIDE SERPL-SCNC: 100 MMOL/L (ref 96–108)
CHOLEST SERPL-MCNC: 142 MG/DL (ref ?–200)
CO2 SERPL-SCNC: 24 MMOL/L (ref 21–32)
CREAT SERPL-MCNC: 1.52 MG/DL (ref 0.6–1.3)
EOSINOPHIL # BLD AUTO: 0.08 THOUSAND/ÂΜL (ref 0–0.61)
EOSINOPHIL NFR BLD AUTO: 2 % (ref 0–6)
ERYTHROCYTE [DISTWIDTH] IN BLOOD BY AUTOMATED COUNT: 12.8 % (ref 11.6–15.1)
EST. AVERAGE GLUCOSE BLD GHB EST-MCNC: 160 MG/DL
GFR SERPL CREATININE-BSD FRML MDRD: 45 ML/MIN/1.73SQ M
GLUCOSE P FAST SERPL-MCNC: 157 MG/DL (ref 65–99)
HBA1C MFR BLD: 7.2 %
HCT VFR BLD AUTO: 34.2 % (ref 36.5–49.3)
HDLC SERPL-MCNC: 49 MG/DL
HGB BLD-MCNC: 11.5 G/DL (ref 12–17)
IMM GRANULOCYTES # BLD AUTO: 0.01 THOUSAND/UL (ref 0–0.2)
IMM GRANULOCYTES NFR BLD AUTO: 0 % (ref 0–2)
LDLC SERPL CALC-MCNC: 76 MG/DL (ref 0–100)
LYMPHOCYTES # BLD AUTO: 1.26 THOUSANDS/ÂΜL (ref 0.6–4.47)
LYMPHOCYTES NFR BLD AUTO: 28 % (ref 14–44)
MCH RBC QN AUTO: 30.2 PG (ref 26.8–34.3)
MCHC RBC AUTO-ENTMCNC: 33.6 G/DL (ref 31.4–37.4)
MCV RBC AUTO: 90 FL (ref 82–98)
MONOCYTES # BLD AUTO: 0.37 THOUSAND/ÂΜL (ref 0.17–1.22)
MONOCYTES NFR BLD AUTO: 8 % (ref 4–12)
NEUTROPHILS # BLD AUTO: 2.74 THOUSANDS/ÂΜL (ref 1.85–7.62)
NEUTS SEG NFR BLD AUTO: 61 % (ref 43–75)
NRBC BLD AUTO-RTO: 0 /100 WBCS
PLATELET # BLD AUTO: 156 THOUSANDS/UL (ref 149–390)
PMV BLD AUTO: 10.3 FL (ref 8.9–12.7)
POTASSIUM SERPL-SCNC: 4.5 MMOL/L (ref 3.5–5.3)
PROT SERPL-MCNC: 7 G/DL (ref 6.4–8.4)
PSA SERPL-MCNC: 0.29 NG/ML (ref 0–4)
RBC # BLD AUTO: 3.81 MILLION/UL (ref 3.88–5.62)
SODIUM SERPL-SCNC: 133 MMOL/L (ref 135–147)
TRIGL SERPL-MCNC: 85 MG/DL (ref ?–150)
TSH SERPL DL<=0.05 MIU/L-ACNC: 2.17 UIU/ML (ref 0.45–4.5)
WBC # BLD AUTO: 4.5 THOUSAND/UL (ref 4.31–10.16)

## 2025-05-07 PROCEDURE — 80053 COMPREHEN METABOLIC PANEL: CPT

## 2025-05-07 PROCEDURE — 80061 LIPID PANEL: CPT

## 2025-05-07 PROCEDURE — G0103 PSA SCREENING: HCPCS

## 2025-05-07 PROCEDURE — 85025 COMPLETE CBC W/AUTO DIFF WBC: CPT

## 2025-05-07 PROCEDURE — 83036 HEMOGLOBIN GLYCOSYLATED A1C: CPT

## 2025-05-07 PROCEDURE — 36415 COLL VENOUS BLD VENIPUNCTURE: CPT

## 2025-05-07 PROCEDURE — 84443 ASSAY THYROID STIM HORMONE: CPT

## 2025-05-27 DIAGNOSIS — E11.65 TYPE 2 DIABETES MELLITUS WITH HYPERGLYCEMIA, WITHOUT LONG-TERM CURRENT USE OF INSULIN (HCC): ICD-10-CM

## 2025-05-28 RX ORDER — GLIPIZIDE 10 MG/1
20 TABLET, FILM COATED, EXTENDED RELEASE ORAL DAILY
Qty: 180 TABLET | Refills: 1 | Status: SHIPPED | OUTPATIENT
Start: 2025-05-28 | End: 2025-11-24

## 2025-06-04 ENCOUNTER — OFFICE VISIT (OUTPATIENT)
Dept: INTERNAL MEDICINE CLINIC | Facility: OTHER | Age: 70
End: 2025-06-04
Payer: COMMERCIAL

## 2025-06-04 VITALS
BODY MASS INDEX: 31.21 KG/M2 | HEIGHT: 70 IN | TEMPERATURE: 98.2 F | HEART RATE: 85 BPM | OXYGEN SATURATION: 97 % | DIASTOLIC BLOOD PRESSURE: 80 MMHG | WEIGHT: 218 LBS | SYSTOLIC BLOOD PRESSURE: 136 MMHG

## 2025-06-04 DIAGNOSIS — R73.01 ELEVATED FASTING GLUCOSE: ICD-10-CM

## 2025-06-04 DIAGNOSIS — E11.3293 TYPE 2 DIABETES MELLITUS WITH BOTH EYES AFFECTED BY MILD NONPROLIFERATIVE RETINOPATHY WITHOUT MACULAR EDEMA, WITHOUT LONG-TERM CURRENT USE OF INSULIN (HCC): ICD-10-CM

## 2025-06-04 DIAGNOSIS — R80.9 MICROALBUMINURIA: ICD-10-CM

## 2025-06-04 DIAGNOSIS — E11.628 DIABETIC FOOT INFECTION  (HCC): ICD-10-CM

## 2025-06-04 DIAGNOSIS — L97.519 ULCER OF RIGHT FOOT, UNSPECIFIED ULCER STAGE (HCC): ICD-10-CM

## 2025-06-04 DIAGNOSIS — I10 ESSENTIAL HYPERTENSION: ICD-10-CM

## 2025-06-04 DIAGNOSIS — Z13.228 SCREENING FOR METABOLIC DISORDER: Primary | ICD-10-CM

## 2025-06-04 DIAGNOSIS — E87.1 HYPONATREMIA: ICD-10-CM

## 2025-06-04 DIAGNOSIS — L08.9 DIABETIC FOOT INFECTION  (HCC): ICD-10-CM

## 2025-06-04 DIAGNOSIS — E55.9 VITAMIN D DEFICIENCY: ICD-10-CM

## 2025-06-04 DIAGNOSIS — D69.6 PLATELETS DECREASED (HCC): ICD-10-CM

## 2025-06-04 DIAGNOSIS — M81.0 OSTEOPOROSIS WITHOUT CURRENT PATHOLOGICAL FRACTURE, UNSPECIFIED OSTEOPOROSIS TYPE: ICD-10-CM

## 2025-06-04 DIAGNOSIS — E78.2 MIXED HYPERLIPIDEMIA: ICD-10-CM

## 2025-06-04 DIAGNOSIS — Z89.411 ACQUIRED ABSENCE OF RIGHT GREAT TOE (HCC): ICD-10-CM

## 2025-06-04 DIAGNOSIS — R71.0 DROP IN HEMOGLOBIN: ICD-10-CM

## 2025-06-04 DIAGNOSIS — I73.9 PERIPHERAL VASCULAR DISEASE (HCC): ICD-10-CM

## 2025-06-04 DIAGNOSIS — E78.5 HYPERLIPIDEMIA, UNSPECIFIED HYPERLIPIDEMIA TYPE: ICD-10-CM

## 2025-06-04 DIAGNOSIS — N18.9 CHRONIC KIDNEY DISEASE, UNSPECIFIED CKD STAGE: ICD-10-CM

## 2025-06-04 PROCEDURE — 99214 OFFICE O/P EST MOD 30 MIN: CPT | Performed by: NURSE PRACTITIONER

## 2025-06-04 PROCEDURE — G2211 COMPLEX E/M VISIT ADD ON: HCPCS | Performed by: NURSE PRACTITIONER

## 2025-06-04 NOTE — ASSESSMENT & PLAN NOTE
Lab Results   Component Value Date    HGBA1C 7.2 (H) 05/07/2025   Uncontrolled, continue on Actos, and glipizide to 20 mg tablet extended release  -RESTART Metformin 500mg BID     Orders:    metFORMIN (GLUCOPHAGE) 500 mg tablet; Take 1 tablet (500 mg total) by mouth 2 (two) times a day with meals

## 2025-06-04 NOTE — ASSESSMENT & PLAN NOTE
Lab Results   Component Value Date    EGFR 45 05/07/2025    EGFR 37 12/05/2024    EGFR 49 11/11/2024    CREATININE 1.52 (H) 05/07/2025    CREATININE 1.78 (H) 12/05/2024    CREATININE 1.42 (H) 11/11/2024     -continue to follow nephrology   -avoid nephrotoxins   -encouraged good oral hydration

## 2025-06-04 NOTE — ASSESSMENT & PLAN NOTE
DEXA scan from 10/2018 shows a T-score of -2.1.  Continue with calcium 1200 mg and vitamin-D 2000 units daily supplementation.    Orders:    DXA bone density spine hip and pelvis; Future

## 2025-06-04 NOTE — PROGRESS NOTES
Name: Cecilio Munoz      : 1955      MRN: [id number]  Encounter Provider: SANDRA Cary  Encounter Date: 2025   Encounter department: [facility] [place]  :  Assessment & Plan  Screening for metabolic disorder    Orders:    Comprehensive metabolic panel    CBC and differential    Lipid panel    Hyperlipidemia, unspecified hyperlipidemia type    Orders:    Lipid panel    Elevated fasting glucose    Orders:    Hemoglobin A1C    Osteoporosis without current pathological fracture, unspecified osteoporosis type  DEXA scan from 10/2018 shows a T-score of -2.1.  Continue with calcium 1200 mg and vitamin-D 2000 units daily supplementation.    Orders:    DXA bone density spine hip and pelvis; Future      Type 2 diabetes mellitus with both eyes affected by mild nonproliferative retinopathy without macular edema, without long-term current use of insulin (HCC)    Lab Results   Component Value Date    HGBA1C 7.2 (H) 2025   Uncontrolled, continue on Actos, and glipizide to 20 mg tablet extended release  -RESTART Metformin 500mg BID     Orders:    metFORMIN (GLUCOPHAGE) 500 mg tablet; Take 1 tablet (500 mg total) by mouth 2 (two) times a day with meals      Ulcer of right foot, unspecified ulcer stage (HCC)  Patient is status post amputation of right great toe on 12/15/2020 with I&D.           Peripheral vascular disease (HCC)         Essential hypertension  Patient reports blood pressures are well-controlled at home  Continue Norvasc 5 mg tablet daily, Cardura 2 mg tablet daily  Continue to monitor blood pressure at home.  Goal BP is < 130/80.  Contact our office for consistent elevations.  Recommend low sodium diet.  Exercise 30 minutes 5 times a week as tolerated.  Recommend yearly eye exam.              Diabetic foot infection  (HCC)    Lab Results   Component Value Date    HGBA1C 7.2 (H) 2025            Chronic kidney disease, unspecified CKD stage  Lab Results    Component Value Date    EGFR 45 05/07/2025    EGFR 37 12/05/2024    EGFR 49 11/11/2024    CREATININE 1.52 (H) 05/07/2025    CREATININE 1.78 (H) 12/05/2024    CREATININE 1.42 (H) 11/11/2024     -continue to follow nephrology   -avoid nephrotoxins   -encouraged good oral hydration              Platelets decreased (HCC)  -continue to monitor CBC          Drop in hemoglobin  hbg improving since starting multiviatmin.  Denies blood in urine and stool.  Cologuard negative which was done this year.   Iron and B12 panel Within normal range.            Acquired absence of right great toe (HCC)         Vitamin D deficiency  Continue supplementation           Mixed hyperlipidemia  Patient will continue on Lipitor 10 mg tablet.                  Microalbuminuria  -ensure good DM control   -continue to monitor                   Hyponatremia  -will repeat CMP and get TSH   -continue to monitor               Depression Screening and Follow-up Plan: Patient was screened for depression during today's encounter. They screened negative with a PHQ-2 score of 0.        History of Present Illness   Patient presents for 6 month follow up for his chronic conditions. Reviewed lab work from 5/7/2025 during visit.   Patient denies any concerns at this time.     HTN- managed on the amlodipine. Denies any SOB, chest pain, dizziness    Reports palpitations at time. Reports it happens when he gets nervous. Says he does get nervous too coming to the doctors office.     HLD- stable on lipitor. Chol 142, tri 85, LDL 76, HDL 49    DM- A1C 7.2. fasting  managed with glipizide and actos. States he does monitor his BS at home runs between 130-150. Denies symptoms of low blood sugar    CKD 3- GFR 45. Currently still on cardura, now following nephrology     Reports he sleeps better some days than others. Reports he does take naps during the day.     Does go for routine eye exams. Reports he did have his cataracts removed.   Reports he does not  "attend routine dental visits.     Hypertension  Pertinent negatives include no chest pain, headaches, neck pain, palpitations or shortness of breath.   Diabetes  Pertinent negatives for hypoglycemia include no dizziness or headaches. Pertinent negatives for diabetes include no chest pain, no polydipsia, no polyphagia, no polyuria and no weakness.   Hyperlipidemia  Pertinent negatives include no chest pain or shortness of breath.     Review of Systems   Constitutional:  Negative for activity change, appetite change, chills, diaphoresis and fever.   HENT:  Negative for congestion, ear discharge, ear pain, postnasal drip, rhinorrhea, sinus pressure, sinus pain and sore throat.    Eyes:  Negative for pain, discharge, itching and visual disturbance.   Respiratory:  Negative for cough, chest tightness, shortness of breath and wheezing.    Cardiovascular:  Negative for chest pain, palpitations and leg swelling.   Gastrointestinal:  Negative for abdominal pain, constipation, diarrhea, nausea and vomiting.   Endocrine: Negative for polydipsia, polyphagia and polyuria.   Genitourinary:  Negative for difficulty urinating, dysuria and urgency.   Musculoskeletal:  Negative for arthralgias, back pain and neck pain.   Skin:  Negative for rash and wound.   Neurological:  Negative for dizziness, weakness, numbness and headaches.       Objective   /80 (BP Location: Left arm, Patient Position: Sitting, Cuff Size: Adult)   Pulse 85   Temp 98.2 °F (36.8 °C)   Ht 5' 10\" (1.778 m)   Wt 98.9 kg (218 lb)   SpO2 97%   BMI 31.28 kg/m²      Physical Exam  Constitutional:       General: He is not in acute distress.     Appearance: He is well-developed. He is not diaphoretic.   HENT:      Head: Normocephalic and atraumatic.      Right Ear: External ear normal.      Left Ear: External ear normal.      Nose: Nose normal.      Mouth/Throat:      Mouth: Mucous membranes are moist.      Pharynx: No oropharyngeal exudate or posterior " oropharyngeal erythema.     Eyes:      General:         Right eye: No discharge.         Left eye: No discharge.      Conjunctiva/sclera: Conjunctivae normal.      Pupils: Pupils are equal, round, and reactive to light.     Neck:      Thyroid: No thyromegaly.     Cardiovascular:      Rate and Rhythm: Normal rate and regular rhythm.      Heart sounds: Normal heart sounds. No murmur heard.     No friction rub. No gallop.   Pulmonary:      Effort: Pulmonary effort is normal. No respiratory distress.      Breath sounds: Normal breath sounds. No stridor. No wheezing or rales.   Abdominal:      General: Bowel sounds are normal. There is no distension.      Palpations: Abdomen is soft.      Tenderness: There is no abdominal tenderness.     Musculoskeletal:      Cervical back: Normal range of motion and neck supple.   Lymphadenopathy:      Cervical: No cervical adenopathy.     Skin:     General: Skin is warm and dry.      Findings: No erythema or rash.     Neurological:      Mental Status: He is alert and oriented to person, place, and time.     Psychiatric:         Behavior: Behavior normal.         Thought Content: Thought content normal.         Judgment: Judgment normal.

## 2025-06-04 NOTE — ASSESSMENT & PLAN NOTE
Patient reports blood pressures are well-controlled at home  Continue Norvasc 5 mg tablet daily, Cardura 2 mg tablet daily  Continue to monitor blood pressure at home.  Goal BP is < 130/80.  Contact our office for consistent elevations.  Recommend low sodium diet.  Exercise 30 minutes 5 times a week as tolerated.  Recommend yearly eye exam.

## 2025-06-04 NOTE — PROGRESS NOTES
"Name: Cecilio Munoz      : 1955      MRN: [id number]  Encounter Provider: SANDRA Cary  Encounter Date: 2025   Encounter department: [facility]  :  Assessment & Plan           History of Present Illness {?Quick Links Encounters * My Last Note * Last Note in Specialty * Snapshot * Since Last Visit * History :04639}  HPI  Review of Systems    Objective {?Quick Links Trend Vitals * Enter New Vitals * Results Review * Timeline (Adult) * Labs * Imaging * Cardiology * Procedures * Lung Cancer Screening * Surgical eConsent :71590}  Ht 5' 10\" (1.778 m)   Wt 98.9 kg (218 lb)   BMI 31.28 kg/m²      Physical Exam  {Administrative / Billing Section (Optional):02769}  "

## 2025-07-15 ENCOUNTER — HOSPITAL ENCOUNTER (OUTPATIENT)
Dept: RADIOLOGY | Facility: IMAGING CENTER | Age: 70
Discharge: HOME | End: 2025-07-15
Attending: NURSE PRACTITIONER
Payer: COMMERCIAL

## 2025-07-15 VITALS — HEIGHT: 68 IN | BODY MASS INDEX: 31.52 KG/M2 | WEIGHT: 208 LBS

## 2025-07-15 DIAGNOSIS — M81.0 OSTEOPOROSIS WITHOUT CURRENT PATHOLOGICAL FRACTURE, UNSPECIFIED OSTEOPOROSIS TYPE: ICD-10-CM

## 2025-07-15 PROCEDURE — 77080 DXA BONE DENSITY AXIAL: CPT

## 2025-07-17 DIAGNOSIS — I10 ESSENTIAL HYPERTENSION: ICD-10-CM

## 2025-07-17 DIAGNOSIS — E78.5 HYPERLIPIDEMIA, UNSPECIFIED HYPERLIPIDEMIA TYPE: ICD-10-CM

## 2025-07-17 DIAGNOSIS — E11.3293 TYPE 2 DIABETES MELLITUS WITH BOTH EYES AFFECTED BY MILD NONPROLIFERATIVE RETINOPATHY WITHOUT MACULAR EDEMA, WITHOUT LONG-TERM CURRENT USE OF INSULIN (HCC): ICD-10-CM

## 2025-07-17 DIAGNOSIS — N18.9 CHRONIC KIDNEY DISEASE, UNSPECIFIED CKD STAGE: ICD-10-CM

## 2025-07-18 RX ORDER — ATORVASTATIN CALCIUM 10 MG/1
10 TABLET, FILM COATED ORAL DAILY
Qty: 90 TABLET | Refills: 0 | OUTPATIENT
Start: 2025-07-18

## 2025-07-18 RX ORDER — DOXAZOSIN 2 MG/1
2 TABLET ORAL 2 TIMES DAILY
Qty: 180 TABLET | Refills: 0 | OUTPATIENT
Start: 2025-07-18

## 2025-07-18 RX ORDER — PIOGLITAZONE 30 MG/1
30 TABLET ORAL DAILY
Qty: 90 TABLET | Refills: 0 | OUTPATIENT
Start: 2025-07-18

## 2025-07-18 RX ORDER — AMLODIPINE BESYLATE 5 MG/1
5 TABLET ORAL DAILY
Qty: 90 TABLET | Refills: 0 | OUTPATIENT
Start: 2025-07-18

## 2025-08-08 ENCOUNTER — APPOINTMENT (OUTPATIENT)
Dept: LAB | Facility: IMAGING CENTER | Age: 70
End: 2025-08-08
Payer: COMMERCIAL

## 2025-08-08 ENCOUNTER — TELEPHONE (OUTPATIENT)
Age: 70
End: 2025-08-08

## 2025-08-14 ENCOUNTER — TELEPHONE (OUTPATIENT)
Dept: NEPHROLOGY | Facility: CLINIC | Age: 70
End: 2025-08-14

## 2025-08-21 ENCOUNTER — OFFICE VISIT (OUTPATIENT)
Dept: NEPHROLOGY | Facility: CLINIC | Age: 70
End: 2025-08-21
Payer: COMMERCIAL

## 2025-08-21 VITALS
HEART RATE: 70 BPM | DIASTOLIC BLOOD PRESSURE: 70 MMHG | WEIGHT: 210 LBS | SYSTOLIC BLOOD PRESSURE: 132 MMHG | BODY MASS INDEX: 31.83 KG/M2 | HEIGHT: 68 IN

## 2025-08-21 DIAGNOSIS — R80.9 MICROALBUMINURIA: ICD-10-CM

## 2025-08-21 DIAGNOSIS — N18.9 CHRONIC KIDNEY DISEASE, UNSPECIFIED CKD STAGE: Primary | ICD-10-CM

## 2025-08-21 PROCEDURE — 99214 OFFICE O/P EST MOD 30 MIN: CPT | Performed by: INTERNAL MEDICINE

## (undated) DEVICE — REM POLYHESIVE ADULT PATIENT RETURN ELECTRODE: Brand: VALLEYLAB

## (undated) DEVICE — DRAPE EQUIPMENT RF WAND

## (undated) DEVICE — SUT VICRYL 2-0 CTB-1 36 IN JB945

## (undated) DEVICE — 2.5MM REAMING ROD WITH BALL TIP/950MM-STERILE

## (undated) DEVICE — DRESSING MEPILEX AG BORDER 4 X 4 IN

## (undated) DEVICE — ARTHROSCOPY FLOOR MAT

## (undated) DEVICE — INTENDED FOR TISSUE SEPARATION, AND OTHER PROCEDURES THAT REQUIRE A SHARP SURGICAL BLADE TO PUNCTURE OR CUT.: Brand: BARD-PARKER SAFETY BLADES SIZE 10, STERILE

## (undated) DEVICE — CHLORAPREP HI-LITE 26ML ORANGE

## (undated) DEVICE — ACE WRAP 6 IN UNSTERILE

## (undated) DEVICE — SUT VICRYL PLUS 0 CTB-1 27 IN VCPB260H

## (undated) DEVICE — 3.2MM GUIDE WIRE 400MM

## (undated) DEVICE — SPONGE PVP SCRUB WING STERILE

## (undated) DEVICE — STERILE ORIF HIP PACK: Brand: CARDINAL HEALTH

## (undated) DEVICE — SPONGE GAUZE 4 X 9

## (undated) DEVICE — SOFT SILICONE HYDROCELLULAR FOAM DRESSING WITH LOCK AWAY LAYER: Brand: ALLEVYN LIFE M 12.9X12.9 CTN10

## (undated) DEVICE — 4.0MM THREE-FLUTED DRILL BIT QC/260MM/65MM CALIBRATION

## (undated) DEVICE — 6617 IOBAN II PATIENT ISOLATION DRAPE 5/BX,4BX/CS: Brand: STERI-DRAPE™ IOBAN™ 2

## (undated) DEVICE — DISPOSABLE EQUIPMENT COVER: Brand: SMALL TOWEL DRAPE

## (undated) DEVICE — DRAPE C-ARMOUR

## (undated) DEVICE — GLOVE INDICATOR PI UNDERGLOVE SZ 8.5 BLUE

## (undated) DEVICE — COBAN 4 IN STERILE

## (undated) DEVICE — GLOVE SRG BIOGEL 8.5